# Patient Record
Sex: FEMALE | Race: WHITE | NOT HISPANIC OR LATINO | Employment: UNEMPLOYED | URBAN - NONMETROPOLITAN AREA
[De-identification: names, ages, dates, MRNs, and addresses within clinical notes are randomized per-mention and may not be internally consistent; named-entity substitution may affect disease eponyms.]

---

## 2017-03-02 ENCOUNTER — ALLSCRIPTS OFFICE VISIT (OUTPATIENT)
Dept: FAMILY MEDICINE CLINIC | Facility: CLINIC | Age: 47
End: 2017-03-02
Payer: COMMERCIAL

## 2017-03-02 DIAGNOSIS — I42.0 DILATED CARDIOMYOPATHY (HCC): ICD-10-CM

## 2017-03-02 DIAGNOSIS — E55.9 VITAMIN D DEFICIENCY: ICD-10-CM

## 2017-03-02 DIAGNOSIS — R00.0 TACHYCARDIA: ICD-10-CM

## 2017-03-02 DIAGNOSIS — I25.10 ATHEROSCLEROTIC HEART DISEASE OF NATIVE CORONARY ARTERY WITHOUT ANGINA PECTORIS: ICD-10-CM

## 2017-03-02 PROCEDURE — T1015 CLINIC SERVICE: HCPCS | Performed by: PHYSICIAN ASSISTANT

## 2017-05-15 ENCOUNTER — ALLSCRIPTS OFFICE VISIT (OUTPATIENT)
Dept: FAMILY MEDICINE CLINIC | Facility: CLINIC | Age: 47
End: 2017-05-15
Payer: COMMERCIAL

## 2017-05-15 ENCOUNTER — APPOINTMENT (OUTPATIENT)
Dept: LAB | Facility: HOSPITAL | Age: 47
End: 2017-05-15
Payer: COMMERCIAL

## 2017-05-15 DIAGNOSIS — E55.9 VITAMIN D DEFICIENCY: ICD-10-CM

## 2017-05-15 DIAGNOSIS — I25.10 ATHEROSCLEROTIC HEART DISEASE OF NATIVE CORONARY ARTERY WITHOUT ANGINA PECTORIS: ICD-10-CM

## 2017-05-15 DIAGNOSIS — I42.0 DILATED CARDIOMYOPATHY (HCC): ICD-10-CM

## 2017-05-15 DIAGNOSIS — R00.0 TACHYCARDIA: ICD-10-CM

## 2017-05-15 LAB
25(OH)D3 SERPL-MCNC: 7.3 NG/ML (ref 30–100)
ALBUMIN SERPL BCP-MCNC: 3.5 G/DL (ref 3.5–5)
ALP SERPL-CCNC: 156 U/L (ref 46–116)
ALT SERPL W P-5'-P-CCNC: 16 U/L (ref 12–78)
ANION GAP SERPL CALCULATED.3IONS-SCNC: 9 MMOL/L (ref 4–13)
AST SERPL W P-5'-P-CCNC: 17 U/L (ref 5–45)
BASOPHILS # BLD AUTO: 0.09 THOUSANDS/ΜL (ref 0–0.1)
BASOPHILS NFR BLD AUTO: 1 % (ref 0–1)
BILIRUB SERPL-MCNC: 0.25 MG/DL (ref 0.2–1)
BUN SERPL-MCNC: 8 MG/DL (ref 5–25)
CALCIUM SERPL-MCNC: 9 MG/DL (ref 8.3–10.1)
CHLORIDE SERPL-SCNC: 109 MMOL/L (ref 100–108)
CHOLEST SERPL-MCNC: 147 MG/DL (ref 50–200)
CO2 SERPL-SCNC: 25 MMOL/L (ref 21–32)
CREAT SERPL-MCNC: 0.75 MG/DL (ref 0.6–1.3)
EOSINOPHIL # BLD AUTO: 0.11 THOUSAND/ΜL (ref 0–0.61)
EOSINOPHIL NFR BLD AUTO: 1 % (ref 0–6)
ERYTHROCYTE [DISTWIDTH] IN BLOOD BY AUTOMATED COUNT: 15 % (ref 11.6–15.1)
GFR SERPL CREATININE-BSD FRML MDRD: >60 ML/MIN/1.73SQ M
GLUCOSE P FAST SERPL-MCNC: 92 MG/DL (ref 65–99)
HCT VFR BLD AUTO: 41.5 % (ref 34.8–46.1)
HDLC SERPL-MCNC: 37 MG/DL (ref 40–60)
HGB BLD-MCNC: 13.3 G/DL (ref 11.5–15.4)
LDLC SERPL CALC-MCNC: 85 MG/DL (ref 0–100)
LYMPHOCYTES # BLD AUTO: 3.51 THOUSANDS/ΜL (ref 0.6–4.47)
LYMPHOCYTES NFR BLD AUTO: 26 % (ref 14–44)
MCH RBC QN AUTO: 28.9 PG (ref 26.8–34.3)
MCHC RBC AUTO-ENTMCNC: 32 G/DL (ref 31.4–37.4)
MCV RBC AUTO: 90 FL (ref 82–98)
MONOCYTES # BLD AUTO: 0.51 THOUSAND/ΜL (ref 0.17–1.22)
MONOCYTES NFR BLD AUTO: 4 % (ref 4–12)
NEUTROPHILS # BLD AUTO: 9.35 THOUSANDS/ΜL (ref 1.85–7.62)
NEUTS SEG NFR BLD AUTO: 68 % (ref 43–75)
NRBC BLD AUTO-RTO: 0 /100 WBCS
PLATELET # BLD AUTO: 438 THOUSANDS/UL (ref 149–390)
PMV BLD AUTO: 10.4 FL (ref 8.9–12.7)
POTASSIUM SERPL-SCNC: 4.5 MMOL/L (ref 3.5–5.3)
PROT SERPL-MCNC: 7.1 G/DL (ref 6.4–8.2)
RBC # BLD AUTO: 4.61 MILLION/UL (ref 3.81–5.12)
SODIUM SERPL-SCNC: 143 MMOL/L (ref 136–145)
TRIGL SERPL-MCNC: 123 MG/DL
TSH SERPL DL<=0.05 MIU/L-ACNC: 0.5 UIU/ML (ref 0.36–3.74)
WBC # BLD AUTO: 13.61 THOUSAND/UL (ref 4.31–10.16)

## 2017-05-15 PROCEDURE — T1015 CLINIC SERVICE: HCPCS | Performed by: FAMILY MEDICINE

## 2017-05-15 PROCEDURE — 80053 COMPREHEN METABOLIC PANEL: CPT

## 2017-05-15 PROCEDURE — 82306 VITAMIN D 25 HYDROXY: CPT

## 2017-05-15 PROCEDURE — 80061 LIPID PANEL: CPT

## 2017-05-15 PROCEDURE — 84443 ASSAY THYROID STIM HORMONE: CPT

## 2017-05-15 PROCEDURE — 36415 COLL VENOUS BLD VENIPUNCTURE: CPT

## 2017-05-15 PROCEDURE — 85025 COMPLETE CBC W/AUTO DIFF WBC: CPT

## 2017-07-12 ENCOUNTER — ALLSCRIPTS OFFICE VISIT (OUTPATIENT)
Dept: OTHER | Facility: OTHER | Age: 47
End: 2017-07-12

## 2017-07-27 ENCOUNTER — GENERIC CONVERSION - ENCOUNTER (OUTPATIENT)
Dept: OTHER | Facility: OTHER | Age: 47
End: 2017-07-27

## 2017-07-27 ENCOUNTER — APPOINTMENT (EMERGENCY)
Dept: CT IMAGING | Facility: HOSPITAL | Age: 47
End: 2017-07-27
Payer: COMMERCIAL

## 2017-07-27 ENCOUNTER — HOSPITAL ENCOUNTER (OUTPATIENT)
Facility: HOSPITAL | Age: 47
Setting detail: OBSERVATION
Discharge: HOME | End: 2017-07-28
Attending: EMERGENCY MEDICINE | Admitting: INTERNAL MEDICINE
Payer: COMMERCIAL

## 2017-07-27 DIAGNOSIS — I25.810 CORONARY ARTERY DISEASE INVOLVING CORONARY BYPASS GRAFT: Chronic | ICD-10-CM

## 2017-07-27 DIAGNOSIS — G45.9 BRAIN TIA: Primary | ICD-10-CM

## 2017-07-27 LAB
ALBUMIN SERPL BCP-MCNC: 3.4 G/DL (ref 3.5–5)
ALP SERPL-CCNC: 140 U/L (ref 46–116)
ALT SERPL W P-5'-P-CCNC: 16 U/L (ref 12–78)
ANION GAP SERPL CALCULATED.3IONS-SCNC: 10 MMOL/L (ref 4–13)
AST SERPL W P-5'-P-CCNC: 18 U/L (ref 5–45)
BASOPHILS # BLD AUTO: 0.08 THOUSANDS/ΜL (ref 0–0.1)
BASOPHILS NFR BLD AUTO: 1 % (ref 0–1)
BILIRUB SERPL-MCNC: 0.2 MG/DL (ref 0.2–1)
BUN SERPL-MCNC: 9 MG/DL (ref 5–25)
CALCIUM SERPL-MCNC: 8.6 MG/DL (ref 8.3–10.1)
CHLORIDE SERPL-SCNC: 104 MMOL/L (ref 100–108)
CO2 SERPL-SCNC: 26 MMOL/L (ref 21–32)
CREAT SERPL-MCNC: 0.85 MG/DL (ref 0.6–1.3)
EOSINOPHIL # BLD AUTO: 0.12 THOUSAND/ΜL (ref 0–0.61)
EOSINOPHIL NFR BLD AUTO: 1 % (ref 0–6)
ERYTHROCYTE [DISTWIDTH] IN BLOOD BY AUTOMATED COUNT: 15 % (ref 11.6–15.1)
GFR SERPL CREATININE-BSD FRML MDRD: 82 ML/MIN/1.73SQ M
GLUCOSE SERPL-MCNC: 68 MG/DL (ref 65–140)
HCT VFR BLD AUTO: 41.2 % (ref 34.8–46.1)
HGB BLD-MCNC: 13.6 G/DL (ref 11.5–15.4)
INR PPP: 0.94 (ref 0.86–1.16)
LYMPHOCYTES # BLD AUTO: 3.66 THOUSANDS/ΜL (ref 0.6–4.47)
LYMPHOCYTES NFR BLD AUTO: 30 % (ref 14–44)
MCH RBC QN AUTO: 28.8 PG (ref 26.8–34.3)
MCHC RBC AUTO-ENTMCNC: 33 G/DL (ref 31.4–37.4)
MCV RBC AUTO: 87 FL (ref 82–98)
MONOCYTES # BLD AUTO: 0.77 THOUSAND/ΜL (ref 0.17–1.22)
MONOCYTES NFR BLD AUTO: 6 % (ref 4–12)
NEUTROPHILS # BLD AUTO: 7.47 THOUSANDS/ΜL (ref 1.85–7.62)
NEUTS SEG NFR BLD AUTO: 62 % (ref 43–75)
PLATELET # BLD AUTO: 427 THOUSANDS/UL (ref 149–390)
PMV BLD AUTO: 9.4 FL (ref 8.9–12.7)
POTASSIUM SERPL-SCNC: 3.1 MMOL/L (ref 3.5–5.3)
PROT SERPL-MCNC: 7.4 G/DL (ref 6.4–8.2)
PROTHROMBIN TIME: 12.5 SECONDS (ref 12.1–14.4)
RBC # BLD AUTO: 4.72 MILLION/UL (ref 3.81–5.12)
SODIUM SERPL-SCNC: 140 MMOL/L (ref 136–145)
SPECIMEN SOURCE: NORMAL
TROPONIN I BLD-MCNC: 0 NG/ML (ref 0–0.08)
TSH SERPL DL<=0.05 MIU/L-ACNC: 1.22 UIU/ML (ref 0.36–3.74)
WBC # BLD AUTO: 12.1 THOUSAND/UL (ref 4.31–10.16)

## 2017-07-27 PROCEDURE — 36415 COLL VENOUS BLD VENIPUNCTURE: CPT | Performed by: EMERGENCY MEDICINE

## 2017-07-27 PROCEDURE — 85025 COMPLETE CBC W/AUTO DIFF WBC: CPT | Performed by: EMERGENCY MEDICINE

## 2017-07-27 PROCEDURE — 84484 ASSAY OF TROPONIN QUANT: CPT

## 2017-07-27 PROCEDURE — 80053 COMPREHEN METABOLIC PANEL: CPT | Performed by: EMERGENCY MEDICINE

## 2017-07-27 PROCEDURE — 70450 CT HEAD/BRAIN W/O DYE: CPT

## 2017-07-27 PROCEDURE — 83880 ASSAY OF NATRIURETIC PEPTIDE: CPT | Performed by: INTERNAL MEDICINE

## 2017-07-27 PROCEDURE — 85610 PROTHROMBIN TIME: CPT | Performed by: EMERGENCY MEDICINE

## 2017-07-27 PROCEDURE — 93005 ELECTROCARDIOGRAM TRACING: CPT | Performed by: EMERGENCY MEDICINE

## 2017-07-27 PROCEDURE — 84443 ASSAY THYROID STIM HORMONE: CPT | Performed by: EMERGENCY MEDICINE

## 2017-07-28 ENCOUNTER — APPOINTMENT (OUTPATIENT)
Dept: OCCUPATIONAL THERAPY | Facility: HOSPITAL | Age: 47
End: 2017-07-28
Payer: COMMERCIAL

## 2017-07-28 ENCOUNTER — APPOINTMENT (OUTPATIENT)
Dept: ULTRASOUND IMAGING | Facility: HOSPITAL | Age: 47
End: 2017-07-28
Payer: COMMERCIAL

## 2017-07-28 ENCOUNTER — APPOINTMENT (OUTPATIENT)
Dept: RADIOLOGY | Facility: HOSPITAL | Age: 47
End: 2017-07-28
Payer: COMMERCIAL

## 2017-07-28 ENCOUNTER — APPOINTMENT (OUTPATIENT)
Dept: CT IMAGING | Facility: HOSPITAL | Age: 47
End: 2017-07-28
Payer: COMMERCIAL

## 2017-07-28 ENCOUNTER — GENERIC CONVERSION - ENCOUNTER (OUTPATIENT)
Dept: OTHER | Facility: OTHER | Age: 47
End: 2017-07-28

## 2017-07-28 ENCOUNTER — APPOINTMENT (OUTPATIENT)
Dept: MRI IMAGING | Facility: HOSPITAL | Age: 47
End: 2017-07-28
Payer: COMMERCIAL

## 2017-07-28 ENCOUNTER — APPOINTMENT (OUTPATIENT)
Dept: NON INVASIVE DIAGNOSTICS | Facility: HOSPITAL | Age: 47
End: 2017-07-28
Payer: COMMERCIAL

## 2017-07-28 ENCOUNTER — APPOINTMENT (OUTPATIENT)
Dept: PHYSICAL THERAPY | Facility: HOSPITAL | Age: 47
End: 2017-07-28
Payer: COMMERCIAL

## 2017-07-28 VITALS
SYSTOLIC BLOOD PRESSURE: 122 MMHG | OXYGEN SATURATION: 95 % | WEIGHT: 158.51 LBS | HEART RATE: 66 BPM | TEMPERATURE: 99 F | HEIGHT: 66 IN | BODY MASS INDEX: 25.47 KG/M2 | DIASTOLIC BLOOD PRESSURE: 61 MMHG | RESPIRATION RATE: 16 BRPM

## 2017-07-28 PROBLEM — G45.9 BRAIN TIA: Status: ACTIVE | Noted: 2017-07-28

## 2017-07-28 PROBLEM — R20.0 LEFT SIDED NUMBNESS: Status: ACTIVE | Noted: 2017-07-28

## 2017-07-28 PROBLEM — R06.00 DOE (DYSPNEA ON EXERTION): Status: ACTIVE | Noted: 2017-07-28

## 2017-07-28 PROBLEM — R06.09 DOE (DYSPNEA ON EXERTION): Status: ACTIVE | Noted: 2017-07-28

## 2017-07-28 PROBLEM — I65.22 LEFT CAROTID STENOSIS: Chronic | Status: ACTIVE | Noted: 2017-07-28

## 2017-07-28 PROBLEM — M79.606 LEG PAIN: Status: ACTIVE | Noted: 2017-07-28

## 2017-07-28 LAB
ALBUMIN SERPL BCP-MCNC: 3.1 G/DL (ref 3.5–5)
ALP SERPL-CCNC: 133 U/L (ref 46–116)
ALT SERPL W P-5'-P-CCNC: 11 U/L (ref 12–78)
ANION GAP SERPL CALCULATED.3IONS-SCNC: 9 MMOL/L (ref 4–13)
AST SERPL W P-5'-P-CCNC: 18 U/L (ref 5–45)
ATRIAL RATE: 87 BPM
BILIRUB SERPL-MCNC: 0.2 MG/DL (ref 0.2–1)
BUN SERPL-MCNC: 7 MG/DL (ref 5–25)
CALCIUM SERPL-MCNC: 8.6 MG/DL (ref 8.3–10.1)
CHLORIDE SERPL-SCNC: 107 MMOL/L (ref 100–108)
CHOLEST SERPL-MCNC: 187 MG/DL (ref 50–200)
CO2 SERPL-SCNC: 24 MMOL/L (ref 21–32)
CREAT SERPL-MCNC: 0.74 MG/DL (ref 0.6–1.3)
DEPRECATED D DIMER PPP: 614 NG/ML (FEU) (ref 0–424)
ERYTHROCYTE [DISTWIDTH] IN BLOOD BY AUTOMATED COUNT: 15 % (ref 11.6–15.1)
EST. AVERAGE GLUCOSE BLD GHB EST-MCNC: 126 MG/DL
GFR SERPL CREATININE-BSD FRML MDRD: 97 ML/MIN/1.73SQ M
GLUCOSE P FAST SERPL-MCNC: 91 MG/DL (ref 65–99)
GLUCOSE SERPL-MCNC: 91 MG/DL (ref 65–140)
HBA1C MFR BLD: 6 % (ref 4.2–6.3)
HCT VFR BLD AUTO: 40.1 % (ref 34.8–46.1)
HDLC SERPL-MCNC: 28 MG/DL (ref 40–60)
HGB BLD-MCNC: 13.1 G/DL (ref 11.5–15.4)
LDLC SERPL CALC-MCNC: 127 MG/DL (ref 0–100)
MAGNESIUM SERPL-MCNC: 1.9 MG/DL (ref 1.6–2.6)
MCH RBC QN AUTO: 28.4 PG (ref 26.8–34.3)
MCHC RBC AUTO-ENTMCNC: 32.7 G/DL (ref 31.4–37.4)
MCV RBC AUTO: 87 FL (ref 82–98)
NT-PROBNP SERPL-MCNC: 133 PG/ML
P AXIS: 75 DEGREES
PHOSPHATE SERPL-MCNC: 3.3 MG/DL (ref 2.7–4.5)
PLATELET # BLD AUTO: 420 THOUSANDS/UL (ref 149–390)
PMV BLD AUTO: 9.9 FL (ref 8.9–12.7)
POTASSIUM SERPL-SCNC: 3.3 MMOL/L (ref 3.5–5.3)
PR INTERVAL: 160 MS
PROT SERPL-MCNC: 7 G/DL (ref 6.4–8.2)
QRS AXIS: 101 DEGREES
QRSD INTERVAL: 144 MS
QT INTERVAL: 420 MS
QTC INTERVAL: 505 MS
RBC # BLD AUTO: 4.62 MILLION/UL (ref 3.81–5.12)
SODIUM SERPL-SCNC: 140 MMOL/L (ref 136–145)
T WAVE AXIS: 57 DEGREES
TRIGL SERPL-MCNC: 159 MG/DL
VENTRICULAR RATE: 87 BPM
WBC # BLD AUTO: 13.54 THOUSAND/UL (ref 4.31–10.16)

## 2017-07-28 PROCEDURE — 73630 X-RAY EXAM OF FOOT: CPT

## 2017-07-28 PROCEDURE — 70551 MRI BRAIN STEM W/O DYE: CPT

## 2017-07-28 PROCEDURE — 71275 CT ANGIOGRAPHY CHEST: CPT

## 2017-07-28 PROCEDURE — 93306 TTE W/DOPPLER COMPLETE: CPT

## 2017-07-28 PROCEDURE — G8996 SWALLOW CURRENT STATUS: HCPCS

## 2017-07-28 PROCEDURE — 80053 COMPREHEN METABOLIC PANEL: CPT | Performed by: INTERNAL MEDICINE

## 2017-07-28 PROCEDURE — G8998 SWALLOW D/C STATUS: HCPCS

## 2017-07-28 PROCEDURE — 71010 HB CHEST X-RAY 1 VIEW FRONTAL (PORTABLE): CPT

## 2017-07-28 PROCEDURE — 97166 OT EVAL MOD COMPLEX 45 MIN: CPT

## 2017-07-28 PROCEDURE — G8988 SELF CARE GOAL STATUS: HCPCS

## 2017-07-28 PROCEDURE — 93970 EXTREMITY STUDY: CPT

## 2017-07-28 PROCEDURE — 99285 EMERGENCY DEPT VISIT HI MDM: CPT

## 2017-07-28 PROCEDURE — G8997 SWALLOW GOAL STATUS: HCPCS

## 2017-07-28 PROCEDURE — 93880 EXTRACRANIAL BILAT STUDY: CPT

## 2017-07-28 PROCEDURE — 92610 EVALUATE SWALLOWING FUNCTION: CPT

## 2017-07-28 PROCEDURE — G8979 MOBILITY GOAL STATUS: HCPCS | Performed by: PHYSICAL THERAPIST

## 2017-07-28 PROCEDURE — 85027 COMPLETE CBC AUTOMATED: CPT | Performed by: INTERNAL MEDICINE

## 2017-07-28 PROCEDURE — 80061 LIPID PANEL: CPT | Performed by: INTERNAL MEDICINE

## 2017-07-28 PROCEDURE — 97530 THERAPEUTIC ACTIVITIES: CPT

## 2017-07-28 PROCEDURE — 83735 ASSAY OF MAGNESIUM: CPT | Performed by: INTERNAL MEDICINE

## 2017-07-28 PROCEDURE — 97163 PT EVAL HIGH COMPLEX 45 MIN: CPT | Performed by: PHYSICAL THERAPIST

## 2017-07-28 PROCEDURE — G8987 SELF CARE CURRENT STATUS: HCPCS

## 2017-07-28 PROCEDURE — 84100 ASSAY OF PHOSPHORUS: CPT | Performed by: INTERNAL MEDICINE

## 2017-07-28 PROCEDURE — 85379 FIBRIN DEGRADATION QUANT: CPT | Performed by: INTERNAL MEDICINE

## 2017-07-28 PROCEDURE — 83036 HEMOGLOBIN GLYCOSYLATED A1C: CPT | Performed by: INTERNAL MEDICINE

## 2017-07-28 PROCEDURE — G8978 MOBILITY CURRENT STATUS: HCPCS | Performed by: PHYSICAL THERAPIST

## 2017-07-28 PROCEDURE — G8989 SELF CARE D/C STATUS: HCPCS

## 2017-07-28 RX ORDER — GABAPENTIN 400 MG/1
400 CAPSULE ORAL 3 TIMES DAILY
Status: DISCONTINUED | OUTPATIENT
Start: 2017-07-28 | End: 2017-07-28 | Stop reason: HOSPADM

## 2017-07-28 RX ORDER — OXYCODONE HYDROCHLORIDE AND ACETAMINOPHEN 5; 325 MG/1; MG/1
1 TABLET ORAL EVERY 4 HOURS PRN
Status: DISCONTINUED | OUTPATIENT
Start: 2017-07-28 | End: 2017-07-28 | Stop reason: HOSPADM

## 2017-07-28 RX ORDER — ASPIRIN 81 MG/1
81 TABLET, CHEWABLE ORAL DAILY
Status: DISCONTINUED | OUTPATIENT
Start: 2017-07-28 | End: 2017-07-28 | Stop reason: HOSPADM

## 2017-07-28 RX ORDER — NICOTINE 21 MG/24HR
1 PATCH, TRANSDERMAL 24 HOURS TRANSDERMAL DAILY
Status: DISCONTINUED | OUTPATIENT
Start: 2017-07-28 | End: 2017-07-28 | Stop reason: HOSPADM

## 2017-07-28 RX ORDER — ATORVASTATIN CALCIUM 40 MG/1
40 TABLET, FILM COATED ORAL EVERY EVENING
Qty: 30 TABLET | Refills: 0 | Status: SHIPPED | OUTPATIENT
Start: 2017-07-28 | End: 2018-05-03 | Stop reason: SDUPTHER

## 2017-07-28 RX ORDER — OXYCODONE HYDROCHLORIDE AND ACETAMINOPHEN 5; 325 MG/1; MG/1
1 TABLET ORAL EVERY 4 HOURS PRN
Refills: 0
Start: 2017-07-28 | End: 2017-08-07

## 2017-07-28 RX ORDER — PANTOPRAZOLE SODIUM 40 MG/1
40 TABLET, DELAYED RELEASE ORAL
Status: DISCONTINUED | OUTPATIENT
Start: 2017-07-28 | End: 2017-07-28 | Stop reason: HOSPADM

## 2017-07-28 RX ORDER — ACETAMINOPHEN 325 MG/1
650 TABLET ORAL EVERY 4 HOURS PRN
Status: DISCONTINUED | OUTPATIENT
Start: 2017-07-28 | End: 2017-07-28 | Stop reason: HOSPADM

## 2017-07-28 RX ORDER — ASPIRIN 81 MG/1
81 TABLET, CHEWABLE ORAL DAILY
Qty: 30 TABLET | Refills: 0 | Status: SHIPPED | OUTPATIENT
Start: 2017-07-28 | End: 2018-04-06 | Stop reason: SDUPTHER

## 2017-07-28 RX ORDER — ATORVASTATIN CALCIUM 40 MG/1
40 TABLET, FILM COATED ORAL EVERY EVENING
Status: DISCONTINUED | OUTPATIENT
Start: 2017-07-28 | End: 2017-07-28 | Stop reason: HOSPADM

## 2017-07-28 RX ORDER — CITALOPRAM 20 MG/1
20 TABLET ORAL DAILY
Status: DISCONTINUED | OUTPATIENT
Start: 2017-07-28 | End: 2017-07-28 | Stop reason: HOSPADM

## 2017-07-28 RX ORDER — POTASSIUM CHLORIDE 20 MEQ/1
20 TABLET, EXTENDED RELEASE ORAL ONCE
Status: COMPLETED | OUTPATIENT
Start: 2017-07-28 | End: 2017-07-28

## 2017-07-28 RX ORDER — MAGNESIUM HYDROXIDE/ALUMINUM HYDROXICE/SIMETHICONE 120; 1200; 1200 MG/30ML; MG/30ML; MG/30ML
30 SUSPENSION ORAL EVERY 6 HOURS PRN
Status: DISCONTINUED | OUTPATIENT
Start: 2017-07-28 | End: 2017-07-28 | Stop reason: HOSPADM

## 2017-07-28 RX ADMIN — POTASSIUM CHLORIDE 20 MEQ: 1500 TABLET, EXTENDED RELEASE ORAL at 10:20

## 2017-07-28 RX ADMIN — ASPIRIN 81 MG 81 MG: 81 TABLET ORAL at 02:25

## 2017-07-28 RX ADMIN — GABAPENTIN 400 MG: 400 CAPSULE ORAL at 08:13

## 2017-07-28 RX ADMIN — ASPIRIN 81 MG 81 MG: 81 TABLET ORAL at 08:12

## 2017-07-28 RX ADMIN — CITALOPRAM HYDROBROMIDE 20 MG: 20 TABLET ORAL at 08:12

## 2017-07-28 RX ADMIN — IOHEXOL 85 ML: 350 INJECTION, SOLUTION INTRAVENOUS at 12:04

## 2017-07-28 RX ADMIN — ENOXAPARIN SODIUM 40 MG: 40 INJECTION SUBCUTANEOUS at 08:13

## 2017-07-28 RX ADMIN — OXYCODONE HYDROCHLORIDE AND ACETAMINOPHEN 1 TABLET: 5; 325 TABLET ORAL at 02:27

## 2017-07-28 RX ADMIN — METOPROLOL TARTRATE 12.5 MG: 25 TABLET ORAL at 02:25

## 2017-07-28 RX ADMIN — METOPROLOL TARTRATE 12.5 MG: 25 TABLET ORAL at 16:16

## 2017-07-28 RX ADMIN — PANTOPRAZOLE SODIUM 40 MG: 40 TABLET, DELAYED RELEASE ORAL at 06:11

## 2017-07-28 RX ADMIN — ALUMINUM HYDROXIDE, MAGNESIUM HYDROXIDE, AND SIMETHICONE 30 ML: 200; 200; 20 SUSPENSION ORAL at 10:20

## 2017-07-28 RX ADMIN — GABAPENTIN 400 MG: 400 CAPSULE ORAL at 16:16

## 2017-07-28 RX ADMIN — METOPROLOL TARTRATE 12.5 MG: 25 TABLET ORAL at 08:15

## 2017-09-10 ENCOUNTER — HOSPITAL ENCOUNTER (INPATIENT)
Facility: HOSPITAL | Age: 47
LOS: 2 days | Discharge: HOME | DRG: 190 | End: 2017-09-12
Attending: HOSPITALIST | Admitting: HOSPITALIST
Payer: COMMERCIAL

## 2017-09-10 DIAGNOSIS — Z95.1 S/P CABG X 1: Primary | ICD-10-CM

## 2017-09-10 DIAGNOSIS — I21.4 NSTEMI (NON-ST ELEVATED MYOCARDIAL INFARCTION) (HCC): ICD-10-CM

## 2017-09-10 PROBLEM — D72.829 LEUKOCYTOSIS: Status: ACTIVE | Noted: 2017-09-10

## 2017-09-10 PROBLEM — G45.9 TIA (TRANSIENT ISCHEMIC ATTACK): Status: ACTIVE | Noted: 2017-09-10

## 2017-09-10 LAB
ANION GAP SERPL CALCULATED.3IONS-SCNC: 8 MMOL/L (ref 4–13)
APTT PPP: 29 SECONDS (ref 23–35)
APTT PPP: 49 SECONDS (ref 23–35)
APTT PPP: 85 SECONDS (ref 23–35)
ATRIAL RATE: 71 BPM
ATRIAL RATE: 74 BPM
ATRIAL RATE: 76 BPM
BACTERIA UR QL AUTO: NORMAL /HPF
BILIRUB UR QL STRIP: NEGATIVE
BUN SERPL-MCNC: 3 MG/DL (ref 5–25)
CALCIUM SERPL-MCNC: 8.7 MG/DL (ref 8.3–10.1)
CHLORIDE SERPL-SCNC: 108 MMOL/L (ref 100–108)
CLARITY UR: NORMAL
CO2 SERPL-SCNC: 26 MMOL/L (ref 21–32)
COLOR UR: YELLOW
CREAT SERPL-MCNC: 0.71 MG/DL (ref 0.6–1.3)
ERYTHROCYTE [DISTWIDTH] IN BLOOD BY AUTOMATED COUNT: 15.1 % (ref 11.6–15.1)
GFR SERPL CREATININE-BSD FRML MDRD: 102 ML/MIN/1.73SQ M
GLUCOSE SERPL-MCNC: 100 MG/DL (ref 65–140)
GLUCOSE UR STRIP-MCNC: NEGATIVE MG/DL
HCG SERPL QL: ABNORMAL
HCT VFR BLD AUTO: 40.2 % (ref 34.8–46.1)
HGB BLD-MCNC: 13.1 G/DL (ref 11.5–15.4)
HGB UR QL STRIP.AUTO: NEGATIVE
INR PPP: 1.1 (ref 0.86–1.16)
KETONES UR STRIP-MCNC: NEGATIVE MG/DL
LEUKOCYTE ESTERASE UR QL STRIP: NEGATIVE
MCH RBC QN AUTO: 28.6 PG (ref 26.8–34.3)
MCHC RBC AUTO-ENTMCNC: 32.6 G/DL (ref 31.4–37.4)
MCV RBC AUTO: 88 FL (ref 82–98)
NITRITE UR QL STRIP: NEGATIVE
NON-SQ EPI CELLS URNS QL MICRO: NORMAL /HPF
P AXIS: 63 DEGREES
P AXIS: 70 DEGREES
P AXIS: 74 DEGREES
PH UR STRIP.AUTO: 7 [PH] (ref 4.5–8)
PLATELET # BLD AUTO: 365 THOUSANDS/UL (ref 149–390)
PMV BLD AUTO: 9.8 FL (ref 8.9–12.7)
POTASSIUM SERPL-SCNC: 3.7 MMOL/L (ref 3.5–5.3)
PR INTERVAL: 174 MS
PR INTERVAL: 178 MS
PR INTERVAL: 186 MS
PROT UR STRIP-MCNC: NEGATIVE MG/DL
PROTHROMBIN TIME: 14.2 SECONDS (ref 12.1–14.4)
QRS AXIS: 100 DEGREES
QRS AXIS: 106 DEGREES
QRS AXIS: 99 DEGREES
QRSD INTERVAL: 140 MS
QRSD INTERVAL: 146 MS
QRSD INTERVAL: 160 MS
QT INTERVAL: 428 MS
QT INTERVAL: 432 MS
QT INTERVAL: 436 MS
QTC INTERVAL: 469 MS
QTC INTERVAL: 475 MS
QTC INTERVAL: 490 MS
RBC # BLD AUTO: 4.58 MILLION/UL (ref 3.81–5.12)
RBC #/AREA URNS AUTO: NORMAL /HPF
SODIUM SERPL-SCNC: 142 MMOL/L (ref 136–145)
SP GR UR STRIP.AUTO: 1 (ref 1–1.03)
T WAVE AXIS: 76 DEGREES
T WAVE AXIS: 78 DEGREES
T WAVE AXIS: 85 DEGREES
TROPONIN I SERPL-MCNC: 0.97 NG/ML
TROPONIN I SERPL-MCNC: 1.03 NG/ML
TROPONIN I SERPL-MCNC: 1.36 NG/ML
TROPONIN I SERPL-MCNC: 1.39 NG/ML
TROPONIN I SERPL-MCNC: 1.59 NG/ML
TROPONIN I SERPL-MCNC: 1.67 NG/ML
UROBILINOGEN UR QL STRIP.AUTO: 0.2 E.U./DL
VENTRICULAR RATE: 71 BPM
VENTRICULAR RATE: 74 BPM
VENTRICULAR RATE: 76 BPM
WBC # BLD AUTO: 15.24 THOUSAND/UL (ref 4.31–10.16)
WBC #/AREA URNS AUTO: NORMAL /HPF

## 2017-09-10 PROCEDURE — 80048 BASIC METABOLIC PNL TOTAL CA: CPT | Performed by: HOSPITALIST

## 2017-09-10 PROCEDURE — 93005 ELECTROCARDIOGRAM TRACING: CPT

## 2017-09-10 PROCEDURE — B211YZZ FLUOROSCOPY OF MULTIPLE CORONARY ARTERIES USING OTHER CONTRAST: ICD-10-PCS | Performed by: HOSPITALIST

## 2017-09-10 PROCEDURE — 93005 ELECTROCARDIOGRAM TRACING: CPT | Performed by: HOSPITALIST

## 2017-09-10 PROCEDURE — 84484 ASSAY OF TROPONIN QUANT: CPT | Performed by: NURSE PRACTITIONER

## 2017-09-10 PROCEDURE — 84484 ASSAY OF TROPONIN QUANT: CPT | Performed by: HOSPITALIST

## 2017-09-10 PROCEDURE — 85610 PROTHROMBIN TIME: CPT | Performed by: HOSPITALIST

## 2017-09-10 PROCEDURE — 4A023N7 MEASUREMENT OF CARDIAC SAMPLING AND PRESSURE, LEFT HEART, PERCUTANEOUS APPROACH: ICD-10-PCS | Performed by: HOSPITALIST

## 2017-09-10 PROCEDURE — 85730 THROMBOPLASTIN TIME PARTIAL: CPT | Performed by: NURSE PRACTITIONER

## 2017-09-10 PROCEDURE — 84703 CHORIONIC GONADOTROPIN ASSAY: CPT | Performed by: STUDENT IN AN ORGANIZED HEALTH CARE EDUCATION/TRAINING PROGRAM

## 2017-09-10 PROCEDURE — 81001 URINALYSIS AUTO W/SCOPE: CPT | Performed by: HOSPITALIST

## 2017-09-10 PROCEDURE — 93005 ELECTROCARDIOGRAM TRACING: CPT | Performed by: NURSE PRACTITIONER

## 2017-09-10 PROCEDURE — B212YZZ FLUOROSCOPY OF SINGLE CORONARY ARTERY BYPASS GRAFT USING OTHER CONTRAST: ICD-10-PCS | Performed by: HOSPITALIST

## 2017-09-10 PROCEDURE — 85027 COMPLETE CBC AUTOMATED: CPT | Performed by: HOSPITALIST

## 2017-09-10 PROCEDURE — B215YZZ FLUOROSCOPY OF LEFT HEART USING OTHER CONTRAST: ICD-10-PCS | Performed by: HOSPITALIST

## 2017-09-10 PROCEDURE — 85730 THROMBOPLASTIN TIME PARTIAL: CPT | Performed by: HOSPITALIST

## 2017-09-10 RX ORDER — CLOPIDOGREL BISULFATE 75 MG/1
300 TABLET ORAL DAILY
Status: DISCONTINUED | OUTPATIENT
Start: 2017-09-10 | End: 2017-09-10

## 2017-09-10 RX ORDER — ASPIRIN 81 MG/1
81 TABLET, CHEWABLE ORAL DAILY
Status: DISCONTINUED | OUTPATIENT
Start: 2017-09-10 | End: 2017-09-12 | Stop reason: HOSPADM

## 2017-09-10 RX ORDER — SENNOSIDES 8.6 MG
1 TABLET ORAL DAILY
Status: DISCONTINUED | OUTPATIENT
Start: 2017-09-10 | End: 2017-09-12 | Stop reason: HOSPADM

## 2017-09-10 RX ORDER — HEPARIN SODIUM 1000 [USP'U]/ML
2000 INJECTION, SOLUTION INTRAVENOUS; SUBCUTANEOUS AS NEEDED
Status: DISCONTINUED | OUTPATIENT
Start: 2017-09-10 | End: 2017-09-11

## 2017-09-10 RX ORDER — MORPHINE SULFATE 2 MG/ML
2 INJECTION, SOLUTION INTRAMUSCULAR; INTRAVENOUS EVERY 4 HOURS PRN
Status: DISCONTINUED | OUTPATIENT
Start: 2017-09-10 | End: 2017-09-12 | Stop reason: HOSPADM

## 2017-09-10 RX ORDER — HEPARIN SODIUM 10000 [USP'U]/100ML
3-20 INJECTION, SOLUTION INTRAVENOUS
Status: DISCONTINUED | OUTPATIENT
Start: 2017-09-10 | End: 2017-09-11

## 2017-09-10 RX ORDER — ATORVASTATIN CALCIUM 40 MG/1
40 TABLET, FILM COATED ORAL EVERY EVENING
Status: DISCONTINUED | OUTPATIENT
Start: 2017-09-10 | End: 2017-09-12 | Stop reason: HOSPADM

## 2017-09-10 RX ORDER — CLOPIDOGREL BISULFATE 75 MG/1
75 TABLET ORAL DAILY
Status: DISCONTINUED | OUTPATIENT
Start: 2017-09-11 | End: 2017-09-11

## 2017-09-10 RX ORDER — SODIUM CHLORIDE 9 MG/ML
125 INJECTION, SOLUTION INTRAVENOUS CONTINUOUS
Status: DISCONTINUED | OUTPATIENT
Start: 2017-09-10 | End: 2017-09-11

## 2017-09-10 RX ORDER — PANTOPRAZOLE SODIUM 40 MG/1
40 TABLET, DELAYED RELEASE ORAL
Status: DISCONTINUED | OUTPATIENT
Start: 2017-09-10 | End: 2017-09-12 | Stop reason: HOSPADM

## 2017-09-10 RX ORDER — CALCIUM CARBONATE 200(500)MG
500 TABLET,CHEWABLE ORAL DAILY PRN
Status: DISCONTINUED | OUTPATIENT
Start: 2017-09-10 | End: 2017-09-12 | Stop reason: HOSPADM

## 2017-09-10 RX ORDER — OXYCODONE HYDROCHLORIDE 5 MG/1
5 TABLET ORAL EVERY 4 HOURS PRN
COMMUNITY
End: 2021-06-16

## 2017-09-10 RX ORDER — HEPARIN SODIUM 1000 [USP'U]/ML
4000 INJECTION, SOLUTION INTRAVENOUS; SUBCUTANEOUS AS NEEDED
Status: DISCONTINUED | OUTPATIENT
Start: 2017-09-10 | End: 2017-09-11

## 2017-09-10 RX ORDER — ONDANSETRON 2 MG/ML
4 INJECTION INTRAMUSCULAR; INTRAVENOUS EVERY 6 HOURS PRN
Status: DISCONTINUED | OUTPATIENT
Start: 2017-09-10 | End: 2017-09-12 | Stop reason: HOSPADM

## 2017-09-10 RX ORDER — GABAPENTIN 400 MG/1
400 CAPSULE ORAL 3 TIMES DAILY
Status: DISCONTINUED | OUTPATIENT
Start: 2017-09-10 | End: 2017-09-12 | Stop reason: HOSPADM

## 2017-09-10 RX ORDER — OXYCODONE HYDROCHLORIDE 5 MG/1
5 TABLET ORAL EVERY 6 HOURS PRN
Status: DISCONTINUED | OUTPATIENT
Start: 2017-09-10 | End: 2017-09-12 | Stop reason: HOSPADM

## 2017-09-10 RX ORDER — DOCUSATE SODIUM 100 MG/1
100 CAPSULE, LIQUID FILLED ORAL 2 TIMES DAILY
Status: DISCONTINUED | OUTPATIENT
Start: 2017-09-10 | End: 2017-09-12 | Stop reason: HOSPADM

## 2017-09-10 RX ORDER — ACETAMINOPHEN 325 MG/1
650 TABLET ORAL EVERY 6 HOURS PRN
Status: DISCONTINUED | OUTPATIENT
Start: 2017-09-10 | End: 2017-09-12 | Stop reason: HOSPADM

## 2017-09-10 RX ORDER — CLOPIDOGREL BISULFATE 75 MG/1
300 TABLET ORAL ONCE
Status: COMPLETED | OUTPATIENT
Start: 2017-09-10 | End: 2017-09-10

## 2017-09-10 RX ORDER — NITROGLYCERIN 0.4 MG/1
0.4 TABLET SUBLINGUAL
Status: DISCONTINUED | OUTPATIENT
Start: 2017-09-10 | End: 2017-09-12 | Stop reason: HOSPADM

## 2017-09-10 RX ORDER — CITALOPRAM 20 MG/1
20 TABLET ORAL DAILY
Status: DISCONTINUED | OUTPATIENT
Start: 2017-09-10 | End: 2017-09-12 | Stop reason: HOSPADM

## 2017-09-10 RX ORDER — NICOTINE 21 MG/24HR
1 PATCH, TRANSDERMAL 24 HOURS TRANSDERMAL DAILY
Status: DISCONTINUED | OUTPATIENT
Start: 2017-09-10 | End: 2017-09-12 | Stop reason: HOSPADM

## 2017-09-10 RX ORDER — PANTOPRAZOLE SODIUM 40 MG/1
40 TABLET, DELAYED RELEASE ORAL
Status: DISCONTINUED | OUTPATIENT
Start: 2017-09-10 | End: 2017-09-10

## 2017-09-10 RX ADMIN — HEPARIN SODIUM 2000 UNITS: 1000 INJECTION, SOLUTION INTRAVENOUS; SUBCUTANEOUS at 16:05

## 2017-09-10 RX ADMIN — CITALOPRAM HYDROBROMIDE 20 MG: 20 TABLET ORAL at 08:24

## 2017-09-10 RX ADMIN — ATORVASTATIN CALCIUM 40 MG: 40 TABLET, FILM COATED ORAL at 17:04

## 2017-09-10 RX ADMIN — GABAPENTIN 400 MG: 400 CAPSULE ORAL at 21:23

## 2017-09-10 RX ADMIN — METOPROLOL TARTRATE 12.5 MG: 25 TABLET ORAL at 08:24

## 2017-09-10 RX ADMIN — SODIUM CHLORIDE 125 ML/HR: 0.9 INJECTION, SOLUTION INTRAVENOUS at 16:00

## 2017-09-10 RX ADMIN — PANTOPRAZOLE SODIUM 40 MG: 40 TABLET, DELAYED RELEASE ORAL at 08:24

## 2017-09-10 RX ADMIN — MORPHINE SULFATE 2 MG: 2 INJECTION, SOLUTION INTRAMUSCULAR; INTRAVENOUS at 17:23

## 2017-09-10 RX ADMIN — CLOPIDOGREL BISULFATE 300 MG: 75 TABLET ORAL at 11:15

## 2017-09-10 RX ADMIN — SENNOSIDES 8.6 MG: 8.6 TABLET, FILM COATED ORAL at 08:24

## 2017-09-10 RX ADMIN — METOPROLOL TARTRATE 12.5 MG: 25 TABLET ORAL at 13:46

## 2017-09-10 RX ADMIN — OXYCODONE HYDROCHLORIDE 5 MG: 5 TABLET ORAL at 23:09

## 2017-09-10 RX ADMIN — GABAPENTIN 400 MG: 400 CAPSULE ORAL at 15:59

## 2017-09-10 RX ADMIN — GABAPENTIN 400 MG: 400 CAPSULE ORAL at 08:24

## 2017-09-10 RX ADMIN — PANTOPRAZOLE SODIUM 40 MG: 40 TABLET, DELAYED RELEASE ORAL at 15:59

## 2017-09-10 RX ADMIN — ASPIRIN 81 MG 81 MG: 81 TABLET ORAL at 08:24

## 2017-09-10 RX ADMIN — DOCUSATE SODIUM 100 MG: 100 CAPSULE, LIQUID FILLED ORAL at 08:24

## 2017-09-10 RX ADMIN — OXYCODONE HYDROCHLORIDE 5 MG: 5 TABLET ORAL at 09:02

## 2017-09-10 RX ADMIN — METOPROLOL TARTRATE 12.5 MG: 25 TABLET ORAL at 21:23

## 2017-09-10 RX ADMIN — HEPARIN SODIUM AND DEXTROSE 12 UNITS/KG/HR: 10000; 5 INJECTION INTRAVENOUS at 09:04

## 2017-09-10 RX ADMIN — CLOPIDOGREL BISULFATE 300 MG: 75 TABLET ORAL at 15:59

## 2017-09-10 RX ADMIN — SODIUM CHLORIDE 125 ML/HR: 0.9 INJECTION, SOLUTION INTRAVENOUS at 23:11

## 2017-09-11 ENCOUNTER — APPOINTMENT (INPATIENT)
Dept: NON INVASIVE DIAGNOSTICS | Facility: HOSPITAL | Age: 47
DRG: 190 | End: 2017-09-11
Payer: COMMERCIAL

## 2017-09-11 ENCOUNTER — GENERIC CONVERSION - ENCOUNTER (OUTPATIENT)
Dept: OTHER | Facility: OTHER | Age: 47
End: 2017-09-11

## 2017-09-11 LAB
ANION GAP SERPL CALCULATED.3IONS-SCNC: 7 MMOL/L (ref 4–13)
APTT PPP: 79 SECONDS (ref 23–35)
ATRIAL RATE: 87 BPM
BASOPHILS # BLD AUTO: 0.08 THOUSANDS/ΜL (ref 0–0.1)
BASOPHILS NFR BLD AUTO: 1 % (ref 0–1)
BUN SERPL-MCNC: 4 MG/DL (ref 5–25)
CALCIUM SERPL-MCNC: 8.1 MG/DL (ref 8.3–10.1)
CHLORIDE SERPL-SCNC: 114 MMOL/L (ref 100–108)
CO2 SERPL-SCNC: 24 MMOL/L (ref 21–32)
CREAT SERPL-MCNC: 0.74 MG/DL (ref 0.6–1.3)
EOSINOPHIL # BLD AUTO: 0.15 THOUSAND/ΜL (ref 0–0.61)
EOSINOPHIL NFR BLD AUTO: 2 % (ref 0–6)
ERYTHROCYTE [DISTWIDTH] IN BLOOD BY AUTOMATED COUNT: 15.1 % (ref 11.6–15.1)
GFR SERPL CREATININE-BSD FRML MDRD: 97 ML/MIN/1.73SQ M
GLUCOSE SERPL-MCNC: 92 MG/DL (ref 65–140)
HCT VFR BLD AUTO: 38.6 % (ref 34.8–46.1)
HGB BLD-MCNC: 12.5 G/DL (ref 11.5–15.4)
LYMPHOCYTES # BLD AUTO: 3.36 THOUSANDS/ΜL (ref 0.6–4.47)
LYMPHOCYTES NFR BLD AUTO: 40 % (ref 14–44)
MAGNESIUM SERPL-MCNC: 2.2 MG/DL (ref 1.6–2.6)
MCH RBC QN AUTO: 28.4 PG (ref 26.8–34.3)
MCHC RBC AUTO-ENTMCNC: 32.4 G/DL (ref 31.4–37.4)
MCV RBC AUTO: 88 FL (ref 82–98)
MONOCYTES # BLD AUTO: 0.75 THOUSAND/ΜL (ref 0.17–1.22)
MONOCYTES NFR BLD AUTO: 9 % (ref 4–12)
NEUTROPHILS # BLD AUTO: 4 THOUSANDS/ΜL (ref 1.85–7.62)
NEUTS SEG NFR BLD AUTO: 48 % (ref 43–75)
NRBC BLD AUTO-RTO: 0 /100 WBCS
P AXIS: 74 DEGREES
PLATELET # BLD AUTO: 320 THOUSANDS/UL (ref 149–390)
PMV BLD AUTO: 9.6 FL (ref 8.9–12.7)
POTASSIUM SERPL-SCNC: 4 MMOL/L (ref 3.5–5.3)
PR INTERVAL: 182 MS
QRS AXIS: 102 DEGREES
QRSD INTERVAL: 150 MS
QT INTERVAL: 424 MS
QTC INTERVAL: 510 MS
RBC # BLD AUTO: 4.4 MILLION/UL (ref 3.81–5.12)
SODIUM SERPL-SCNC: 145 MMOL/L (ref 136–145)
T WAVE AXIS: 63 DEGREES
TROPONIN I SERPL-MCNC: 0.93 NG/ML
VENTRICULAR RATE: 87 BPM
WBC # BLD AUTO: 8.35 THOUSAND/UL (ref 4.31–10.16)

## 2017-09-11 PROCEDURE — 93459 L HRT ART/GRFT ANGIO: CPT | Performed by: STUDENT IN AN ORGANIZED HEALTH CARE EDUCATION/TRAINING PROGRAM

## 2017-09-11 PROCEDURE — 99153 MOD SED SAME PHYS/QHP EA: CPT | Performed by: STUDENT IN AN ORGANIZED HEALTH CARE EDUCATION/TRAINING PROGRAM

## 2017-09-11 PROCEDURE — C1760 CLOSURE DEV, VASC: HCPCS | Performed by: STUDENT IN AN ORGANIZED HEALTH CARE EDUCATION/TRAINING PROGRAM

## 2017-09-11 PROCEDURE — C1894 INTRO/SHEATH, NON-LASER: HCPCS | Performed by: STUDENT IN AN ORGANIZED HEALTH CARE EDUCATION/TRAINING PROGRAM

## 2017-09-11 PROCEDURE — 85025 COMPLETE CBC W/AUTO DIFF WBC: CPT | Performed by: NURSE PRACTITIONER

## 2017-09-11 PROCEDURE — 84484 ASSAY OF TROPONIN QUANT: CPT | Performed by: INTERNAL MEDICINE

## 2017-09-11 PROCEDURE — 85730 THROMBOPLASTIN TIME PARTIAL: CPT | Performed by: INTERNAL MEDICINE

## 2017-09-11 PROCEDURE — 80048 BASIC METABOLIC PNL TOTAL CA: CPT | Performed by: NURSE PRACTITIONER

## 2017-09-11 PROCEDURE — 83735 ASSAY OF MAGNESIUM: CPT | Performed by: NURSE PRACTITIONER

## 2017-09-11 PROCEDURE — 93306 TTE W/DOPPLER COMPLETE: CPT

## 2017-09-11 PROCEDURE — C1769 GUIDE WIRE: HCPCS | Performed by: STUDENT IN AN ORGANIZED HEALTH CARE EDUCATION/TRAINING PROGRAM

## 2017-09-11 PROCEDURE — 99152 MOD SED SAME PHYS/QHP 5/>YRS: CPT | Performed by: STUDENT IN AN ORGANIZED HEALTH CARE EDUCATION/TRAINING PROGRAM

## 2017-09-11 RX ORDER — SODIUM CHLORIDE 9 MG/ML
125 INJECTION, SOLUTION INTRAVENOUS CONTINUOUS
Status: DISPENSED | OUTPATIENT
Start: 2017-09-11 | End: 2017-09-11

## 2017-09-11 RX ORDER — LIDOCAINE HYDROCHLORIDE 10 MG/ML
INJECTION, SOLUTION INFILTRATION; PERINEURAL CODE/TRAUMA/SEDATION MEDICATION
Status: COMPLETED | OUTPATIENT
Start: 2017-09-11 | End: 2017-09-11

## 2017-09-11 RX ORDER — MIDAZOLAM HYDROCHLORIDE 1 MG/ML
INJECTION INTRAMUSCULAR; INTRAVENOUS CODE/TRAUMA/SEDATION MEDICATION
Status: COMPLETED | OUTPATIENT
Start: 2017-09-11 | End: 2017-09-11

## 2017-09-11 RX ORDER — FENTANYL CITRATE 50 UG/ML
INJECTION, SOLUTION INTRAMUSCULAR; INTRAVENOUS CODE/TRAUMA/SEDATION MEDICATION
Status: COMPLETED | OUTPATIENT
Start: 2017-09-11 | End: 2017-09-11

## 2017-09-11 RX ADMIN — SODIUM CHLORIDE 125 ML/HR: 0.9 INJECTION, SOLUTION INTRAVENOUS at 14:58

## 2017-09-11 RX ADMIN — MIDAZOLAM 1 MG: 1 INJECTION INTRAMUSCULAR; INTRAVENOUS at 11:30

## 2017-09-11 RX ADMIN — MIDAZOLAM 1 MG: 1 INJECTION INTRAMUSCULAR; INTRAVENOUS at 11:27

## 2017-09-11 RX ADMIN — FENTANYL CITRATE 25 MCG: 50 INJECTION, SOLUTION INTRAMUSCULAR; INTRAVENOUS at 11:27

## 2017-09-11 RX ADMIN — ATORVASTATIN CALCIUM 40 MG: 40 TABLET, FILM COATED ORAL at 17:53

## 2017-09-11 RX ADMIN — ASPIRIN 81 MG 81 MG: 81 TABLET ORAL at 09:16

## 2017-09-11 RX ADMIN — LIDOCAINE HYDROCHLORIDE 10 ML: 10 INJECTION, SOLUTION INFILTRATION; PERINEURAL at 11:27

## 2017-09-11 RX ADMIN — DOCUSATE SODIUM 100 MG: 100 CAPSULE, LIQUID FILLED ORAL at 17:53

## 2017-09-11 RX ADMIN — DOCUSATE SODIUM 100 MG: 100 CAPSULE, LIQUID FILLED ORAL at 13:02

## 2017-09-11 RX ADMIN — SODIUM CHLORIDE 125 ML/HR: 0.9 INJECTION, SOLUTION INTRAVENOUS at 07:29

## 2017-09-11 RX ADMIN — METOPROLOL TARTRATE 12.5 MG: 25 TABLET ORAL at 05:29

## 2017-09-11 RX ADMIN — CLOPIDOGREL BISULFATE 75 MG: 75 TABLET ORAL at 09:16

## 2017-09-11 RX ADMIN — METOPROLOL TARTRATE 12.5 MG: 25 TABLET ORAL at 13:47

## 2017-09-11 RX ADMIN — METOPROLOL TARTRATE 12.5 MG: 25 TABLET ORAL at 22:59

## 2017-09-11 RX ADMIN — FENTANYL CITRATE 25 MCG: 50 INJECTION, SOLUTION INTRAMUSCULAR; INTRAVENOUS at 11:30

## 2017-09-11 RX ADMIN — FENTANYL CITRATE 50 MCG: 50 INJECTION, SOLUTION INTRAMUSCULAR; INTRAVENOUS at 11:21

## 2017-09-11 RX ADMIN — CITALOPRAM HYDROBROMIDE 20 MG: 20 TABLET ORAL at 09:16

## 2017-09-11 RX ADMIN — OXYCODONE HYDROCHLORIDE 5 MG: 5 TABLET ORAL at 13:07

## 2017-09-11 RX ADMIN — HEPARIN SODIUM AND DEXTROSE 14 UNITS/KG/HR: 10000; 5 INJECTION INTRAVENOUS at 07:28

## 2017-09-11 RX ADMIN — OXYCODONE HYDROCHLORIDE 5 MG: 5 TABLET ORAL at 19:56

## 2017-09-11 RX ADMIN — LIDOCAINE HYDROCHLORIDE 10 ML: 10 INJECTION, SOLUTION INFILTRATION; PERINEURAL at 11:33

## 2017-09-11 RX ADMIN — MIDAZOLAM 2 MG: 1 INJECTION INTRAMUSCULAR; INTRAVENOUS at 11:21

## 2017-09-11 RX ADMIN — PANTOPRAZOLE SODIUM 40 MG: 40 TABLET, DELAYED RELEASE ORAL at 15:42

## 2017-09-11 RX ADMIN — GABAPENTIN 400 MG: 400 CAPSULE ORAL at 22:59

## 2017-09-11 RX ADMIN — GABAPENTIN 400 MG: 400 CAPSULE ORAL at 15:42

## 2017-09-11 RX ADMIN — SENNOSIDES 8.6 MG: 8.6 TABLET, FILM COATED ORAL at 13:02

## 2017-09-11 RX ADMIN — GABAPENTIN 400 MG: 400 CAPSULE ORAL at 09:17

## 2017-09-12 VITALS
SYSTOLIC BLOOD PRESSURE: 130 MMHG | BODY MASS INDEX: 25.12 KG/M2 | TEMPERATURE: 98.1 F | WEIGHT: 156.31 LBS | HEART RATE: 80 BPM | OXYGEN SATURATION: 99 % | DIASTOLIC BLOOD PRESSURE: 72 MMHG | HEIGHT: 66 IN | RESPIRATION RATE: 18 BRPM

## 2017-09-12 LAB
ANION GAP SERPL CALCULATED.3IONS-SCNC: 7 MMOL/L (ref 4–13)
BUN SERPL-MCNC: 6 MG/DL (ref 5–25)
CALCIUM SERPL-MCNC: 8.9 MG/DL (ref 8.3–10.1)
CHLORIDE SERPL-SCNC: 111 MMOL/L (ref 100–108)
CO2 SERPL-SCNC: 24 MMOL/L (ref 21–32)
CREAT SERPL-MCNC: 0.69 MG/DL (ref 0.6–1.3)
ERYTHROCYTE [DISTWIDTH] IN BLOOD BY AUTOMATED COUNT: 14.8 % (ref 11.6–15.1)
GFR SERPL CREATININE-BSD FRML MDRD: 104 ML/MIN/1.73SQ M
GLUCOSE SERPL-MCNC: 83 MG/DL (ref 65–140)
HCT VFR BLD AUTO: 37.9 % (ref 34.8–46.1)
HGB BLD-MCNC: 12.5 G/DL (ref 11.5–15.4)
MCH RBC QN AUTO: 28.6 PG (ref 26.8–34.3)
MCHC RBC AUTO-ENTMCNC: 33 G/DL (ref 31.4–37.4)
MCV RBC AUTO: 87 FL (ref 82–98)
PLATELET # BLD AUTO: 335 THOUSANDS/UL (ref 149–390)
PMV BLD AUTO: 10 FL (ref 8.9–12.7)
POTASSIUM SERPL-SCNC: 3.9 MMOL/L (ref 3.5–5.3)
RBC # BLD AUTO: 4.37 MILLION/UL (ref 3.81–5.12)
SODIUM SERPL-SCNC: 142 MMOL/L (ref 136–145)
WBC # BLD AUTO: 12.91 THOUSAND/UL (ref 4.31–10.16)

## 2017-09-12 PROCEDURE — 85027 COMPLETE CBC AUTOMATED: CPT | Performed by: INTERNAL MEDICINE

## 2017-09-12 PROCEDURE — 80048 BASIC METABOLIC PNL TOTAL CA: CPT | Performed by: INTERNAL MEDICINE

## 2017-09-12 RX ADMIN — GABAPENTIN 400 MG: 400 CAPSULE ORAL at 09:50

## 2017-09-12 RX ADMIN — OXYCODONE HYDROCHLORIDE 5 MG: 5 TABLET ORAL at 02:50

## 2017-09-12 RX ADMIN — DOCUSATE SODIUM 100 MG: 100 CAPSULE, LIQUID FILLED ORAL at 09:50

## 2017-09-12 RX ADMIN — METOPROLOL TARTRATE 12.5 MG: 25 TABLET ORAL at 06:02

## 2017-09-12 RX ADMIN — CITALOPRAM HYDROBROMIDE 20 MG: 20 TABLET ORAL at 09:55

## 2017-09-12 RX ADMIN — SENNOSIDES 8.6 MG: 8.6 TABLET, FILM COATED ORAL at 09:50

## 2017-09-12 RX ADMIN — ASPIRIN 81 MG 81 MG: 81 TABLET ORAL at 09:50

## 2017-09-12 RX ADMIN — OXYCODONE HYDROCHLORIDE 5 MG: 5 TABLET ORAL at 09:50

## 2017-09-12 RX ADMIN — PANTOPRAZOLE SODIUM 40 MG: 40 TABLET, DELAYED RELEASE ORAL at 06:02

## 2017-09-13 ENCOUNTER — TRANSCRIBE ORDERS (OUTPATIENT)
Dept: ADMINISTRATIVE | Facility: HOSPITAL | Age: 47
End: 2017-09-13

## 2017-09-13 ENCOUNTER — ALLSCRIPTS OFFICE VISIT (OUTPATIENT)
Dept: OTHER | Facility: OTHER | Age: 47
End: 2017-09-13

## 2017-09-13 DIAGNOSIS — G54.0 BRACHIAL PLEXUS LESIONS: Primary | ICD-10-CM

## 2017-09-13 DIAGNOSIS — G54.0 BRACHIAL PLEXUS DISORDERS: ICD-10-CM

## 2017-10-05 ENCOUNTER — GENERIC CONVERSION - ENCOUNTER (OUTPATIENT)
Dept: OTHER | Facility: OTHER | Age: 47
End: 2017-10-05

## 2017-10-20 ENCOUNTER — APPOINTMENT (OUTPATIENT)
Dept: FAMILY MEDICINE CLINIC | Facility: CLINIC | Age: 47
End: 2017-10-20
Payer: COMMERCIAL

## 2017-10-20 ENCOUNTER — GENERIC CONVERSION - ENCOUNTER (OUTPATIENT)
Dept: OTHER | Facility: OTHER | Age: 47
End: 2017-10-20

## 2017-10-20 DIAGNOSIS — R22.41 LOCALIZED SWELLING, MASS AND LUMP, RIGHT LOWER LIMB: ICD-10-CM

## 2017-10-20 PROCEDURE — T1015 CLINIC SERVICE: HCPCS | Performed by: FAMILY MEDICINE

## 2017-11-20 ENCOUNTER — HOSPITAL ENCOUNTER (OUTPATIENT)
Facility: HOSPITAL | Age: 47
Setting detail: OBSERVATION
Discharge: HOME | End: 2017-11-22
Attending: EMERGENCY MEDICINE | Admitting: HOSPITALIST
Payer: COMMERCIAL

## 2017-11-20 ENCOUNTER — APPOINTMENT (EMERGENCY)
Dept: RADIOLOGY | Facility: HOSPITAL | Age: 47
End: 2017-11-20
Payer: COMMERCIAL

## 2017-11-20 ENCOUNTER — APPOINTMENT (EMERGENCY)
Dept: CT IMAGING | Facility: HOSPITAL | Age: 47
End: 2017-11-20
Payer: COMMERCIAL

## 2017-11-20 DIAGNOSIS — G45.9 TIA (TRANSIENT ISCHEMIC ATTACK): Primary | ICD-10-CM

## 2017-11-20 LAB
ABO GROUP BLD: NORMAL
ANION GAP BLD CALC-SCNC: 19 MMOL/L (ref 4–13)
ANION GAP SERPL CALCULATED.3IONS-SCNC: 10 MMOL/L (ref 4–13)
APTT PPP: 28 SECONDS (ref 23–35)
BLD GP AB SCN SERPL QL: NEGATIVE
BUN BLD-MCNC: <3 MG/DL (ref 5–25)
BUN SERPL-MCNC: 5 MG/DL (ref 5–25)
CA-I BLD-SCNC: 1.18 MMOL/L (ref 1.12–1.32)
CALCIUM SERPL-MCNC: 8.8 MG/DL (ref 8.3–10.1)
CHLORIDE BLD-SCNC: 106 MMOL/L (ref 100–108)
CHLORIDE SERPL-SCNC: 106 MMOL/L (ref 100–108)
CO2 SERPL-SCNC: 27 MMOL/L (ref 21–32)
CREAT BLD-MCNC: 0.7 MG/DL (ref 0.6–1.3)
CREAT SERPL-MCNC: 0.8 MG/DL (ref 0.6–1.3)
ERYTHROCYTE [DISTWIDTH] IN BLOOD BY AUTOMATED COUNT: 15.3 % (ref 11.6–15.1)
GFR SERPL CREATININE-BSD FRML MDRD: 104 ML/MIN/1.73SQ M
GFR SERPL CREATININE-BSD FRML MDRD: 88 ML/MIN/1.73SQ M
GLUCOSE SERPL-MCNC: 101 MG/DL (ref 65–140)
GLUCOSE SERPL-MCNC: 105 MG/DL (ref 65–140)
HCT VFR BLD AUTO: 39.8 % (ref 34.8–46.1)
HCT VFR BLD CALC: 42 % (ref 34.8–46.1)
HGB BLD-MCNC: 13 G/DL (ref 11.5–15.4)
HGB BLDA-MCNC: 14.3 G/DL (ref 11.5–15.4)
INR PPP: 0.94 (ref 0.86–1.16)
MCH RBC QN AUTO: 28.8 PG (ref 26.8–34.3)
MCHC RBC AUTO-ENTMCNC: 32.7 G/DL (ref 31.4–37.4)
MCV RBC AUTO: 88 FL (ref 82–98)
PCO2 BLD: 23 MMOL/L (ref 21–32)
PLATELET # BLD AUTO: 412 THOUSANDS/UL (ref 149–390)
PMV BLD AUTO: 9.3 FL (ref 8.9–12.7)
POTASSIUM BLD-SCNC: 3.3 MMOL/L (ref 3.5–5.3)
POTASSIUM SERPL-SCNC: 3.3 MMOL/L (ref 3.5–5.3)
PROTHROMBIN TIME: 12.5 SECONDS (ref 12.1–14.4)
RBC # BLD AUTO: 4.52 MILLION/UL (ref 3.81–5.12)
RH BLD: POSITIVE
SODIUM BLD-SCNC: 144 MMOL/L (ref 136–145)
SODIUM SERPL-SCNC: 143 MMOL/L (ref 136–145)
SPECIMEN EXPIRATION DATE: NORMAL
SPECIMEN SOURCE: ABNORMAL
WBC # BLD AUTO: 15.96 THOUSAND/UL (ref 4.31–10.16)

## 2017-11-20 PROCEDURE — 86850 RBC ANTIBODY SCREEN: CPT | Performed by: EMERGENCY MEDICINE

## 2017-11-20 PROCEDURE — 70496 CT ANGIOGRAPHY HEAD: CPT

## 2017-11-20 PROCEDURE — 70450 CT HEAD/BRAIN W/O DYE: CPT

## 2017-11-20 PROCEDURE — 71010 HB CHEST X-RAY 1 VIEW FRONTAL (PORTABLE): CPT

## 2017-11-20 PROCEDURE — 86900 BLOOD TYPING SEROLOGIC ABO: CPT | Performed by: EMERGENCY MEDICINE

## 2017-11-20 PROCEDURE — 85610 PROTHROMBIN TIME: CPT | Performed by: EMERGENCY MEDICINE

## 2017-11-20 PROCEDURE — 93005 ELECTROCARDIOGRAM TRACING: CPT | Performed by: EMERGENCY MEDICINE

## 2017-11-20 PROCEDURE — 85027 COMPLETE CBC AUTOMATED: CPT | Performed by: EMERGENCY MEDICINE

## 2017-11-20 PROCEDURE — 85730 THROMBOPLASTIN TIME PARTIAL: CPT | Performed by: EMERGENCY MEDICINE

## 2017-11-20 PROCEDURE — 80048 BASIC METABOLIC PNL TOTAL CA: CPT | Performed by: EMERGENCY MEDICINE

## 2017-11-20 PROCEDURE — 36415 COLL VENOUS BLD VENIPUNCTURE: CPT | Performed by: EMERGENCY MEDICINE

## 2017-11-20 PROCEDURE — 70498 CT ANGIOGRAPHY NECK: CPT

## 2017-11-20 PROCEDURE — 85014 HEMATOCRIT: CPT

## 2017-11-20 PROCEDURE — 80047 BASIC METABLC PNL IONIZED CA: CPT

## 2017-11-20 PROCEDURE — 86901 BLOOD TYPING SEROLOGIC RH(D): CPT | Performed by: EMERGENCY MEDICINE

## 2017-11-20 RX ORDER — OXYCODONE HYDROCHLORIDE AND ACETAMINOPHEN 5; 325 MG/1; MG/1
1 TABLET ORAL ONCE
Status: COMPLETED | OUTPATIENT
Start: 2017-11-20 | End: 2017-11-20

## 2017-11-20 RX ADMIN — IOHEXOL 100 ML: 350 INJECTION, SOLUTION INTRAVENOUS at 21:00

## 2017-11-20 RX ADMIN — OXYCODONE HYDROCHLORIDE AND ACETAMINOPHEN 1 TABLET: 5; 325 TABLET ORAL at 23:49

## 2017-11-21 ENCOUNTER — APPOINTMENT (OUTPATIENT)
Dept: ULTRASOUND IMAGING | Facility: HOSPITAL | Age: 47
End: 2017-11-21
Payer: COMMERCIAL

## 2017-11-21 ENCOUNTER — APPOINTMENT (OUTPATIENT)
Dept: MRI IMAGING | Facility: HOSPITAL | Age: 47
End: 2017-11-21
Payer: COMMERCIAL

## 2017-11-21 LAB
ALBUMIN SERPL BCP-MCNC: 2.9 G/DL (ref 3.5–5)
ALP SERPL-CCNC: 130 U/L (ref 46–116)
ALT SERPL W P-5'-P-CCNC: 12 U/L (ref 12–78)
ANION GAP SERPL CALCULATED.3IONS-SCNC: 9 MMOL/L (ref 4–13)
AST SERPL W P-5'-P-CCNC: 15 U/L (ref 5–45)
ATRIAL RATE: 89 BPM
BILIRUB SERPL-MCNC: 0.1 MG/DL (ref 0.2–1)
BILIRUB UR QL STRIP: NEGATIVE
BUN SERPL-MCNC: 6 MG/DL (ref 5–25)
CALCIUM SERPL-MCNC: 8.5 MG/DL (ref 8.3–10.1)
CHLORIDE SERPL-SCNC: 108 MMOL/L (ref 100–108)
CHOLEST SERPL-MCNC: 167 MG/DL (ref 50–200)
CLARITY UR: CLEAR
CO2 SERPL-SCNC: 23 MMOL/L (ref 21–32)
COLOR UR: YELLOW
CREAT SERPL-MCNC: 0.73 MG/DL (ref 0.6–1.3)
DEPRECATED D DIMER PPP: 780 NG/ML (FEU) (ref 0–424)
ERYTHROCYTE [DISTWIDTH] IN BLOOD BY AUTOMATED COUNT: 15.5 % (ref 11.6–15.1)
EST. AVERAGE GLUCOSE BLD GHB EST-MCNC: 111 MG/DL
FLUAV AG SPEC QL: NORMAL
FLUBV AG SPEC QL: NORMAL
GFR SERPL CREATININE-BSD FRML MDRD: 98 ML/MIN/1.73SQ M
GLUCOSE P FAST SERPL-MCNC: 88 MG/DL (ref 65–99)
GLUCOSE SERPL-MCNC: 88 MG/DL (ref 65–140)
GLUCOSE UR STRIP-MCNC: NEGATIVE MG/DL
HBA1C MFR BLD: 5.5 % (ref 4.2–6.3)
HCT VFR BLD AUTO: 39 % (ref 34.8–46.1)
HDLC SERPL-MCNC: 28 MG/DL (ref 40–60)
HGB BLD-MCNC: 12.4 G/DL (ref 11.5–15.4)
HGB UR QL STRIP.AUTO: NEGATIVE
KETONES UR STRIP-MCNC: NEGATIVE MG/DL
L PNEUMO1 AG UR QL IA.RAPID: NEGATIVE
LDLC SERPL CALC-MCNC: 119 MG/DL (ref 0–100)
LEUKOCYTE ESTERASE UR QL STRIP: NEGATIVE
MAGNESIUM SERPL-MCNC: 1.9 MG/DL (ref 1.6–2.6)
MAGNESIUM SERPL-MCNC: 2.1 MG/DL (ref 1.6–2.6)
MCH RBC QN AUTO: 28.2 PG (ref 26.8–34.3)
MCHC RBC AUTO-ENTMCNC: 31.8 G/DL (ref 31.4–37.4)
MCV RBC AUTO: 89 FL (ref 82–98)
NITRITE UR QL STRIP: NEGATIVE
P AXIS: 75 DEGREES
PH UR STRIP.AUTO: 5 [PH] (ref 4.5–8)
PHOSPHATE SERPL-MCNC: 3.2 MG/DL (ref 2.7–4.5)
PLATELET # BLD AUTO: 335 THOUSANDS/UL (ref 149–390)
PMV BLD AUTO: 9.7 FL (ref 8.9–12.7)
POTASSIUM SERPL-SCNC: 4.2 MMOL/L (ref 3.5–5.3)
PR INTERVAL: 164 MS
PROT SERPL-MCNC: 6.5 G/DL (ref 6.4–8.2)
PROT UR STRIP-MCNC: NEGATIVE MG/DL
QRS AXIS: 97 DEGREES
QRSD INTERVAL: 134 MS
QT INTERVAL: 402 MS
QTC INTERVAL: 489 MS
RBC # BLD AUTO: 4.39 MILLION/UL (ref 3.81–5.12)
RSV B RNA SPEC QL NAA+PROBE: NORMAL
S PNEUM AG UR QL: NEGATIVE
SODIUM SERPL-SCNC: 140 MMOL/L (ref 136–145)
SP GR UR STRIP.AUTO: 1.01 (ref 1–1.03)
T WAVE AXIS: 57 DEGREES
TRIGL SERPL-MCNC: 99 MG/DL
TROPONIN I SERPL-MCNC: <0.02 NG/ML
UROBILINOGEN UR QL STRIP.AUTO: 0.2 E.U./DL
VENTRICULAR RATE: 89 BPM
WBC # BLD AUTO: 10.72 THOUSAND/UL (ref 4.31–10.16)

## 2017-11-21 PROCEDURE — 97167 OT EVAL HIGH COMPLEX 60 MIN: CPT

## 2017-11-21 PROCEDURE — G8987 SELF CARE CURRENT STATUS: HCPCS

## 2017-11-21 PROCEDURE — G8998 SWALLOW D/C STATUS: HCPCS | Performed by: SPEECH-LANGUAGE PATHOLOGIST

## 2017-11-21 PROCEDURE — 84100 ASSAY OF PHOSPHORUS: CPT | Performed by: HOSPITALIST

## 2017-11-21 PROCEDURE — 97116 GAIT TRAINING THERAPY: CPT | Performed by: PHYSICAL THERAPIST

## 2017-11-21 PROCEDURE — G8997 SWALLOW GOAL STATUS: HCPCS | Performed by: SPEECH-LANGUAGE PATHOLOGIST

## 2017-11-21 PROCEDURE — G8979 MOBILITY GOAL STATUS: HCPCS | Performed by: PHYSICAL THERAPIST

## 2017-11-21 PROCEDURE — G8996 SWALLOW CURRENT STATUS: HCPCS | Performed by: SPEECH-LANGUAGE PATHOLOGIST

## 2017-11-21 PROCEDURE — 87449 NOS EACH ORGANISM AG IA: CPT | Performed by: HOSPITALIST

## 2017-11-21 PROCEDURE — 94664 DEMO&/EVAL PT USE INHALER: CPT

## 2017-11-21 PROCEDURE — G8978 MOBILITY CURRENT STATUS: HCPCS | Performed by: PHYSICAL THERAPIST

## 2017-11-21 PROCEDURE — 97163 PT EVAL HIGH COMPLEX 45 MIN: CPT | Performed by: PHYSICAL THERAPIST

## 2017-11-21 PROCEDURE — G8989 SELF CARE D/C STATUS: HCPCS

## 2017-11-21 PROCEDURE — 99285 EMERGENCY DEPT VISIT HI MDM: CPT

## 2017-11-21 PROCEDURE — 70551 MRI BRAIN STEM W/O DYE: CPT

## 2017-11-21 PROCEDURE — 84145 PROCALCITONIN (PCT): CPT | Performed by: HOSPITALIST

## 2017-11-21 PROCEDURE — 84484 ASSAY OF TROPONIN QUANT: CPT | Performed by: HOSPITALIST

## 2017-11-21 PROCEDURE — 85379 FIBRIN DEGRADATION QUANT: CPT | Performed by: HOSPITALIST

## 2017-11-21 PROCEDURE — 92610 EVALUATE SWALLOWING FUNCTION: CPT | Performed by: SPEECH-LANGUAGE PATHOLOGIST

## 2017-11-21 PROCEDURE — 87798 DETECT AGENT NOS DNA AMP: CPT | Performed by: HOSPITALIST

## 2017-11-21 PROCEDURE — 87081 CULTURE SCREEN ONLY: CPT | Performed by: HOSPITALIST

## 2017-11-21 PROCEDURE — 93970 EXTREMITY STUDY: CPT

## 2017-11-21 PROCEDURE — G8988 SELF CARE GOAL STATUS: HCPCS

## 2017-11-21 PROCEDURE — 80061 LIPID PANEL: CPT | Performed by: HOSPITALIST

## 2017-11-21 PROCEDURE — 81003 URINALYSIS AUTO W/O SCOPE: CPT | Performed by: EMERGENCY MEDICINE

## 2017-11-21 PROCEDURE — 85027 COMPLETE CBC AUTOMATED: CPT | Performed by: HOSPITALIST

## 2017-11-21 PROCEDURE — 97530 THERAPEUTIC ACTIVITIES: CPT

## 2017-11-21 PROCEDURE — 94760 N-INVAS EAR/PLS OXIMETRY 1: CPT

## 2017-11-21 PROCEDURE — 83036 HEMOGLOBIN GLYCOSYLATED A1C: CPT | Performed by: HOSPITALIST

## 2017-11-21 PROCEDURE — 83735 ASSAY OF MAGNESIUM: CPT | Performed by: HOSPITALIST

## 2017-11-21 PROCEDURE — 80053 COMPREHEN METABOLIC PANEL: CPT | Performed by: HOSPITALIST

## 2017-11-21 RX ORDER — LORAZEPAM 2 MG/ML
1 INJECTION INTRAMUSCULAR ONCE
Status: DISCONTINUED | OUTPATIENT
Start: 2017-11-21 | End: 2017-11-21

## 2017-11-21 RX ORDER — ACETAMINOPHEN 325 MG/1
650 TABLET ORAL EVERY 4 HOURS PRN
Status: DISCONTINUED | OUTPATIENT
Start: 2017-11-21 | End: 2017-11-22 | Stop reason: HOSPADM

## 2017-11-21 RX ORDER — CLOPIDOGREL BISULFATE 75 MG/1
75 TABLET ORAL DAILY
Status: DISCONTINUED | OUTPATIENT
Start: 2017-11-21 | End: 2017-11-22 | Stop reason: HOSPADM

## 2017-11-21 RX ORDER — POTASSIUM CHLORIDE 20 MEQ/1
20 TABLET, EXTENDED RELEASE ORAL ONCE
Status: COMPLETED | OUTPATIENT
Start: 2017-11-21 | End: 2017-11-21

## 2017-11-21 RX ORDER — OXYCODONE HYDROCHLORIDE 5 MG/1
5 TABLET ORAL EVERY 4 HOURS PRN
Status: DISCONTINUED | OUTPATIENT
Start: 2017-11-21 | End: 2017-11-22 | Stop reason: HOSPADM

## 2017-11-21 RX ORDER — GABAPENTIN 400 MG/1
400 CAPSULE ORAL 3 TIMES DAILY
Status: DISCONTINUED | OUTPATIENT
Start: 2017-11-21 | End: 2017-11-22 | Stop reason: HOSPADM

## 2017-11-21 RX ORDER — LEVALBUTEROL INHALATION SOLUTION 0.63 MG/3ML
0.63 SOLUTION RESPIRATORY (INHALATION) EVERY 6 HOURS PRN
Status: DISCONTINUED | OUTPATIENT
Start: 2017-11-21 | End: 2017-11-22 | Stop reason: HOSPADM

## 2017-11-21 RX ORDER — ASPIRIN 81 MG/1
81 TABLET, CHEWABLE ORAL DAILY
Status: DISCONTINUED | OUTPATIENT
Start: 2017-11-21 | End: 2017-11-22 | Stop reason: HOSPADM

## 2017-11-21 RX ORDER — CITALOPRAM 20 MG/1
20 TABLET ORAL DAILY
Status: DISCONTINUED | OUTPATIENT
Start: 2017-11-21 | End: 2017-11-22 | Stop reason: HOSPADM

## 2017-11-21 RX ORDER — ONDANSETRON 2 MG/ML
4 INJECTION INTRAMUSCULAR; INTRAVENOUS EVERY 6 HOURS PRN
Status: DISCONTINUED | OUTPATIENT
Start: 2017-11-21 | End: 2017-11-22 | Stop reason: HOSPADM

## 2017-11-21 RX ORDER — ATORVASTATIN CALCIUM 40 MG/1
40 TABLET, FILM COATED ORAL EVERY EVENING
Status: DISCONTINUED | OUTPATIENT
Start: 2017-11-21 | End: 2017-11-22 | Stop reason: HOSPADM

## 2017-11-21 RX ADMIN — GABAPENTIN 400 MG: 400 CAPSULE ORAL at 21:51

## 2017-11-21 RX ADMIN — CEFTRIAXONE SODIUM 1000 MG: 1 INJECTION, POWDER, FOR SOLUTION INTRAMUSCULAR; INTRAVENOUS at 04:07

## 2017-11-21 RX ADMIN — ATORVASTATIN CALCIUM 40 MG: 40 TABLET, FILM COATED ORAL at 18:12

## 2017-11-21 RX ADMIN — OXYCODONE HYDROCHLORIDE 5 MG: 5 TABLET ORAL at 18:17

## 2017-11-21 RX ADMIN — METOPROLOL TARTRATE 12.5 MG: 25 TABLET ORAL at 14:47

## 2017-11-21 RX ADMIN — GABAPENTIN 400 MG: 400 CAPSULE ORAL at 18:12

## 2017-11-21 RX ADMIN — CITALOPRAM HYDROBROMIDE 20 MG: 20 TABLET ORAL at 08:22

## 2017-11-21 RX ADMIN — ENOXAPARIN SODIUM 70 MG: 80 INJECTION SUBCUTANEOUS at 08:23

## 2017-11-21 RX ADMIN — ENOXAPARIN SODIUM 70 MG: 80 INJECTION SUBCUTANEOUS at 21:51

## 2017-11-21 RX ADMIN — CLOPIDOGREL BISULFATE 75 MG: 75 TABLET ORAL at 08:22

## 2017-11-21 RX ADMIN — AZITHROMYCIN MONOHYDRATE 500 MG: 500 INJECTION, POWDER, LYOPHILIZED, FOR SOLUTION INTRAVENOUS at 05:18

## 2017-11-21 RX ADMIN — METOPROLOL TARTRATE 12.5 MG: 25 TABLET ORAL at 21:52

## 2017-11-21 RX ADMIN — POTASSIUM CHLORIDE 20 MEQ: 20 TABLET, EXTENDED RELEASE ORAL at 02:57

## 2017-11-21 RX ADMIN — GABAPENTIN 400 MG: 400 CAPSULE ORAL at 08:22

## 2017-11-21 RX ADMIN — ASPIRIN 81 MG CHEWABLE TABLET 81 MG: 81 TABLET CHEWABLE at 08:22

## 2017-11-21 NOTE — SOCIAL WORK
Cm met with the patient to evaluate the patients prior function and living situation and any barriers to d/c and form a safe d/c plan  Cm also evaluated the patient for any services in the home or needs for services  Cm will follow the patient and address any needs and address all concerns  Cm met with the patient and she is at home and she is in a multi level home and there is a full flight of steps to the 2nd floor where the bed and bath is  The patient has 4 steps to enter the home  She is independent at home with all adls  Home with no needs   pcp Angélica Bustillos   The cm d/c assessment was reviewed with the patient

## 2017-11-21 NOTE — PHYSICAL THERAPY NOTE
PT Evaluation     11/21/17 0852   Note Type   Note type Eval/Treat   Pain Assessment   Pain Assessment 0-10   Pain Score 7   Pain Location Shoulder   Pain Orientation Left   Home Living   Type of [address] Multi-level;Bed/bath upstairs;Stairs to enter with rails  (4 YULIANA with HR, full flight with HR)   Bathroom Shower/Tub Tub/shower unit   Bathroom Toilet Standard   Home Equipment (no DME)   Prior Function   Level of [place] Independent with ADLs and functional mobility  ((I) ambulation no A  D )   Lives With Spouse   Receives Help From Family   ADL Assistance Independent   IADLs Independent   Comments (I) with driving   Restrictions/Precautions   Other Precautions Contact/isolation   General   Family/Caregiver Present No   Cognition   Arousal/Participation Alert   Orientation Level Oriented X4   Following Commands Follows all commands and directions without difficulty   RLE Assessment   RLE Assessment WFL  (hip 4+/5, knee 4-/5, ankle 4/5)   LLE Assessment   LLE Assessment WFL  (4+/5 throughout L LE)   Coordination   Sensation WFL   Light Touch   RLE Light Touch Grossly intact   LLE Light Touch Grossly intact   Bed Mobility   Supine to Sit 6  Modified independent   Additional items Bedrails   Sit to Supine (seated in chair at bedside, bell in reach)   Additional Comments Pt with no difficulty with bed mobility  Pt with normal eye tracking, ROM, smooth pursuit and saccades  Pt with normal heel to shin and thumb to fingertip test bilaterally  Pt with slightly decreased L hand disdiadochokinesia and increased postural sway with LOB during tandem stance eyes closed  moderate postural sway no LOB with narrom BRANDEN stance eyes closed  Transfers   Sit to Stand 7  Independent   Stand to Sit 7  Independent   Stand pivot 7  Independent   Additional Comments pt with wide BRANDEN and decreased step length during ambulation but no LOB  Pt able to correct 2 small episodes of gait deviation without assistance  Ambulation/Elevation   Gait pattern Wide BRANDEN; Short stride   Gait Assistance 5  Supervision   Assistive Device None   Distance 170ft no A D  (S) no LOB but ambulates with wide BRANDEN  Balance   Static Sitting Good   Dynamic Sitting Good   Static Standing Fair +   Dynamic Standing Fair   Ambulatory Fair   Endurance Deficit   Endurance Deficit Yes   Endurance Deficit Description decreased ambulation tolerance due to fatigue   Activity Tolerance   Activity Tolerance Patient tolerated treatment well;Patient limited by fatigue   Assessment   Prognosis Good   Problem List Decreased strength;Decreased endurance; Impaired balance;Decreased mobility;Pain   Assessment Pt is a 52year old female presenting with decreased R LE strength balance endurance and functional mobility with altered gait pattern but able to self correct minor gait deviations  Pt with no complaint of lightheadedness or dizziness performing all functional mobility at a (S) to (I) level  Pt demonstrates decreased dynamic balance  pt would benefit from continued activity in PT to ensure safe (I) stair negotiation and to improve ambulatory endurance with return to (I) LOF  Pt will be safe to return home on discharge   Goals   Patient Goals To feel better and return home   STG Expiration Date 11/28/17   Short Term Goal #1 (I) with all bed mobility and transfers no A D  Short Term Goal #2 Pt will ambulate 250ft no A D  (I) no LOB and will ascend/descend 11 steps with HR (I)   Plan   Treatment/Interventions Functional transfer training;LE strengthening/ROM; Elevations; Therapeutic exercise; Endurance training;Patient/family training;Bed mobility;Gait training   PT Frequency (3-5x/wk)   Recommendation   Recommendation Home independently   PT - OK to Discharge Yes  (when medically stable)   SCD's on when PT entered room  SCD's reapplied and turned on with pt seated in chair, alarm on and call bell in reach

## 2017-11-21 NOTE — PROGRESS NOTES
Progress Note - Neurology   Katherine Muniz 52 y o  female MRN: [id number]  Unit/Bed#: TRAUMA Encounter: [id number]    CVA Alert:  52year old woman with chronic weakness of the left hand, as well as numbness of the 3rd,4th,5th digits on the left and medial aspect of the forearm and upper arm as per Dr Trina Singh (vascular Sx) note dated 9/13/2017  She has been diagnosed with Thoracic outlet syndrome involving the left upper extremity  - Current complaints of worsening Left upper extremity weakness and numbness since 7 pm   - NIHSS 1-2 new symptoms  ( although unlikely to be vascular in etiology)  - CVA alert called 21:53  - neuro response 21:53    No tpa 2/2 low NIHSS/non-disabling symptoms , and unlikely to be vascular etiology  Of note patient reports to ED that "my carotid artery is blocked", and the same language in quotes is mentioned in the OP note of Dr Carolann Hammans leading me to believe that the patient s uses that specific term quite often  Her CUS in 7/28/2017 demonstrated <50 % stenosis on the R, and 50-69% on the L and was unchanged compared 09/12/2016  Not sure where     [address] and CTA were both unremarkable for any significant path

## 2017-11-21 NOTE — OCCUPATIONAL THERAPY NOTE
Occupational Therapy Evaluation     Patient Name: Adam POTTER Date: 11/21/2017  Problem List  Patient Active Problem List   Diagnosis    Smoker    NSTEMI (non-ST elevated myocardial infarction) ([address] )    CAD S/P CABG x 1 2016    Numbness of face    GERD (gastroesophageal reflux disease)    Coronary artery disease involving coronary bypass graft    Depression    Tobacco abuse    Left sided numbness    Brain TIA    CASTORENA (dyspnea on exertion)    Leg pain    Left carotid stenosis    Leukocytosis    TIA (transient ischemic attack)     Past Medical History  Past Medical History:   Diagnosis Date    Chronic pain     Coronary artery disease     Cubital tunnel syndrome     Depression     GERD (gastroesophageal reflux disease)     History of Clostridium difficile     Ovarian cyst     Psychiatric disorder     depression    Raynaud's disease     Takotsubo cardiomyopathy     Thoracic outlet syndrome     Tobacco abuse     Vitamin D deficiency      Past Surgical History  Past Surgical History:   Procedure Laterality Date    CARDIAC CATHETERIZATION      CABG    CORONARY ARTERY BYPASS GRAFT N/A 2/12/2016    Procedure: CABG X1; Left  EVH to mid-LAD; ZAHRA;  Surgeon: Justin Butcher DO;  [facility];  Service:    Jeanne ALVAREZ TENNIS ELBOW      DILATION AND CURETTAGE OF UTERUS      HYSTEROSCOPY      LEFT OOPHORECTOMY      SALPINGECTOMY Right              11/21/17 0826   Note Type   Note type Eval only   Restrictions/Precautions   Weight Bearing Precautions Per Order No   Other Precautions Contact/isolation;Droplet precautions; Chair Alarm; Bed Alarm;Multiple lines;Telemetry;O2;Fall Risk;Pain   Pain Assessment   Pain Assessment 0-10   Pain Score 7   Pain Location Shoulder   Pain Orientation Left   Home Living   Additional Comments see PT evaluation   Prior Function   Comments see PT evaluation for details   Psychosocial   Psychosocial (WDL) WDL   Bed Mobility   Supine to Sit 6 Modified independent   Additional items Bedrails   Sit to Supine (seated in chair at end of sessio)   Transfers   Sit to Stand 7  Independent   Stand to Sit 7  Independent   Additional Comments no device; no difficulties; on 1 5L O2-SpO2 ranged 97-98%    Functional Mobility   Functional Mobility 7  Independent   Additional Comments no difficulties, pt did not note dizziness or light headedness   Balance   Static Sitting Good   Dynamic Sitting Good   Static Standing Fair +   Dynamic Standing Fair +   Ambulatory Fair +   Activity Tolerance   Activity Tolerance Patient tolerated treatment well   RUE Assessment   RUE Assessment WFL   LUE Assessment   LUE Assessment WFL   Hand Function   Gross Motor Coordination Functional   Fine Motor Coordination Functional   Sensation   Light Touch No apparent deficits   Sharp/Dull No apparent deficits   Cognition   Overall Cognitive Status WFL   Arousal/Participation Alert   Attention Within functional limits   Orientation Level Oriented X4   Memory Within functional limits   Following Commands Follows all commands and directions without difficulty   Assessment   Assessment pt with no functional limitations requiring skilled OT intervention; pt is to be d/c'ed from this facility today   Recommendation   OT Discharge Recommendation Home independent   OT - OK to Discharge Yes   B[place]hel Index   Feeding 10   Bathing 5   Grooming Score 5   Dressing Score 10   Bladder Score 10   Bowels Score 10   Toilet Use Score 10   Transfers (Bed/Chair) Score 15   Mobility (Level Surface) Score 15   Stairs Score 0   Barthel Index Score 90       Pt is performing at PLOF; OT services will be discontinued at this time  Pt left seated in chair at end of session; all needs within reach; all lines intact; scds connected and turned on

## 2017-11-21 NOTE — SPEECH THERAPY NOTE
Speech/Language Pathology  Assessment    Patient Name: Niecy Espinoza  ITPKA'C Date: 11/21/2017     Problem List  Patient Active Problem List   Diagnosis    Smoker    NSTEMI (non-ST elevated myocardial infarction) [facility] [address] )    CAD S/P CABG x 1 2016    Numbness of face    GERD (gastroesophageal reflux disease)    Coronary artery disease involving coronary bypass graft    Depression    Tobacco abuse    Left sided numbness    Brain TIA    CASTORENA (dyspnea on exertion)    Leg pain    Left carotid stenosis    Leukocytosis    TIA (transient ischemic attack)     Past Medical History  Past Medical History:   Diagnosis Date    Chronic pain     Coronary artery disease     Cubital tunnel syndrome     Depression     GERD (gastroesophageal reflux disease)     History of Clostridium difficile     Ovarian cyst     Psychiatric disorder     depression    Raynaud's disease     Takotsubo cardiomyopathy     Thoracic outlet syndrome     Tobacco abuse     Vitamin D deficiency      Past Surgical History  Past Surgical History:   Procedure Laterality Date    CARDIAC CATHETERIZATION      CABG    CORONARY ARTERY BYPASS GRAFT N/A 2/12/2016    Procedure: CABG X1; Left  EVH to mid-LAD; ZAHRA;  Surgeon: Ericka Smalls DO;  [facility];  Service:    Hussain ALVAREZ TENNIS ELBOW      DILATION AND CURETTAGE OF UTERUS      HYSTEROSCOPY      LEFT OOPHORECTOMY      SALPINGECTOMY Right         11/21/17 1220   Swallow Information   Current Risks for Dysphagia & Aspiration New Neuro event   Current Diet Regular; Thin liquid   Baseline Diet Regular; Thin liquids   Baseline Assessment   Behavior/Cognition Alert; Cooperative; Interactive   Speech/Language Status WFL   Patient Positioning Upright in bed   Swallow Mechanism Exam   Labial Symmetry WFL   Labial Strength WFL   Labial ROM WFL   Labial Sensation WFL   Facial Symmetry WFL   Facial Strength WFL   Facial ROM WFL   Facial Sensation WFL   Lingual Symmetry WFL   Lingual Strength WFL   Lingual ROM WFL   Lingual Sensation WFL   Velum WFL   Gag WFL   Mandible WFL   Dentition Nearly edentulous; Poor dental/oral hygiene   Volitional Cough Strong   Consistencies Assessed and Performance   Materials Admnistered Regular/Solid;Soft/Level 3;Puree/Level 1; Thin liquid   Oral Stage WFL   Phargngeal Stage WFL   Swallow Mechanics WFL;Swallow initation; Appears prompt;Good Larygneal rise   Esophageal Concerns Hx GERDS   Summary   Swallow Summary Patient presents after possible TIA in bed and upright  Patient is intact cognitively and able to follow directions and verbalize  Oral stage is [facility] and no suspect or overt s/s of pharyngeal dysphagia are present at bedside  Patient is able to feed herself  Patient does have h/o GERD and is on medication for this; and she states she has no recent s/s  Patient is [facility] on current diet level  Recommendations   Risk for Aspiration None   Recommendations Consider oral diet   Diet Solid Recommendation Regular consistency   Diet Liquid Recommendation Thin liquid   Recommended Form of Meds As desired; As tolerated   General Precautions Feed only when alert;Remain upright for 45 mins after meals;Upright as possible for all oral intake   Results Reviewed with RN;PT/Family/Caregiver        11/21/17 1220   Swallow Information   Current Risks for Dysphagia & Aspiration New Neuro event   Current Diet Regular; Thin liquid   Baseline Diet Regular; Thin liquids   Baseline Assessment   Behavior/Cognition Alert; Cooperative; Interactive   Speech/Language Status WFL   Patient Positioning Upright in bed   Swallow Mechanism Exam   Labial Symmetry WFL   Labial Strength WFL   Labial ROM WFL   Labial Sensation WFL   Facial Symmetry WFL   Facial Strength WFL   Facial ROM WFL   Facial Sensation WFL   Lingual Symmetry WFL   Lingual Strength WFL   Lingual ROM WFL   Lingual Sensation WFL   Velum WFL   Gag WFL   Mandible WFL   Dentition Nearly edentulous; Poor dental/oral hygiene   Volitional Cough Strong   Consistencies Assessed and Performance   Materials Admnistered Regular/Solid;Soft/Level 3;Puree/Level 1; Thin liquid   Oral Stage WFL   Phargngeal Stage WFL   Swallow Mechanics WFL;Swallow initation; Appears prompt;Good Larygneal rise   Esophageal Concerns Hx GERDS   Summary   Swallow Summary Patient presents after possible TIA in bed and upright  Patient is intact cognitively and able to follow directions and verbalize  Oral stage is [facility] and no suspect or overt s/s of pharyngeal dysphagia are present at bedside  Patient is able to feed herself  Patient does have h/o GERD and is on medication for this; and she states she has no recent s/s  Patient is [facility] on current diet level  Recommendations   Risk for Aspiration None   Recommendations Consider oral diet   Diet Solid Recommendation Regular consistency   Diet Liquid Recommendation Thin liquid   Recommended Form of Meds As desired; As tolerated   General Precautions Feed only when alert;Remain upright for 45 mins after meals;Upright as possible for all oral intake   Results Reviewed with RN;PT/Family/Caregiver

## 2017-11-21 NOTE — RESPIRATORY THERAPY NOTE
RT Protocol Note  Katherine Muniz 52 y o  female MRN: [id number]  Unit/Bed#: 416-01 Encounter: [id number]    Assessment    Principal Problem:    TIA (transient ischemic attack)      Home Pulmonary Medications:  None    Past Medical History:   Diagnosis Date    Chronic pain     Coronary artery disease     Cubital tunnel syndrome     Depression     GERD (gastroesophageal reflux disease)     History of Clostridium difficile     Ovarian cyst     Psychiatric disorder     depression    Raynaud's disease     Takotsubo cardiomyopathy     Thoracic outlet syndrome     Tobacco abuse     Vitamin D deficiency      Social History     Social History    Marital status: /Civil Union     Spouse name: N/A    Number of children: 3    Years of education: N/A     Occupational History    unemployed      Social History Main Topics    Smoking status: Current Every Day Smoker     Packs/day: 0 25     Years: 30 00    Smokeless tobacco: Never Used    Alcohol use No    Drug use: No    Sexual activity: Yes     Other Topics Concern    None     Social History Narrative    None       Subjective         Objective    Physical Exam:   Assessment Type: Assess only  General Appearance: Drowsy  Respiratory Pattern: Normal  Chest Assessment: Chest expansion symmetrical  Bilateral Breath Sounds: Diminished  Cough: None  O2 Device: NC    Vitals:  Blood pressure 92/68, pulse 64, temperature 98 3 °F (36 8 °C), temperature source Temporal, resp  rate 16, height 5' 6" (1 676 m), weight 70 kg (154 lb 5 2 oz), last menstrual period 10/14/2015, SpO2 97 %, not currently breastfeeding  Imaging and other studies: I have personally reviewed pertinent reports        O2 Device: NC     Plan    Respiratory Plan: No distress/Pulmonary history

## 2017-11-21 NOTE — PLAN OF CARE
Problem: PAIN - ADULT  Goal: Verbalizes/displays adequate comfort level or baseline comfort level  Interventions:  - Encourage patient to monitor pain and request assistance  - Assess pain using appropriate pain scale  - Administer analgesics based on type and severity of pain and evaluate response  - Implement non-pharmacological measures as appropriate and evaluate response  - Consider cultural and social influences on pain and pain management  - Notify physician/advanced practitioner if interventions unsuccessful or patient reports new pain   Outcome: Progressing      Problem: INFECTION - ADULT  Goal: Absence or prevention of progression during hospitalization  INTERVENTIONS:  - Assess and monitor for signs and symptoms of infection  - Monitor lab/diagnostic results  - Monitor all insertion sites, i e  indwelling lines, tubes, and drains  - Monitor endotracheal (as able) and nasal secretions for changes in amount and color  - [place] appropriate cooling/warming therapies per order  - Administer medications as ordered  - Instruct and encourage patient and family to use good hand hygiene technique  - Identify and instruct in appropriate isolation precautions for identified infection/condition   Outcome: Progressing      Problem: SAFETY ADULT  Goal: Patient will remain free of falls  INTERVENTIONS:  - Assess patient frequently for physical needs  -  Identify cognitive and physical deficits and behaviors that affect risk of falls    -  [place] fall precautions as indicated by assessment   - Educate patient/family on patient safety including physical limitations  - Instruct patient to call for assistance with activity based on assessment  - Modify environment to reduce risk of injury  - Consider OT/PT consult to assist with strengthening/mobility    Outcome: Progressing    Goal: Maintain or return to baseline ADL function  INTERVENTIONS:  -  Assess patient's ability to carry out ADLs; assess patient's baseline for ADL function and identify physical deficits which impact ability to perform ADLs (bathing, care of mouth/teeth, toileting, grooming, dressing, etc )  - Assess/evaluate cause of self-care deficits   - Assess range of motion  - Assess patient's mobility; develop plan if impaired  - Assess patient's need for assistive devices and provide as appropriate  - Encourage maximum independence but intervene and supervise when necessary  ¯ Involve family in performance of ADLs  ¯ Assess for home care needs following discharge   ¯ Request OT consult to assist with ADL evaluation and planning for discharge  ¯ Provide patient education as appropriate   Outcome: Progressing    Goal: Maintain or return mobility status to optimal level  INTERVENTIONS:  - Assess patient's baseline mobility status (ambulation, transfers, stairs, etc )    - Identify cognitive and physical deficits and behaviors that affect mobility  - Identify mobility aids required to assist with transfers and/or ambulation (gait belt, sit-to-stand, lift, walker, cane, etc )  - [place] fall precautions as indicated by assessment  - Record patient progress and toleration of activity level on Mobility SBAR; progress patient to next Phase/Stage  - Instruct patient to call for assistance with activity based on assessment  - Request Rehabilitation consult to assist with strengthening/weightbearing, etc    Outcome: Progressing      Problem: DISCHARGE PLANNING  Goal: Discharge to home or other facility with appropriate resources  INTERVENTIONS:  - Identify barriers to discharge w/patient and caregiver  - Arrange for needed discharge resources and transportation as appropriate  - Identify discharge learning needs (meds, wound care, etc )  - Arrange for interpretive services to assist at discharge as needed  - Refer to Case Management Department for coordinating discharge planning if the patient needs post-hospital services based on physician/advanced practitioner order or complex needs related to functional status, cognitive ability, or social support system   Outcome: Progressing      Problem: Knowledge Deficit  Goal: Patient/family/caregiver demonstrates understanding of disease process, treatment plan, medications, and discharge instructions  Complete learning assessment and assess knowledge base  Interventions:  - Provide teaching at level of understanding  - Provide teaching via preferred learning methods   Outcome: Progressing      Problem: Neurological Deficit  Goal: Neurological status is stable or improving  Interventions:  - Monitor and assess patient's level of consciousness, motor function, sensory function, and level of assistance needed for ADLs  - Monitor and report changes from baseline  Collaborate with interdisciplinary team to initiate plan and implement interventions as ordered  - Provide and maintain a safe environment  - Utilize seizure precautions  - Utilize fall precautions  - Utilize aspiration precautions  - Utilize bleeding precautions  Outcome: Progressing      Problem: Potential for Falls  Goal: Patient will remain free of falls  INTERVENTIONS:  - Assess patient frequently for physical needs  -  Identify cognitive and physical deficits and behaviors that affect risk of falls    -  [place] fall precautions as indicated by assessment   - Educate patient/family on patient safety including physical limitations  - Instruct patient to call for assistance with activity based on assessment  - Modify environment to reduce risk of injury  - Consider OT/PT consult to assist with strengthening/mobility    Outcome: Progressing

## 2017-11-21 NOTE — ED PROVIDER NOTES
History  Chief Complaint   Patient presents with    STROKE Alert     Around 200 tonight patient got dizzy, had left facial numbness, and her left arm started to hurt her  Patient: Ken Ball  45 y o /female  YOB: 1970  MRN: [id number]  PCP: Kimberly Melton PA-C  Date of evaluation: 11/21/17    (N B Maye Fothergill may have been used in the preparation of this document )    Chief complaint:  Stroke-like symptoms then  At 19:00, she noticed numbness on the left side of her face, associated with new paresthesia of the left upper arm and new weakness in flexion and extension of the left arm  Her week left hand  was unchanged  She admitted to some lightheadedness  She had no difficulties with speech or language  She had no lack of coordination  She has not been ill recently  She has had no injury  She states that she has a blocked carotid artery on the left side  (See below for further information on this point)            Prior to Admission Medications   Prescriptions Last Dose Informant Patient Reported? Taking?   aspirin 81 mg chewable tablet 11/21/2017 at Unknown time  No Yes   Sig: Chew 1 tablet daily   atorvastatin (LIPITOR) 40 mg tablet 11/21/2017 at Unknown time  No Yes   Sig: Take 1 tablet by mouth every evening   citalopram (CeleXA) 20 mg tablet 11/21/2017 at Unknown time  Yes Yes   Sig: Take 20 mg by mouth daily  gabapentin (NEURONTIN) 400 mg capsule 11/21/2017 at Unknown time  Yes Yes   Sig: Take 400 mg by mouth 3 (three) times a day  metoprolol tartrate (LOPRESSOR) 25 mg tablet 11/21/2017 at Unknown time  No Yes   Sig: Take 0 5 tablets (12 5 mg total) by mouth every 8 (eight) hours  oxyCODONE (ROXICODONE) 5 mg immediate release tablet 11/21/2017 at Unknown time  Yes Yes   Sig: Take 5 mg by mouth every 4 (four) hours as needed for moderate pain   pantoprazole (PROTONIX) 40 mg tablet 11/21/2017 at Unknown time  Yes Yes   Sig: Take 40 mg by mouth daily  Facility-Administered Medications: None       Past Medical History:   Diagnosis Date    Chronic pain     Coronary artery disease     Cubital tunnel syndrome     Depression     GERD (gastroesophageal reflux disease)     History of Clostridium difficile     Ovarian cyst     Psychiatric disorder     depression    Raynaud's disease     Takotsubo cardiomyopathy     Thoracic outlet syndrome     Tobacco abuse     Vitamin D deficiency        Past Surgical History:   Procedure Laterality Date    CARDIAC CATHETERIZATION      CABG    CORONARY ARTERY BYPASS GRAFT N/A 2/12/2016    Procedure: CABG X1; Left  EVH to mid-LAD; ZAHRA;  Surgeon: Erik Sparks DO;  Location: [facility];  Service:    Vee Silence DEBRIDEMENT TENNIS ELBOW      DILATION AND CURETTAGE OF UTERUS      HYSTEROSCOPY      LEFT OOPHORECTOMY      SALPINGECTOMY Right        Family History   Problem Relation Age of Onset    Heart disease Mother     Heart attack Mother     Heart attack Brother 36     s/p stent placement    Diabetes Brother     Heart disease Brother     Cancer Father     Diabetes Sister     Heart disease Sister     Diabetes Sister      I have reviewed and agree with the history as documented  Social History   Substance Use Topics    Smoking status: Current Every Day Smoker     Packs/day: 0 25     Years: 30 00    Smokeless tobacco: Never Used    Alcohol use No        Review of Systems   Constitutional: Negative for chills and fever  HENT: Negative for hearing loss, trouble swallowing and voice change  Eyes: Negative for pain, redness and visual disturbance  Respiratory: Negative for cough and shortness of breath  Cardiovascular: Negative for chest pain and palpitations  Gastrointestinal: Negative for abdominal pain, constipation, diarrhea, nausea and vomiting  Genitourinary: Negative for dysuria, hematuria, vaginal bleeding and vaginal discharge     Musculoskeletal: Negative for back pain, gait problem and neck pain  Skin: Negative for color change and rash  Neurological: Positive for dizziness, weakness, light-headedness and numbness  Negative for speech difficulty  Psychiatric/Behavioral: Negative for confusion and decreased concentration  The patient is not nervous/anxious  All other systems reviewed and are negative  Physical Exam  ED Triage Vitals   Temperature Pulse Respirations Blood Pressure SpO2   11/20/17 2110 11/20/17 2054 11/20/17 2054 11/20/17 2056 11/20/17 2054   99 2 °F (37 3 °C) 91 21 123/62 96 %      Temp Source Heart Rate Source Patient Position - Orthostatic VS BP Location FiO2 (%)   11/20/17 2110 11/20/17 2110 11/20/17 2110 11/20/17 2300 --   Temporal Monitor Sitting Right arm       Pain Score       11/20/17 2300       2           Orthostatic Vital Signs  Vitals:    11/20/17 2300 11/20/17 2330 11/21/17 0016 11/21/17 0200   BP: 106/66 107/62 108/79 103/60   Pulse: 77 75 74 68   Patient Position - Orthostatic VS: Lying Lying Sitting Lying       Physical Exam   Constitutional: She is oriented to person, place, and time  She appears well-developed and well-nourished  HENT:   Mouth/Throat: Oropharynx is clear and moist and mucous membranes are normal    Voice normal   Eyes: EOM are normal  Pupils are equal, round, and reactive to light  Cardiovascular: Normal rate and regular rhythm  Pulmonary/Chest: Effort normal    Abdominal: Soft  Bowel sounds are normal    Neurological: She is alert and oriented to person, place, and time  No cranial nerve deficit or sensory deficit  GCS eye subscore is 4  GCS verbal subscore is 5  GCS motor subscore is 6  Weak LUE   Skin: Skin is warm and dry  Psychiatric: She has a normal mood and affect  Her speech is normal and behavior is normal    Nursing note and vitals reviewed        ED Medications  Medications   atorvastatin (LIPITOR) tablet 40 mg (not administered)   citalopram (CeleXA) tablet 20 mg (not administered)   gabapentin (NEURONTIN) capsule 400 mg (not administered)   metoprolol tartrate (LOPRESSOR) partial tablet 12 5 mg (not administered)   oxyCODONE (ROXICODONE) IR tablet 5 mg (not administered)   aspirin chewable tablet 81 mg (not administered)   acetaminophen (TYLENOL) tablet 650 mg (not administered)   ondansetron (ZOFRAN) injection 4 mg (not administered)   clopidogrel (PLAVIX) tablet 75 mg (not administered)   enoxaparin (LOVENOX) subcutaneous injection 40 mg (not administered)   cefTRIAXone (ROCEPHIN) 1,000 mg in dextrose 5 % 50 mL IVPB (not administered)     And   azithromycin (ZITHROMAX) 500 mg in sodium chloride 0 9 % 250 mL IVPB (not administered)   iohexol (OMNIPAQUE) 350 MG/ML injection (SINGLE-DOSE) 100 mL (100 mL Intravenous Given 11/20/17 2100)   oxyCODONE-acetaminophen (PERCOCET) 5-325 mg per tablet 1 tablet (1 tablet Oral Given 11/20/17 2349)   potassium chloride (K-DUR,KLOR-CON) CR tablet 20 mEq (20 mEq Oral Given 11/21/17 0257)       Diagnostic Studies  Results Reviewed     Procedure Component Value Units Date/Time    Urinalysis with reflex to microscopic [[id number]] Collected:  11/21/17 0048    Lab Status:  Final result Specimen:  Urine from Urine, Clean Catch Updated:  11/21/17 0100     Color, UA Yellow     Clarity, UA Clear     Specific Gravity, UA 1 010     pH, UA 5 0     Leukocytes, UA Negative     Nitrite, UA Negative     Protein, UA Negative mg/dl      Glucose, UA Negative mg/dl      Ketones, UA Negative mg/dl      Urobilinogen, UA 0 2 E U /dl      Bilirubin, UA Negative     Blood, UA Negative    POCT Chem 8+ [[id number]]  (Abnormal) Collected:  11/20/17 2117    Lab Status:  Final result Updated:  11/20/17 2123     SODIUM, I-STAT 144 mmol/l      Potassium, i-STAT 3 3 (L) mmol/L      Chloride, istat 106 mmol/L      CO2, i-STAT 23 mmol/L      Anion Gap, Istat 19 (H) mmol/L      Calcium, Ionized i-STAT 1 18 mmol/L      BUN, I-STAT <3 (L) mg/dl      Creatinine, i-STAT 0 7 mg/dl      eGFR 104 ml/min/1 73sq m      Glucose, i-STAT 101 mg/dl      Hct, i-STAT 42 %      Hgb, i-STAT 14 3 g/dl      Specimen Type VENOUS    Basic metabolic panel [[id number]]  (Abnormal) Collected:  11/20/17 2059    Lab Status:  Final result Specimen:  Blood from Arm, Right Updated:  11/20/17 2117     Sodium 143 mmol/L      Potassium 3 3 (L) mmol/L      Chloride 106 mmol/L      CO2 27 mmol/L      Anion Gap 10 mmol/L      BUN 5 mg/dL      Creatinine 0 80 mg/dL      Glucose 105 mg/dL      Calcium 8 8 mg/dL      eGFR 88 ml/min/1 73sq m     Narrative:         National Kidney Disease Education Program recommendations are as follows:  GFR calculation is accurate only with a steady state creatinine  Chronic Kidney disease less than 60 ml/min/1 73 sq  meters  Kidney failure less than 15 ml/min/1 73 sq  meters  APTT [[id number]]  (Normal) Collected:  11/20/17 2059    Lab Status:  Final result Specimen:  Blood from Arm, Right Updated:  11/20/17 2114     PTT 28 seconds     Narrative: Therapeutic Heparin Range = 60-90 seconds    Protime-INR [[id number]]  (Normal) Collected:  11/20/17 2059    Lab Status:  Final result Specimen:  Blood from Arm, Right Updated:  11/20/17 2114     Protime 12 5 seconds      INR 0 94    CBC [[id number]]  (Abnormal) Collected:  11/20/17 2059    Lab Status:  Final result Specimen:  Blood from Arm, Right Updated:  11/20/17 2106     WBC 15 96 (H) Thousand/uL      RBC 4 52 Million/uL      Hemoglobin 13 0 g/dL      Hematocrit 39 8 %      MCV 88 fL      MCH 28 8 pg      MCHC 32 7 g/dL      RDW 15 3 (H) %      Platelets 315 (H) Thousands/uL      MPV 9 3 fL                  CTA head and neck w wo contrast   Final Result by Victor Hugo Steen MD (11/20 2131)      No acute intracranial abnormality  No focal stenosis or saccular aneurysm within the [place]  No hemodynamically significant stenosis within either common or internal carotid artery  Less than 50% stenosis by NASCET criteria        Moderate emphysematous changes noted at both lung apices  Workstation performed: [id number]         CT stroke alert brain   Final Result by Kira Keen MD (11/20 2113)      No acute intracranial abnormality  Findings were directly discussed with Isiah Hayes on 11/20/2017 9:13 PM          Workstation performed: [id number]         X-ray chest 1 view portable    (Results Pending)   MRI Inpatient Order    (Results Pending)              Procedures  CriticalCare Time  Performed by: Yahaira Ocampo by: Hannah Wise     Critical care provider statement:     Critical care time (minutes):  35    Critical care time was exclusive of:  Separately billable procedures and treating other patients and teaching time    Critical care was necessary to treat or prevent imminent or life-threatening deterioration of the following conditions:  CNS failure or compromise    Critical care was time spent personally by me on the following activities:  Ordering and performing treatments and interventions, ordering and review of radiographic studies, ordering and review of laboratory studies, review of old charts, examination of patient, discussions with consultants, obtaining history from patient or surrogate and development of treatment plan with patient or surrogate           Phone Contacts  ED Phone Contact    ED Course  ED Course      Phone consult with Dr Christofer Tan, neurology  See his note  His review of records revealed more about the pt's carotid artery history  Compare to the note copied below  -------------------------  FROM [facility] OFFICE VISIT ---------------------------------     Assessment  1  Thoracic outlet syndrome (353 0) (G54 0)     Plan  Thoracic outlet syndrome    · (1) BASIC METABOLIC PROFILE; Status:Active; Requested for:52Cqv1185;    Perform:[facility]; Due:96Mgo9308; Ordered; For:Thoracic outlet syndrome; Ordered By:Silvia Herman;   · CTA CHEST WO W CONTRAST; Status:Need Information - Financial Authorization; Requested for:43Gsq1057;    Perform:[facility]; Order Comments:Evaluate for Thoracic outlet syndrome; Due:45Bas8605; Ordered; For:Thoracic outlet syndrome; Ordered By:Debbie Herman;     Discussion/Summary  Discussion Summary:   Patient presents with numbness left upper extremity digits 34 and 5 and medial aspect of upper and lower arm  Her carotid study is essentially normal with no evidence of neurological changes or stroke on her MRI  She has numbness which is exacerbated with frequent and repetitive motions a left upper extremity likely consistent with thoracic outlet syndrome neurogenic  She is just recently discharged from the hospital for MI evaluation, I'll be ordering a CTA of her chest in 3 months and see her back in the office for further evaluation and recommendations  Counseling Documentation With Imm: The patient, patient's family was counseled regarding diagnostic results,-instructions for management,-risk factor reductions,-prognosis,-risks and benefits of treatment options,-importance of compliance with treatment  total time of encounter was 25 [id number] minutes was spent counseling  Chief Complaint  Chief Complaint Free Text Note Form: My carotid artery is blocked  cv duplex 07/28 had TIA 3 wks ago  still has numbness and weakness on Left side  Denies vision changes or slurred speech  History of Present Illness  HPI: Left arm numbness exacerbated with repetitive motion      Review of Systems  Complete Female - Vasc:   Constitutional: fever,-chills-and-feeling tired, but-No fever or chills, feels well, no tiredness, no recent weight gain or weight loss,-loss in appetite,-no recent weight gain-and-no recent weight loss  Eyes: no sudden vision loss,-no blurred vision-and-no double vision, but-no eye pain,-no eyesight problems,-no dryness of the eyes,-eyes not red,-no purulent discharge from the eyes,-no itching of the eyes-and-wears glasses     ENT: sore throat, but-no earache,-no nosebleeds,-no hearing loss,-no nasal discharge-and-no hoarseness  Cardiovascular: no painful veins-and-no bleeding veins, but-regular heart rate,-no chest pain,-no intermittent leg claudication,-no palpitations-and-leg pain with walking  Respiratory: shortness of breath,-wheezing,-cough,-shortness of breath during exertion,-orthopnea-and-complaints of PND  Gastrointestinal: No nausea, No vomiting, no diarrhea, no blood in stool  Genitourinary: no dysuria, no Hematuria,no urinary incontinence  Musculoskeletal: no lumbar pain,-joint swelling-and-limb swelling, but-no limb pain,-no myalgias-and-no joint stiffness  Integumentary: no rash, no lesions, no wounds, no ulcer  Neurological: headache,-numbness,-no dementia,-dizziness-and-difficulty walking, but-no confusion,-no limb weakness,-no convulsions-and-no fainting  Psychiatric: depression-and-no mood disorder, but-no anxiety  Hematologic/Lymphatic: no bleeding disorder, no easy bruising  ROS Reviewed:   ROS reviewed  Active Problems  1  Abdominal bloating (787 3) (R14 0)   2  Abdominal pain, epigastric (789 06) (R10 13)   3  Acid reflux (530 81) (K21 9)   4  Acute systolic CHF (congestive heart failure) (428 21,428 0) (I50 21)   5  Atherosclerotic heart disease of native coronary artery without angina pectoris (414 01)   (I25 10)   6  Backache (724 5) (M54 9)   7  Bacterial UTI (599 0,041 9) (N39 0,A49 9)   8  Bilateral leg edema (782 3) (R60 0)   9  Brain TIA (435 9) (G45 9)   10  CABG   11  Cardiomyopathy, dilated (425 4) (I42 0)   12  Cerumen impaction (380 4) (H61 20)   13  Chest pain, precordial (786 51) (R07 2)   14  Coronary artery disease (414 00) (I25 10)   15  Cough (786 2) (R05)   16  Cubital tunnel syndrome (354 2) (G56 20)   17  Depression (311) (F32 9)   18  Depression screen (V79 0) (Z13 89)   19  Diarrhea, unspecified type (787 91) (R19 7)   20  CASTORENA (dyspnea on exertion) (786 09) (R06 09)   21   Encounter for consultation (V65 9) (Z71 9)   22  GERD (gastroesophageal reflux disease) (530 81) (K21 9)   23  Hip pain, unspecified laterality   24  Ischemic cardiomyopathy (414 8) (I25 5)   25  Leg pain, left (729 5) (M79 605)   26  Lower back pain (724 2) (M54 5)   27  Need for immunization against influenza (V04 81) (Z23)   28  Non-ST elevation MI (NSTEMI) (410 70) (I21 4)   29  Other specified transient cerebral ischemias (435 8) (G45 8)   30  Persistent disorder of initiating or maintaining sleep (307 42) (G47 00)   31  Postop check (V67 00) (Z09)   32  Postoperative visit (V58 49) (Z48 89)   33  Raynaud's syndrome without gangrene (443 0) (I73 00)   34  Rib pain on left side (786 50) (R07 81)   35  Snoring (786 09) (R06 83)   36  ST elevation myocardial infarction (STEMI) of anterior wall (410 10) (I21 09)   37  Tachycardia (785 0) (R00 0)   38  Urinary urgency (788 63) (R39 15)   39  Vitamin D deficiency (268 9) (E55 9)   40  Weight gain, abnormal (783 1) (R63 5)     Past Medical History  1  History of C  difficile diarrhea (008 45) (A04 7)   2  History of Denial Of Any Significant Medical History   3  History of  3   4  History of menorrhagia (V13 29) (Z87 42)   5  History of upper respiratory infection (V12 09) (Z87 09)   6  History of viral gastroenteritis (V12 09) (Z86 19)  Active Problems And Past Medical History Reviewed: The active problems and past medical history were reviewed and updated today  Surgical History  1  History of CABG   2  History of Dilation And Curettage   3  History of Hysteroscopy   4  History of Salpingectomy Unilateral Right Side   5  History of Salpingo-oophorectomy Left Side  Surgical History Reviewed: The surgical history was reviewed and updated today  Family History  Mother    1  Family history of Heart disease (429 9) (I51 9)  Father    2  Family history of lung cancer (V16 1) (Z80 1)  Brother    3  Family history of Diabetes mellitus (250 00) (E11 9)   4   Family history of malignant neoplasm of prostate (V16 42) (Z80 42)   5  Family history of Heart disease (429 9) (I51 9)   6  Family history of Hypertension (401 9) (I10)  Family History    7  Denied: Family history of Breast Cancer   8  Denied: Family history of Colon Cancer   9  Denied: Family history of Osteoporosis   10  Denied: Family history of Ovarian Cancer   11  Denied: Family history of Uterine Cancer  Family History Reviewed: The family history was reviewed and updated today  Social History   · Current Every Day Smoker (305 1)   · No alcohol use  Social History Reviewed: The social history was reviewed and updated today  Current Meds   1  Aspir-Low 81 MG Oral Tablet Delayed Release; take one tablet daily; Therapy: 77WGD7667 to (Last Rx:67Vcg9524)  Requested for: 88Qfp0824 Ordered   2  Atorvastatin Calcium 40 MG Oral Tablet; take one tablet daily with dinner; Therapy: 09APC7789 to (Last Rx:84Ysi7528)  Requested for: 16QAC1330 Ordered   3  CeleXA 20 MG Oral Tablet; TAKE 1 TABLET DAILY; Therapy: (Recorded:35Fsm4088) to Recorded   4  Clopidogrel Bisulfate 75 MG Oral Tablet; TAKE (1) TABLET BY MOUTH DAILY; Therapy: 16ZLB5498 to (Last Rx:13Fhw6833)  Requested for: 76KEC0928 Ordered   5  Doxepin HCl - 10 MG Oral Capsule; TAKE 1 CAPSULE AT BEDTIME; Therapy: (Recorded:66Ecn9071) to Recorded   6  Furosemide 20 MG Oral Tablet; TAKE 1 TABLET DAILY AS NEEDED; Therapy: 42GMF4070 to (([phone] [phone])  Requested for: 04NDH6159; Last   Rx:61Pdp2485 Ordered   7  Gabapentin 400 MG Oral Capsule; TAKE 1 CAPSULE Every 8 hours; Therapy: (Recorded:56Paw9670) to Recorded   8  Metoprolol Tartrate 25 MG Oral Tablet; TAKE ONE-HALF TABLET EVERY 8 HOURS; Therapy: 49EEP3764 to (Last Rx:69Nxo4808)  Requested for: 80AVT1997 Ordered   9  Oxycodone-Acetaminophen 5-325 MG Oral Tablet; TAKE 1 TAB 2X DAILY   (3 X daily per   patient); Therapy: 89VPQ8486 to (Last Rx:95Zhz3787) Ordered   10   Pantoprazole Sodium 40 MG Oral Tablet Delayed Release; TAKE (1) TABLET BY MOUTH    DAILY; Therapy: 73JDN5615 to (Last Rx:23Fct9006)  Requested for: 98Otz1187 Ordered   11  Venlafaxine HCl ER 37 5 MG Oral Capsule Extended Release 24 Hour; TAKE ONE    CAPSULE BY MOUTH ONCE DAILY; Therapy: 46QVV6026 to (Evaluate:30Jun2017)  Requested for: 57NPG1321; Last    Rx:02Mar2017 Ordered   12  Vitamin D (Ergocalciferol) 95800 UNIT Oral Capsule; 1 cap 2x weekly for 6 weeks; Therapy: 61THQ3928 to (Evaluate:09Jun2017)  Requested for: 42ALM7513; Last    Rx:31Ods7986 Ordered  Medication List Reviewed: The medication list was reviewed and updated today  Allergies  1  No Known Drug Allergies     Vitals  Vital Signs     Recorded: 13Sep2017 09:46AM Recorded: 13Sep2017 09:44AM   Heart Rate 91 86   Pulse Quality Regular Regular   Systolic 457, LUE, Sitting 128, RUE, Sitting   Diastolic 63, LUE, Sitting 67, RUE, Sitting   Height   5 ft 6 in   Weight   151 lb 12 8 oz   BMI Calculated   24 5   BSA Calculated   1 78      Physical Exam     Carotid: right 2+,-no bruit heard on the right,-left 2+-and-no bruit on the left  Brachial: right 2+-and-left 2+  Radial: right 2+-and-left 2+  Femoral: right 2+,-no bruit heard on the right,-left 2+-and-no bruit on the left  Popliteal: right 2+-and-left 2+  Posterior tibialis: right 2+-and-left 2+  Dorsalis pedis: right 2+-and-left 2+  Distal Pulse Exam: Left radial pulse not disturbed with the surrender sign Normal Capillary Refill             NIH Stroke Scale    Flowsheet Row Most Recent Value   Level of Consciousness (1a )  0 Filed at: 11/21/2017 0019   LOC Questions (1b )  0 Filed at: 11/21/2017 0019   LOC Commands (1c )  0 Filed at: 11/21/2017 0019   Best Gaze (2 )  0 Filed at: 11/21/2017 0019   Visual (3 )  0 Filed at: 11/21/2017 0019   Facial Palsy (4 )  0 Filed at: 11/21/2017 0019   Motor Arm, Left (5a )  0 Filed at: 11/21/2017 0019   Motor Arm, Right (5b )  0 Filed at: 11/21/2017 0019   Motor Leg, Left (6a )  0 Filed at: 11/21/2017 0019   Motor Leg, Right (6b )  0 Filed at: 11/21/2017 0019   Limb Ataxia (7 )  0 Filed at: 11/21/2017 0019   Sensory (8 )  1 Filed at: 11/21/2017 0019   Best Language (9 )  0 Filed at: 11/21/2017 0019   Dysarthria (10 )  0 Filed at: 11/21/2017 0019   Extinction and Inattention (11 ) (Formerly Neglect)  0 Filed at: 11/21/2017 0019   Total  1 Filed at: 11/21/2017 0019                        MDM  Number of Diagnoses or Management Options  TIA (transient ischemic attack):      Amount and/or Complexity of Data Reviewed  Tests in the radiology section of CPT®: ordered and reviewed  Tests in the medicine section of CPT®: ordered and reviewed  Discussion of test results with the performing providers: yes  Decide to obtain previous medical records or to obtain history from someone other than the patient: yes  Obtain history from someone other than the patient: yes  Discuss the patient with other providers: yes    Risk of Complications, Morbidity, and/or Mortality  Presenting problems: high    Patient Progress  Patient progress: stable    The patient presented with a condition in which there was a high probability of imminent or life-threatening deterioration, and critical care services (excluding separately billable procedures) totalled 30-74 minutes          Disposition  Final diagnoses:   TIA (transient ischemic attack)     Time reflects when diagnosis was documented in both MDM as applicable and the Disposition within this note     Time User Action Codes Description Comment    11/20/2017  8:52 PM Lilliana BARBER Add [G45 9] TIA (transient ischemic attack)       ED Disposition     None      Follow-up Information    None       Current Discharge Medication List      CONTINUE these medications which have NOT CHANGED    Details   aspirin 81 mg chewable tablet Chew 1 tablet daily  Qty: 30 tablet, Refills: 0      atorvastatin (LIPITOR) 40 mg tablet Take 1 tablet by mouth every evening  Qty: 30 tablet, Refills: 0      citalopram (CeleXA) 20 mg tablet Take 20 mg by mouth daily  gabapentin (NEURONTIN) 400 mg capsule Take 400 mg by mouth 3 (three) times a day  metoprolol tartrate (LOPRESSOR) 25 mg tablet Take 0 5 tablets (12 5 mg total) by mouth every 8 (eight) hours  Qty: 45 tablet, Refills: 2      oxyCODONE (ROXICODONE) 5 mg immediate release tablet Take 5 mg by mouth every 4 (four) hours as needed for moderate pain      pantoprazole (PROTONIX) 40 mg tablet Take 40 mg by mouth daily  No discharge procedures on file      ED Provider  Electronically Signed by           Michael Caballero MD  11/21/17 0145

## 2017-11-21 NOTE — PROGRESS NOTES
Adrian Palma is a 53 yo female admitted for multiple complaints which include feeling achy, productive cough and chills  She is also complaining of left arm pain and numbness that she describes as "pins and needles" feeling  She is being workup for possible TIA  CT brain is negative  MRI brain is pending  Chest x-ray is negative for acute findings  Will discontinue IV Rocephin and continue IV Azithromycin for acute tracheobronchitis  The patient also had complaints of left leg pain, venous duplex is negative for DVT  D-dimer is elevated, CTA chest pending to r/o pulmonary emboli however it is not able to be done until tomorrow 11/22/17 which will be 24 hours after CTA head and neck  Prophylactic Lovenox given dosed at 1 mg/kg

## 2017-11-21 NOTE — CASE MANAGEMENT
Initial Clinical Review    Admission: Date/Time/Statement: 11/20/2017  2233    Orders Placed This Encounter   Procedures    Place in Observation (expected length of stay for this patient is less than two midnights)     Standing Status:   Standing     Number of Occurrences:   1     Order Specific Question:   Admitting Physician     Answer:   Xavi Mitchell [[id number]]     Order Specific Question:   Level of Care     Answer:   Med Surg [[id number]]         ED: Date/Time/Mode of Arrival:   ED Arrival Information     Expected Arrival Acuity Means of Arrival Escorted By Service Admission Type    - 11/20/2017 20:44 Immediate Walk-In Family Member General Medicine Emergency    Arrival Complaint    Possible Stroke          Chief Complaint:   Chief Complaint   Patient presents with    STROKE Alert     Around 1900 tonight patient got dizzy, had left facial numbness, and her left arm started to hurt her  History of Illness: 52year old female who presents to the ED today with generally not feeling well  She says that her left neck hurts and radiates down her left arm  Everything is achy  She has had chills and a productive cough for 5 days now  She has been coughing up mucous  She complains of chest tightness, like it is sore from coughing  She denies shortness of breath  She was in contact with someone who had pneumonia  She had loose stools yesterday but none today  She was just on antibiotics, a little green pill but not sure of the name, prescribed by her pain doctor for an infection in her right calf  The patient says that they didn't do anything  She had previously seen a PA at her PCPs office who had ordered an ultrasound to r/o a soft tissue mass  She has been running low grade fevers      Per the ED report, the patient developed dizziness and left facial numbness and then her left arm started to hurt   They requested an observational stay for a TIA work up   Eliecer Walker of Systems   Constitutional: Positive for chills and fever    HENT: Positive for congestion and tinnitus  Respiratory: Positive for cough, shortness of breath and wheezing  Feels like she has a throat full of phlegm making it hard to swallow, but hasn't had any choking   Gastrointestinal:        Heartburn, decreased appetite   Endocrine: Positive for polydipsia  Hot flashes   Musculoskeletal: Positive for arthralgias and myalgias  Leg cramps, pain in calf when walking on right calf with it going up her leg more --started a month ago  She is supposed to get u/s tomorrow per PA at PCPs office to r/o mass; then pain doctor told her it was infection and put her on abx and it didn't change at all  Skin:        dry   Neurological: Positive for dizziness, weakness, numbness and headaches  She has weakness with numbness and tingling in left arm like she always gets only it is worse with increased pain in her neck    Hematological: Bruises/bleeds easily  Psychiatric/Behavioral: Positive for dysphoric mood and sleep disturbance  Stress     ED Vital Signs:   ED Triage Vitals   Temperature Pulse Respirations Blood Pressure SpO2   11/20/17 2110 11/20/17 2054 11/20/17 2054 11/20/17 2056 11/20/17 2054   99 2 °F (37 3 °C) 91 21 123/62 96 %      Temp Source Heart Rate Source Patient Position - Orthostatic VS BP Location FiO2 (%)   11/20/17 2110 11/20/17 2110 11/20/17 2110 11/20/17 2300 --   Temporal Monitor Sitting Right arm       Pain Score       11/20/17 2300       2        Wt Readings from Last 1 Encounters:   11/21/17 70 kg (154 lb 5 2 oz)       Vital Signs (abnormal): 99 2    Abnormal Labs/Diagnostic Test Results:k+ 3 3  Wbc 15 96--platlets 412  cta of head--No acute intracranial abnormality  No focal stenosis or saccular aneurysm within the [place]  No hemodynamically significant stenosis within either common or internal carotid artery   Less than 50% stenosis by NASCET criteria      Moderate emphysematous changes noted at both [facility]  ED Treatment:   Medication Administration from 11/20/2017 2044 to 11/21/2017 0008       Date/Time Order Dose Route Action Action by Comments     11/20/2017 2100 iohexol (OMNIPAQUE) 350 MG/ML injection (SINGLE-DOSE) 100 mL 100 mL Intravenous Given Bear Bassett      11/20/2017 7409 oxyCODONE-acetaminophen (PERCOCET) 5-325 mg per tablet 1 tablet 1 tablet Oral Given Samuel Smith RN           Past Medical/Surgical History: Active Ambulatory Problems     Diagnosis Date Noted    Smoker 02/08/2016    NSTEMI (non-ST elevated myocardial infarction) ([facility] [address] ) 02/13/2016    CAD S/P CABG x 1 2016 02/13/2016    Numbness of face 09/11/2016    GERD (gastroesophageal reflux disease) 09/11/2016    Coronary artery disease involving coronary bypass graft 09/11/2016    Depression 09/11/2016    Tobacco abuse 09/11/2016    Left sided numbness 07/28/2017    Brain TIA 07/28/2017    CASTORENA (dyspnea on exertion) 07/28/2017    Leg pain 07/28/2017    Left carotid stenosis 07/28/2017    Leukocytosis 09/10/2017    TIA (transient ischemic attack) 09/10/2017     Resolved Ambulatory Problems     Diagnosis Date Noted    Elevated troponin 02/08/2016    Chest pain 02/08/2016     Past Medical History:   Diagnosis Date    Chronic pain     Coronary artery disease     Cubital tunnel syndrome     Depression     GERD (gastroesophageal reflux disease)     History of Clostridium difficile     Ovarian cyst     Psychiatric disorder     Raynaud's disease     Takotsubo cardiomyopathy     Thoracic outlet syndrome     Tobacco abuse     Vitamin D deficiency        Admitting Diagnosis: TIA (transient ischemic attack) [G45 9]  Stroke-like symptom [R29 90]    Age/Sex: 52 y o  female    Assessment/Plan: Assessment:  1  URI versus pneumonia  2  Possible DVT right lower extremity  3  Exacerbation of thoracic outlet syndrome due to scalene muscle spasm resultant from coughing  4  Possible TIA  5  Hypokalemia  6  Leukocytosis     Plan:  1  Observe in hospital  2  F/u CXR report, cover for CAP in meantime  Check procalcitonin  3  Check D-dimer  4   Stroke work up        Admission Orders:  Scheduled Meds:   aspirin 81 mg Oral Daily   atorvastatin 40 mg Oral QPM   cefTRIAXone 1,000 mg Intravenous Q24H   And      azithromycin 500 mg Intravenous Q24H   citalopram 20 mg Oral Daily   clopidogrel 75 mg Oral Daily   enoxaparin 1 mg/kg Subcutaneous Q12H NAIF   gabapentin 400 mg Oral TID   metoprolol tartrate 12 5 mg Oral Q8H [address]     Continuous Infusions:    PRN Meds:   acetaminophen    levalbuterol    ondansetron    oxyCODONE     Neurology consult    venodynes  neuros q1 hr x4 then q2 x2 then q4   pulse ox on ra  Dysphagia eval  cta of chest  Mri of brain  Sputum culture  Trop q3  Cardiac diet  Pt/ot  Speech eval

## 2017-11-21 NOTE — PLAN OF CARE
Problem: PAIN - ADULT  Goal: Verbalizes/displays adequate comfort level or baseline comfort level  Interventions:  - Encourage patient to monitor pain and request assistance  - Assess pain using appropriate pain scale  - Administer analgesics based on type and severity of pain and evaluate response  - Implement non-pharmacological measures as appropriate and evaluate response  - Consider cultural and social influences on pain and pain management  - Notify physician/advanced practitioner if interventions unsuccessful or patient reports new pain  Outcome: Progressing      Problem: INFECTION - ADULT  Goal: Absence or prevention of progression during hospitalization  INTERVENTIONS:  - Assess and monitor for signs and symptoms of infection  - Monitor lab/diagnostic results  - Monitor all insertion sites, i e  indwelling lines, tubes, and drains  - Monitor endotracheal (as able) and nasal secretions for changes in amount and color  - [place] appropriate cooling/warming therapies per order  - Administer medications as ordered  - Instruct and encourage patient and family to use good hand hygiene technique  - Identify and instruct in appropriate isolation precautions for identified infection/condition  Outcome: Progressing      Problem: SAFETY ADULT  Goal: Patient will remain free of falls  INTERVENTIONS:  - Assess patient frequently for physical needs  -  Identify cognitive and physical deficits and behaviors that affect risk of falls    -  [place] fall precautions as indicated by assessment   - Educate patient/family on patient safety including physical limitations  - Instruct patient to call for assistance with activity based on assessment  - Modify environment to reduce risk of injury  - Consider OT/PT consult to assist with strengthening/mobility  Outcome: Progressing    Goal: Maintain or return to baseline ADL function  INTERVENTIONS:  -  Assess patient's ability to carry out ADLs; assess patient's baseline for ADL function and identify physical deficits which impact ability to perform ADLs (bathing, care of mouth/teeth, toileting, grooming, dressing, etc )  - Assess/evaluate cause of self-care deficits   - Assess range of motion  - Assess patient's mobility; develop plan if impaired  - Assess patient's need for assistive devices and provide as appropriate  - Encourage maximum independence but intervene and supervise when necessary  ¯ Involve family in performance of ADLs  ¯ Assess for home care needs following discharge   ¯ Request OT consult to assist with ADL evaluation and planning for discharge  ¯ Provide patient education as appropriate  Outcome: Progressing    Goal: Maintain or return mobility status to optimal level  INTERVENTIONS:  - Assess patient's baseline mobility status (ambulation, transfers, stairs, etc )    - Identify cognitive and physical deficits and behaviors that affect mobility  - Identify mobility aids required to assist with transfers and/or ambulation (gait belt, sit-to-stand, lift, walker, cane, etc )  - [place] fall precautions as indicated by assessment  - Record patient progress and toleration of activity level on Mobility SBAR; progress patient to next Phase/Stage  - Instruct patient to call for assistance with activity based on assessment  - Request Rehabilitation consult to assist with strengthening/weightbearing, etc   Outcome: Progressing      Problem: DISCHARGE PLANNING  Goal: Discharge to home or other facility with appropriate resources  INTERVENTIONS:  - Identify barriers to discharge w/patient and caregiver  - Arrange for needed discharge resources and transportation as appropriate  - Identify discharge learning needs (meds, wound care, etc )  - Arrange for interpretive services to assist at discharge as needed  - Refer to Case Management Department for coordinating discharge planning if the patient needs post-hospital services based on physician/advanced practitioner order or complex needs related to functional status, cognitive ability, or social support system  Outcome: Progressing      Problem: Knowledge Deficit  Goal: Patient/family/caregiver demonstrates understanding of disease process, treatment plan, medications, and discharge instructions  Complete learning assessment and assess knowledge base  Interventions:  - Provide teaching at level of understanding  - Provide teaching via preferred learning methods  Outcome: Progressing      Problem: Neurological Deficit  Goal: Neurological status is stable or improving  Interventions:  - Monitor and assess patient's level of consciousness, motor function, sensory function, and level of assistance needed for ADLs  - Monitor and report changes from baseline  Collaborate with interdisciplinary team to initiate plan and implement interventions as ordered  - Provide and maintain a safe environment  - Utilize seizure precautions  - Utilize fall precautions  - Utilize aspiration precautions  - Utilize bleeding precautions    Outcome: Progressing

## 2017-11-21 NOTE — H&P
H&P Exam - Gaudencio Osorio 52 y o  female MRN: [id number]    Unit/Bed#: 416-01 Encounter: [id number]    Assessment:  1  URI versus pneumonia  2  Possible DVT right lower extremity  3  Exacerbation of thoracic outlet syndrome due to scalene muscle spasm resultant from coughing  4  Possible TIA  5  Hypokalemia  6  Leukocytosis    Plan:  1  Observe in hospital  2  F/u CXR report, cover for CAP in meantime  Check procalcitonin  3  Check D-dimer  4  Stroke work up     History of Present Illness   Ms Preethi Kaminski is a very pleasant 52year old female who presents to the ED today with generally not feeling well  She says that her left neck hurts and radiates down her left arm  Everything is achy  She has had chills and a productive cough for 5 days now  She has been coughing up mucous  She complains of chest tightness, like it is sore from coughing  She denies shortness of breath  She was in contact with someone who had pneumonia  She had loose stools yesterday but none today  She was just on antibiotics, a little green pill but not sure of the name, prescribed by her pain doctor for an infection in her right calf  The patient says that they didn't do anything  She had previously seen a PA at her PCPs office who had ordered an ultrasound to r/o a soft tissue mass  She has been running low grade fevers  Per the ED report, the patient developed dizziness and left facial numbness and then her left arm started to hurt  They requested an observational stay for a TIA work up  Review of Systems   Constitutional: Positive for chills and fever  HENT: Positive for congestion and tinnitus  Respiratory: Positive for cough, shortness of breath and wheezing  Feels like she has a throat full of phlegm making it hard to swallow, but hasn't had any choking   Gastrointestinal:        Heartburn, decreased appetite   Endocrine: Positive for polydipsia  Hot flashes   Musculoskeletal: Positive for arthralgias and myalgias  Leg cramps, pain in calf when walking on right calf with it going up her leg more --started a month ago  She is supposed to get u/s tomorrow per PA at PCPs office to r/o mass; then pain doctor told her it was infection and put her on abx and it didn't change at all  Skin:        dry   Neurological: Positive for dizziness, weakness, numbness and headaches  She has weakness with numbness and tingling in left arm like she always gets only it is worse with increased pain in her neck    Hematological: Bruises/bleeds easily  Psychiatric/Behavioral: Positive for dysphoric mood and sleep disturbance  Stress   All other systems reviewed and are negative        Historical Information   Past Medical History:   Diagnosis Date    Chronic pain     Coronary artery disease     Cubital tunnel syndrome     Depression     GERD (gastroesophageal reflux disease)     History of Clostridium difficile     Ovarian cyst     Psychiatric disorder     depression    Raynaud's disease     Takotsubo cardiomyopathy     Thoracic outlet syndrome     Tobacco abuse     Vitamin D deficiency      Past Surgical History:   Procedure Laterality Date    CARDIAC CATHETERIZATION      CABG    CORONARY ARTERY BYPASS GRAFT N/A 2/12/2016    Procedure: CABG X1; Left  EVH to mid-LAD; ZAHRA;  Surgeon: Kimmy Jacobo DO;  Location: [facility];  Service:    Edith ALVAREZ TENNIS ELBOW      DILATION AND CURETTAGE OF UTERUS      HYSTEROSCOPY      LEFT OOPHORECTOMY      SALPINGECTOMY Right      Social History   History   Alcohol Use No     History   Drug Use No     History   Smoking Status    Current Every Day Smoker    Packs/day: 0 25    Years: 30 00   Smokeless Tobacco    Never Used     Family History:   Family History   Problem Relation Age of Onset    Heart disease Mother     Heart attack Mother     Heart attack Brother 36     s/p stent placement    Diabetes Brother     Heart disease Brother     Cancer Father     Diabetes Sister     Heart disease Sister     Diabetes Sister        Meds/Allergies   PTA meds:   Prior to Admission Medications   Prescriptions Last Dose Informant Patient Reported? Taking?   aspirin 81 mg chewable tablet 11/21/2017 at Unknown time  No Yes   Sig: Chew 1 tablet daily   atorvastatin (LIPITOR) 40 mg tablet 11/21/2017 at Unknown time  No Yes   Sig: Take 1 tablet by mouth every evening   citalopram (CeleXA) 20 mg tablet 11/21/2017 at Unknown time  Yes Yes   Sig: Take 20 mg by mouth daily  gabapentin (NEURONTIN) 400 mg capsule 11/21/2017 at Unknown time  Yes Yes   Sig: Take 400 mg by mouth 3 (three) times a day  metoprolol tartrate (LOPRESSOR) 25 mg tablet 11/21/2017 at Unknown time  No Yes   Sig: Take 0 5 tablets (12 5 mg total) by mouth every 8 (eight) hours  oxyCODONE (ROXICODONE) 5 mg immediate release tablet 11/21/2017 at Unknown time  Yes Yes   Sig: Take 5 mg by mouth every 4 (four) hours as needed for moderate pain   pantoprazole (PROTONIX) 40 mg tablet 11/21/2017 at Unknown time  Yes Yes   Sig: Take 40 mg by mouth daily        Facility-Administered Medications: None     Allergies   Allergen Reactions    Toradol [Ketorolac Tromethamine] GI Intolerance       Objective   First Vitals:   Blood Pressure: 123/62 (11/20/17 2056)  Pulse: 91 (11/20/17 2054)  Temperature: 99 2 °F (37 3 °C) (11/20/17 2110)  Temp Source: Temporal (11/20/17 2110)  Respirations: 21 (11/20/17 2054)  Height: 5' 6" (167 6 cm) (11/21/17 0008)  Weight - Scale: 70 9 kg (156 lb 3 2 oz) (11/20/17 2110)  SpO2: 96 % (11/20/17 2054)    Current Vitals:   Blood Pressure: 103/60 (11/21/17 0200)  Pulse: 68 (11/21/17 0200)  Temperature: 97 8 °F (36 6 °C) (11/21/17 0200)  Temp Source: Temporal (11/21/17 0200)  Respirations: 18 (11/21/17 0200)  Height: 5' 6" (167 6 cm) (11/21/17 0008)  Weight - Scale: 70 kg (154 lb 5 2 oz) (11/21/17 0008)  SpO2: 94 % (11/21/17 0200)    No intake or output data in the 24 hours ending 11/21/17 0225    Invasive Devices     Peripheral Intravenous Line            Peripheral IV 11/20/17 Right Antecubital less than 1 day                Physical Exam   Constitutional: She is oriented to person, place, and time  She appears well-developed and well-nourished  No distress  Looks tired   HENT:   Head: Normocephalic and atraumatic  Nose: Nose normal    Mouth/Throat: Oropharynx is clear and moist  No oropharyngeal exudate  Eyes: Conjunctivae and EOM are normal  Pupils are equal, round, and reactive to light  Right eye exhibits no discharge  Left eye exhibits no discharge  No scleral icterus  Neck: Normal range of motion  Neck supple  No JVD present  No tracheal deviation present  No thyromegaly present  Tenderness with palpation of left middle scalene muscle   Cardiovascular: Normal rate, regular rhythm, normal heart sounds and intact distal pulses  Exam reveals no gallop and no friction rub  No murmur heard  Pulmonary/Chest: Effort normal and breath sounds normal  No stridor  No respiratory distress  She has no wheezes  She has no rales  She exhibits no tenderness  Abdominal: Soft  Bowel sounds are normal  She exhibits no distension and no mass  There is no tenderness  There is no rebound and no guarding  Musculoskeletal: Normal range of motion  She exhibits edema, tenderness and deformity  Her right calf up to her knee has a posterior section that is slightly ecchymotic with thickened skin and very tender to touch, no increased calor, no rubor  Lymphadenopathy:     She has no cervical adenopathy  Neurological: She is alert and oriented to person, place, and time  She has normal reflexes  She displays normal reflexes  No cranial nerve deficit  She exhibits normal muscle tone  Coordination normal    Skin: Skin is warm and dry  She is not diaphoretic  Psychiatric: She has a normal mood and affect   Her behavior is normal  Judgment and thought content normal        Lab Results: Results for Man Ortega (MRN [id number]) as of 11/21/2017 02:10   Ref   Range 11/20/2017 20:59 11/20/2017 21:17 11/21/2017 00:48   Glucose, i-STAT Latest Ref Range: 65 - 140 mg/dl  101    SODIUM, I-STAT Latest Ref Range: 136 - 145 mmol/l  144    POTASSIUM,I-STAT Latest Ref Range: 3 5 - 5 3 mmol/L  3 3 (L)    CHLORIDE, ISTAT Latest Ref Range: 100 - 108 mmol/L  106    CO2, i-STAT Latest Ref Range: 21 - 32 mmol/L  23    Anion Gap, Istat Latest Ref Range: 4 - 13 mmol/L  19 (H)    CALCIUM, IONIZED ISTAT Latest Ref Range: 1 12 - 1 32 mmol/L  1 18    BUN, I-STAT Latest Ref Range: 5 - 25 mg/dl  <3 (L)    CREATININE, I-STAT Latest Ref Range: 0 6 - 1 3 mg/dl  0 7    eGFR Latest Units: ml/min/1 73sq m 88 104    Hemoglobin, Istat Latest Ref Range: 11 5 - 15 4 g/dl  14 3    HCT, I-STAT Latest Ref Range: 34 8 - 46 1 %  42    Specimen Type Unknown  VENOUS    Sodium Latest Ref Range: 136 - 145 mmol/L 143     Potassium Latest Ref Range: 3 5 - 5 3 mmol/L 3 3 (L)     Chloride Latest Ref Range: 100 - 108 mmol/L 106     CO2 Latest Ref Range: 21 - 32 mmol/L 27     Anion Gap Latest Ref Range: 4 - 13 mmol/L 10     BUN Latest Ref Range: 5 - 25 mg/dL 5     Creatinine Latest Ref Range: 0 60 - 1 30 mg/dL 0 80     Glucose Latest Ref Range: 65 - 140 mg/dL 105     Calcium Latest Ref Range: 8 3 - 10 1 mg/dL 8 8     WBC Latest Ref Range: 4 31 - 10 16 Thousand/uL 15 96 (H)     RBC Latest Ref Range: 3 81 - 5 12 Million/uL 4 52     Hemoglobin Latest Ref Range: 11 5 - 15 4 g/dL 13 0     Hematocrit Latest Ref Range: 34 8 - 46 1 % 39 8     MCV Latest Ref Range: 82 - 98 fL 88     MCH Latest Ref Range: 26 8 - 34 3 pg 28 8     MCHC Latest Ref Range: 31 4 - 37 4 g/dL 32 7     RDW Latest Ref Range: 11 6 - 15 1 % 15 3 (H)     Platelets Latest Ref Range: 149 - 390 Thousands/uL 412 (H)     MPV Latest Ref Range: 8 9 - 12 7 fL 9 3     Protime Latest Ref Range: 12 1 - 14 4 seconds 12 5     INR Latest Ref Range: 0 86 - 1 16  0 94     PTT Latest Ref Range: 23 - 35 seconds 28 ABO Grouping Unknown A     Rh Factor Unknown Positive     Antibody Screen Unknown Negative     Specimen Expiration Date Unknown [id number]     Color, UA Unknown   Yellow   Clarity, UA Unknown   Clear   Specific [place], UA Latest Ref Range: 1 003 - 1 030    1 010   Glucose, UA Latest Ref Range: Negative mg/dl   Negative   Ketones, UA Latest Ref Range: Negative mg/dl   Negative   Blood, UA Latest Ref Range: Negative, Trace-Intact    Negative   Nitrite, UA Latest Ref Range: Negative    Negative   Leukocytes, UA Latest Ref Range: Negative    Negative   pH, UA Latest Ref Range: 4 5 - 8 0    5 0   Protein, UA Latest Ref Range: Negative mg/dl   Negative   Bilirubin, UA Latest Ref Range: Negative    Negative   Urobilinogen, UA Latest Ref Range: 0 2, 1 0 E U /dl E U /dl   0 2     Imaging:  CTA head and neck: IMPRESSION:   No acute intracranial abnormality    No focal stenosis or saccular aneurysm within the [place]    No hemodynamically significant stenosis within either common or internal carotid artery  Less than 50% stenosis by NASCET criteria    Moderate emphysematous changes noted at both lung apices  Head CT: No acute intracranial abnormality  CXR per my review appears to have increased opacity in the right peribronchial area with air bronchograms noted  EKG, Pathology, and Other Studies: EKG shows NSR with rate 89, old RBBB, no acute ST-T changes    Code Status: Prior FULL  Advance Directive and Living Will:      Power of :    POLST:      Counseling / Coordination of Care: Total floor / unit time spent today 69 minutes

## 2017-11-22 ENCOUNTER — APPOINTMENT (OUTPATIENT)
Dept: CT IMAGING | Facility: HOSPITAL | Age: 47
End: 2017-11-22
Payer: COMMERCIAL

## 2017-11-22 VITALS
WEIGHT: 154.32 LBS | HEART RATE: 75 BPM | RESPIRATION RATE: 18 BRPM | OXYGEN SATURATION: 96 % | HEIGHT: 66 IN | DIASTOLIC BLOOD PRESSURE: 88 MMHG | BODY MASS INDEX: 24.8 KG/M2 | SYSTOLIC BLOOD PRESSURE: 129 MMHG | TEMPERATURE: 99.6 F

## 2017-11-22 PROBLEM — D72.829 LEUKOCYTOSIS: Status: RESOLVED | Noted: 2017-09-10 | Resolved: 2017-11-22

## 2017-11-22 PROBLEM — R06.00 DOE (DYSPNEA ON EXERTION): Status: RESOLVED | Noted: 2017-07-28 | Resolved: 2017-11-22

## 2017-11-22 PROBLEM — R20.0 LEFT SIDED NUMBNESS: Status: RESOLVED | Noted: 2017-07-28 | Resolved: 2017-11-22

## 2017-11-22 PROBLEM — G45.9 BRAIN TIA: Status: RESOLVED | Noted: 2017-07-28 | Resolved: 2017-11-22

## 2017-11-22 PROBLEM — G45.9 TIA (TRANSIENT ISCHEMIC ATTACK): Status: RESOLVED | Noted: 2017-09-10 | Resolved: 2017-11-22

## 2017-11-22 PROBLEM — R06.09 DOE (DYSPNEA ON EXERTION): Status: RESOLVED | Noted: 2017-07-28 | Resolved: 2017-11-22

## 2017-11-22 LAB
ANION GAP SERPL CALCULATED.3IONS-SCNC: 10 MMOL/L (ref 4–13)
BASOPHILS # BLD AUTO: 0.05 THOUSANDS/ΜL (ref 0–0.1)
BASOPHILS NFR BLD AUTO: 0 % (ref 0–1)
BUN SERPL-MCNC: 7 MG/DL (ref 5–25)
CALCIUM SERPL-MCNC: 9 MG/DL (ref 8.3–10.1)
CHLORIDE SERPL-SCNC: 107 MMOL/L (ref 100–108)
CO2 SERPL-SCNC: 25 MMOL/L (ref 21–32)
CREAT SERPL-MCNC: 0.75 MG/DL (ref 0.6–1.3)
EOSINOPHIL # BLD AUTO: 0.13 THOUSAND/ΜL (ref 0–0.61)
EOSINOPHIL NFR BLD AUTO: 1 % (ref 0–6)
ERYTHROCYTE [DISTWIDTH] IN BLOOD BY AUTOMATED COUNT: 15.4 % (ref 11.6–15.1)
GFR SERPL CREATININE-BSD FRML MDRD: 95 ML/MIN/1.73SQ M
GLUCOSE SERPL-MCNC: 85 MG/DL (ref 65–140)
HCT VFR BLD AUTO: 42 % (ref 34.8–46.1)
HGB BLD-MCNC: 13.6 G/DL (ref 11.5–15.4)
LYMPHOCYTES # BLD AUTO: 2.87 THOUSANDS/ΜL (ref 0.6–4.47)
LYMPHOCYTES NFR BLD AUTO: 26 % (ref 14–44)
MCH RBC QN AUTO: 28.4 PG (ref 26.8–34.3)
MCHC RBC AUTO-ENTMCNC: 32.4 G/DL (ref 31.4–37.4)
MCV RBC AUTO: 88 FL (ref 82–98)
MONOCYTES # BLD AUTO: 0.78 THOUSAND/ΜL (ref 0.17–1.22)
MONOCYTES NFR BLD AUTO: 7 % (ref 4–12)
MRSA NOSE QL CULT: NORMAL
NEUTROPHILS # BLD AUTO: 7.33 THOUSANDS/ΜL (ref 1.85–7.62)
NEUTS SEG NFR BLD AUTO: 66 % (ref 43–75)
PLATELET # BLD AUTO: 427 THOUSANDS/UL (ref 149–390)
PMV BLD AUTO: 9.7 FL (ref 8.9–12.7)
POTASSIUM SERPL-SCNC: 3.8 MMOL/L (ref 3.5–5.3)
RBC # BLD AUTO: 4.79 MILLION/UL (ref 3.81–5.12)
SODIUM SERPL-SCNC: 142 MMOL/L (ref 136–145)
WBC # BLD AUTO: 11.16 THOUSAND/UL (ref 4.31–10.16)

## 2017-11-22 PROCEDURE — 97110 THERAPEUTIC EXERCISES: CPT

## 2017-11-22 PROCEDURE — 80048 BASIC METABOLIC PNL TOTAL CA: CPT | Performed by: PHYSICIAN ASSISTANT

## 2017-11-22 PROCEDURE — 97116 GAIT TRAINING THERAPY: CPT

## 2017-11-22 PROCEDURE — 71275 CT ANGIOGRAPHY CHEST: CPT

## 2017-11-22 PROCEDURE — 85025 COMPLETE CBC W/AUTO DIFF WBC: CPT | Performed by: PHYSICIAN ASSISTANT

## 2017-11-22 RX ORDER — CLOPIDOGREL BISULFATE 75 MG/1
75 TABLET ORAL DAILY
Qty: 30 TABLET | Refills: 0 | Status: ON HOLD
Start: 2017-11-23 | End: 2018-05-10

## 2017-11-22 RX ORDER — AZITHROMYCIN 250 MG/1
TABLET, FILM COATED ORAL
Qty: 3 TABLET | Refills: 0 | Status: SHIPPED | OUTPATIENT
Start: 2017-11-22 | End: 2018-01-30 | Stop reason: ALTCHOICE

## 2017-11-22 RX ADMIN — IOHEXOL 85 ML: 350 INJECTION, SOLUTION INTRAVENOUS at 06:34

## 2017-11-22 RX ADMIN — ENOXAPARIN SODIUM 70 MG: 80 INJECTION SUBCUTANEOUS at 09:21

## 2017-11-22 RX ADMIN — GABAPENTIN 400 MG: 400 CAPSULE ORAL at 17:57

## 2017-11-22 RX ADMIN — METOPROLOL TARTRATE 12.5 MG: 25 TABLET ORAL at 05:34

## 2017-11-22 RX ADMIN — ASPIRIN 81 MG CHEWABLE TABLET 81 MG: 81 TABLET CHEWABLE at 09:21

## 2017-11-22 RX ADMIN — METOPROLOL TARTRATE 12.5 MG: 25 TABLET ORAL at 14:12

## 2017-11-22 RX ADMIN — GABAPENTIN 400 MG: 400 CAPSULE ORAL at 09:20

## 2017-11-22 RX ADMIN — CLOPIDOGREL BISULFATE 75 MG: 75 TABLET ORAL at 09:21

## 2017-11-22 RX ADMIN — AZITHROMYCIN MONOHYDRATE 500 MG: 500 INJECTION, POWDER, LYOPHILIZED, FOR SOLUTION INTRAVENOUS at 05:02

## 2017-11-22 RX ADMIN — CITALOPRAM HYDROBROMIDE 20 MG: 20 TABLET ORAL at 09:21

## 2017-11-22 RX ADMIN — ATORVASTATIN CALCIUM 40 MG: 40 TABLET, FILM COATED ORAL at 17:57

## 2017-11-22 NOTE — DISCHARGE SUMMARY
Discharge Summary - Ellie Borrero 52 y o  female MRN: [id number]  Unit/Bed#: 416-01 Encounter: [id number]    Admission Date: 11/20/2017   Discharge date: 11/22/17    Admitting Diagnosis: TIA (transient ischemic attack) [G45 9]  Stroke-like symptom [R29 90]     Discharge diagnosis:   Patient Active Problem List   Diagnosis    Smoker    NSTEMI (non-ST elevated myocardial infarction) ([facility] [address] )    CAD S/P CABG x 1 2016    GERD (gastroesophageal reflux disease)    Coronary artery disease involving coronary bypass graft    Depression    Tobacco abuse    Leg pain    Left carotid stenosis       HPI: This patient is a 52year old female who presented to the hospital with multiple complaints including feeling achy, productive cough, chills, left arm pain and numbness  Initially CTA of the head and neck was performed and unremarkable  Chest xray also was normal      Hospital Course: The patient was placed on observation status and was treated with IV azithromycin and rocephin to cover for possible pneumonia, which was then reduced to just azithromycin to treat tracheobronchitis  She received supplemental oxygen and nebulizer treatments as needed  Azithromycin will be continued for a 3 day course on discharge  A CTA chest was negative for pulmonary embolism, but did show signs of chronic lung disease, which should be followed by pulmonology on an outpatient basis  To workup her complaints of left arm numbness an MRI of the head was obtained, which was negative  Her numbness was attributed to thoracic outlet syndrome, unlikely to be a vascular etiology  Overall, after an observation stay this patient was stable for discharge home  Her symptoms had resolved at the time of discharge         Condition at Discharge: good     Examination on discharge:   Vitals:    11/22/17 0714 11/22/17 1118 11/22/17 1400 11/22/17 1603   BP: 103/63 119/57 105/60 129/88   Pulse: 68 60 64 75   Resp: 18 18 18 18   Temp: 98 °F (36 7 °C) 99 2 °F (37 3 °C) 98 2 °F (36 8 °C) 99 6 °F (37 6 °C)   TempSrc: Temporal Temporal Temporal Temporal   SpO2: 96% 95% 96% 96%   Weight:       Height:       Gen: NAD  HEENT: MMM  Lungs: CTA throughout  Abd: soft, nontender  Ext: no edema of the BLE  Discharge instructions/Information to patient and family:   See after visit summary for information provided to patient and family  Provisions for Follow-Up Care:  See after visit summary for information related to follow-up care and any pertinent home health orders  Disposition: Home    Planned Readmission: No    Discharge Statement   I spent 35 minutes discharging the patient  This time was spent on the day of discharge  I examined the patient, reviewed the medical record, and prepared the appropriate paperwork for discharge as well as provided patient education  Discharge Medications:  See after visit summary for reconciled discharge medications provided to patient and family

## 2017-11-22 NOTE — PLAN OF CARE
Problem: Neurological Deficit  Goal: Neurological status is stable or improving  Interventions:  - Monitor and assess patient's level of consciousness, motor function, sensory function, and level of assistance needed for ADLs  - Monitor and report changes from baseline  Collaborate with interdisciplinary team to initiate plan and implement interventions as ordered  - Provide and maintain a safe environment  - Utilize seizure precautions  - Utilize fall precautions  - Utilize aspiration precautions  - Utilize bleeding precautions  Outcome: Progressing      Problem: Activity Intolerance/Impaired Mobility  Goal: Mobility/activity is maintained at optimum level for patient  Interventions:  - Assess and monitor patient  barriers to mobility and need for assistive/adaptive devices  - Assess patient's emotional response to limitations  - Collaborate with interdisciplinary team and initiate plans and interventions as ordered  - Encourage independent activity per ability   - Maintain proper body alignment  - Perform active/passive rom as tolerated/ordered  - Plan activities to conserve energy   - Turn patient   Outcome: Progressing      Problem: Potential for Aspiration  Goal: Non-ventilated patient's risk of aspiration is minimized  Assess and monitor vital signs, respiratory status, and labs (WBC)  Monitor for signs of aspiration (tachypnea, cough, rales, wheezing, cyanosis, fever)  - Assess and monitor patient's ability to swallow  - Place patient up in chair to eat if possible  - HOB up at 90 degrees to eat if unable to get patient up into chair   - Supervise patient during oral intake  - Instruct patient to take small bites  - Instruct patient to take small single sips when taking liquids    - Follow patient-specific strategies generated by speech pathologist    Outcome: Progressing      Problem: Nutrition  Goal: Nutrition/Hydration status is improving  Monitor and assess patient's nutrition/hydration status for malnutrition (ex- brittle hair, bruises, dry skin, pale skin and conjunctiva, muscle wasting, smooth red tongue, and disorientation)  Collaborate with interdisciplinary team and initiate plan and interventions as ordered  Monitor patient's weight and dietary intake as ordered or per policy  Utilize nutrition screening tool and intervene per policy  Determine patient's food preferences and provide high-protein, high-caloric foods as appropriate  - Assist patient with eating   - Allow adequate time for meals   - Encourage patient to take dietary supplement as ordered  - Collaborate with clinical nutritionist   - Include patient/family/caregiver in decisions related to nutrition     Outcome: Progressing

## 2017-11-22 NOTE — PLAN OF CARE
Problem: PAIN - ADULT  Goal: Verbalizes/displays adequate comfort level or baseline comfort level  Interventions:  - Encourage patient to monitor pain and request assistance  - Assess pain using appropriate pain scale  - Administer analgesics based on type and severity of pain and evaluate response  - Implement non-pharmacological measures as appropriate and evaluate response  - Consider cultural and social influences on pain and pain management  - Notify physician/advanced practitioner if interventions unsuccessful or patient reports new pain   Outcome: Progressing      Problem: INFECTION - ADULT  Goal: Absence or prevention of progression during hospitalization  INTERVENTIONS:  - Assess and monitor for signs and symptoms of infection  - Monitor lab/diagnostic results  - Monitor all insertion sites, i e  indwelling lines, tubes, and drains  - Monitor endotracheal (as able) and nasal secretions for changes in amount and color  - [place] appropriate cooling/warming therapies per order  - Administer medications as ordered  - Instruct and encourage patient and family to use good hand hygiene technique  - Identify and instruct in appropriate isolation precautions for identified infection/condition   Outcome: Progressing      Problem: SAFETY ADULT  Goal: Patient will remain free of falls  INTERVENTIONS:  - Assess patient frequently for physical needs  -  Identify cognitive and physical deficits and behaviors that affect risk of falls    -  [place] fall precautions as indicated by assessment   - Educate patient/family on patient safety including physical limitations  - Instruct patient to call for assistance with activity based on assessment  - Modify environment to reduce risk of injury  - Consider OT/PT consult to assist with strengthening/mobility    Outcome: Progressing    Goal: Maintain or return to baseline ADL function  INTERVENTIONS:  -  Assess patient's ability to carry out ADLs; assess patient's baseline for ADL function and identify physical deficits which impact ability to perform ADLs (bathing, care of mouth/teeth, toileting, grooming, dressing, etc )  - Assess/evaluate cause of self-care deficits   - Assess range of motion  - Assess patient's mobility; develop plan if impaired  - Assess patient's need for assistive devices and provide as appropriate  - Encourage maximum independence but intervene and supervise when necessary  ¯ Involve family in performance of ADLs  ¯ Assess for home care needs following discharge   ¯ Request OT consult to assist with ADL evaluation and planning for discharge  ¯ Provide patient education as appropriate   Outcome: Progressing    Goal: Maintain or return mobility status to optimal level  INTERVENTIONS:  - Assess patient's baseline mobility status (ambulation, transfers, stairs, etc )    - Identify cognitive and physical deficits and behaviors that affect mobility  - Identify mobility aids required to assist with transfers and/or ambulation (gait belt, sit-to-stand, lift, walker, cane, etc )  - [place] fall precautions as indicated by assessment  - Record patient progress and toleration of activity level on Mobility SBAR; progress patient to next Phase/Stage  - Instruct patient to call for assistance with activity based on assessment  - Request Rehabilitation consult to assist with strengthening/weightbearing, etc    Outcome: Progressing      Problem: DISCHARGE PLANNING  Goal: Discharge to home or other facility with appropriate resources  INTERVENTIONS:  - Identify barriers to discharge w/patient and caregiver  - Arrange for needed discharge resources and transportation as appropriate  - Identify discharge learning needs (meds, wound care, etc )  - Arrange for interpretive services to assist at discharge as needed  - Refer to Case Management Department for coordinating discharge planning if the patient needs post-hospital services based on physician/advanced practitioner order or complex needs related to functional status, cognitive ability, or social support system   Outcome: Progressing      Problem: Knowledge Deficit  Goal: Patient/family/caregiver demonstrates understanding of disease process, treatment plan, medications, and discharge instructions  Complete learning assessment and assess knowledge base  Interventions:  - Provide teaching at level of understanding  - Provide teaching via preferred learning methods   Outcome: Progressing      Problem: Neurological Deficit  Goal: Neurological status is stable or improving  Interventions:  - Monitor and assess patient's level of consciousness, motor function, sensory function, and level of assistance needed for ADLs  - Monitor and report changes from baseline  Collaborate with interdisciplinary team to initiate plan and implement interventions as ordered  - Provide and maintain a safe environment  - Utilize seizure precautions  - Utilize fall precautions  - Utilize aspiration precautions  - Utilize bleeding precautions  Outcome: Progressing      Problem: Potential for Falls  Goal: Patient will remain free of falls  INTERVENTIONS:  - Assess patient frequently for physical needs  -  Identify cognitive and physical deficits and behaviors that affect risk of falls    -  [place] fall precautions as indicated by assessment   - Educate patient/family on patient safety including physical limitations  - Instruct patient to call for assistance with activity based on assessment  - Modify environment to reduce risk of injury  - Consider OT/PT consult to assist with strengthening/mobility    Outcome: Progressing

## 2017-11-22 NOTE — NURSING NOTE
Pt walked off unit accompanied by significant other and friend  Discharge instructions given to pt  Verbal understanding of same  Pt unwilling to quit smoking an refused cessation materials  Pt in no acute distress

## 2017-11-22 NOTE — PHYSICAL THERAPY NOTE
PT treatment Note      11/22/17 0901   Pain Assessment   Pain Score No Pain   Restrictions/Precautions   Other Precautions (Contact/isolation; Chair Alarm; Bed Alarm;)   Subjective   Subjective States she has some tingling LUE  Bed Mobility   Supine to Sit 6  Modified independent   Additional items Bedrails   Transfers   Sit to Stand 7  Independent   Stand to Sit 7  Independent   Stand pivot 7  Independent   Ambulation/Elevation   Gait pattern Wide BRANDEN; Short stride   Gait Assistance 5  Supervision   Assistive Device None   Distance 180'   Balance   Static Sitting Good   Dynamic Sitting Good   Static Standing Fair +   Dynamic Standing Fair   Ambulatory Fair   Endurance Deficit   Endurance Deficit Yes   Activity Tolerance   Activity Tolerance Patient tolerated treatment well   Exercises   Hip Flexion Sitting;20 reps   Hip Abduction Sitting;20 reps   Hip Adduction Sitting;20 reps   Knee AROM Long Arc Quad Sitting;20 reps   Ankle Pumps Sitting;20 reps   Assessment   Prognosis Good   Problem List Decreased strength;Decreased endurance; Impaired balance;Decreased mobility   Assessment Performing functional mobility at (I/S) level of function  Slow altered gait with no episodes of LOB  Pt demonstrates decreased dynamic balance  pt would benefit from continued activity in PT to ensure safe (I) stair negotiation and to improve ambulatory endurance with return to (I) LOF  Pt will be safe to return home on discharge   Plan   Treatment/Interventions Functional transfer training;LE strengthening/ROM; Therapeutic exercise; Endurance training;Bed mobility;Gait training   Progress Progressing toward goals   Recommendation   Recommendation Home independently   Pt  OOB with call bell within reach, scd's connected and turned on and alarm on at end of PT session

## 2017-11-24 LAB — PROCALCITONIN: 0.03 NG/ML (ref 0–0.08)

## 2017-12-12 NOTE — PROGRESS NOTES
Addendum to discharge summary from 11/22/17: Please note the patient did NOT suffer a NSTEMI while here in the hospital  This diagnosis was entered in error and has since been removed from her discharge diagnosis list

## 2017-12-13 DIAGNOSIS — G54.0 BRACHIAL PLEXUS DISORDERS: ICD-10-CM

## 2018-01-02 ENCOUNTER — GENERIC CONVERSION - ENCOUNTER (OUTPATIENT)
Dept: OTHER | Facility: OTHER | Age: 48
End: 2018-01-02

## 2018-01-02 ENCOUNTER — APPOINTMENT (OUTPATIENT)
Dept: FAMILY MEDICINE CLINIC | Facility: CLINIC | Age: 48
End: 2018-01-02
Payer: COMMERCIAL

## 2018-01-02 PROCEDURE — T1015 CLINIC SERVICE: HCPCS | Performed by: FAMILY MEDICINE

## 2018-01-03 ENCOUNTER — HOSPITAL ENCOUNTER (EMERGENCY)
Facility: HOSPITAL | Age: 48
Discharge: HOME | End: 2018-01-03
Attending: EMERGENCY MEDICINE | Admitting: EMERGENCY MEDICINE
Payer: COMMERCIAL

## 2018-01-03 ENCOUNTER — APPOINTMENT (EMERGENCY)
Dept: CT IMAGING | Facility: HOSPITAL | Age: 48
End: 2018-01-03
Payer: COMMERCIAL

## 2018-01-03 ENCOUNTER — APPOINTMENT (EMERGENCY)
Dept: RADIOLOGY | Facility: HOSPITAL | Age: 48
End: 2018-01-03
Payer: COMMERCIAL

## 2018-01-03 VITALS
HEART RATE: 71 BPM | RESPIRATION RATE: 16 BRPM | SYSTOLIC BLOOD PRESSURE: 109 MMHG | WEIGHT: 153.66 LBS | TEMPERATURE: 98.2 F | HEIGHT: 66 IN | DIASTOLIC BLOOD PRESSURE: 67 MMHG | BODY MASS INDEX: 24.7 KG/M2 | OXYGEN SATURATION: 97 %

## 2018-01-03 DIAGNOSIS — G54.0 THORACIC OUTLET SYNDROME: Primary | ICD-10-CM

## 2018-01-03 LAB
ALBUMIN SERPL BCP-MCNC: 3.2 G/DL (ref 3.5–5)
ALP SERPL-CCNC: 131 U/L (ref 46–116)
ALT SERPL W P-5'-P-CCNC: 21 U/L (ref 12–78)
AMPHETAMINES SERPL QL SCN: NEGATIVE
ANION GAP SERPL CALCULATED.3IONS-SCNC: 9 MMOL/L (ref 4–13)
AST SERPL W P-5'-P-CCNC: 16 U/L (ref 5–45)
ATRIAL RATE: 74 BPM
BARBITURATES UR QL: NEGATIVE
BASOPHILS # BLD AUTO: 0.07 THOUSANDS/ΜL (ref 0–0.1)
BASOPHILS NFR BLD AUTO: 1 % (ref 0–1)
BENZODIAZ UR QL: NEGATIVE
BILIRUB SERPL-MCNC: 0.2 MG/DL (ref 0.2–1)
BUN SERPL-MCNC: 7 MG/DL (ref 5–25)
CALCIUM SERPL-MCNC: 8.4 MG/DL (ref 8.3–10.1)
CHLORIDE SERPL-SCNC: 105 MMOL/L (ref 100–108)
CO2 SERPL-SCNC: 28 MMOL/L (ref 21–32)
COCAINE UR QL: NEGATIVE
CREAT SERPL-MCNC: 0.73 MG/DL (ref 0.6–1.3)
EOSINOPHIL # BLD AUTO: 0.14 THOUSAND/ΜL (ref 0–0.61)
EOSINOPHIL NFR BLD AUTO: 1 % (ref 0–6)
ERYTHROCYTE [DISTWIDTH] IN BLOOD BY AUTOMATED COUNT: 15.3 % (ref 11.6–15.1)
GFR SERPL CREATININE-BSD FRML MDRD: 98 ML/MIN/1.73SQ M
GLUCOSE SERPL-MCNC: 91 MG/DL (ref 65–140)
HCT VFR BLD AUTO: 39.4 % (ref 34.8–46.1)
HGB BLD-MCNC: 12.9 G/DL (ref 11.5–15.4)
LYMPHOCYTES # BLD AUTO: 3.31 THOUSANDS/ΜL (ref 0.6–4.47)
LYMPHOCYTES NFR BLD AUTO: 29 % (ref 14–44)
MAGNESIUM SERPL-MCNC: 1.9 MG/DL (ref 1.6–2.6)
MCH RBC QN AUTO: 29 PG (ref 26.8–34.3)
MCHC RBC AUTO-ENTMCNC: 32.7 G/DL (ref 31.4–37.4)
MCV RBC AUTO: 89 FL (ref 82–98)
METHADONE UR QL: NEGATIVE
MONOCYTES # BLD AUTO: 0.69 THOUSAND/ΜL (ref 0.17–1.22)
MONOCYTES NFR BLD AUTO: 6 % (ref 4–12)
NEUTROPHILS # BLD AUTO: 7.41 THOUSANDS/ΜL (ref 1.85–7.62)
NEUTS SEG NFR BLD AUTO: 63 % (ref 43–75)
OPIATES UR QL SCN: NEGATIVE
P AXIS: 71 DEGREES
PCP UR QL: NEGATIVE
PLATELET # BLD AUTO: 347 THOUSANDS/UL (ref 149–390)
PMV BLD AUTO: 9.6 FL (ref 8.9–12.7)
POTASSIUM SERPL-SCNC: 3.9 MMOL/L (ref 3.5–5.3)
PR INTERVAL: 176 MS
PROT SERPL-MCNC: 6.7 G/DL (ref 6.4–8.2)
QRS AXIS: 99 DEGREES
QRSD INTERVAL: 142 MS
QT INTERVAL: 416 MS
QTC INTERVAL: 461 MS
RBC # BLD AUTO: 4.45 MILLION/UL (ref 3.81–5.12)
SODIUM SERPL-SCNC: 142 MMOL/L (ref 136–145)
T WAVE AXIS: 64 DEGREES
THC UR QL: POSITIVE
VENTRICULAR RATE: 74 BPM
WBC # BLD AUTO: 11.62 THOUSAND/UL (ref 4.31–10.16)

## 2018-01-03 PROCEDURE — 85025 COMPLETE CBC W/AUTO DIFF WBC: CPT | Performed by: EMERGENCY MEDICINE

## 2018-01-03 PROCEDURE — 71046 X-RAY EXAM CHEST 2 VIEWS: CPT

## 2018-01-03 PROCEDURE — 83735 ASSAY OF MAGNESIUM: CPT | Performed by: EMERGENCY MEDICINE

## 2018-01-03 PROCEDURE — 93005 ELECTROCARDIOGRAM TRACING: CPT | Performed by: EMERGENCY MEDICINE

## 2018-01-03 PROCEDURE — 80307 DRUG TEST PRSMV CHEM ANLYZR: CPT | Performed by: EMERGENCY MEDICINE

## 2018-01-03 PROCEDURE — 70450 CT HEAD/BRAIN W/O DYE: CPT

## 2018-01-03 PROCEDURE — 36415 COLL VENOUS BLD VENIPUNCTURE: CPT | Performed by: EMERGENCY MEDICINE

## 2018-01-03 PROCEDURE — 99284 EMERGENCY DEPT VISIT MOD MDM: CPT

## 2018-01-03 PROCEDURE — 80053 COMPREHEN METABOLIC PANEL: CPT | Performed by: EMERGENCY MEDICINE

## 2018-01-03 NOTE — DISCHARGE INSTRUCTIONS
Thoracic Outlet Syndrome   WHAT YOU NEED TO KNOW:   Thoracic outlet syndrome (TOS) occurs when nerves or blood vessels are pinched in the thoracic outlet  The thoracic outlet is the area between your collarbone and your first rib  Nerves and blood vessels run through the thoracic outlet as they go from your chest out to your hands  DISCHARGE INSTRUCTIONS:   Physical therapy:  A physical therapist teaches you exercises to strengthen the muscles in your neck, shoulders, and back  This can help increase the amount of room in the thoracic outlet  These exercises can also help improve your posture and decrease pain  Self-care:   · Practice correct posture  · Do not sleep with your arms above your chest     · Avoid activities that involve repeated movements  · Do exercises as directed to strengthen and stretch your shoulder and neck muscles  Medicines: You may need any of the following:  · Nonsteroidal anti-inflammatory (NSAID) medicine  may decrease swelling or pain  This medicine can be bought with or without a doctor's order  NSAIDs can cause stomach bleeding or kidney problems in certain people  If you take blood thinner medicine, always ask your healthcare provider if NSAIDs are safe for you  Always read the medicine label and follow the directions on it before using this medicine  · Muscle relaxers  or other medicines to decrease nerve pain may be needed  Ask your healthcare provider for more information about these medicines  · Prescription pain medicine  may be given to decrease pain  Do not wait until the pain is severe before you take this medicine  · Take your medicine as directed  Contact your healthcare provider if you think your medicine is not helping or if you have side effects  Tell him of her if you are allergic to any medicine  Keep a list of the medicines, vitamins, and herbs you take  Include the amounts, and when and why you take them   Bring the list or the pill bottles to follow-up visits  Carry your medicine list with you in case of an emergency  Follow up with your healthcare provider in 3 to 4 weeks: Your healthcare provider may refer you to an orthopedic, neurologic, or other specialist  Write down your questions so you remember to ask them during your visits  Contact your healthcare provider if:   · You have new pain, numbness, or tingling in your neck, shoulder, arm, or hand  · You have a weak  that is new for you  · One hand looks smaller than the other  · You have questions or concerns about your condition or care  Return to the emergency department if:   · You have severe pain  · Your arm or hand aches or feels heavy  · Your arm or hand feels cold, or looks pale or blue  © 2017 [facility] Information is for End User's use only and may not be sold, redistributed or otherwise used for commercial purposes  All illustrations and images included in CareNotes® are the copyrighted property of [employer] M [employer]  or [employer]  The above information is an  only  It is not intended as medical advice for individual conditions or treatments  Talk to your doctor, nurse or pharmacist before following any medical regimen to see if it is safe and effective for you

## 2018-01-03 NOTE — ED PROVIDER NOTES
History  Chief Complaint   Patient presents with    Numbness     Pt reports left sided facial and arm numbness that started on 1/1/18 and was seen by PCP on 1/2/18 Pt was instructed if continues to be seen in ER  Patient: Chidi Sneed  57 y o /female  YOB: 1970  MRN: [id number]  PCP: Jazlyn Palomino PA-C  Date of evaluation: 01/03/18    (N B  Link Bradlykofi may have been used in the preparation of this document )    This patient presents to the emergency department by private vehicle reporting left-sided facial and arm numbness starting 1 day prior to her visit  She saw her family doctor on January 2nd for this problem which has been continuous since 1 day prior to her visit  She has had no symptom free periods  She states that she has a 100 percent stenosis in her left carotid artery  She states that she has been told that the symptoms are due to that problem  (See notes below )    This patient is well-known to this emergency department for repeated visits for this same complaint  During a previous visit, I have had an extended discussion with her vascular surgeon about this problem  He has diagnosed her with a thoracic outlet syndrome  He stated that she had no significant carotid artery stenosis  (Below I have appended several exercise from her medical records regarding this problem )        History provided by:  Patient and medical records      Prior to Admission Medications   Prescriptions Last Dose Informant Patient Reported? Taking?   aspirin 81 mg chewable tablet 1/2/2018 at Unknown time  No Yes   Sig: Chew 1 tablet daily   atorvastatin (LIPITOR) 40 mg tablet 1/2/2018 at Unknown time  No Yes   Sig: Take 1 tablet by mouth every evening   azithromycin (ZITHROMAX) 250 mg tablet   No Yes   Sig: Take 1 tablet daily for 3 more days   citalopram (CeleXA) 20 mg tablet   Yes Yes   Sig: Take 20 mg by mouth daily     clopidogrel (PLAVIX) 75 mg tablet 1/2/2018 at Unknown time  No Yes   Sig: Take 1 tablet by mouth daily   gabapentin (NEURONTIN) 400 mg capsule 1/2/2018 at Unknown time  Yes Yes   Sig: Take 400 mg by mouth 3 (three) times a day  metoprolol tartrate (LOPRESSOR) 25 mg tablet 1/2/2018 at Unknown time  No Yes   Sig: Take 0 5 tablets (12 5 mg total) by mouth every 8 (eight) hours  oxyCODONE (ROXICODONE) 5 mg immediate release tablet 1/2/2018 at Unknown time  Yes Yes   Sig: Take 5 mg by mouth every 4 (four) hours as needed for moderate pain   pantoprazole (PROTONIX) 40 mg tablet 1/2/2018 at Unknown time  Yes Yes   Sig: Take 40 mg by mouth daily  Facility-Administered Medications: None       Past Medical History:   Diagnosis Date    Chronic pain     Coronary artery disease     Cubital tunnel syndrome     Depression     GERD (gastroesophageal reflux disease)     History of Clostridium difficile     Ovarian cyst     Psychiatric disorder     depression    Raynaud's disease     Takotsubo cardiomyopathy     Thoracic outlet syndrome     Tobacco abuse     Vitamin D deficiency        Past Surgical History:   Procedure Laterality Date    CARDIAC CATHETERIZATION      CABG    CORONARY ARTERY BYPASS GRAFT N/A 2/12/2016    Procedure: CABG X1; Left  EVH to mid-LAD; ZAHRA;  Surgeon: Ian Salinas DO;  Location: [facility];  Service:    Ardeth Needs DEBRIDEMENT TENNIS ELBOW      DILATION AND CURETTAGE OF UTERUS      HYSTEROSCOPY      LEFT OOPHORECTOMY      SALPINGECTOMY Right        Family History   Problem Relation Age of Onset    Heart disease Mother     Heart attack Mother     Heart attack Brother 36     s/p stent placement    Diabetes Brother     Heart disease Brother     Cancer Father     Diabetes Sister     Heart disease Sister     Diabetes Sister      I have reviewed and agree with the history as documented      Social History   Substance Use Topics    Smoking status: Current Every Day Smoker     Packs/day: 0 25     Years: 30 00    Smokeless tobacco: Never Used    Alcohol use No        Review of Systems    Physical Exam  ED Triage Vitals [01/03/18 0008]   Temperature Pulse Respirations Blood Pressure SpO2   98 2 °F (36 8 °C) 93 19 125/64 98 %      Temp Source Heart Rate Source Patient Position - Orthostatic VS BP Location FiO2 (%)   Temporal Monitor Lying Right arm --      Pain Score       7           Orthostatic Vital Signs  Vitals:    01/03/18 0008 01/03/18 0015 01/03/18 0300   BP: 125/64 125/64 109/67   Pulse: 93 80 71   Patient Position - Orthostatic VS: Lying         Physical Exam   Constitutional: She is oriented to person, place, and time  She appears well-developed and well-nourished  HENT:   Mouth/Throat: Oropharynx is clear and moist and mucous membranes are normal    Voice normal   Eyes: EOM are normal  Pupils are equal, round, and reactive to light  Cardiovascular: Normal rate and regular rhythm  Pulmonary/Chest: Effort normal    Abdominal: Soft  Bowel sounds are normal    Neurological: She is alert and oriented to person, place, and time  No cranial nerve deficit  GCS eye subscore is 4  GCS verbal subscore is 5  GCS motor subscore is 6  Left handgrasp weaker than right - similar to previous exams by me on previous visits   Skin: Skin is warm and dry  Psychiatric: She has a normal mood and affect  Her speech is normal and behavior is normal    Nursing note and vitals reviewed  ED Medications  Medications - No data to display    Diagnostic Studies  Results Reviewed     Procedure Component Value Units Date/Time    Rapid drug screen, urine [[id number]]  (Abnormal) Collected:  01/03/18 0228    Lab Status:  Final result Specimen:  Urine from Urine, Clean Catch Updated:  01/03/18 0246     Amph/Meth UR Negative     Barbiturate Ur Negative     Benzodiazepine Urine Negative     Cocaine Urine Negative     Methadone Urine Negative     Opiate Urine Negative     PCP Ur Negative     THC Urine Positive (A)    Narrative:         Presumptive report   If requested, specimen will be sent to reference lab for confirmation  FOR MEDICAL PURPOSES ONLY  IF CONFIRMATION NEEDED PLEASE CONTACT THE LAB WITHIN 5 DAYS  Drug Screen Cutoff Levels:  AMPHETAMINE/METHAMPHETAMINES  1000 ng/mL  BARBITURATES     200 ng/mL  BENZODIAZEPINES     200 ng/mL  COCAINE      300 ng/mL  METHADONE      300 ng/mL  OPIATES      300 ng/mL  PHENCYCLIDINE     25 ng/mL  THC       50 ng/mL    Comprehensive metabolic panel [[id number]]  (Abnormal) Collected:  01/03/18 0140    Lab Status:  Final result Specimen:  Blood from Arm, Right Updated:  01/03/18 0207     Sodium 142 mmol/L      Potassium 3 9 mmol/L      Chloride 105 mmol/L      CO2 28 mmol/L      Anion Gap 9 mmol/L      BUN 7 mg/dL      Creatinine 0 73 mg/dL      Glucose 91 mg/dL      Calcium 8 4 mg/dL      AST 16 U/L      ALT 21 U/L      Alkaline Phosphatase 131 (H) U/L      Total Protein 6 7 g/dL      Albumin 3 2 (L) g/dL      Total Bilirubin 0 20 mg/dL      eGFR 98 ml/min/1 73sq m     Narrative:         National Kidney Disease Education Program recommendations are as follows:  GFR calculation is accurate only with a steady state creatinine  Chronic Kidney disease less than 60 ml/min/1 73 sq  meters  Kidney failure less than 15 ml/min/1 73 sq  meters      Magnesium [[id number]]  (Normal) Collected:  01/03/18 0140    Lab Status:  Final result Specimen:  Blood from Arm, Right Updated:  01/03/18 0207     Magnesium 1 9 mg/dL     CBC and differential [[id number]]  (Abnormal) Collected:  01/03/18 0140    Lab Status:  Final result Specimen:  Blood from Arm, Right Updated:  01/03/18 0153     WBC 11 62 (H) Thousand/uL      RBC 4 45 Million/uL      Hemoglobin 12 9 g/dL      Hematocrit 39 4 %      MCV 89 fL      MCH 29 0 pg      MCHC 32 7 g/dL      RDW 15 3 (H) %      MPV 9 6 fL      Platelets 565 Thousands/uL      Neutrophils Relative 63 %      Lymphocytes Relative 29 %      Monocytes Relative 6 %      Eosinophils Relative 1 %      Basophils Relative 1 % Neutrophils Absolute 7 41 Thousands/µL      Lymphocytes Absolute 3 31 Thousands/µL      Monocytes Absolute 0 69 Thousand/µL      Eosinophils Absolute 0 14 Thousand/µL      Basophils Absolute 0 07 Thousands/µL              [facility] report for this date:    [address]  Preliminary Radiology Report  Call: [phone]  assistance Online chat: [email or web address]  Patient Name: Erling Burkitt MRN: [id number]   (Age): 1970 52 Gender: F  Date of Exam: 2018 Accession: [id number]  Referring Physician: Ysosi Iverson # of Images: 155  Ordered As: CT HEAD WO   (QA) DISCREPANCY? If there is a discrepancy between the preliminary and final interpretation, please notify [facility] via [email or web address])  If you do not have access to our QA portal, call our QA team at [phone]  This report is intended only for the use of the referring physician, and only in accordance with law, If you received this in error, call [phone]  Page 1 of 1  EXAM:  CT Head Without Intravenous Contrast  CLINICAL HISTORY:  52years old, female; Signs and symptoms; Numbness / parasthesia; Left; Patient HX: Pt C/O  numbness in lt arm and lt face x 1 day  TECHNIQUE:  Axial computed tomography images of the head/brain without intravenous contrast  All CT scans at  this facility use one or more dose reduction techniques, viz : automated exposure control; ma/kV  adjustment per patient size (including targeted exams where dose is matched to indication; i e  head);  or iterative reconstruction technique  Coronal reformatted images were created and reviewed  COMPARISON:  CT STROKE ALERT BRAIN 2017 20:59  FINDINGS:  Brain: Unremarkable  No hemorrhage  No significant white matter disease  No edema  Ventricles: Unremarkable  No ventriculomegaly  Bones/joints: Unremarkable  No acute fracture  Soft tissues: Unremarkable  Sinuses: Unremarkable as visualized  No acute sinusitis  Mastoid air cells: Unremarkable as visualized  No mastoid effusion  IMPRESSION:  No acute intracranial pathology  Thank you for allowing us to participate in the care of your patient  Dictated and Authenticated by: Robert Zhang MD  01/03/2018 1:31 AM Eastern Time (Caleb Taylor 4319)    XR chest 2 views   ED Interpretation by Kory Wyman MD (01/03 0225)   NAD      Final Result by ANIL Small MD (78/74 0130)      No acute pulmonary disease, status post cardiac surgery  ____ ____ ____ ____ ____ ____ ____ ____ ____ ____ ____ ____ ____       As per comments in the PACS workstation, findings are concordant with preliminary interpretation provided by the emergency room physician                Workstation performed: [id number]         CT head wo contrast   ED Interpretation by Kory Wyman MD (01/03 0225)   [facility] read      Final Result by Brijesh Maria MD (01/03 7241)      No acute intracranial process or significant interval change since November 20, 2017  Findings are consistent with the preliminary report from [facility] which was provided shortly after completion of the exam                       Workstation performed: [id number]                    Procedures  Procedures       Phone Contacts  ED Phone Contact    ED Course  ED Course as of Jan 05 0344 Wed Jan 03, 2018   0258 Pt given counseling about adverse effects of smoking  Pt does not feel ready to quit  Pt advised that when she is ready to quit, her PCP can help her  Review of records   Reveals the following  There is a cerebrovascular duplex scan complete bilateral completed on 28 July 2017  On the right side there is less than 50 percent stenosis noted in the internal carotid artery  Plaque is homogeneous since moved  Vertebral artery flow is antegrade there is no significant subclavian artery disease    On the left side there is 50 to 69 percent stenosis noted in the internal carotid artery  Plaque is homogeneous and smooth  Vertebral artery flow is antegrade  There is no significant subclavian artery disease  Compared to previous study on 12 September 2016 there is no significant change  Patient: Jabari Penn  33 y o /female  YOB: 1970  MRN: [id number]  PCP: Angela Colin PA-C  Date of evaluation: 01/05/18    (N B  [address] may have been used in the preparation of this document )        -------------------------    FROM [facility] OFFICE VISIT  for the same symptoms ---------------------------------      Assessment  1  Thoracic outlet syndrome (353 0) (G54 0)     Plan  Thoracic outlet syndrome    · (1) BASIC METABOLIC PROFILE; Status:Active; Requested for:[id number];    Perform:[facility]; Due:85Osd5186; Ordered; For:Thoracic outlet syndrome; Ordered By:Hoda Herman;   · CTA CHEST WO W CONTRAST; Status:Need Information - Financial Authorization; Requested for:[id number];    Perform:[facility]; Order Comments:Evaluate for Thoracic outlet syndrome; Due:17Vhj2228; Ordered; For:Thoracic outlet syndrome; Ordered By:Hoda Herman;     Discussion/Summary  Discussion Summary:   Patient presents with numbness left upper extremity digits 34 and 5 and medial aspect of upper and lower arm  Her carotid study is essentially normal with no evidence of neurological changes or stroke on her MRI  She has numbness which is exacerbated with frequent and repetitive motions a left upper extremity likely consistent with thoracic outlet syndrome neurogenic  She is just recently discharged from the hospital for MI evaluation, I'll be ordering a CTA of her chest in 3 months and see her back in the office for further evaluation and recommendations     Counseling Documentation With Imm: The patient, patient's family was counseled regarding diagnostic results,-instructions for management,-risk factor reductions,-prognosis,-risks and benefits of treatment options,-importance of compliance with treatment  total time of encounter was 25 [id number] minutes was spent counseling       Chief Complaint  Chief Complaint Free Text Note Form: My carotid artery is blocked  cv duplex 07/28 had TIA 3 wks ago  still has numbness and weakness on Left side  Denies vision changes or slurred speech       History of Present Illness  HPI: Left arm numbness exacerbated with repetitive motion      Review of Systems  Complete Female - Vasc:   Constitutional: fever,-chills-and-feeling tired, but-No fever or chills, feels well, no tiredness, no recent weight gain or weight loss,-loss in appetite,-no recent weight gain-and-no recent weight loss  Eyes: no sudden vision loss,-no blurred vision-and-no double vision, but-no eye pain,-no eyesight problems,-no dryness of the eyes,-eyes not red,-no purulent discharge from the eyes,-no itching of the eyes-and-wears glasses  ENT: sore throat, but-no earache,-no nosebleeds,-no hearing loss,-no nasal discharge-and-no hoarseness  Cardiovascular: no painful veins-and-no bleeding veins, but-regular heart rate,-no chest pain,-no intermittent leg claudication,-no palpitations-and-leg pain with walking  Respiratory: shortness of breath,-wheezing,-cough,-shortness of breath during exertion,-orthopnea-and-complaints of PND  Gastrointestinal: No nausea, No vomiting, no diarrhea, no blood in stool  Genitourinary: no dysuria, no Hematuria,no urinary incontinence  Musculoskeletal: no lumbar pain,-joint swelling-and-limb swelling, but-no limb pain,-no myalgias-and-no joint stiffness  Integumentary: no rash, no lesions, no wounds, no ulcer  Neurological: headache,-numbness,-no dementia,-dizziness-and-difficulty walking, but-no confusion,-no limb weakness,-no convulsions-and-no fainting  Psychiatric: depression-and-no mood disorder, but-no anxiety  Hematologic/Lymphatic: no bleeding disorder, no easy bruising     ROS Reviewed:   ANA reviewed       Active Problems  1  Abdominal bloating (787 3) (R14 0)   2  Abdominal pain, epigastric (789 06) (R10 13)   3  Acid reflux (530 81) (K21 9)   7  ZYXRB systolic CHF (congestive heart failure) (428 21,428 0) (I50 21)   5  Atherosclerotic heart disease of native coronary artery without angina pectoris (414 01)   (I25 10)   6  Backache (724 5) (M54 9)   7  Bacterial UTI (599 0,041 9) (N39 0,A49  9)   8  Bilateral leg edema (782 3) (R60 0)   9  Brain TIA (435 9) (G45 9)   10  CABG   11  Cardiomyopathy, dilated (425 4) (I42 0)   12  Cerumen impaction (380 4) (H61 20)   13  Chest pain, precordial (786 51) (R07 2)   14  Coronary artery disease (414 00) (I25 10)   15  Cough (786 2) (R05)   16  Cubital tunnel syndrome (354 2) (G56 20)   17  Depression (311) (F32 9)   18  Depression screen (V79 0) (Z13 89)   19  Diarrhea, unspecified type (787 91) (R19 7)   20  CASTORENA (dyspnea on exertion) (786 09) (R06 09)   21  Encounter for consultation (V65 9) (Z71 9)   22  GERD (gastroesophageal reflux disease) (530 81) (K21 9)   23  Hip pain, unspecified laterality   24  Ischemic cardiomyopathy (414 8) (I25 5)   25  Leg pain, left (729 5) (M79 605)   26  Lower back pain (724 2) (M54 5)   27  Need for immunization against influenza (V04 81) (Z23)   28  Non-ST elevation MI (NSTEMI) (410 70) (I21 4)   29  Other specified transient cerebral ischemias (435 8) (G45 8)   30  Persistent disorder of initiating or maintaining sleep (307 42) (G47 00)   31  Postop check (V67 00) (Z09)   32  Postoperative visit (V58 49) (Z48 89)   33  Raynaud's syndrome without gangrene (443 0) (I73 00)   34  Rib pain on left side (786 50) (R07 81)   35  Snoring (786 09) (R06 83)   36  ST elevation myocardial infarction (STEMI) of anterior wall (410 10) (I21 09)   37  Tachycardia (785 0) (R00 0)   38  Urinary urgency (788 63) (R39 15)   39  Vitamin D deficiency (268 9) (E55 9)   40  Weight gain, abnormal (783 1) (R63 5)     Past Medical History  1  History of C  difficile diarrhea (008 45) (A04 7)   2  History of Denial Of Any Significant Medical History   3  History of  3   4  History of menorrhagia (V13 29) (Z87 42)   5  History of upper respiratory infection (V12 09) (Z87 09)   6  History of viral gastroenteritis (V12 09) (Z86 19)  Active Problems And Past Medical History Reviewed: The active problems and past medical history were reviewed and updated today       Surgical History  1  History of CABG   2  History of Dilation And Curettage   3  History of Hysteroscopy   4  History of Salpingectomy Unilateral Right Side   5  History of Salpingo-oophorectomy Left Side  Surgical History Reviewed: The surgical history was reviewed and updated today      +++++++++  END of VASCULAR office note  ++++++         Study Result from ED visit 2017 with the same symptoms:    CTA NECK AND BRAIN WITH CONTRAST     INDICATION: Stroke alert  History of thoracic outlet syndrome      COMPARISON:   None      TECHNIQUE:       Post contrast imaging was performed after administration of iodinated contrast through the neck and brain  Post contrast axial 0 625 mm images timed to opacify the arterial system        3D rendering was performed on an independent workstation  MIP reconstructions performed  Coronal reconstructions were performed of the noncontrast portion of the brain        Radiation dose length product (DLP) for this visit:  391 15 mGy-cm   This examination, like all CT scans performed in the [facility], was performed utilizing techniques to minimize radiation dose exposure, including the use of iterative   reconstruction and automated exposure control         IV Contrast:  100 mL of iohexol (OMNIPAQUE)         IMAGE QUALITY:   Diagnostic     FINDINGS:     CERVICAL VASCULATURE     AORTIC ARCH AND GREAT VESSELS:  Normal aortic arch and great vessel origins    There is mild atherosclerotic plaquing of the proximal left subclavian artery causing no hemodynamically significant stenosis      RIGHT VERTEBRAL ARTERY CERVICAL SEGMENT:  Normal origin  The vessel is normal in caliber throughout the neck      LEFT VERTEBRAL ARTERY CERVICAL SEGMENT:  Normal origin  The vessel is normal in caliber throughout the neck      RIGHT EXTRACRANIAL CAROTID SEGMENT:  Normal caliber common carotid artery  Normal bifurcation and cervical internal carotid artery  No stenosis or dissection           LEFT EXTRACRANIAL CAROTID SEGMENT:  Normal caliber common carotid artery  Normal bifurcation and cervical internal carotid artery  No stenosis or dissection           NASCET criteria was used to determine the degree of internal carotid artery diameter stenosis        INTRACRANIAL VASCULATURE      INTERNAL CAROTID ARTERIES:  Normal enhancement of the intracranial portions of the internal carotid arteries  Normal ophthalmic artery origins  Normal ICA terminus       ANTERIOR CIRCULATION:  Symmetric A1 segments and anterior cerebral arteries with normal enhancement  Normal anterior communicating artery      MIDDLE CEREBRAL ARTERY CIRCULATION:  M1 segment and middle cerebral artery branches demonstrate normal enhancement bilaterally      DISTAL VERTEBRAL ARTERIES:  Normal distal vertebral arteries  Posterior inferior cerebellar artery origins are normal  Normal vertebral basilar junction      BASILAR ARTERY:  Basilar artery is normal in caliber  Normal superior cerebellar arteries      POSTERIOR CEREBRAL ARTERIES: Both posterior cerebral arteries arises from the basilar tip  Both arteries demonstrate normal flow-related enhancement     Normal posterior communicating arteries      DURAL VENOUS SINUSES:  Normal         NON VASCULAR ANATOMY     BONY STRUCTURES:  No acute osseous abnormality      SOFT TISSUES OF THE NECK:  Normal              THORACIC INLET:  Moderate emphysematous lung changes are noted at both lung apices         IMPRESSION:     No acute intracranial abnormality      No focal stenosis or saccular aneurysm within the [place]      No hemodynamically significant stenosis within either common or internal carotid artery  Less than 50% stenosis by NASCET criteria      Moderate emphysematous changes noted at both lung apices      +++++ END of CTA from November ED visit for same symptoms                  [facility]  CritCare Time    Disposition  Final diagnoses:   Thoracic outlet syndrome - already diagnosed by pt's vascular surgeon     Time reflects when diagnosis was documented in both MDM as applicable and the Disposition within this note     Time User Action Codes Description Comment    1/3/2018  3:22 AM Rocio BARBER Add [G54 0] Thoracic outlet syndrome     1/3/2018  3:22 AM Kiya Garcia Modify [G54 0] Thoracic outlet syndrome already diagnosed by pt's vascular surgeon      ED Disposition     ED Disposition Condition Comment    Discharge  65 [address] Frazier discharge to home/self care  Condition at discharge: Good        Follow-up Information     Follow up With Specialties Details Why Contact Info    Eual Files, PABRONWYN Family Medicine Call in 1 day Say you are following up from an ER visit  [facility]      Tono Morley MD Vascular Surgery, Radiology Call Tell about this ER visit  [facility]  [phone]          Discharge Medication List as of 1/3/2018  3:23 AM      CONTINUE these medications which have NOT CHANGED    Details   aspirin 81 mg chewable tablet Chew 1 tablet daily, Starting Fri 7/28/2017, Normal      atorvastatin (LIPITOR) 40 mg tablet Take 1 tablet by mouth every evening, Starting Fri 7/28/2017, Normal      azithromycin (ZITHROMAX) 250 mg tablet Take 1 tablet daily for 3 more days, Normal      citalopram (CeleXA) 20 mg tablet Take 20 mg by mouth daily  , Until Discontinued, Historical Med      clopidogrel (PLAVIX) 75 mg tablet Take 1 tablet by mouth daily, Starting Thu 11/23/2017, No Print gabapentin (NEURONTIN) 400 mg capsule Take 400 mg by mouth 3 (three) times a day , Until Discontinued, Historical Med      metoprolol tartrate (LOPRESSOR) 25 mg tablet Take 0 5 tablets (12 5 mg total) by mouth every 8 (eight) hours  , Starting 2/15/2016, Until Discontinued, Normal      oxyCODONE (ROXICODONE) 5 mg immediate release tablet Take 5 mg by mouth every 4 (four) hours as needed for moderate pain, Historical Med      pantoprazole (PROTONIX) 40 mg tablet Take 40 mg by mouth daily  , Until Discontinued, Historical Med           No discharge procedures on file      ED Provider  Electronically Signed by           Valery Barney MD  01/05/18 4873

## 2018-01-10 NOTE — PROGRESS NOTES
History of Present Illness  Care Coordination Encounter Information:   Type of Encounter: Telephonic    Spoke to Patient  Care Coordination  Nurse Eric Packer:   The reason for call is to discuss outreach for follow up/needed services  Patient is doing well after her CABG surgery  She states she is having new right rib pain but it is resolved after she takes her Percocet  She denies any redness, swelling, fever, chills, or sweats  She is aware that she has an appointment with Dr Lynn Aviles on 3/16/2016 but was not aware of the time, which i confirmed was at 345pm  She states her incisions are healing and the home health nurses are still coming to the house; last week they were with her twice  Patient taking all medications, and is taking her lowered dose of Aspirin now, which is 81 mg  I educated on the signs and symptoms of infection, which patient is aware of and explained that she should call her doctor if her rib pain gets worse or does not subside with pain medications  She verbally understands  No questions or concerns at this time; patient thanked me for calling  Active Problems    1  Abdominal pain, epigastric (789 06) (R10 13)   2  Acute systolic CHF (congestive heart failure) (428 21,428 0) (I50 21)   3  Backache (724 5) (M54 9)   4  Bacterial UTI (599 0,041 9) (N39 0,A49 9)   5  CABG   6  CAD in native artery (414 01) (I25 10)   7  Cardiomyopathy, dilated (425 4) (I42 0)   8  Cerumen impaction (380 4) (H61 20)   9  Cough (786 2) (R05)   10  Cubital tunnel syndrome (354 2) (G56 20)   11  Depression (311) (F32 9)   12  CASTORENA (dyspnea on exertion) (786 09) (R06 09)   13  Encounter for consultation (V65 9) (Z71 9)   14  GERD (gastroesophageal reflux disease) (530 81) (K21 9)   15  Hip pain, unspecified laterality   16  Ischemic cardiomyopathy (414 8) (I25 5)   17  Lower back pain (724 2) (M54 5)   18  Need for immunization against influenza (V04 81) (Z23)   19   Non-ST elevation MI (NSTEMI) (410 70) (I21 4)   20  Postoperative visit (V58 49) (Z48 89)   21  Raynaud's syndrome without gangrene (443 0) (I73 00)   22  Rib pain on left side (786 50) (R07 81)   23  ST elevation myocardial infarction (STEMI) of anterior wall (410 10) (I21 09)   24  Tachycardia (785 0) (R00 0)   25  Urinary urgency (788 63) (R39 15)   26  Vitamin D deficiency (268 9) (E55 9)    Past Medical History    1  History of C  difficile diarrhea (008 45) (A04 7)   2  History of Denial Of Any Significant Medical History   3  History of  3   4  History of menorrhagia (V13 29) (Z87 42)   5  History of upper respiratory infection (V12 09) (Z87 09)   6  History of viral gastroenteritis (V12 09) (Z86 19)    Surgical History    1  History of CABG   2  History of Dilation And Curettage   3  History of Hysteroscopy    Family History    1  Family history of Heart disease (429 9) (I51 9)    2  Family history of lung cancer (V16 1) (Z80 1)    3  Family history of Diabetes mellitus (250 00) (E11 9)   4  Family history of malignant neoplasm of prostate (V16 42) (Z80 42)   5  Family history of Heart disease (429 9) (I51 9)   6  Family history of Hypertension (401 9) (I10)    7  Denied: Family history of Breast Cancer   8  Denied: Family history of Colon Cancer   9  Denied: Family history of Osteoporosis   10  Denied: Family history of Ovarian Cancer   11  Denied: Family history of Uterine Cancer    Social History    · Current Every Day Smoker (305 1)   · No alcohol use    Current Meds    1  Pantoprazole Sodium 40 MG Oral Tablet Delayed Release; TAKE (1) TABLET BY MOUTH   DAILY; Therapy: 77TXQ7148 to (Last Rx:29Ptg8722)  Requested for: 96XRG4264 Ordered    2  Aspirin Low Dose 81 MG Oral Tablet; take 1 tablet every other day; Last Rx:2016   Ordered    3  Citalopram Hydrobromide 20 MG Oral Tablet; TAKE 1 TABLET DAILY; Therapy: 44MTH5561 to (Last Rx:51Rul9272)  Requested for: 36AUR2651 Ordered    4   Clopidogrel Bisulfate 75 MG Oral Tablet; TAKE (1) TABLET BY MOUTH DAILY; Therapy: 28WPJ4186 to (Last Rx:52Wwv3634)  Requested for: 41KMW6074 Ordered    5  Oxycodone-Acetaminophen 5-325 MG Oral Tablet; TAKE 1 TAB 2X DAILY   (3 X daily per   patient); Therapy: 60CLQ7111 to (Last Rx:75Xef1785) Ordered    6  Atorvastatin Calcium 40 MG Oral Tablet; TAKE (1) TABLET BY MOUTH DAILY; Therapy: 87SUI3352 to (Last Rx:45Khg9595)  Requested for: 98Sjq7936 Ordered    7  Docusate Sodium 100 MG Oral Tablet; Take 1 tablet twice daily; Therapy: (Recorded:80Bwh0431) to Recorded   8  Gabapentin 400 MG Oral Capsule; TAKE 1 CAPSULE Every 8 hours; Therapy: (Recorded:18Jun2015) to Recorded   9  Meloxicam 7 5 MG Oral Tablet; TAKE 1 TABLET DAILY; Therapy: (Recorded:13Fwx9934) to Recorded   10  Metoprolol Tartrate 25 MG Oral Tablet; half tab bid; Therapy: (Recorded:03Mar2016) to Recorded   11  Polyethylene Glycol 3350 POWD;    Therapy: (Recorded:84Pyr5334) to Recorded    Allergies    1  No Known Drug Allergies    End of Encounter Meds    1  Pantoprazole Sodium 40 MG Oral Tablet Delayed Release; TAKE (1) TABLET BY MOUTH   DAILY; Therapy: 87NZB4729 to (Last Rx:17Xry3767)  Requested for: 08HWC2518 Ordered    2  Aspirin Low Dose 81 MG Oral Tablet; take 1 tablet every other day; Last Rx:06Mar2016   Ordered    3  Citalopram Hydrobromide 20 MG Oral Tablet; TAKE 1 TABLET DAILY; Therapy: 76GPF1525 to (Last Rx:84Wac7805)  Requested for: 19YZN5537 Ordered    4  Clopidogrel Bisulfate 75 MG Oral Tablet; TAKE (1) TABLET BY MOUTH DAILY; Therapy: 36EDR9857 to (Last Rx:94Okb4887)  Requested for: 25JTR3733 Ordered    5  Oxycodone-Acetaminophen 5-325 MG Oral Tablet; TAKE 1 TAB 2X DAILY   (3 X daily per   patient); Therapy: 99WNM0244 to (Last Rx:86Cer9344) Ordered    6  Atorvastatin Calcium 40 MG Oral Tablet; TAKE (1) TABLET BY MOUTH DAILY; Therapy: 93MMT3482 to (Last Rx:27Tnr5076)  Requested for: 94Cpc5307 Ordered    7  Docusate Sodium 100 MG Oral Tablet;  Take 1 tablet twice daily; Therapy: (Recorded:61Anl0686) to Recorded   8  Gabapentin 400 MG Oral Capsule; TAKE 1 CAPSULE Every 8 hours; Therapy: (Recorded:18Jun2015) to Recorded   9  Meloxicam 7 5 MG Oral Tablet; TAKE 1 TABLET DAILY; Therapy: (Recorded:69Blh2332) to Recorded   10  Metoprolol Tartrate 25 MG Oral Tablet; half tab bid; Therapy: (Recorded:03Mar2016) to Recorded   11  Polyethylene Glycol 3350 POWD;    Therapy: (Recorded:54Qnp3354) to Recorded    Future Appointments    Date/Time Provider Specialty Site   03/16/2016 03:45 PM Jaxson Saenz DO Cardiac Surgery [address] [facility] OFFICE     Patient Care Team    Care Team Member Role Specialty Office Number   Sondra Saunders MD Specialist Orthopedic Surgery [phone]   Fortunato Mullen MD Specialist Obstetrics/Gynecology [phone]   [facility] HYATT    Cardiology [phone]   [phone]     Signatures   Electronically signed by : Sherlyn Bennett RN; Mar  7 2016  2:05PM EST                       (Author)

## 2018-01-13 VITALS
HEIGHT: 66 IN | HEART RATE: 91 BPM | DIASTOLIC BLOOD PRESSURE: 70 MMHG | WEIGHT: 149 LBS | TEMPERATURE: 97.6 F | RESPIRATION RATE: 17 BRPM | OXYGEN SATURATION: 97 % | BODY MASS INDEX: 23.95 KG/M2 | SYSTOLIC BLOOD PRESSURE: 108 MMHG

## 2018-01-13 VITALS
WEIGHT: 153 LBS | HEIGHT: 66 IN | OXYGEN SATURATION: 98 % | DIASTOLIC BLOOD PRESSURE: 68 MMHG | BODY MASS INDEX: 24.59 KG/M2 | TEMPERATURE: 96.8 F | SYSTOLIC BLOOD PRESSURE: 100 MMHG | HEART RATE: 100 BPM

## 2018-01-14 VITALS
HEIGHT: 66 IN | HEART RATE: 91 BPM | SYSTOLIC BLOOD PRESSURE: 114 MMHG | BODY MASS INDEX: 24.4 KG/M2 | DIASTOLIC BLOOD PRESSURE: 63 MMHG | WEIGHT: 151.8 LBS

## 2018-01-15 NOTE — MISCELLANEOUS
History of Present Illness  Cardiac Surgery Phone Follow-up:    This phone call was in regards to post-operative care  Procedure: CABG Ã1  2/12/16   Hospital Discharge Date: 2/15/16  Surgeon: Ericka Marlow DO  Office Visit Pending: Yes   3/16/16 at 3:15 PM    The patient has an appointment with their PCP on: 2/23/16 at 12:15 PM    The patient has an appointment with their cardiologist on: 3/3/16 at 10 AM    Date of Call: 02/19/2016, Follow-up call after discharge from hospital    Symptom review with the patient is as follows: shortness of breath, no chest pain, leg swelling, pain is controlled, incision stable, lightheaded/dizziness, activity: Walking around and currently out of the house, patient's reported pre-op weight was 125 lbs, patient's reported weight at discharge was 130 lbs, patient's reported current weight is No scale and home lbs and medication review  Comments: Spoke to patient today  She reports lightheadedness and dizziness with walking however she is currently out of the house during this phone call tolerating her activity  She is not weighing herself, does not have a scale in the house  She does report some edema in her lower extremities at the end of the day  Incision healing well without issues  She was set with [facility] they have not been out to see her or contact her at this point  Plan: 1  [facility] home healthcare called to inquire about patient's visits  They will investigate and get back to me and the patient  2  Obtain scale, daily weight, call for waking at 2 pounds or more in one day  3  Frequent use of incentive spirometer  4  Continue frequent activity, increase as tolerated  5  Call CT surgery office with questions  Active Problems    1  Abdominal pain, epigastric (789 06) (R10 13)   2  Acute systolic CHF (congestive heart failure) (428 21,428 0) (I50 21)   3  Backache (724 5) (M54 9)   4  Bacterial UTI (599 0,041 9) (N39 0,A49 9)   5  Cardiomyopathy, dilated (425 4) (I42 0)   6  Cerumen impaction (380 4) (H61 20)   7  Cough (786 2) (R05)   8  Cubital tunnel syndrome (354 2) (G56 20)   9  Depression (311) (F32 9)   10  CASTORENA (dyspnea on exertion) (786 09) (R06 09)   11  Encounter for consultation (V65 9) (Z71 9)   12  GERD (gastroesophageal reflux disease) (530 81) (K21 9)   13  Hip pain, unspecified laterality   14  Ischemic cardiomyopathy (414 8) (I25 5)   15  Lower back pain (724 2) (M54 5)   16  Need for immunization against influenza (V04 81) (Z23)   17  Non-ST elevation MI (NSTEMI) (410 70) (I21 4)   18  Postoperative visit (V58 49) (Z48 89)   19  Raynaud's syndrome without gangrene (443 0) (I73 00)   20  Rib pain on left side (786 50) (R07 81)   21  ST elevation myocardial infarction (STEMI) of anterior wall (410 10) (I21 09)   22  Tachycardia (785 0) (R00 0)   23  Urinary urgency (788 63) (R39 15)   24  Vitamin D deficiency (268 9) (E55 9)    Past Medical History    1  History of C  difficile diarrhea (008 45) (A04 7)   2  History of Denial Of Any Significant Medical History   3  History of  3   4  History of menorrhagia (V13 29) (Z87 42)   5  History of upper respiratory infection (V12 09) (Z87 09)   6  History of viral gastroenteritis (V12 09) (Z86 19)    Surgical History    1  History of Dilation And Curettage   2  History of Hysteroscopy    Family History    1  Denied: Family history of Breast Cancer   2  Denied: Family history of Colon Cancer   3  Family history of Diabetes mellitus (250 00) (E11 9) : Brother   4  Family history of lung cancer (V16 1) (Z80 1) : Father   5  Family history of malignant neoplasm of prostate (V16 42) (X71 87) : Brother   6  Family history of Heart disease (429 9) (I51 9) : Brother, Mother   9  Family history of Hypertension (401 9) (I10) : Brother   6  Denied: Family history of Osteoporosis   9  Denied: Family history of Ovarian Cancer   10   Denied: Family history of Uterine Cancer    Social History    · Current Every Day Smoker (305 1)   · No alcohol use    Current Meds    1  Pantoprazole Sodium 40 MG Oral Tablet Delayed Release; TAKE (1) TABLET BY MOUTH   DAILY; Therapy: 95AZB6659 to (Last Rx:56Yoc4031)  Requested for: 98TXV8278 Ordered    2  Citalopram Hydrobromide 20 MG Oral Tablet; TAKE 1 TABLET DAILY; Therapy: 12YHA6355 to (Last Rx:73Gad2147)  Requested for: 42LNM3073 Ordered    3  Clopidogrel Bisulfate 75 MG Oral Tablet; TAKE (1) TABLET BY MOUTH DAILY; Therapy: 19XZV6283 to (Last Rx:79Bnd0853)  Requested for: 34KCD2065 Ordered    4  Oxycodone-Acetaminophen 5-325 MG Oral Tablet; TAKE 1 TAB 2X DAILY   (3 X daily per   patient); Therapy: 86KOE5323 to (Last Rx:61Nmx9331) Ordered    5  Atorvastatin Calcium 40 MG Oral Tablet; TAKE (1) TABLET BY MOUTH DAILY; Therapy: 65RIH4080 to (Last Rx:04Lub6129)  Requested for: 62Wmh8688 Ordered    6  Aspirin 325 MG Oral Tablet; Therapy: (Recorded:33Nyx1195) to Recorded   7  Docusate Sodium 100 MG Oral Tablet; Take 1 tablet twice daily; Therapy: (Recorded:74Vgs1254) to Recorded   8  Gabapentin 400 MG Oral Capsule; TAKE 1 CAPSULE Every 8 hours; Therapy: (Recorded:87Ukt3329) to Recorded   9  Meloxicam 7 5 MG Oral Tablet; TAKE 1 TABLET DAILY; Therapy: (Recorded:32Juu5144) to Recorded   10  Metoprolol Tartrate 25 MG Oral Tablet; take 0 5 tablet daily; Therapy: (Recorded:28Yah1149) to Recorded   11  Polyethylene Glycol 3350 POWD;    Therapy: (Recorded:32Brq4041) to Recorded   12  Potassium Chloride 10 MEQ TBCR; Take 1 tablet twice daily; Therapy: (Recorded:42Fof6104) to Recorded   13  Torsemide 10 MG Oral Tablet; Take 1 tablet twice daily for 10 days;     Therapy: (Recorded:61Atp7341) to Recorded    Signatures   Electronically signed by : ROXANA Bergeron; Feb 19 2016  1:33PM EST                       (Author)

## 2018-01-18 NOTE — MISCELLANEOUS
Provider Comments  Provider Comments: You missed your appointment on 7/12/2017 with Dr Marco Lugo  Please contact our office at [phone] to reschedule      Thank You,  [facility]      Signatures   Electronically signed by : Mg Guthrie DO; Jul 12 2017 11:54AM EST                       (Author)

## 2018-01-22 VITALS — OXYGEN SATURATION: 98 %

## 2018-01-22 VITALS
HEIGHT: 66 IN | HEART RATE: 88 BPM | WEIGHT: 151 LBS | BODY MASS INDEX: 24.27 KG/M2 | OXYGEN SATURATION: 97 % | TEMPERATURE: 98.8 F | SYSTOLIC BLOOD PRESSURE: 116 MMHG | DIASTOLIC BLOOD PRESSURE: 68 MMHG

## 2018-01-24 VITALS
SYSTOLIC BLOOD PRESSURE: 90 MMHG | HEIGHT: 66 IN | OXYGEN SATURATION: 98 % | DIASTOLIC BLOOD PRESSURE: 70 MMHG | TEMPERATURE: 97.2 F | RESPIRATION RATE: 16 BRPM | WEIGHT: 151 LBS | BODY MASS INDEX: 24.27 KG/M2 | HEART RATE: 89 BPM

## 2018-01-30 ENCOUNTER — APPOINTMENT (EMERGENCY)
Dept: RADIOLOGY | Facility: HOSPITAL | Age: 48
End: 2018-01-30
Payer: COMMERCIAL

## 2018-01-30 ENCOUNTER — HOSPITAL ENCOUNTER (EMERGENCY)
Facility: HOSPITAL | Age: 48
Discharge: HOME | End: 2018-01-30
Attending: EMERGENCY MEDICINE | Admitting: EMERGENCY MEDICINE
Payer: COMMERCIAL

## 2018-01-30 ENCOUNTER — APPOINTMENT (EMERGENCY)
Dept: CT IMAGING | Facility: HOSPITAL | Age: 48
End: 2018-01-30
Payer: COMMERCIAL

## 2018-01-30 ENCOUNTER — OFFICE VISIT (OUTPATIENT)
Dept: FAMILY MEDICINE CLINIC | Facility: CLINIC | Age: 48
End: 2018-01-30
Payer: COMMERCIAL

## 2018-01-30 VITALS
DIASTOLIC BLOOD PRESSURE: 72 MMHG | WEIGHT: 148 LBS | RESPIRATION RATE: 18 BRPM | OXYGEN SATURATION: 96 % | HEIGHT: 66 IN | HEART RATE: 103 BPM | TEMPERATURE: 97.3 F | BODY MASS INDEX: 23.78 KG/M2 | SYSTOLIC BLOOD PRESSURE: 130 MMHG

## 2018-01-30 VITALS
HEART RATE: 80 BPM | HEIGHT: 66 IN | DIASTOLIC BLOOD PRESSURE: 70 MMHG | BODY MASS INDEX: 23.77 KG/M2 | OXYGEN SATURATION: 100 % | WEIGHT: 147.93 LBS | SYSTOLIC BLOOD PRESSURE: 146 MMHG | RESPIRATION RATE: 17 BRPM | TEMPERATURE: 97.6 F

## 2018-01-30 DIAGNOSIS — F32.A DEPRESSION, UNSPECIFIED DEPRESSION TYPE: ICD-10-CM

## 2018-01-30 DIAGNOSIS — Z72.0 TOBACCO ABUSE: Chronic | ICD-10-CM

## 2018-01-30 DIAGNOSIS — R07.89 CHEST TIGHTNESS OR PRESSURE: Primary | ICD-10-CM

## 2018-01-30 DIAGNOSIS — R06.02 SOB (SHORTNESS OF BREATH): ICD-10-CM

## 2018-01-30 DIAGNOSIS — R07.9 CHEST PAIN AT REST: Primary | ICD-10-CM

## 2018-01-30 DIAGNOSIS — J44.9 CHRONIC OBSTRUCTIVE PULMONARY DISEASE (HCC): ICD-10-CM

## 2018-01-30 DIAGNOSIS — G54.0 THORACIC OUTLET SYNDROME: ICD-10-CM

## 2018-01-30 LAB
ALBUMIN SERPL BCP-MCNC: 3.8 G/DL (ref 3.5–5)
ALP SERPL-CCNC: 156 U/L (ref 46–116)
ALT SERPL W P-5'-P-CCNC: 16 U/L (ref 12–78)
ANION GAP SERPL CALCULATED.3IONS-SCNC: 15 MMOL/L (ref 4–13)
APTT PPP: 28 SECONDS (ref 23–35)
AST SERPL W P-5'-P-CCNC: 18 U/L (ref 5–45)
BASOPHILS # BLD AUTO: 0.06 THOUSANDS/ΜL (ref 0–0.1)
BASOPHILS NFR BLD AUTO: 1 % (ref 0–1)
BILIRUB SERPL-MCNC: 0.3 MG/DL (ref 0.2–1)
BUN SERPL-MCNC: 1 MG/DL (ref 5–25)
CALCIUM SERPL-MCNC: 9 MG/DL (ref 8.3–10.1)
CHLORIDE SERPL-SCNC: 107 MMOL/L (ref 100–108)
CO2 SERPL-SCNC: 22 MMOL/L (ref 21–32)
CREAT SERPL-MCNC: 0.68 MG/DL (ref 0.6–1.3)
DEPRECATED D DIMER PPP: 731 NG/ML (FEU) (ref 0–424)
EOSINOPHIL # BLD AUTO: 0.08 THOUSAND/ΜL (ref 0–0.61)
EOSINOPHIL NFR BLD AUTO: 1 % (ref 0–6)
ERYTHROCYTE [DISTWIDTH] IN BLOOD BY AUTOMATED COUNT: 15.5 % (ref 11.6–15.1)
GAS + CO PNL BLDA: 5.9 % (ref 0–1.5)
GFR SERPL CREATININE-BSD FRML MDRD: 104 ML/MIN/1.73SQ M
GLUCOSE SERPL-MCNC: 83 MG/DL (ref 65–140)
HCT VFR BLD AUTO: 43.7 % (ref 34.8–46.1)
HGB BLD-MCNC: 14.7 G/DL (ref 11.5–15.4)
INR PPP: 1 (ref 0.86–1.16)
LYMPHOCYTES # BLD AUTO: 2.78 THOUSANDS/ΜL (ref 0.6–4.47)
LYMPHOCYTES NFR BLD AUTO: 26 % (ref 14–44)
MCH RBC QN AUTO: 29.2 PG (ref 26.8–34.3)
MCHC RBC AUTO-ENTMCNC: 33.6 G/DL (ref 31.4–37.4)
MCV RBC AUTO: 87 FL (ref 82–98)
MONOCYTES # BLD AUTO: 0.58 THOUSAND/ΜL (ref 0.17–1.22)
MONOCYTES NFR BLD AUTO: 5 % (ref 4–12)
NEUTROPHILS # BLD AUTO: 7.2 THOUSANDS/ΜL (ref 1.85–7.62)
NEUTS SEG NFR BLD AUTO: 67 % (ref 43–75)
PLATELET # BLD AUTO: 415 THOUSANDS/UL (ref 149–390)
PMV BLD AUTO: 10 FL (ref 8.9–12.7)
POTASSIUM SERPL-SCNC: 3.3 MMOL/L (ref 3.5–5.3)
PROT SERPL-MCNC: 7.9 G/DL (ref 6.4–8.2)
PROTHROMBIN TIME: 13.1 SECONDS (ref 12.1–14.4)
RBC # BLD AUTO: 5.04 MILLION/UL (ref 3.81–5.12)
SODIUM SERPL-SCNC: 144 MMOL/L (ref 136–145)
TROPONIN I SERPL-MCNC: <0.02 NG/ML
WBC # BLD AUTO: 10.7 THOUSAND/UL (ref 4.31–10.16)

## 2018-01-30 PROCEDURE — 87040 BLOOD CULTURE FOR BACTERIA: CPT | Performed by: EMERGENCY MEDICINE

## 2018-01-30 PROCEDURE — 85025 COMPLETE CBC W/AUTO DIFF WBC: CPT | Performed by: EMERGENCY MEDICINE

## 2018-01-30 PROCEDURE — 93005 ELECTROCARDIOGRAM TRACING: CPT

## 2018-01-30 PROCEDURE — 85379 FIBRIN DEGRADATION QUANT: CPT | Performed by: EMERGENCY MEDICINE

## 2018-01-30 PROCEDURE — 80053 COMPREHEN METABOLIC PANEL: CPT | Performed by: EMERGENCY MEDICINE

## 2018-01-30 PROCEDURE — 71275 CT ANGIOGRAPHY CHEST: CPT

## 2018-01-30 PROCEDURE — 96360 HYDRATION IV INFUSION INIT: CPT

## 2018-01-30 PROCEDURE — 36415 COLL VENOUS BLD VENIPUNCTURE: CPT | Performed by: EMERGENCY MEDICINE

## 2018-01-30 PROCEDURE — 96361 HYDRATE IV INFUSION ADD-ON: CPT

## 2018-01-30 PROCEDURE — 84484 ASSAY OF TROPONIN QUANT: CPT | Performed by: EMERGENCY MEDICINE

## 2018-01-30 PROCEDURE — 99285 EMERGENCY DEPT VISIT HI MDM: CPT

## 2018-01-30 PROCEDURE — T1015 CLINIC SERVICE: HCPCS | Performed by: FAMILY MEDICINE

## 2018-01-30 PROCEDURE — 82375 ASSAY CARBOXYHB QUANT: CPT | Performed by: EMERGENCY MEDICINE

## 2018-01-30 PROCEDURE — 71046 X-RAY EXAM CHEST 2 VIEWS: CPT

## 2018-01-30 PROCEDURE — 85610 PROTHROMBIN TIME: CPT | Performed by: EMERGENCY MEDICINE

## 2018-01-30 PROCEDURE — 85730 THROMBOPLASTIN TIME PARTIAL: CPT | Performed by: EMERGENCY MEDICINE

## 2018-01-30 RX ORDER — VENLAFAXINE HYDROCHLORIDE 37.5 MG/1
1 CAPSULE, EXTENDED RELEASE ORAL DAILY
COMMUNITY
Start: 2017-03-02 | End: 2018-01-30 | Stop reason: SINTOL

## 2018-01-30 RX ORDER — ASPIRIN 325 MG
325 TABLET, DELAYED RELEASE (ENTERIC COATED) ORAL ONCE
Status: DISCONTINUED | OUTPATIENT
Start: 2018-01-30 | End: 2018-01-30

## 2018-01-30 RX ORDER — PREDNISONE 20 MG/1
TABLET ORAL
Qty: 20 TABLET | Refills: 0 | Status: SHIPPED | OUTPATIENT
Start: 2018-01-30 | End: 2018-02-12 | Stop reason: ALTCHOICE

## 2018-01-30 RX ORDER — OXYCODONE HYDROCHLORIDE AND ACETAMINOPHEN 5; 325 MG/1; MG/1
TABLET ORAL
COMMUNITY
Start: 2014-12-09 | End: 2018-01-30 | Stop reason: SDUPTHER

## 2018-01-30 RX ORDER — MELOXICAM 7.5 MG/1
7.5 TABLET ORAL DAILY
COMMUNITY
End: 2018-01-30 | Stop reason: ALTCHOICE

## 2018-01-30 RX ORDER — NITROGLYCERIN 0.4 MG/1
0.4 TABLET SUBLINGUAL ONCE
Status: COMPLETED | OUTPATIENT
Start: 2018-01-30 | End: 2018-01-30

## 2018-01-30 RX ORDER — NITROGLYCERIN 0.4 MG/1
0.4 TABLET SUBLINGUAL
COMMUNITY

## 2018-01-30 RX ORDER — ESCITALOPRAM OXALATE 5 MG/1
5 TABLET ORAL DAILY
Qty: 30 TABLET | Refills: 3 | Status: SHIPPED | OUTPATIENT
Start: 2018-01-30 | End: 2018-05-03 | Stop reason: SDUPTHER

## 2018-01-30 RX ORDER — POTASSIUM CHLORIDE 20 MEQ/1
40 TABLET, EXTENDED RELEASE ORAL ONCE
Status: COMPLETED | OUTPATIENT
Start: 2018-01-30 | End: 2018-01-30

## 2018-01-30 RX ORDER — SODIUM CHLORIDE 9 MG/ML
125 INJECTION, SOLUTION INTRAVENOUS CONTINUOUS
Status: DISCONTINUED | OUTPATIENT
Start: 2018-01-30 | End: 2018-01-30

## 2018-01-30 RX ORDER — GUAIFENESIN 600 MG
1200 TABLET, EXTENDED RELEASE 12 HR ORAL EVERY 12 HOURS SCHEDULED
Qty: 40 TABLET | Refills: 0 | Status: SHIPPED | OUTPATIENT
Start: 2018-01-30 | End: 2018-02-12 | Stop reason: ALTCHOICE

## 2018-01-30 RX ADMIN — NITROGLYCERIN 0.4 MG: 0.4 TABLET SUBLINGUAL at 14:26

## 2018-01-30 RX ADMIN — SODIUM CHLORIDE 1000 ML: 0.9 INJECTION, SOLUTION INTRAVENOUS at 14:19

## 2018-01-30 RX ADMIN — POTASSIUM CHLORIDE 40 MEQ: 1500 TABLET, EXTENDED RELEASE ORAL at 15:39

## 2018-01-30 RX ADMIN — IOHEXOL 85 ML: 350 INJECTION, SOLUTION INTRAVENOUS at 15:31

## 2018-01-30 NOTE — ED PROVIDER NOTES
History  Chief Complaint   Patient presents with    Chest Pain     Chest pressure since this morning, PCP states she has EKG changes     Pt sent from PCP office for further evaluation  Pt had been at Other ER 1/25 and dx with pneumonia -was on Zithromax  Catherene Bridegroom to PCP today for SOB with activity and 2 days watery diarrhea  Pt relates PCP noted change on EKG from that other visit  Pt gives hx of 2 days feeling "hot" -did not take temp  No chills  Has had chest pressure anjd occ left arm numbness, which she relates is not different then her Thoracic outlet synd, for which she is under eval at present  Has white productive cough  Diarrhea -no vomiting  Pt presents NAD  HAs smell of fuel oil  Pt denies any free standing heaters,etc    PMH  Thoracic outlet sx; CAD; GERD; COPD;Reynauds; Cardiomyopathy; chronic pain  History provided by:  Patient  Chest Pain   Chest pain location: vague tightness  Pain quality: tightness    Pain quality: not sharp and not shooting    Pain radiates to the back: no    Timing:  Intermittent  Context: breathing    Context: not eating, not lifting, no movement, no stress and no trauma    Worsened by:  Deep breathing  Associated symptoms: cough, numbness (occ left arm) and shortness of breath    Associated symptoms: no abdominal pain, no altered mental status, no anorexia, no back pain, no claudication, no diaphoresis, no dizziness, no dysphagia, no fever, no headache, no heartburn, no lower extremity edema, no nausea, no near-syncope, no orthopnea, no palpitations, no syncope and not vomiting    Risk factors: coronary artery disease, hypertension and smoking    Risk factors: no immobilization, not male and no prior DVT/PE        Prior to Admission Medications   Prescriptions Last Dose Informant Patient Reported?  Taking?   aspirin 81 mg chewable tablet   No No   Sig: Chew 1 tablet daily   atorvastatin (LIPITOR) 40 mg tablet   No No   Sig: Take 1 tablet by mouth every evening clopidogrel (PLAVIX) 75 mg tablet   No No   Sig: Take 1 tablet by mouth daily   escitalopram (LEXAPRO) 5 mg tablet   No No   Sig: Take 1 tablet (5 mg total) by mouth daily   gabapentin (NEURONTIN) 400 mg capsule   Yes No   Sig: Take 400 mg by mouth 3 (three) times a day  metoprolol tartrate (LOPRESSOR) 25 mg tablet   No No   Sig: Take 0 5 tablets (12 5 mg total) by mouth every 8 (eight) hours  nitroglycerin (NITROSTAT) 0 4 mg SL tablet   Yes No   Sig: Place 0 4 mg under the tongue every 5 (five) minutes as needed for chest pain   oxyCODONE (ROXICODONE) 5 mg immediate release tablet   Yes No   Sig: Take 5 mg by mouth every 4 (four) hours as needed for moderate pain   pantoprazole (PROTONIX) 40 mg tablet   Yes No   Sig: Take 40 mg by mouth daily        Facility-Administered Medications: None       Past Medical History:   Diagnosis Date    Chronic pain     Coronary artery disease     Cubital tunnel syndrome     Depression     GERD (gastroesophageal reflux disease)     History of Clostridium difficile     Ovarian cyst     Psychiatric disorder     depression    Raynaud's disease     Takotsubo cardiomyopathy     Thoracic outlet syndrome     Tobacco abuse     Vitamin D deficiency        Past Surgical History:   Procedure Laterality Date    CARDIAC CATHETERIZATION      CABG    CORONARY ARTERY BYPASS GRAFT N/A 2/12/2016    Procedure: CABG X1; Left  EVH to mid-LAD; ZAHRA;  Surgeon: Ted Aleman DO;  Location: [facility];  Service:    Hola Polio DEBRIDEMENT TENNIS ELBOW      DILATION AND CURETTAGE OF UTERUS      HYSTEROSCOPY      LEFT OOPHORECTOMY      SALPINGECTOMY Right        Family History   Problem Relation Age of Onset    Heart disease Mother     Heart attack Mother     Heart attack Brother 36     s/p stent placement    Diabetes Brother     Heart disease Brother     Cancer Father     Diabetes Sister     Heart disease Sister     Diabetes Sister      I have reviewed and agree with the history as documented  Social History   Substance Use Topics    Smoking status: Current Every Day Smoker     Packs/day: 0 25     Years: 30 00    Smokeless tobacco: Never Used    Alcohol use No        Review of Systems   Constitutional: Positive for activity change  Negative for chills, diaphoresis and fever  HENT: Negative  Negative for drooling, facial swelling, mouth sores, sore throat, trouble swallowing and voice change  Eyes: Negative  Negative for pain and visual disturbance  Respiratory: Positive for cough, chest tightness and shortness of breath  Negative for choking, wheezing and stridor  Cardiovascular: Positive for chest pain  Negative for palpitations, orthopnea, claudication, leg swelling, syncope and near-syncope  Gastrointestinal: Positive for diarrhea  Negative for abdominal pain, anorexia, blood in stool, heartburn, nausea and vomiting  Genitourinary: Negative  Negative for dysuria, flank pain, hematuria and pelvic pain  Musculoskeletal: Negative for back pain, joint swelling, myalgias, neck pain and neck stiffness  Skin: Negative  Negative for pallor, rash and wound  Neurological: Positive for numbness (occ left arm)  Negative for dizziness, tremors, syncope, facial asymmetry, speech difficulty, light-headedness and headaches  Psychiatric/Behavioral: Negative  Negative for confusion, decreased concentration and hallucinations  The patient is not nervous/anxious          Physical Exam  ED Triage Vitals [01/30/18 1355]   Temperature Pulse Respirations Blood Pressure SpO2   97 6 °F (36 4 °C) 81 18 141/85 97 %      Temp Source Heart Rate Source Patient Position - Orthostatic VS BP Location FiO2 (%)   Temporal Monitor Lying Left arm --      Pain Score       6           Orthostatic Vital Signs  Vitals:    01/30/18 1430 01/30/18 1500 01/30/18 1545 01/30/18 1600   BP: 130/89 134/82 126/79 146/70   Pulse: 83 76 81 80   Patient Position - Orthostatic VS:  Lying Sitting Sitting Physical Exam   Constitutional: She is oriented to person, place, and time  She appears well-developed and well-nourished  She is active and cooperative  Non-toxic appearance  She does not appear ill  No distress  Mildly dyspnic with speaking  Has smell of fuel oil   HENT:   Head: Normocephalic and atraumatic  Right Ear: Tympanic membrane, external ear and ear canal normal    Left Ear: Tympanic membrane, external ear and ear canal normal    Mouth/Throat: Mucous membranes are normal  Mucous membranes are not dry and not cyanotic  Abnormal dentition (poor dentition)  No oropharyngeal exudate, posterior oropharyngeal edema or posterior oropharyngeal erythema  Eyes: Conjunctivae, EOM and lids are normal  Pupils are equal, round, and reactive to light  Neck: Trachea normal, normal range of motion and phonation normal  Neck supple  No JVD present  Cardiovascular: Normal rate and regular rhythm  No extrasystoles are present  No perf edema or calf tenderness   Pulmonary/Chest: No stridor  Tachypneic: with activity  No respiratory distress  She has decreased breath sounds (all fields)  She has no rhonchi (couse cough)  She has no rales  Abdominal: Soft  Bowel sounds are normal  There is no tenderness  There is no rigidity, no guarding and no CVA tenderness  Neurological: She is alert and oriented to person, place, and time  She has normal strength and normal reflexes  No cranial nerve deficit or sensory deficit  She displays a negative Romberg sign  Present and equal upper ext neuro,vascular , sensation,motor exam   Skin: Skin is warm and dry  No petechiae and no rash noted  She is not diaphoretic  No cyanosis  Nails show clubbing  Psychiatric: She has a normal mood and affect  Her speech is normal and behavior is normal  Judgment and thought content normal  Cognition and memory are normal    Vitals reviewed        ED Medications  Medications   sodium chloride 0 9 % bolus 1,000 mL (0 mL Intravenous Stopped 1/30/18 1630)   nitroglycerin (NITROSTAT) SL tablet 0 4 mg (0 4 mg Sublingual Given 1/30/18 1426)   potassium chloride (K-DUR,KLOR-CON) CR tablet 40 mEq (40 mEq Oral Given 1/30/18 1539)   iohexol (OMNIPAQUE) 350 MG/ML injection (SINGLE-DOSE) 85 mL (85 mL Intravenous Given 1/30/18 1531)       Diagnostic Studies  Results Reviewed     Procedure Component Value Units Date/Time    Blood culture #2 [[id number]] Collected:  01/30/18 1519    Lab Status:  Preliminary result Specimen:  Blood from Arm, Right Updated:  02/01/18 2301     Blood Culture No Growth at 48 hrs  Carboxyhemoglobin [[id number]]  (Abnormal) Collected:  01/30/18 1520    Lab Status:  Final result Specimen:  Blood from Arm, Right Updated:  01/30/18 1530     Carbon Monoxide, Blood 5 9 (H) %     Narrative:         Normal Carboxyhemoglobin range for nonsmokers is <1 5%   Normal Carboxyhemoglobin range for smokers is 1 5% to 5 1%     Troponin I [[id number]]  (Normal) Collected:  01/30/18 1420    Lab Status:  Final result Specimen:  Blood from Line, Venous Updated:  01/30/18 1452     Troponin I <0 02 ng/mL     Narrative:         [employer] Chemistry analyzer 99% cutoff is > 0 04 ng/mL in network labs    o cTnI 99% cutoff is useful only when applied to patients in the clinical setting of myocardial ischemia  o cTnI 99% cutoff should be interpreted in the context of clinical history, ECG findings and possibly cardiac imaging to establish correct diagnosis  o cTnI 99% cutoff may be suggestive but clearly not indicative of a coronary event without the clinical setting of myocardial ischemia      Comprehensive metabolic panel [[id number]]  (Abnormal) Collected:  01/30/18 1420    Lab Status:  Final result Specimen:  Blood from Line, Venous Updated:  01/30/18 1449     Sodium 144 mmol/L      Potassium 3 3 (L) mmol/L      Chloride 107 mmol/L      CO2 22 mmol/L      Anion Gap 15 (H) mmol/L      BUN 1 (L) mg/dL      Creatinine 0 68 mg/dL      Glucose 83 mg/dL Calcium 9 0 mg/dL      AST 18 U/L      ALT 16 U/L      Alkaline Phosphatase 156 (H) U/L      Total Protein 7 9 g/dL      Albumin 3 8 g/dL      Total Bilirubin 0 30 mg/dL      eGFR 104 ml/min/1 73sq m     Narrative:         National Kidney Disease Education Program recommendations are as follows:  GFR calculation is accurate only with a steady state creatinine  Chronic Kidney disease less than 60 ml/min/1 73 sq  meters  Kidney failure less than 15 ml/min/1 73 sq  meters  D-Dimer [[id number]]  (Abnormal) Collected:  01/30/18 1420    Lab Status:  Final result Specimen:  Blood from Line, Venous Updated:  01/30/18 1437     D-Dimer, Quant 731 (H) ng/ml (FEU)     Protime-INR [[id number]]  (Normal) Collected:  01/30/18 1420    Lab Status:  Final result Specimen:  Blood from Line, Venous Updated:  01/30/18 1434     Protime 13 1 seconds      INR 1 00    APTT [[id number]]  (Normal) Collected:  01/30/18 1420    Lab Status:  Final result Specimen:  Blood from Line, Venous Updated:  01/30/18 1434     PTT 28 seconds     Narrative:          Therapeutic Heparin Range = 60-90 seconds    CBC and differential [[id number]]  (Abnormal) Collected:  01/30/18 1420    Lab Status:  Final result Specimen:  Blood from Line, Venous Updated:  01/30/18 1428     WBC 10 70 (H) Thousand/uL      RBC 5 04 Million/uL      Hemoglobin 14 7 g/dL      Hematocrit 43 7 %      MCV 87 fL      MCH 29 2 pg      MCHC 33 6 g/dL      RDW 15 5 (H) %      MPV 10 0 fL      Platelets 948 (H) Thousands/uL      Neutrophils Relative 67 %      Lymphocytes Relative 26 %      Monocytes Relative 5 %      Eosinophils Relative 1 %      Basophils Relative 1 %      Neutrophils Absolute 7 20 Thousands/µL      Lymphocytes Absolute 2 78 Thousands/µL      Monocytes Absolute 0 58 Thousand/µL      Eosinophils Absolute 0 08 Thousand/µL      Basophils Absolute 0 06 Thousands/µL                  CTA ED chest PE study   Final Result by Neftali Ferreira MD (01/30 4386)      No evidence of pulmonary embolism      Severe panlobular emphysema      No congestion      A focal area of groundglass density seen in the right apex, not definitely seen on the previous study would suggest a follow-up CT at 6 months as per Fleischner Society guidelines of 2017  If this abnormality persists and is stable on the follow-up    study at 6 months then follow-up CT every 2 years is suggested      The study was marked in [facility] for immediate notification  Workstation performed: [id number]         XR chest 2 views   Final Result by ANIL Myers MD (01/30 1513)      No acute pulmonary disease, status post cardiac surgery                Workstation performed: [id number]                    Procedures  ECG 12 Lead Documentation  Date/Time: 1/30/2018 1:40 PM  Performed by: Margareth Nielsen  Authorized by: Margareth Nielsen     ECG reviewed by me, the ED Provider: yes    Patient location:  ED  Previous ECG:     Previous ECG:  Compared to current    Similarity:  No change  Interpretation:     Interpretation: non-specific    Rate:     ECG rate assessment: normal    Rhythm:     Rhythm: sinus rhythm    Comments:      RBBB           Phone Contacts  ED Phone Contact    ED Course  ED Course as of Feb 02 1313   Tue Jan 30, 2018   1449 Potassium: (!) 3 3   1449 D-DIMER QUANTITATIVE: (!) 731   1449 WBC: (!) 10 70   1451 eGFR: 104   1543 Carbon Monoxide, Blood: (!) 5 9   1559 Troponin I: <0 02   1609 Discussed work up and results  Discussed CO level-pt is smoker and  as well- will have CO checked in house  Pt has cardiologist-pt to set up follow up appt    Will review/adjust resp meds          HEART Risk Score    Flowsheet Row Most Recent Value   History  0 Filed at: 01/30/2018 1519   ECG  1 Filed at: 01/30/2018 1519   Age  1 Filed at: 01/30/2018 1519   Risk Factors  2 Filed at: 01/30/2018 1519   Troponin  0 Filed at: 01/30/2018 1519   Heart Score Risk Calculator   History  0 Filed at: 01/30/2018 1519   ECG  1 Filed at: 01/30/2018 1519   Age  1 Filed at: 01/30/2018 1519   Risk Factors  2 Filed at: 01/30/2018 1519   Troponin  0 Filed at: 01/30/2018 1519   HEART Score  4 Filed at: 01/30/2018 1519   HEART Score  4 Filed at: 01/30/2018 1519                            MDM  The patient presented with a condition in which there was a high probability of imminent or life-threatening deterioration, and critical care services (excluding separately billable procedures) totalled 30-74 minutes  Disposition  Final diagnoses:   Tobacco abuse   Chest tightness or pressure   Chronic obstructive pulmonary disease ([address] )   Thoracic outlet syndrome     Time reflects when diagnosis was documented in both MDM as applicable and the Disposition within this note     Time User Action Codes Description Comment    1/30/2018  4:18 PM [place] Add [Z72 0] Tobacco abuse     2/2/2018  1:03 PM [place] Modify [Z72 0] Tobacco abuse     2/2/2018  1:04 PM [place] Add [R07 89] Chest tightness or pressure     2/2/2018  1:04 PM [place] Add [J44 9] Chronic obstructive pulmonary disease ([address] )     2/2/2018  1:05 PM [place] Add [G54 0] Thoracic outlet syndrome       ED Disposition     ED Disposition Condition Comment    Discharge  Maximilian Buenrostro discharge to home/self care      Condition at discharge: Stable        Follow-up Information     Follow up With Specialties Details Why Contact Info    Arsenio Spencer PA-C Family Medicine   [place] [id number] [phone]          Discharge Medication List as of 1/30/2018  4:19 PM      START taking these medications    Details   guaiFENesin (MUCINEX) 600 mg 12 hr tablet Take 2 tablets (1,200 mg total) by mouth every 12 (twelve) hours, Starting Tue 1/30/2018, Normal      predniSONE 20 mg tablet Take 3 tablets daily for 3 days then 2 tablets daily for 3 days then 1 tablet daily for 3 days, Normal         CONTINUE these medications which have NOT CHANGED    Details   aspirin 81 mg chewable tablet Chew 1 tablet daily, Starting Fri 7/28/2017, Normal      atorvastatin (LIPITOR) 40 mg tablet Take 1 tablet by mouth every evening, Starting Fri 7/28/2017, Normal      clopidogrel (PLAVIX) 75 mg tablet Take 1 tablet by mouth daily, Starting Thu 11/23/2017, No Print      escitalopram (LEXAPRO) 5 mg tablet Take 1 tablet (5 mg total) by mouth daily, Starting Tue 1/30/2018, Normal      gabapentin (NEURONTIN) 400 mg capsule Take 400 mg by mouth 3 (three) times a day , Until Discontinued, Historical Med      metoprolol tartrate (LOPRESSOR) 25 mg tablet Take 0 5 tablets (12 5 mg total) by mouth every 8 (eight) hours  , Starting 2/15/2016, Until Discontinued, Normal      nitroglycerin (NITROSTAT) 0 4 mg SL tablet Place 0 4 mg under the tongue every 5 (five) minutes as needed for chest pain, Historical Med      oxyCODONE (ROXICODONE) 5 mg immediate release tablet Take 5 mg by mouth every 4 (four) hours as needed for moderate pain, Historical Med      pantoprazole (PROTONIX) 40 mg tablet Take 40 mg by mouth daily  , Until Discontinued, Historical Med           No discharge procedures on file      ED Provider  Electronically Signed by           Jason Parkinson DO  02/02/18 1102

## 2018-01-30 NOTE — DISCHARGE INSTRUCTIONS
Use your inhaler 2 puffs every 4 hrs when awake  Take prescriptions as directed  Make appt with your cardiologist for check up   Have the carbon monoxide level checked in your house today            Chest Pain   WHAT YOU NEED TO KNOW:   What causes chest pain? Chest pain can be caused by a range of conditions, from not serious to life-threatening  Chest pain can be a symptom of a digestive problem, such as acid reflux or a stomach ulcer  An anxiety attack or a strong emotion, such as anger, can also cause chest pain  Infection, inflammation, or a fracture in the bones or cartilage in your chest can cause pain or discomfort  Sometimes chest pain or pressure is caused by poor blood flow to your heart (angina)  Chest pain may also be caused by life-threatening conditions such as a heart attack or blood clot in your lungs  What other symptoms might I have with chest pain? · A burning feeling behind your breastbone    · A racing or slow heartbeat     · Fever or sweating     · Nausea or vomiting     · Shortness of breath     · Discomfort or pressure that spreads from your chest to your back, jaw, or arm     · Feeling weak, tired, or faint  How is the cause of chest pain diagnosed? Your healthcare provider will examine you  Describe your chest pain in as much detail as possible  Tell him or her where your pain is and when it began  Tell the provider if you notice anything that makes the pain worse or better  Tell him or her if it is constant or comes and goes  Your healthcare provider will ask about any medicines you use and medical conditions you have  He or she will also examine you  You may also need any of the following tests:  · An EKG  is a test that records your heart's electrical activity  · Blood tests  check for heart damage and signs of a heart attack  · An echocardiogram  uses sound waves to see if blood is flowing normally through your heart       · An ultrasound, x-ray, CT, or MRI scan  may show the cause of your chest pain  You may be given contrast liquid to help your heart show up better in the pictures  Tell the healthcare provider if you have ever had an allergic reaction to contrast liquid  Do not enter the MRI room with anything metal  Metal can cause serious injury  Tell the healthcare provider if you have any metal in or on your body  · An endoscopy  may be done to check for ulcers or problem with your esophagus  How is chest pain treated? Medicines may be given to treat the cause of your chest pain  Examples include pain medicine, anxiety medicine, or medicines to increase blood flow to your heart  Do not  take certain medicines without asking your healthcare provider first  These include NSAIDs, herbal or vitamin supplements, or hormones (estrogen or progestin)  What are some healthy living tips? The following are general healthy guidelines  If your chest pain is caused by a heart problem, your healthcare provider will give you specific guidelines to follow  · Do not smoke  Nicotine and other chemicals in cigarettes and cigars can cause lung and heart damage  Ask your healthcare provider for information if you currently smoke and need help to quit  E-cigarettes or smokeless tobacco still contain nicotine  Talk to your healthcare provider before you use these products  · Eat a variety of healthy, low-fat foods  Healthy foods include fruits, vegetables, whole-grain breads, low-fat dairy products, beans, lean meats, and fish  Ask for more information about a heart healthy diet  · Ask about activity  Your healthcare provider will tell you which activities to limit or avoid  Ask when you can drive, return to work, and have sex  Ask about the best exercise plan for you  · Maintain a healthy weight  Ask your healthcare provider how much you should weigh  Ask him or her to help you create a weight loss plan if you are overweight    Call 911 if:   · You have any of the following signs of a heart attack:      ¨ Squeezing, pressure, or pain in your chest that lasts longer than 5 minutes or returns    ¨ Discomfort or pain in your back, neck, jaw, stomach, or arm     ¨ Trouble breathing    ¨ Nausea or vomiting    ¨ Lightheadedness or a sudden cold sweat, especially with chest pain or trouble breathing    When should I seek immediate care? · You have chest discomfort that gets worse, even with medicine  · You cough or vomit blood  · Your bowel movements are black or bloody  · You cannot stop vomiting, or it hurts to swallow  When should I contact my healthcare provider? · You have questions or concerns about your condition or care  CARE AGREEMENT:   You have the right to help plan your care  Learn about your health condition and how it may be treated  Discuss treatment options with your caregivers to decide what care you want to receive  You always have the right to refuse treatment  The above information is an  only  It is not intended as medical advice for individual conditions or treatments  Talk to your doctor, nurse or pharmacist before following any medical regimen to see if it is safe and effective for you  © 2017 [facility] Information is for End User's use only and may not be sold, redistributed or otherwise used for commercial purposes  All illustrations and images included in CareNotes® are the copyrighted property of [employer]  or [employer]  COPD (Chronic Obstructive Pulmonary Disease)   WHAT YOU NEED TO KNOW:   Chronic obstructive pulmonary disease (COPD) is a lung disease that makes it hard for you to breathe  It is usually a result of lung damage caused by years of irritation and inflammation in your lungs  DISCHARGE INSTRUCTIONS:   Call 911 if:   · You feel lightheaded, short of breath, and have chest pain  Seek care immediately if:   · You are confused, dizzy, or feel faint       · Your arm or leg feels warm, tender, and painful  It may look swollen and red  · You cough up blood  Contact your healthcare provider if:   · You have more shortness of breath than usual      · You need more medicine than usual to control your symptoms  · You are coughing or wheezing more than usual      · You are coughing up more mucus, or it is a different color or has a different odor  · You gain more than 3 pounds in a week  · You have a fever, a runny or stuffy nose, and a sore throat, or other cold or flu symptoms  · Your skin, lips, or nails start to turn blue  · You have swelling in your legs or ankles  · You are very tired or weak for more than a day  · You notice changes in your mood, or changes in your ability to think or concentrate  · You have questions or concerns about your condition or care  Medicines:   · Medicines  may be used to open your airways, decrease swelling and inflammation in your lungs, or treat an infection  You may need 2 or more medicines  A short-acting medicine relieves symptoms quickly  Long-acting medicines will control or prevent symptoms  Ask for more information about the medicines you are given and how to use them safely  · Take your medicine as directed  Contact your healthcare provider if you think your medicine is not helping or if you have side effects  Tell him or her if you are allergic to any medicine  Keep a list of the medicines, vitamins, and herbs you take  Include the amounts, and when and why you take them  Bring the list or the pill bottles to follow-up visits  Carry your medicine list with you in case of an emergency  Help make breathing easier:   · Use pursed-lip breathing any time you feel short of breath  Take a deep breath in through your nose  Slowly breathe out through your mouth with your lips pursed for twice as long as you inhaled   You can also practice this breathing pattern while you bend, lift, climb stairs, or exercise  It slows down your breathing and helps move more air in and out of your lungs  · Do not smoke, and avoid others who smoke  Nicotine and other substances can cause lung irritation or damage and make it harder for you to breathe  Do not use e-cigarettes or smokeless tobacco  They still contain nicotine  Ask your healthcare provider for information if you currently smoke and need help to quit  For support and more information:  [employer]  Phone: 2- 884 - 757-2096  Web Address: [employer]      · Be aware of and avoid anything that makes your symptoms worse  Stay out of high altitudes and places with high humidity  Stay inside, or cover your mouth and nose with a scarf when you are outside during cold weather  Stay inside on days when air pollution or pollen counts are high  Do not use aerosol sprays such as deodorant, bug spray, and hair spray  Manage COPD and help prevent exacerbations:  COPD is a serious condition that gets worse over time  A COPD exacerbation means your symptoms suddenly get worse  It is important to prevent exacerbations  An exacerbation can cause more lung damage  COPD cannot be cured, but you can take action to feel better and prevent COPD exacerbations:  · Protect yourself from germs  Germs can get into your lungs and cause an infection  An infection in your lungs can create more mucus and make it harder to breathe  An infection can also create swelling in your airways and prevent air from getting in  You can decrease your risk for infection by doing the following:     [facility] your hands often with soap and water  Carry germ-killing gel with you  You can use the gel to clean your hands when soap and water are not available  ¨ Do not touch your eyes, nose, or mouth unless you have washed your hands first      ¨ Always cover your mouth when you cough  Cough into a tissue or your shirtsleeve so you do not spread germs from your hands       ¨ Try to avoid people who have a cold or the flu  If you are sick, stay away from others as much as possible  · Drink more liquids  This will help to keep your air passages moist and help you cough up mucus  Ask how much liquid to drink each day and which liquids are best for you  · Exercise daily  Exercise for at least 20 minutes each day to help increase your energy and decrease shortness of breath  Walking or riding a bike are good ways to exercise  Talk to your healthcare provider about the best exercise plan for you  · Ask about vaccines  Your healthcare provider may recommend that you get regular flu and pneumonia vaccines  Pneumonia can become life-threatening for a person who has COPD  Ask about other vaccines you may need  Ask your healthcare provider about the flu and pneumonia vaccines  All adults should get the flu (influenza) vaccine every year as soon as it becomes available  The pneumonia vaccine is given to adults aged 72 or older to prevent pneumococcal disease, such as pneumonia  Adults aged 23 to 59 years who are at high risk for pneumococcal disease also should get the pneumococcal vaccine  It may need to be repeated 1 or 5 years later  Pulmonary rehabilitation:  Your healthcare provider may recommend a program to help you manage your symptoms and improve your quality of life  It may include nutritional counseling and exercise to strengthen your lungs  Make decisions about your choices for future treatment:  Ask for information about advanced medical directives and living hutton  These documents help you decide and write down your choices for treatment and end-of-life care  It is best to complete them when you feel well and can think clearly about your wishes  The information can then be kept for future use if you are in the hospital or become very ill  Follow up with your healthcare provider as directed: You may need more tests   Your healthcare provider may refer you to a pulmonary (lung) specialist  Write down your questions so you remember to ask them during your visits  © 2017 [address] [employer] Information is for End User's use only and may not be sold, redistributed or otherwise used for commercial purposes  All illustrations and images included in CareNotes® are the copyrighted property of [employer]  or [employer]  The above information is an  only  It is not intended as medical advice for individual conditions or treatments  Talk to your doctor, nurse or pharmacist before following any medical regimen to see if it is safe and effective for you  Carbon Monoxide Poisoning   WHAT YOU NEED TO KNOW:   What is carbon monoxide poisoning? Carbon monoxide (CO) poisoning is a life-threatening condition caused by exposure to high levels of CO  CO is a poisonous gas that you cannot see, taste, or smell  Exposure happens when you breathe in CO  CO can build up in your body and replace oxygen in your blood  Your brain, organs, and tissues can be damaged from a lack of oxygen  CO poisoning can be mild or severe  Severe poisoning can cause permanent injury  Where is CO found? · Smoke from a fire    · Faulty devices or equipment, such as a furnace, water heater, gas stove, or wood-burning stove or fireplace    · Gas-powered tools, vehicles, or machines used in poorly ventilated areas, such as a barbecue grill or chain saw    · Nonvented devices such as propane heaters, stoves, grills, or lanterns used inside a house, trailer, or tent     [employer] from cars or other vehicles  What increases my risk for CO poisoning? · Older age    · Heart disease, blood vessel disease, sickle cell anemia, and lung problems     · Pregnancy    · Smoking cigarettes    · Work that uses equipment or chemicals that produce CO  What are the signs and symptoms of CO poisoning? Signs and symptoms may develop right after CO exposure, or several weeks later   You may have any of the following:  · Blurred vision, dizziness, or a headache    · Nausea, vomiting, or loss of appetite    · Faster breathing than normal, or trouble breathing    · Weakness, muscle pain, or dark urine    · Chest pain, or a fast, strong, or irregular heartbeat    · Confusion, fainting, or seizures    · Tremors or shaking, or trouble moving, bending arms or legs, or walking    · Difficulty speaking, chewing, or controlling facial muscles  How is CO poisoning diagnosed? Your healthcare provider will examine you and ask about your symptoms  Tell him if anyone in your home has similar signs and symptoms  Pets may also show similar signs  Tell him if you use home heating devices that burn gas, oil, wood, or other fuel  You may need blood tests to check the level of CO in your blood  Blood tests may also show problems caused by CO poisoning  Your breath may be tested for the amount of CO it contains  Your heart rhythm and brain function may also be monitored  How is CO poisoning treated? · Extra oxygen  may be given if your blood oxygen level is lower than it should be  You may get oxygen through a mask placed over your nose and mouth or through small tubes placed in your nostrils  · Hyperbaric oxygen therapy  is used to get more oxygen into your body  The oxygen is given under pressure to help it get into your tissues and blood  You will be in a room called a hyperbaric chamber during the treatment  What should I do if I think I or someone else was exposed to CO? CO poisoning can seem like the flu  Anyone who may have been exposed to CO needs to be checked by a healthcare provider  The following are steps to take if you believe you or someone else is near a source of CO:  · Move into fresh air  If safely possible, shut off the source of the CO  Wait for a professional to help you if you cannot do this safely  · Call 911  Explain when the exposure happened and how long you think it lasted       · Start CPR if needed and you are trained on how to do this  CPR may be needed if the person is not breathing  What can I do to prevent CO poisoning? · Install a CO detector in every sleeping area in your home  Place it 5 feet above the floor and away from fireplaces or gas-burning equipment  Change the batteries twice each year  · Check your chimney, furnace, or wood stoves  Check for problems every year before you use them  Have your fireplace flue cleaned on a regular basis  · Be careful with gas appliances  Do not use barbecues or heaters that burn fuel inside your home or other closed spaces  Do not use your gas kitchen oven to heat your home  Make sure appliances are properly hooded or vented  · Do not let motor vehicles run in closed areas  This includes letting your car run in a garage  If the car is outside, check that the exhaust pipe is not blocked  · Do not smoke  Cigarette smoke contains small amounts of CO  This increases your risk of CO poisoning if you are exposed to a source of CO  Ask your healthcare provider for information if you need help quitting  Call 911 if:   · You have chest pain or an irregular or fast heartbeat  · You or someone close to you has a seizure or is unconscious  When should I seek immediate care? · You have trouble breathing or are breathing faster than usual     · You feel like you are going to faint  · You feel weak, have trouble moving, or have severe muscle pain  · Your urine becomes dark or red  When should I contact my healthcare provider? · You feel dizzy  · You have a headache or start to vomit  · Your eyesight becomes blurred  · You have questions or concerns about your condition or care  CARE AGREEMENT:   You have the right to help plan your care  Learn about your health condition and how it may be treated  Discuss treatment options with your caregivers to decide what care you want to receive   You always have the right to refuse treatment  The above information is an  only  It is not intended as medical advice for individual conditions or treatments  Talk to your doctor, nurse or pharmacist before following any medical regimen to see if it is safe and effective for you  © 2017 [address] [employer] Information is for End User's use only and may not be sold, redistributed or otherwise used for commercial purposes  All illustrations and images included in CareNotes® are the copyrighted property of [employer]  or [employer]

## 2018-01-30 NOTE — PROGRESS NOTES
History and Physical  Fernando Moreau 50 y o  female MRN: [id number]      Assessment:   SOB  Chest Pain      Plan: To ER    Chief Complaint   Patient presents with    Follow-up        HPI:  Fernando Moreau is a 50 y o  female who presents after hospitalization for community acquired pneumonia  Today she reports she is SOB and feels like somebody is sitting on her chest  Also has some loose stool  She is finished with the antibiotic that was given at discharge      Historical Information   Past Medical History:   Diagnosis Date    Chronic pain     Coronary artery disease     Cubital tunnel syndrome     Depression     GERD (gastroesophageal reflux disease)     History of Clostridium difficile     Ovarian cyst     Psychiatric disorder     depression    Raynaud's disease     Takotsubo cardiomyopathy     Thoracic outlet syndrome     Tobacco abuse     Vitamin D deficiency      Past Surgical History:   Procedure Laterality Date    CARDIAC CATHETERIZATION      CABG    CORONARY ARTERY BYPASS GRAFT N/A 2/12/2016    Procedure: CABG X1; Left  EVH to mid-LAD; ZAHRA;  Surgeon: Yee Adair DO;  Location: [facility];  Service:    [facility] DEBRIDEMENT TENNIS ELBOW      DILATION AND CURETTAGE OF UTERUS      HYSTEROSCOPY      LEFT OOPHORECTOMY      SALPINGECTOMY Right      Social History   History   Alcohol Use No     History   Drug Use No     History   Smoking Status    Current Every Day Smoker    Packs/day: 0 25    Years: 30 00   Smokeless Tobacco    Never Used     Family History   Problem Relation Age of Onset    Heart disease Mother     Heart attack Mother     Heart attack Brother 36     s/p stent placement    Diabetes Brother     Heart disease Brother     Cancer Father     Diabetes Sister     Heart disease Sister     Diabetes Sister        Meds/Allergies   Allergies   Allergen Reactions    Toradol [Ketorolac Tromethamine] GI Intolerance       Meds:    Current Outpatient Prescriptions:     aspirin 81 mg chewable tablet, Chew 1 tablet daily, Disp: 30 tablet, Rfl: 0    atorvastatin (LIPITOR) 40 mg tablet, Take 1 tablet by mouth every evening, Disp: 30 tablet, Rfl: 0    clopidogrel (PLAVIX) 75 mg tablet, Take 1 tablet by mouth daily, Disp: 30 tablet, Rfl: 0    gabapentin (NEURONTIN) 400 mg capsule, Take 400 mg by mouth 3 (three) times a day , Disp: , Rfl:     meloxicam (MOBIC) 7 5 mg tablet, Take 7 5 mg by mouth daily, Disp: , Rfl:     metoprolol tartrate (LOPRESSOR) 25 mg tablet, Take 0 5 tablets (12 5 mg total) by mouth every 8 (eight) hours  , Disp: 45 tablet, Rfl: 2    nitroglycerin (NITROSTAT) 0 4 mg SL tablet, Place 0 4 mg under the tongue every 5 (five) minutes as needed for chest pain, Disp: , Rfl:     oxyCODONE (ROXICODONE) 5 mg immediate release tablet, Take 5 mg by mouth every 4 (four) hours as needed for moderate pain, Disp: , Rfl:     oxyCODONE-acetaminophen (PERCOCET) 5-325 mg per tablet, Take by mouth, Disp: , Rfl:     pantoprazole (PROTONIX) 40 mg tablet, Take 40 mg by mouth daily  , Disp: , Rfl:     venlafaxine (EFFEXOR-XR) 37 5 mg 24 hr capsule, Take 1 capsule by mouth daily, Disp: , Rfl:       REVIEW OF SYSTEMS  Review of Systems   Constitutional: Positive for diaphoresis  HENT: Negative  Eyes: Negative  Respiratory: Positive for shortness of breath  Cardiovascular: Positive for chest pain  Gastrointestinal: Positive for diarrhea  Endocrine: Negative  Genitourinary: Negative  Musculoskeletal: Negative  Skin: Negative  Allergic/Immunologic: Negative  Neurological: Negative  Hematological: Negative  Psychiatric/Behavioral: Negative          Current Vitals:   Blood Pressure: 130/72 (01/30/18 1250)  Pulse: 103 (01/30/18 1250)  Temperature: (!) 97 3 °F (36 3 °C) (01/30/18 1250)  Respirations: 18 (01/30/18 1250)  Height: 5' 6" (167 6 cm) (01/30/18 1250)  Weight - Scale: 67 1 kg (148 lb) (01/30/18 1250)  SpO2: 96 % (01/30/18 1250)      PHYSICAL EXAMS:  Physical Exam   Constitutional: She is oriented to person, place, and time  She appears well-developed and well-nourished  HENT:   Head: Normocephalic and atraumatic  Eyes: EOM are normal  Pupils are equal, round, and reactive to light  Neck: Normal range of motion  Neck supple  Cardiovascular: Normal rate and regular rhythm  Pulmonary/Chest: Effort normal    Neurological: She is alert and oriented to person, place, and time  Psychiatric: She has a normal mood and affect  Lab Results:          Juan Carlos Larios PA-C  1/30/2018, 1:01 PM

## 2018-01-31 LAB
ATRIAL RATE: 78 BPM
P AXIS: 73 DEGREES
PR INTERVAL: 172 MS
QRS AXIS: 97 DEGREES
QRSD INTERVAL: 136 MS
QT INTERVAL: 430 MS
QTC INTERVAL: 490 MS
T WAVE AXIS: 54 DEGREES
VENTRICULAR RATE: 78 BPM

## 2018-01-31 PROCEDURE — 93010 ELECTROCARDIOGRAM REPORT: CPT | Performed by: INTERNAL MEDICINE

## 2018-02-04 LAB — BACTERIA BLD CULT: NORMAL

## 2018-02-06 LAB — ACCELERATIONS > 10BPM: 0

## 2018-02-06 NOTE — PROGRESS NOTES
I have reviewed the notes, assessments, and/or procedures performed by AP, I concur with her/his documentation of Gabriela Romero

## 2018-02-12 ENCOUNTER — HOSPITAL ENCOUNTER (EMERGENCY)
Facility: HOSPITAL | Age: 48
Discharge: HOME | End: 2018-02-13
Attending: EMERGENCY MEDICINE | Admitting: EMERGENCY MEDICINE
Payer: COMMERCIAL

## 2018-02-12 ENCOUNTER — APPOINTMENT (EMERGENCY)
Dept: RADIOLOGY | Facility: HOSPITAL | Age: 48
End: 2018-02-12
Payer: COMMERCIAL

## 2018-02-12 DIAGNOSIS — E87.6 HYPOKALEMIA: ICD-10-CM

## 2018-02-12 DIAGNOSIS — R09.1 PLEURISY: Primary | ICD-10-CM

## 2018-02-12 LAB
ALBUMIN SERPL BCP-MCNC: 3.3 G/DL (ref 3.5–5)
ALP SERPL-CCNC: 132 U/L (ref 46–116)
ALT SERPL W P-5'-P-CCNC: 11 U/L (ref 12–78)
ANION GAP SERPL CALCULATED.3IONS-SCNC: 10 MMOL/L (ref 4–13)
APTT PPP: 33 SECONDS (ref 23–35)
AST SERPL W P-5'-P-CCNC: 17 U/L (ref 5–45)
BASOPHILS # BLD AUTO: 0.07 THOUSANDS/ΜL (ref 0–0.1)
BASOPHILS NFR BLD AUTO: 1 % (ref 0–1)
BILIRUB SERPL-MCNC: 0.2 MG/DL (ref 0.2–1)
BUN SERPL-MCNC: 3 MG/DL (ref 5–25)
CALCIUM SERPL-MCNC: 8.4 MG/DL (ref 8.3–10.1)
CHLORIDE SERPL-SCNC: 105 MMOL/L (ref 100–108)
CO2 SERPL-SCNC: 25 MMOL/L (ref 21–32)
CREAT SERPL-MCNC: 0.65 MG/DL (ref 0.6–1.3)
DEPRECATED D DIMER PPP: 818 NG/ML (FEU) (ref 0–424)
EOSINOPHIL # BLD AUTO: 0.03 THOUSAND/ΜL (ref 0–0.61)
EOSINOPHIL NFR BLD AUTO: 0 % (ref 0–6)
ERYTHROCYTE [DISTWIDTH] IN BLOOD BY AUTOMATED COUNT: 15.2 % (ref 11.6–15.1)
GFR SERPL CREATININE-BSD FRML MDRD: 105 ML/MIN/1.73SQ M
GLUCOSE SERPL-MCNC: 97 MG/DL (ref 65–140)
HCT VFR BLD AUTO: 37.9 % (ref 34.8–46.1)
HGB BLD-MCNC: 12.8 G/DL (ref 11.5–15.4)
INR PPP: 1.05 (ref 0.86–1.16)
LYMPHOCYTES # BLD AUTO: 2.87 THOUSANDS/ΜL (ref 0.6–4.47)
LYMPHOCYTES NFR BLD AUTO: 19 % (ref 14–44)
MAGNESIUM SERPL-MCNC: 1.9 MG/DL (ref 1.6–2.6)
MCH RBC QN AUTO: 29.8 PG (ref 26.8–34.3)
MCHC RBC AUTO-ENTMCNC: 33.8 G/DL (ref 31.4–37.4)
MCV RBC AUTO: 88 FL (ref 82–98)
MONOCYTES # BLD AUTO: 0.93 THOUSAND/ΜL (ref 0.17–1.22)
MONOCYTES NFR BLD AUTO: 6 % (ref 4–12)
NEUTROPHILS # BLD AUTO: 11.16 THOUSANDS/ΜL (ref 1.85–7.62)
NEUTS SEG NFR BLD AUTO: 74 % (ref 43–75)
PLATELET # BLD AUTO: 347 THOUSANDS/UL (ref 149–390)
PMV BLD AUTO: 9.8 FL (ref 8.9–12.7)
POTASSIUM SERPL-SCNC: 3.2 MMOL/L (ref 3.5–5.3)
PROT SERPL-MCNC: 7 G/DL (ref 6.4–8.2)
PROTHROMBIN TIME: 13.6 SECONDS (ref 12.1–14.4)
RBC # BLD AUTO: 4.3 MILLION/UL (ref 3.81–5.12)
SODIUM SERPL-SCNC: 140 MMOL/L (ref 136–145)
TROPONIN I SERPL-MCNC: <0.02 NG/ML
WBC # BLD AUTO: 15.06 THOUSAND/UL (ref 4.31–10.16)

## 2018-02-12 PROCEDURE — 84484 ASSAY OF TROPONIN QUANT: CPT | Performed by: EMERGENCY MEDICINE

## 2018-02-12 PROCEDURE — 85025 COMPLETE CBC W/AUTO DIFF WBC: CPT | Performed by: EMERGENCY MEDICINE

## 2018-02-12 PROCEDURE — 36415 COLL VENOUS BLD VENIPUNCTURE: CPT | Performed by: EMERGENCY MEDICINE

## 2018-02-12 PROCEDURE — 85610 PROTHROMBIN TIME: CPT | Performed by: EMERGENCY MEDICINE

## 2018-02-12 PROCEDURE — 85379 FIBRIN DEGRADATION QUANT: CPT | Performed by: EMERGENCY MEDICINE

## 2018-02-12 PROCEDURE — 80053 COMPREHEN METABOLIC PANEL: CPT | Performed by: EMERGENCY MEDICINE

## 2018-02-12 PROCEDURE — 83735 ASSAY OF MAGNESIUM: CPT | Performed by: EMERGENCY MEDICINE

## 2018-02-12 PROCEDURE — 85730 THROMBOPLASTIN TIME PARTIAL: CPT | Performed by: EMERGENCY MEDICINE

## 2018-02-12 PROCEDURE — 71046 X-RAY EXAM CHEST 2 VIEWS: CPT

## 2018-02-12 PROCEDURE — 93005 ELECTROCARDIOGRAM TRACING: CPT

## 2018-02-12 RX ORDER — ALBUTEROL SULFATE 90 UG/1
2 AEROSOL, METERED RESPIRATORY (INHALATION) EVERY 6 HOURS PRN
COMMUNITY
End: 2018-05-25

## 2018-02-12 RX ORDER — POTASSIUM CHLORIDE 20 MEQ/1
20 TABLET, EXTENDED RELEASE ORAL ONCE
Status: COMPLETED | OUTPATIENT
Start: 2018-02-12 | End: 2018-02-13

## 2018-02-12 RX ORDER — ASPIRIN 81 MG/1
324 TABLET, CHEWABLE ORAL ONCE
Status: COMPLETED | OUTPATIENT
Start: 2018-02-12 | End: 2018-02-12

## 2018-02-12 RX ADMIN — ASPIRIN 81 MG CHEWABLE TABLET 324 MG: 81 TABLET CHEWABLE at 22:01

## 2018-02-13 ENCOUNTER — APPOINTMENT (EMERGENCY)
Dept: CT IMAGING | Facility: HOSPITAL | Age: 48
End: 2018-02-13
Payer: COMMERCIAL

## 2018-02-13 VITALS
HEART RATE: 76 BPM | WEIGHT: 148.8 LBS | TEMPERATURE: 98.2 F | HEIGHT: 66 IN | DIASTOLIC BLOOD PRESSURE: 60 MMHG | SYSTOLIC BLOOD PRESSURE: 99 MMHG | OXYGEN SATURATION: 96 % | BODY MASS INDEX: 23.91 KG/M2 | RESPIRATION RATE: 18 BRPM

## 2018-02-13 LAB — TROPONIN I SERPL-MCNC: <0.02 NG/ML

## 2018-02-13 PROCEDURE — 36415 COLL VENOUS BLD VENIPUNCTURE: CPT | Performed by: EMERGENCY MEDICINE

## 2018-02-13 PROCEDURE — 71275 CT ANGIOGRAPHY CHEST: CPT

## 2018-02-13 PROCEDURE — 84484 ASSAY OF TROPONIN QUANT: CPT | Performed by: EMERGENCY MEDICINE

## 2018-02-13 PROCEDURE — 99285 EMERGENCY DEPT VISIT HI MDM: CPT

## 2018-02-13 PROCEDURE — 93005 ELECTROCARDIOGRAM TRACING: CPT | Performed by: EMERGENCY MEDICINE

## 2018-02-13 RX ORDER — POTASSIUM CHLORIDE 20 MEQ/1
20 TABLET, EXTENDED RELEASE ORAL 2 TIMES DAILY
Qty: 20 TABLET | Refills: 0 | Status: SHIPPED | OUTPATIENT
Start: 2018-02-13 | End: 2020-03-19 | Stop reason: ALTCHOICE

## 2018-02-13 RX ADMIN — POTASSIUM CHLORIDE 20 MEQ: 20 TABLET, EXTENDED RELEASE ORAL at 00:01

## 2018-02-13 RX ADMIN — IOHEXOL 85 ML: 350 INJECTION, SOLUTION INTRAVENOUS at 00:29

## 2018-02-13 NOTE — ED NOTES
Patient transported to [facility] 052, [address] [facility]  02/13/18 [id number]

## 2018-02-13 NOTE — ED PROVIDER NOTES
History  Chief Complaint   Patient presents with    Chest Pain     Patient was seen on saturday at [facility] and was diagnosed with an infection and flid on the lungs  patient states that she is having spasms  79-year-old female with history of coronary artery disease status post CABG COPD GERD Raynaud's cardiomyopathy recent pneumonia presents with complaints of substernal chest pain which she describes as a sharp spasm sensation which radiates to the back  She has had trouble breathing she states every time she coughs or takes a deep breath it hurts more  She tried a nebulized treatment it did not change the symptoms these spasms last a couple of seconds if she breathes they seemed to resolve them or if she moves slightly they will get better  She denies any trauma or falls  She has been occasionally lightheaded  She has had no lower extremity edema  No fever but has been chilled  She has had an occasional cough  She does complain of increasing dyspnea on exertion worse than baseline  No prior history of DVT or PE  She was recently diagnosed with pneumonia in January and completed a course of Zithromax was re-evaluated here on 1/30/2017 for complaint of chest pain which did include a CTA to evaluate for the possibility of PE that was negative  She states she was in [facility] 10 2 days ago underwent chest x-ray and lab work as well as EKG told she had fluid on her lungs but no changes to medications were recommended  She denies any abdominal pain she has no new numbness she has chronic left upper extremity numbness secondary to thoracic outlet syndrome  She denies any sweats  She has had no other myalgias no congestion or upper respiratory complaints  No heartburn            Prior to Admission Medications   Prescriptions Last Dose Informant Patient Reported? Taking?    albuterol (PROVENTIL HFA,VENTOLIN HFA) 90 mcg/act inhaler   Yes Yes   Sig: Inhale 2 puffs every 6 (six) hours as needed for wheezing   aspirin 81 mg chewable tablet   No Yes   Sig: Chew 1 tablet daily   atorvastatin (LIPITOR) 40 mg tablet   No Yes   Sig: Take 1 tablet by mouth every evening   clopidogrel (PLAVIX) 75 mg tablet   No Yes   Sig: Take 1 tablet by mouth daily   escitalopram (LEXAPRO) 5 mg tablet   No Yes   Sig: Take 1 tablet (5 mg total) by mouth daily   gabapentin (NEURONTIN) 400 mg capsule   Yes Yes   Sig: Take 400 mg by mouth 3 (three) times a day  metoprolol tartrate (LOPRESSOR) 25 mg tablet   No Yes   Sig: Take 0 5 tablets (12 5 mg total) by mouth every 8 (eight) hours     nitroglycerin (NITROSTAT) 0 4 mg SL tablet   Yes Yes   Sig: Place 0 4 mg under the tongue every 5 (five) minutes as needed for chest pain   oxyCODONE (ROXICODONE) 5 mg immediate release tablet   Yes Yes   Sig: Take 5 mg by mouth every 4 (four) hours as needed for moderate pain      Facility-Administered Medications: None       Past Medical History:   Diagnosis Date    Chronic pain     Coronary artery disease     Cubital tunnel syndrome     Depression     GERD (gastroesophageal reflux disease)     History of Clostridium difficile     Ovarian cyst     Psychiatric disorder     depression    Raynaud's disease     Takotsubo cardiomyopathy     Thoracic outlet syndrome     Tobacco abuse     Vitamin D deficiency        Past Surgical History:   Procedure Laterality Date    CARDIAC CATHETERIZATION      CABG    CORONARY ARTERY BYPASS GRAFT N/A 2/12/2016    Procedure: CABG X1; Left  EVH to mid-LAD; ZAHRA;  Surgeon: Deneen Rogers DO;  Location: [facility];  Service:    Kathy ALVAREZ TENNIS ELBOW      DILATION AND CURETTAGE OF UTERUS      HYSTERECTOMY      HYSTEROSCOPY      LEFT OOPHORECTOMY      SALPINGECTOMY Right        Family History   Problem Relation Age of Onset    Heart disease Mother     Heart attack Mother     Heart attack Brother 36     s/p stent placement    Diabetes Brother     Heart disease Brother     Cancer Father     Diabetes Sister     Heart disease Sister     Diabetes Sister      I have reviewed and agree with the history as documented  Social History   Substance Use Topics    Smoking status: Current Every Day Smoker     Packs/day: 0 50     Years: 30 00    Smokeless tobacco: Never Used    Alcohol use No        Review of Systems   Constitutional: Positive for activity change and chills  Negative for appetite change, diaphoresis and fever  HENT: Negative for congestion, ear pain, rhinorrhea, sneezing and sore throat  Eyes: Negative for discharge and visual disturbance  Respiratory: Positive for cough and shortness of breath  Negative for wheezing  Cardiovascular: Positive for chest pain  Negative for leg swelling  Gastrointestinal: Negative for abdominal pain, blood in stool, diarrhea, nausea and vomiting  Endocrine: Negative for polyuria  Genitourinary: Negative for difficulty urinating  Musculoskeletal: Negative for back pain and myalgias  Skin: Negative for rash  Neurological: Positive for light-headedness  Negative for dizziness, speech difficulty, weakness, numbness and headaches  Hematological: Negative for adenopathy  Psychiatric/Behavioral: Negative for confusion  All other systems reviewed and are negative        Physical Exam  ED Triage Vitals   Temperature Pulse Respirations Blood Pressure SpO2   02/12/18 2332 02/12/18 2127 02/12/18 2127 02/12/18 2129 02/12/18 2127   98 2 °F (36 8 °C) 96 18 107/62 96 %      Temp Source Heart Rate Source Patient Position - Orthostatic VS BP Location FiO2 (%)   02/12/18 2332 02/12/18 2127 02/12/18 2230 02/12/18 2129 --   Temporal Monitor Lying Left arm       Pain Score       02/12/18 2127       7           Orthostatic Vital Signs  Vitals:    02/13/18 0000 02/13/18 0030 02/13/18 0045 02/13/18 0100   BP: 105/57 101/60  99/60   Pulse: 74 75 80 76   Patient Position - Orthostatic VS: Lying   Lying       Physical Exam   Constitutional: She is oriented to person, place, and time  She appears well-developed  No distress  HENT:   Head: Normocephalic and atraumatic  Right Ear: External ear normal    Left Ear: External ear normal    Nose: Nose normal    Mouth/Throat: Oropharynx is clear and moist  No oropharyngeal exudate  Eyes: Conjunctivae and EOM are normal  Pupils are equal, round, and reactive to light  Right eye exhibits no discharge  Left eye exhibits no discharge  Neck: Normal range of motion  Neck supple  No midline or paraspinous tenderness   Cardiovascular: Normal rate, regular rhythm, normal heart sounds and intact distal pulses  Pulmonary/Chest: Effort normal and breath sounds normal  No respiratory distress  She has no wheezes  She exhibits tenderness (peristeranally lower sternal border)  Abdominal: Soft  Bowel sounds are normal  She exhibits no distension  There is no tenderness  There is no rebound and no guarding  Back no midline or CVA tenderness   Musculoskeletal: Normal range of motion  She exhibits no edema, tenderness or deformity  Lymphadenopathy:     She has no cervical adenopathy  Neurological: She is alert and oriented to person, place, and time  No cranial nerve deficit or sensory deficit  She exhibits normal muscle tone  Coordination normal    Gait steady   Skin: Skin is warm and dry  Capillary refill takes less than 2 seconds  She is not diaphoretic  Psychiatric: She has a normal mood and affect  Vitals reviewed        ED Medications  Medications   aspirin chewable tablet 324 mg (324 mg Oral Given 2/12/18 2201)   potassium chloride (K-DUR,KLOR-CON) CR tablet 20 mEq (20 mEq Oral Given 2/13/18 0001)   iohexol (OMNIPAQUE) 350 MG/ML injection (SINGLE-DOSE) 85 mL (85 mL Intravenous Given 2/13/18 0029)       Diagnostic Studies  Results Reviewed     Procedure Component Value Units Date/Time    Troponin I [[id number]]  (Normal) Collected:  02/13/18 0044    Lab Status:  Final result Specimen:  Blood from Arm, Right Updated: 02/13/18 0107     Troponin I <0 02 ng/mL     Narrative:         [employer] Chemistry analyzer 99% cutoff is > 0 04 ng/mL in network labs    o cTnI 99% cutoff is useful only when applied to patients in the clinical setting of myocardial ischemia  o cTnI 99% cutoff should be interpreted in the context of clinical history, ECG findings and possibly cardiac imaging to establish correct diagnosis  o cTnI 99% cutoff may be suggestive but clearly not indicative of a coronary event without the clinical setting of myocardial ischemia  D-dimer, quantitative [[id number]]  (Abnormal) Collected:  02/12/18 2202    Lab Status:  Final result Specimen:  Blood from Arm, Right Updated:  02/12/18 2349     D-Dimer, Quant 818 (H) ng/ml (FEU)     Magnesium [[id number]]  (Normal) Collected:  02/12/18 2202    Lab Status:  Final result Specimen:  Blood from Arm, Right Updated:  02/12/18 2343     Magnesium 1 9 mg/dL     Troponin I [[id number]]  (Normal) Collected:  02/12/18 2202    Lab Status:  Final result Specimen:  Blood from Arm, Right Updated:  02/12/18 2234     Troponin I <0 02 ng/mL     Narrative:         Siemens Chemistry analyzer 99% cutoff is > 0 04 ng/mL in network labs    o cTnI 99% cutoff is useful only when applied to patients in the clinical setting of myocardial ischemia  o cTnI 99% cutoff should be interpreted in the context of clinical history, ECG findings and possibly cardiac imaging to establish correct diagnosis  o cTnI 99% cutoff may be suggestive but clearly not indicative of a coronary event without the clinical setting of myocardial ischemia      Comprehensive metabolic panel [[id number]]  (Abnormal) Collected:  02/12/18 2202    Lab Status:  Final result Specimen:  Blood from Arm, Right Updated:  02/12/18 2229     Sodium 140 mmol/L      Potassium 3 2 (L) mmol/L      Chloride 105 mmol/L      CO2 25 mmol/L      Anion Gap 10 mmol/L      BUN 3 (L) mg/dL      Creatinine 0 65 mg/dL      Glucose 97 mg/dL      Calcium 8 4 mg/dL AST 17 U/L      ALT 11 (L) U/L      Alkaline Phosphatase 132 (H) U/L      Total Protein 7 0 g/dL      Albumin 3 3 (L) g/dL      Total Bilirubin 0 20 mg/dL      eGFR 105 ml/min/1 73sq m     Narrative:         National Kidney Disease Education Program recommendations are as follows:  GFR calculation is accurate only with a steady state creatinine  Chronic Kidney disease less than 60 ml/min/1 73 sq  meters  Kidney failure less than 15 ml/min/1 73 sq  meters  Protime-INR [[id number]]  (Normal) Collected:  02/12/18 2202    Lab Status:  Final result Specimen:  Blood from Arm, Right Updated:  02/12/18 2217     Protime 13 6 seconds      INR 1 05    APTT [[id number]]  (Normal) Collected:  02/12/18 2202    Lab Status:  Final result Specimen:  Blood from Arm, Right Updated:  02/12/18 2217     PTT 33 seconds     Narrative:          Therapeutic Heparin Range = 60-90 seconds    CBC and differential [[id number]]  (Abnormal) Collected:  02/12/18 2202    Lab Status:  Final result Specimen:  Blood from Arm, Right Updated:  02/12/18 2208     WBC 15 06 (H) Thousand/uL      RBC 4 30 Million/uL      Hemoglobin 12 8 g/dL      Hematocrit 37 9 %      MCV 88 fL      MCH 29 8 pg      MCHC 33 8 g/dL      RDW 15 2 (H) %      MPV 9 8 fL      Platelets 132 Thousands/uL      Neutrophils Relative 74 %      Lymphocytes Relative 19 %      Monocytes Relative 6 %      Eosinophils Relative 0 %      Basophils Relative 1 %      Neutrophils Absolute 11 16 (H) Thousands/µL      Lymphocytes Absolute 2 87 Thousands/µL      Monocytes Absolute 0 93 Thousand/µL      Eosinophils Absolute 0 03 Thousand/µL      Basophils Absolute 0 07 Thousands/µL                  CTA ED chest PE study   ED Interpretation by Omar Boyle MD (02/13 0054)   [facility] Dr Daniel Cosby:  Central lobe da silva emphysema nonspecific peribronchial cuffing no evidence for PE no thoracic aneurysm peribronchial cuffing is present which may represent nonspecific which is nonspecific and may reflect acute or chronic bronchial inflammation a rate flexed an element of reactive airways disease no mass status post CABG no significant pleural pericardial effusion effusion no evidence of RV dysfunction small nonspecific mediastinal lymph nodes are present soft tissues unremarkable pleural spaces unremarkable no significant effusion no pneumothorax      X-ray chest 2 views   ED Interpretation by Vasu Rothman MD (02/12 2316)   Read by me; Radiologist to provide formal interpretation   Blunting of CFA seen on lateral vs  1/30/18                 Procedures  ECG 12 Lead Documentation  Date/Time: 2/12/2018 9:32 PM  Performed by: Keila Orr by: Michael Ngo     Indications / Diagnosis:  Cp  ECG reviewed by me, the ED Provider: yes    Patient location:  ED  Previous ECG:     Previous ECG:  Compared to current    Comparison ECG info:  1/20/18    Similarity:  No change  Rate:     ECG rate:  87    ECG rate assessment: normal    Rhythm:     Rhythm: sinus rhythm    QRS:     QRS axis:  Normal  Comments:      RBBB no acute ischemic change no signif change vs  prev    ECG 12 Lead Documentation  Date/Time: 2/13/2018 12:55 AM  Performed by: Keila Orr by: Michael Ngo     Indications / Diagnosis:  Cp recheck  ECG reviewed by me, the ED Provider: yes    Patient location:  ED  Previous ECG:     Previous ECG:  Compared to current    Comparison ECG info:  2/12/17 2126; 1/30/18 13:57    Similarity:  No change  Rate:     ECG rate:  73    ECG rate assessment: normal    Rhythm:     Rhythm: sinus rhythm    QRS:     QRS axis:  Normal  Comments:      RBBB no acute ischemic change no signif change from prev           Phone Contacts  ED Phone Contact    ED Course  ED Course          HEART Risk Score    Flowsheet Row Most Recent Value   History  1 Filed at: 02/13/2018 0003   ECG  1 Filed at: 02/13/2018 0003   Age  1 Filed at: 02/13/2018 0003   Risk Factors  2 Filed at: 02/13/2018 0003 Troponin  0 Filed at: 02/13/2018 0003   Heart Score Risk Calculator   History  1 Filed at: 02/13/2018 0003   ECG  1 Filed at: 02/13/2018 0003   Age  1 Filed at: 02/13/2018 0003   Risk Factors  2 Filed at: 02/13/2018 0003   Troponin  0 Filed at: 02/13/2018 0003   HEART Score  5 Filed at: 02/13/2018 0003   HEART Score  5 Filed at: 02/13/2018 0003                            [facility]  Number of Diagnoses or Management Options  Hypokalemia:   Pleurisy:   Diagnosis management comments: Mdm: pleurisy, costochondritis, pneumonia, PE, ACS; presentation not c/w COPD exacerbation or CHF; prior notes from 1/30/18 reviewed; secondary to symptoms patient describing concern for PE, will proceed with d-dimer if postive will purse repeat CTA; will purse delta trop, EKG    Prior records reviewed most recent cardiac cath 9/11/17: VENTRICLES:   --  Global left ventricular function was normal  EF calculated by contrast ventriculography was 60 %  Small anterobasal diverticulum noted  This was present pre bypass      CORONARY VESSELS:   --  The coronary circulation is right dominant  --  Left main: Normal   --  Mid LAD: There was a 99 % stenosis  --  Circumflex: Normal   --  Ostial RCA: There was a 20 % stenosis  --  Graft to the mid LAD: The graft was a saphenous vein graft from the ascending aorta  Graft angiography showed minor luminal irregularities  Reviewed NSAIDs patient has GI intolerance of ketorlac and ibuprofen, aleve do not work according to patient  Will proceed with asa for tx of pleurisy  Reviewed to taper back to 81mg as soon as pleurisy resovled as on plavix and to use enteric coated   Patient and  verbalize understanding of instruction       CritCare Time    Disposition  Final diagnoses:   Pleurisy   Hypokalemia     Time reflects when diagnosis was documented in both MDM as applicable and the Disposition within this note     Time User Action Codes Description Comment    2/13/2018  1:02 AM Yaa Lo Add [R09 1] Pleurisy     2/13/2018  1:02 AM Guadalupe Andujar Add [E87 6] Hypokalemia       ED Disposition     ED Disposition Condition Comment    Discharge  65 [address] Frazier discharge to home/self care  Condition at discharge: Stable        Follow-up Information     Follow up With Specialties Details Why Contact Info    Demetrius Son PA-C Family Medicine Call in 1 day recheck later this week [address] [facility] [address] ([place] [address])      [address] [facility] Call in 1 day recheck of chest pain symptoms [facility] [id number] [phone]        Patient's Medications   Discharge Prescriptions    POTASSIUM CHLORIDE (K-DUR,KLOR-CON) 20 MEQ TABLET    Take 1 tablet (20 mEq total) by mouth 2 (two) times a day       Start Date: 2/13/2018 End Date: --       Order Dose: 20 mEq       Quantity: 20 tablet    Refills: 0     No discharge procedures on file      ED Provider  Electronically Signed by           Sri Romero MD  02/13/18 7270

## 2018-02-13 NOTE — DISCHARGE INSTRUCTIONS
Pleurisy   WHAT YOU NEED TO KNOW:   Pleurisy happens when the pleura becomes irritated or swollen  The pleura are 2 thin layers of tissue that surround your lungs and line the inside of your chest cavity  There is a small amount of fluid between the pleura that helps the layers move easily when you breathe  When the pleura is irritated or swollen, the layers rub together as you breathe  DISCHARGE INSTRUCTIONS:   Return to the emergency department if:   · You have a fever  · You have shortness of breath  · Your lips or fingernails turn dusky or blue  · You have sudden, intense chest pain that feels different from your symptoms  · You are confused or feel like you are going to faint  Contact your healthcare provider if:   · Your pain gets worse, even after treatment  · You begin to cough up yellow, green, gray, or bloody mucus  · Your wound has redness, warmth, or drainage  · You have questions or concerns about your condition or care  Medicines: You may  receive any of the following:  · Cough medicine  helps decrease your urge to cough  A cough suppressant may help if a dry cough is causing you pain  · Antibiotics  are used if your pleurisy is caused by a bacteria  · Steroids  may be given to decrease inflammation  · NSAIDs , such as ibuprofen, help decrease swelling, pain, and fever  This medicine is available with or without a doctor's order  NSAIDs can cause stomach bleeding or kidney problems in certain people  If you take blood thinner medicine, always ask if NSAIDs are safe for you  Always read the medicine label and follow directions  Do not give these medicines to children under 10months of age without direction from your child's healthcare provider  · Prescription pain medicine  may be given to decrease severe pain if other pain medicines do not work  Do not wait until the pain is severe before you ask for more medicine  · Take your medicine as directed  Contact your healthcare provider if you think your medicine is not helping or if you have side effects  Tell him or her if you are allergic to any medicine  Keep a list of the medicines, vitamins, and herbs you take  Include the amounts, and when and why you take them  Bring the list or the pill bottles to follow-up visits  Carry your medicine list with you in case of an emergency  Self-care:   · Splint your pain when coughing  Hold a pillow or folded blanket tightly over your chest when you cough or take a deep breath  · Find a comfortable position  that allows you to decrease pain and breathe easier  You may find it comfortable to lie on your the side that has pleurisy  Change your position frequently to prevent complications, such as worsening pneumonia or lung collapse  · Do not smoke  Nicotine and other chemicals in cigarettes and cigars can cause lung damage  Ask your healthcare provider for information if you currently smoke and need help to quit  E-cigarettes or smokeless tobacco still contain nicotine  Talk to your healthcare provider before you use these products  Prevention:   · Get early treatment  for conditions that cause pleurisy  · Get vaccinated  Ask your healthcare provider if you should get a flu and pneumonia vaccine  These vaccines may prevent infections that cause pleurisy  Follow up with your healthcare provider as directed:  Write down your questions so you remember to ask them during your visits  © 2017 [employer]  Information is for End User's use only and may not be sold, redistributed or otherwise used for commercial purposes  All illustrations and images included in CareNotes® are the copyrighted property of [employer]  or [employer]  The above information is an  only  It is not intended as medical advice for individual conditions or treatments   Talk to your doctor, nurse or pharmacist before following any medical regimen to see if it is safe and effective for you  Hypokalemia   WHAT YOU NEED TO KNOW:   Hypokalemia is a low level of potassium in your blood  Potassium helps control how your muscles, heart, and digestive system work  Hypokalemia occurs when your body loses too much potassium or does not absorb enough from food  DISCHARGE INSTRUCTIONS:   Return to the emergency department if:   · You cannot move your arm or leg  · You have a fast or irregular heartbeat  · You are too tired or weak to stand up  Contact your healthcare provider if:   · You are vomiting, or you have diarrhea  · You have numbness or tingling in your arms or legs  · Your symptoms do not go away or they get worse  · You have questions or concerns about your condition or care  Medicines:   · Potassium  will be given to bring your potassium levels back to normal     · Take your medicine as directed  Contact your healthcare provider if you think your medicine is not helping or if you have side effects  Tell him of her if you are allergic to any medicine  Keep a list of the medicines, vitamins, and herbs you take  Include the amounts, and when and why you take them  Bring the list or the pill bottles to follow-up visits  Carry your medicine list with you in case of an emergency  Eat foods that are high in potassium:  Foods that are high in potassium include bananas, oranges, tomatoes, potatoes, and avocado  Khan beans, turkey, salmon, lean beef, yogurt, and milk are also high in potassium  Ask your healthcare provider or dietitian for more information about foods that are high in potassium  Follow up with your healthcare provider as directed:  Write down your questions so you remember to ask them during your visits  © 2017 [employer] Information is for End User's use only and may not be sold, redistributed or otherwise used for commercial purposes   All illustrations and images included in CareNotes® are the copyrighted property of [employer]  or [employer]  The above information is an  only  It is not intended as medical advice for individual conditions or treatments  Talk to your doctor, nurse or pharmacist before following any medical regimen to see if it is safe and effective for you  Potassium Content of Foods List   WHAT YOU NEED TO KNOW:   Potassium is a mineral that is found in most foods  Potassium helps to balance fluids and minerals in your body  It also helps your body maintain a normal blood pressure  Potassium helps your muscles contract and your nerves function normally  You may need to increase or decrease potassium if you have certain health conditions  DISCHARGE INSTRUCTIONS:   Why you may need to change the amount of potassium you eat:   · You may need more potassium  if you have hypokalemia (low potassium levels) or high blood pressure  You may also need more potassium if you are taking diuretics  Diuretics and certain medicines cause your body to lose potassium  · You may need less potassium  in your diet if you have hyperkalemia (high potassium levels) or kidney disease  Potassium content of fruit:  The amount of potassium in milligrams (mg) contained in each fruit or serving of fruit is listed beside the item    · High-potassium foods (more than 200 mg per serving):      ¨ 1 medium banana (425)    ¨ ½ of a papaya (390)    ¨ ½ cup of prune juice (370)    ¨ ¼ cup of raisins (270)    ¨ 1 medium pancho (325) or kiwi (240)    ¨ 1 small orange (240) or ½ cup of orange juice (235)    ¨ ½ cup of cubed cantaloupe (215) or diced honeydew melon (200)    ¨ 1 medium pear (200)    · Medium-potassium foods (50 to 200 mg per serving):      ¨ 1 medium peach (185)    ¨ 1 small apple or ½ cup of apple juice (150)    ¨ ½ cup of peaches canned in juice (120)    ¨ ½ cup of canned pineapple (100)    ¨ ½ cup of fresh, sliced strawberries (527)    ¨ ½ cup of watermelon (85)    · Low-potassium foods (less than 50 mg per serving):      ¨ ½ cup of cranberries (45) or cranberry juice cocktail (20)    ¨ ½ cup of nectar of papaya, pancho, or pear (35)  Potassium content of vegetables:   · High-potassium foods (more than 200 mg per serving):      ¨ 1 medium baked potato, with skin (925)    ¨ 1 baked medium sweet potato, with skin (450)    ¨ ½ cup of tomato or vegetable juice (275), or 1 medium raw tomato (290)    ¨ ½ cup of mushrooms (280)    ¨ ½ cup of fresh brussels sprouts (250)    ¨ ½ cup of cooked zucchini (220) or winter squash (250)    ¨ ¼ of a medium avocado (245)    ¨ ½ cup of broccoli (230)    · Medium-potassium foods (50 to 200 mg per serving):      ¨ ½ cup of corn (195)    ¨ ½ cup of fresh or cooked carrots (180)    ¨ ½ cup of fresh cauliflower (150)    ¨ ½ cup of asparagus (155)    ¨ ½ cup of canned peas (90)     ¨ 1 cup of lettuce, all types (100)    ¨ ½ cup of fresh green beans (90)    ¨ ½ cup of frozen green beans (85)    ¨ ½ cup of cucumber (80)  Potassium content of protein foods:   · High-potassium foods (more than 200 mg per serving):      ¨ ½ cup of cooked jorge beans (400) or lentils (365)    ¨ 1 cup of soy milk (300)    ¨ 3 ounces of baked or broiled salmon (319)    ¨ 3 ounces of roasted turkey, dark meat (250)    ¨ ¼ cup of sunflower seeds (241)    ¨ 3 ounces of cooked lean beef (224)    ¨ 2 tablespoons of smooth peanut butter (210)    · Medium-potassium foods (50 to 200 mg per serving):      ¨ 1 ounce of salted peanuts, almonds, or cashews (200)    ¨ 1 large egg (60 mg)  Potassium content of dairy foods:   · High-potassium foods (more than 200 mg per serving):      ¨ 6 ounces of yogurt (260 to 435)    ¨ 1 cup of nonfat, low-fat, or whole milk (350 to 380)    · Medium-potassium foods (50 to 200 mg per serving):      ¨ ½ cup of ricotta cheese (154)    ¨ ½ cup of vanilla ice cream (131)    ¨ ½ cup of low-fat (2%) cottage cheese (110)    · Low-potassium foods (less than 50 mg per serving):      ¨ 1 ounce of cheese (20 to 30)    Potassium content of grains:   · 1 slice of white bread (30)    · ½ cup of white or brown rice (50)    · ½ cup of spaghetti or macaroni (30)    · 1 flour or corn tortilla (50)    · 1 four-inch waffle (50)  Potassium content of other foods:   · 1 tablespoon of molasses (295)    · 1½ ounces of chocolate (165)    · Some salt substitutes may contain a high amount of potassium  Check the food label to find the amount of potassium it contains  © 2017 [employer] Information is for End User's use only and may not be sold, redistributed or otherwise used for commercial purposes  All illustrations and images included in CareNotes® are the copyrighted property of [employer]  or [employer]  The above information is an  only  It is not intended as medical advice for individual conditions or treatments  Talk to your doctor, nurse or pharmacist before following any medical regimen to see if it is safe and effective for you      Increase aspirin 81mg to aspirin 325mg daily (can be enteric coated 4-81mg) at most twice daily because you are already on plavix

## 2018-02-14 LAB
ATRIAL RATE: 73 BPM
ATRIAL RATE: 87 BPM
P AXIS: 69 DEGREES
P AXIS: 75 DEGREES
PR INTERVAL: 156 MS
PR INTERVAL: 168 MS
QRS AXIS: 100 DEGREES
QRS AXIS: 97 DEGREES
QRSD INTERVAL: 140 MS
QRSD INTERVAL: 142 MS
QT INTERVAL: 402 MS
QT INTERVAL: 432 MS
QTC INTERVAL: 475 MS
QTC INTERVAL: 483 MS
T WAVE AXIS: 51 DEGREES
T WAVE AXIS: 52 DEGREES
VENTRICULAR RATE: 73 BPM
VENTRICULAR RATE: 87 BPM

## 2018-02-14 PROCEDURE — 93010 ELECTROCARDIOGRAM REPORT: CPT | Performed by: INTERNAL MEDICINE

## 2018-03-26 ENCOUNTER — OFFICE VISIT (OUTPATIENT)
Dept: FAMILY MEDICINE CLINIC | Facility: CLINIC | Age: 48
End: 2018-03-26
Payer: COMMERCIAL

## 2018-03-26 VITALS
TEMPERATURE: 96.9 F | WEIGHT: 147 LBS | HEART RATE: 67 BPM | RESPIRATION RATE: 18 BRPM | OXYGEN SATURATION: 98 % | SYSTOLIC BLOOD PRESSURE: 124 MMHG | HEIGHT: 66 IN | BODY MASS INDEX: 23.63 KG/M2 | DIASTOLIC BLOOD PRESSURE: 66 MMHG

## 2018-03-26 DIAGNOSIS — J43.2 CENTRILOBULAR EMPHYSEMA (HCC): ICD-10-CM

## 2018-03-26 DIAGNOSIS — Z12.31 SCREENING MAMMOGRAM, ENCOUNTER FOR: Primary | ICD-10-CM

## 2018-03-26 DIAGNOSIS — G47.9 SLEEP DISTURBANCE: ICD-10-CM

## 2018-03-26 PROCEDURE — T1015 CLINIC SERVICE: HCPCS | Performed by: FAMILY MEDICINE

## 2018-03-26 RX ORDER — ALBUTEROL SULFATE 90 UG/1
2 AEROSOL, METERED RESPIRATORY (INHALATION) EVERY 6 HOURS PRN
COMMUNITY
End: 2018-08-19 | Stop reason: CLARIF

## 2018-03-26 RX ORDER — DULOXETIN HYDROCHLORIDE 20 MG/1
20 CAPSULE, DELAYED RELEASE ORAL DAILY
COMMUNITY
End: 2018-05-04 | Stop reason: ALTCHOICE

## 2018-03-26 NOTE — PROGRESS NOTES
History and Physical  Gary Floyd 50 y o  female MRN: [id number]      Assessment:   Sleep disturbance  CAD  COPD    Plan:  Sleep study  Continue current meds  She will be scheduling appt for Cardiology for follow up  RTC 4 months    Chief Complaint   Patient presents with    Follow-up        HPI:  Gary Floyd is a 50 y o  female who presents for rourine follow up  She would like oxygen at night since she is unable to breath and needs to open the window  Her  reports she stops breathing the during night and snores loudly  She continues to smoke      Historical Information   Past Medical History:   Diagnosis Date    Chronic pain     Coronary artery disease     Cubital tunnel syndrome     Depression     GERD (gastroesophageal reflux disease)     History of Clostridium difficile     Ovarian cyst     Psychiatric disorder     depression    Raynaud's disease     Takotsubo cardiomyopathy     Thoracic outlet syndrome     Tobacco abuse     Vitamin D deficiency      Past Surgical History:   Procedure Laterality Date    CARDIAC CATHETERIZATION      CABG    CORONARY ARTERY BYPASS GRAFT N/A 2/12/2016    Procedure: CABG X1; Left  EVH to mid-LAD; ZAHRA;  Surgeon: Stephen Davalos DO;  Location: [facility];  Service:    Himanshu Ellinwood DEBRIDEMENT TENNIS ELBOW      DILATION AND CURETTAGE OF UTERUS      HYSTERECTOMY      HYSTEROSCOPY      LEFT OOPHORECTOMY      SALPINGECTOMY Right      Social History   History   Alcohol Use No     History   Drug Use No     History   Smoking Status    Current Every Day Smoker    Packs/day: 0 50    Years: 30 00   Smokeless Tobacco    Never Used     Family History   Problem Relation Age of Onset    Heart disease Mother     Heart attack Mother     Heart attack Brother 36     s/p stent placement    Diabetes Brother     Heart disease Brother     Cancer Father     Diabetes Sister     Heart disease Sister     Diabetes Sister        Meds/Allergies   Allergies   Allergen Reactions    Toradol [Ketorolac Tromethamine] GI Intolerance       Meds:    Current Outpatient Prescriptions:     albuterol (PROVENTIL HFA,VENTOLIN HFA) 90 mcg/act inhaler, Inhale 2 puffs every 6 (six) hours as needed for wheezing, Disp: , Rfl:     DULoxetine (CYMBALTA) 20 mg capsule, Take 20 mg by mouth daily, Disp: , Rfl:     albuterol (PROVENTIL HFA,VENTOLIN HFA) 90 mcg/act inhaler, Inhale 2 puffs every 6 (six) hours as needed for wheezing, Disp: , Rfl:     aspirin 81 mg chewable tablet, Chew 1 tablet daily, Disp: 30 tablet, Rfl: 0    atorvastatin (LIPITOR) 40 mg tablet, Take 1 tablet by mouth every evening, Disp: 30 tablet, Rfl: 0    clopidogrel (PLAVIX) 75 mg tablet, Take 1 tablet by mouth daily, Disp: 30 tablet, Rfl: 0    escitalopram (LEXAPRO) 5 mg tablet, Take 1 tablet (5 mg total) by mouth daily, Disp: 30 tablet, Rfl: 3    gabapentin (NEURONTIN) 400 mg capsule, Take 400 mg by mouth 3 (three) times a day , Disp: , Rfl:     metoprolol tartrate (LOPRESSOR) 25 mg tablet, Take 0 5 tablets (12 5 mg total) by mouth every 8 (eight) hours  , Disp: 45 tablet, Rfl: 2    nitroglycerin (NITROSTAT) 0 4 mg SL tablet, Place 0 4 mg under the tongue every 5 (five) minutes as needed for chest pain, Disp: , Rfl:     oxyCODONE (ROXICODONE) 5 mg immediate release tablet, Take 5 mg by mouth every 4 (four) hours as needed for moderate pain, Disp: , Rfl:     potassium chloride (K-DUR,KLOR-CON) 20 mEq tablet, Take 1 tablet (20 mEq total) by mouth 2 (two) times a day, Disp: 20 tablet, Rfl: 0      REVIEW OF SYSTEMS  Review of Systems   Constitutional: Negative  HENT: Negative  Eyes: Negative  Respiratory:        As per HPI  Cardiovascular: Negative  Gastrointestinal: Negative  Endocrine: Negative  Genitourinary: Negative  Musculoskeletal: Negative  Skin: Negative  Allergic/Immunologic: Negative  Neurological: Negative  Hematological: Negative  Psychiatric/Behavioral: Negative  Current Vitals:   Blood Pressure: 124/66 (03/26/18 1039)  Pulse: 67 (03/26/18 1039)  Temperature: (!) 96 9 °F (36 1 °C) (03/26/18 1039)  Respirations: 18 (03/26/18 1039)  Height: 5' 6" (167 6 cm) (03/26/18 1039)  Weight - Scale: 66 7 kg (147 lb) (03/26/18 1039)  SpO2: 98 % (03/26/18 1039)      PHYSICAL EXAMS:  Physical Exam   Constitutional: She is oriented to person, place, and time  She appears well-developed and well-nourished  HENT:   Head: Normocephalic and atraumatic  Right Ear: External ear normal    Left Ear: External ear normal    Mouth/Throat: Oropharynx is clear and moist    Eyes: EOM are normal  Pupils are equal, round, and reactive to light  Neck: Normal range of motion  Neck supple  No thyromegaly present  Cardiovascular: Normal rate, regular rhythm and normal heart sounds  Pulmonary/Chest: Effort normal and breath sounds normal  She has no wheezes  She has no rales  Musculoskeletal: Normal range of motion  Neurological: She is alert and oriented to person, place, and time  No cranial nerve deficit  Skin: Skin is warm and dry  Psychiatric: She has a normal mood and affect  Lab Results:          Arjun Urias PA-C  3/26/2018, 10:59 AM

## 2018-04-02 ENCOUNTER — TRANSCRIBE ORDERS (OUTPATIENT)
Dept: SLEEP CENTER | Facility: CLINIC | Age: 48
End: 2018-04-02

## 2018-04-05 ENCOUNTER — OFFICE VISIT (OUTPATIENT)
Dept: SLEEP CENTER | Facility: CLINIC | Age: 48
End: 2018-04-05

## 2018-04-05 VITALS
DIASTOLIC BLOOD PRESSURE: 66 MMHG | OXYGEN SATURATION: 98 % | RESPIRATION RATE: 18 BRPM | SYSTOLIC BLOOD PRESSURE: 124 MMHG | WEIGHT: 147 LBS | BODY MASS INDEX: 23.63 KG/M2 | HEIGHT: 66 IN | HEART RATE: 84 BPM

## 2018-04-05 DIAGNOSIS — K21.9 GERD (GASTROESOPHAGEAL REFLUX DISEASE): Chronic | ICD-10-CM

## 2018-04-05 DIAGNOSIS — G47.30 SLEEP-RELATED BREATHING DISORDER: Primary | ICD-10-CM

## 2018-04-05 DIAGNOSIS — G47.00 INSOMNIA: ICD-10-CM

## 2018-04-05 DIAGNOSIS — I25.701: Chronic | ICD-10-CM

## 2018-04-05 DIAGNOSIS — I50.21 ACUTE SYSTOLIC CHF (CONGESTIVE HEART FAILURE) (HCC): ICD-10-CM

## 2018-04-05 DIAGNOSIS — J44.9 CHRONIC OBSTRUCTIVE PULMONARY DISEASE (HCC): ICD-10-CM

## 2018-04-05 DIAGNOSIS — Z72.0 TOBACCO ABUSE: Chronic | ICD-10-CM

## 2018-04-05 DIAGNOSIS — G89.4 CHRONIC PAIN DISORDER: ICD-10-CM

## 2018-04-05 DIAGNOSIS — G47.10 HYPERSOMNIA: ICD-10-CM

## 2018-04-05 DIAGNOSIS — G25.81 RESTLESS LEG SYNDROME: ICD-10-CM

## 2018-04-05 DIAGNOSIS — F51.3 SLEEP WALKING: ICD-10-CM

## 2018-04-05 NOTE — PROGRESS NOTES
Consultation - [facility]  50 y o  female  TMJ:[id number]  [facility]:[id number]    Physician Requesting Consult: Cristina Russ PA-C    Reason for Consult : [At your kind request] I saw this patient for initial sleep evaluation today  The patient is here to evaluate for suspected Obstructive Sleep Apnea  Medications, Past, Family and Social Histories were reviewed  She  has a past medical history of Chronic pain; Coronary artery disease; Cubital tunnel syndrome; Depression; GERD (gastroesophageal reflux disease); History of Clostridium difficile; Ovarian cyst; Psychiatric disorder; Raynaud's disease; Takotsubo cardiomyopathy; Thoracic outlet syndrome; Tobacco abuse; and Vitamin D deficiency  She has a current medication list which includes the following prescription(s): albuterol, albuterol, aspirin, atorvastatin, clopidogrel, duloxetine, escitalopram, gabapentin, metoprolol tartrate, nitroglycerin, oxycodone, and potassium chloride  HPI:  She presents with complaint of breathing difficulties especially at night  She awakens with choking or gasping and have to run to the window to get deep breaths several times a night  She also reports snoring that started around 2 years ago  This is not disturbing her or her bed partner  She has symptoms suggestive of restless legs syndrome  She has had a few episodes of sleep walking, the last around 2 months ago  Sleep Routine:  She is spending 9 hours in bed  She typically takes an hour to fall asleep (even with prescription hypnotic close)  Sleep is interrupted 3-4 times a night because of her breathing difficulties  She awakens feeling unrested  She has excessive daytime drowsiness and is napping for 2-4 hours during the daytime  She rated herself at Total score: 14 /24 on the [place] Sleepiness Scale  Habits:  [ reports that she has been smoking  She has a 15 00 pack-year smoking history   She has never used smokeless tobacco ], [ reports that she does not drink alcohol ], [ reports that she does not use drugs ], [she is using excessive amount of caffeine:  2 L of soda a day  She does not exercise]   Family History:  [Negative for sleep disturbance ]    ROS: reviewed & as attached  [Significant for dyspnea on effort, wheezing, swelling of her legs, palpitations that occur mainly at night  She has chronic pain that disturb sleep  She has ongoing symptoms of depression and rated herself at 21 on the PHQ-9 scale]     EXAM: Blood pressure 124/66, pulse 84, resp  rate 18, height 5' 6" (1 676 m), weight 66 7 kg (147 lb), last menstrual period 10/14/2015, SpO2 98 %, not currently breastfeeding  Body mass index is 23 73 kg/m²  General:This is a [well] groomed female, well appearing [at] their stated age, in no distress  Speech is clear and coherent  Psych: Alert, orientated & cooperative  Affect is constricted and she appears somewhat depressed  Judgement & insight [are good]  Extremities:Skin is warm and dry  Color and hydration are good  There is [no] pedal edema  Head and neck: Craniofacial anatomy [is normal]  TMJ & Sinuses are normal  Neck Circumference: 29 cm, There [are no abnormal masses or] Lymhadenopathy  Thyroid is [normal]  Trachea is [central]  Nasal airway:  Nares are narrow  Septum is [central ] Mucous membranes appeared [normal]  Turbinates are [normal ]  There is [no] rhinorrhea  Oral airway: [is crowded ] Base of tongue is at Modified Mallampati class [IV]  Hard palate [appears normal]  Soft palate [is redundant]  There is [no] tonsillar hypertrophy  She is edentulous     CVS: Heart sounds are [regular]  There [are no] murmur[s] O JVD  Resp: Effort is [normal]  Air entry is [good] bilaterally  GERD she has prolonged expiration  There are [no] wheezes or rales  Abdomen: obese, soft & non-tender  CNS: is intact  Gait is normal  Rombergs is negative  MSK: Muscle bulk, tone and power are [normal]  IMPRESSION:     1  Sleep-related breathing disorder  Diagnostic Sleep Study    CPAP Study   2  Insomnia     3  Restless leg syndrome     4  Sleep walking     5  Hypersomnia     6  Chronic pain disorder     7  Acute systolic CHF (congestive heart failure) ([facility] )     8  Chronic obstructive pulmonary disease ([facility] )     9  GERD (gastroesophageal reflux disease)     10  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 ([place] [address] )     11  Tobacco abuse      12  Multiple comorbidities a outlined above s    PLAN:     1  With respect to above conditions, I counseled on pathophysiology, diagnosis, treatment options, risks and benefits; interrelationship and effects on symptoms and comorbidities; risks of no treatment; costs and insurance aspects  2  I advised on weight reduction, avoiding sleeping supine, using alcohol or sedating medications close to bed time and on safe driving practices  3  Cognitive behavioral therapy was initiated with advise on Sleep Hygiene and behavioral techniques to manage Insomnia  Specifically limiting her time in bed to 8 hours, keeping a regular routine, avoiding daytime naps and caffeine use after 2:00 p m   I also advised starting an exercise program, on relaxation techniques and avoiding use of hypnotics that may explain her sleep walking  4  Nocturnal polysomnography is indicated and a diagnostic study will be scheduled  5  Patient is willing to try Positive airway pressure therapy and will be scheduled for a titration study if indicated  6  Follow-up will be scheduled after the studies to review results, further details of treatment options and to initiate/adjust therapy  Thank you for allowing me to participate in the care of this patient  I will keep you apprised of developments            Sincerely,    Baldev Toledo MD  Board Certified Specialist

## 2018-04-05 NOTE — PROGRESS NOTES
Review of Systems      Genitourinary frequent urination   Cardiology palpitations/fluttering feeling in your chest   Gastrointestinal abdominal pain disturbing sleep   Neurology headaches, numbness/tingling of an extremity and loss/change of vision   Constitutional none   Integumentary none   Psychiatry anxiety and depression   Musculoskeletal joint pain   Pulmonary shortness of breath and wheezing   ENT none   Endocrine excessive thirst   Hematological none

## 2018-04-06 DIAGNOSIS — I25.119 CORONARY ARTERY DISEASE WITH ANGINA PECTORIS, UNSPECIFIED VESSEL OR LESION TYPE, UNSPECIFIED WHETHER NATIVE OR TRANSPLANTED HEART (HCC): ICD-10-CM

## 2018-04-06 DIAGNOSIS — M54.9 BACK PAIN, UNSPECIFIED BACK LOCATION, UNSPECIFIED BACK PAIN LATERALITY, UNSPECIFIED CHRONICITY: ICD-10-CM

## 2018-04-06 DIAGNOSIS — I10 ESSENTIAL HYPERTENSION, BENIGN: Primary | ICD-10-CM

## 2018-04-06 RX ORDER — GABAPENTIN 100 MG/1
100 CAPSULE ORAL 3 TIMES DAILY
Qty: 90 CAPSULE | Refills: 0 | Status: SHIPPED | OUTPATIENT
Start: 2018-04-06 | End: 2020-03-19 | Stop reason: ALTCHOICE

## 2018-04-06 RX ORDER — ASPIRIN 81 MG/1
81 TABLET, CHEWABLE ORAL DAILY
Qty: 30 TABLET | Refills: 3 | Status: ON HOLD | OUTPATIENT
Start: 2018-04-06 | End: 2018-05-10

## 2018-04-11 ENCOUNTER — HOSPITAL ENCOUNTER (OUTPATIENT)
Dept: SLEEP CENTER | Facility: HOSPITAL | Age: 48
Discharge: HOME | End: 2018-04-11
Attending: INTERNAL MEDICINE
Payer: COMMERCIAL

## 2018-04-11 DIAGNOSIS — G47.30 SLEEP-RELATED BREATHING DISORDER: ICD-10-CM

## 2018-04-11 PROCEDURE — 95810 POLYSOM 6/> YRS 4/> PARAM: CPT

## 2018-04-12 NOTE — PROGRESS NOTES
Sleep Study Documentation    Pre-Sleep Study       Sleep testing procedure explained to patient:YES    Patient napped prior to study:NO    Caffeine:Dayshift worker after 12PM   Caffeine use:NO    Alcohol:Dayshift workers after 5PM: Alcohol use:NO    Typical day for patient:YES       Study Documentation  Diagnostic   Snore:Severe  Supplemental O2: no    O2 flow rate (L/min) range N/A  O2 flow rate (L/min) final N/A  Minimum SaO2 87%  Baseline SaO2 96%      EKG abnormalities: no     EEG abnormalities: no    Study Terminated:no    Patient classification: disabled       Post-Sleep Study    Medication used at bedtime or during sleep study:NO    Patient reports time it took to fall asleep:30 to 60 minutes    Patient reports waking up during study:Denied    Patient reports sleeping 4 to 6 hours without dreaming  Patient reports sleep during study:typical    Patient rated sleepiness: Somewhat sleepy or tired    PAP treatment:no

## 2018-04-13 ENCOUNTER — TELEPHONE (OUTPATIENT)
Dept: SLEEP CENTER | Facility: HOSPITAL | Age: 48
End: 2018-04-13

## 2018-04-18 ENCOUNTER — HOSPITAL ENCOUNTER (EMERGENCY)
Facility: HOSPITAL | Age: 48
Discharge: HOME | End: 2018-04-18
Attending: EMERGENCY MEDICINE | Admitting: EMERGENCY MEDICINE
Payer: COMMERCIAL

## 2018-04-18 VITALS
RESPIRATION RATE: 18 BRPM | OXYGEN SATURATION: 99 % | SYSTOLIC BLOOD PRESSURE: 130 MMHG | TEMPERATURE: 97.9 F | WEIGHT: 149 LBS | BODY MASS INDEX: 24.05 KG/M2 | HEART RATE: 75 BPM | DIASTOLIC BLOOD PRESSURE: 60 MMHG

## 2018-04-18 DIAGNOSIS — B02.9 SHINGLES: Primary | ICD-10-CM

## 2018-04-18 PROCEDURE — 99282 EMERGENCY DEPT VISIT SF MDM: CPT

## 2018-04-18 RX ORDER — VALACYCLOVIR HYDROCHLORIDE 1 G/1
1000 TABLET, FILM COATED ORAL 2 TIMES DAILY
Qty: 14 TABLET | Refills: 0 | Status: SHIPPED | OUTPATIENT
Start: 2018-04-18 | End: 2018-05-05 | Stop reason: ALTCHOICE

## 2018-04-18 RX ORDER — PREDNISONE 20 MG/1
40 TABLET ORAL DAILY
Qty: 8 TABLET | Refills: 0 | Status: SHIPPED | OUTPATIENT
Start: 2018-04-18 | End: 2018-05-05 | Stop reason: ALTCHOICE

## 2018-04-18 RX ORDER — PREDNISONE 20 MG/1
40 TABLET ORAL ONCE
Status: COMPLETED | OUTPATIENT
Start: 2018-04-18 | End: 2018-04-18

## 2018-04-18 RX ORDER — ACYCLOVIR 200 MG/1
400 CAPSULE ORAL ONCE
Status: COMPLETED | OUTPATIENT
Start: 2018-04-18 | End: 2018-04-18

## 2018-04-18 RX ADMIN — ACYCLOVIR 400 MG: 200 CAPSULE ORAL at 22:47

## 2018-04-18 RX ADMIN — PREDNISONE 40 MG: 20 TABLET ORAL at 22:47

## 2018-04-19 NOTE — DISCHARGE INSTRUCTIONS
Herpes Zoster   WHAT YOU SHOULD KNOW:   Herpes zoster (HZ) is also called shingles  It is an infection caused by the same virus that causes chickenpox (varicella-zoster virus)  After you get chickenpox, the virus stays in your body for several years without causing any symptoms  HZ occurs when the virus becomes active again  Once active, the virus will travel along a nerve to your skin and cause a rash  CARE AGREEMENT:   You have the right to help plan your care  Learn about your health condition and how it may be treated  Discuss treatment options with your caregivers to decide what care you want to receive  You always have the right to refuse treatment  RISKS:   If left untreated, HZ may cause eye problems, such as a drooping eyelid or blindness  It may lead to a brain infection or stroke  HZ can also cause nerve damage and lead to twitching, dizziness, or loss of taste and hearing  The blisters may leave scars or changes in skin color  HZ may cause pain even after the rash is gone  It may also lead to trouble moving parts of your body  WHILE YOU ARE HERE:   Informed consent  is a legal document that explains the tests, treatments, or procedures that you may need  Informed consent means you understand what will be done and can make decisions about what you want  You give your permission when you sign the consent form  You can have someone sign this form for you if you are not able to sign it  You have the right to understand your medical care in words you know  Before you sign the consent form, understand the risks and benefits of what will be done  Make sure all your questions are answered  Medicines:   · Antiviral medicine: These help decrease symptoms and healing time  They may also decrease your risk of developing nerve pain  You will need to start taking them within 3 days of the start of symptoms to prevent nerve pain  · Pain medicine:   You may need NSAIDs, acetaminophen, or opioid medicine depending on how much pain you are in  Do not wait until the pain is severe before you ask for more pain medicine  · Topical anesthetics: These are used to numb the skin and decrease pain  They can be a cream, gel, spray, or patch  · Anticonvulsants: These decrease nerve pain and may help you sleep at night  · Antidepressants: These may also be used to decrease nerve pain  · Epidural medicine: This is put into your spine to block pain  This medicine treats severe pain that does not get better with other pain medicines  Epidural medicine includes numbing medicine and steroids  Tests:  Your caregiver may send skin scrapings or fluid from your blisters for tests to see if you have HZ  © 2014 [address] [employer] is for End User's use only and may not be sold, redistributed or otherwise used for commercial purposes  All illustrations and images included in CareNotes® are the copyrighted property of [employer]  or [employer]  The above information is an  only  It is not intended as medical advice for individual conditions or treatments  Talk to your doctor, nurse or pharmacist before following any medical regimen to see if it is safe and effective for you  Shingles   WHAT YOU NEED TO KNOW:   Shingles is a painful infection caused by the same virus that causes chickenpox (varicella-zoster virus)  After you get chickenpox, the virus stays in your body for several years without causing any symptoms  Shingles occurs when the virus becomes active again  Once active, the virus will travel along a nerve to your skin and cause a rash  DISCHARGE INSTRUCTIONS:   Return to the emergency department if:   · You have painful, red, warm skin around the blisters, or the blisters drain pus  · Your neck is stiff or you have trouble moving it  · You have trouble moving your arms, legs, or face  · You have a seizure      · You have weakness in an arm or leg  · You become confused, or have difficulty speaking  · You have dizziness, a severe headache, hearing or vision loss  Contact your healthcare provider if:   · You feel weak or have a headache  · You have a cough, chills, or a fever  · You have abdominal pain, nausea, or vomiting  · Your rash becomes more itchy or painful  · Your rash spreads to other parts of your body  · Your pain worsens and does not go away even after taking medicine  · You have questions or concerns about your condition or care  Medicines:   · Antiviral medicine  helps decrease symptoms and healing time  They may also decrease your risk of developing nerve pain  You will need to start taking them within 3 days of the start of symptoms to prevent nerve pain  · Pain medicine  may be prescribed or suggested by your healthcare provider  You may need NSAIDs, acetaminophen, or opioid medicine depending on how much pain you are in  Do not wait until the pain is severe before you take more pain medicine  · Topical anesthetics  are used to numb the skin and decrease pain  They can be a cream, gel, spray, or patch  · Anticonvulsants  decrease nerve pain and may help you sleep at night  · Antidepressants  may be used to decrease nerve pain  · Take your medicine as directed  Contact your healthcare provider if you think your medicine is not helping or if you have side effects  Tell him of her if you are allergic to any medicine  Keep a list of the medicines, vitamins, and herbs you take  Include the amounts, and when and why you take them  Bring the list or the pill bottles to follow-up visits  Carry your medicine list with you in case of an emergency  Follow up with your healthcare provider as directed:  Write down your questions so you remember to ask them during your visits  Self-care:  Keep your rash clean and dry  Cover your rash with a bandage or clothing   Do not use bandages that stick to your skin  The sticky part may irritate your skin and make your rash last longer  Prevent the spread of shingles: The virus can be passed to a person who has never had chickenpox  This person may get chickenpox, but not shingles  You may pass the virus to others as long as you have a rash  The virus is spread by direct contact with the fluid from the blisters  Usually, you cannot spread the virus once the blisters dry up  Prevent shingles or another shingles outbreak:  A vaccine may be given to help prevent shingles  Ask for more information about this vaccine  © 2017 [address] [employer] Information is for End User's use only and may not be sold, redistributed or otherwise used for commercial purposes  All illustrations and images included in CareNotes® are the copyrighted property of [employer]  or [employer]  The above information is an  only  It is not intended as medical advice for individual conditions or treatments  Talk to your doctor, nurse or pharmacist before following any medical regimen to see if it is safe and effective for you  Shingles   WHAT YOU NEED TO KNOW:   Shingles is a painful infection caused by the same virus that causes chickenpox (varicella-zoster virus)  After you get chickenpox, the virus stays in your body for several years without causing any symptoms  Shingles occurs when the virus becomes active again  Once active, the virus will travel along a nerve to your skin and cause a rash  DISCHARGE INSTRUCTIONS:   Seek care immediately if:   · You have painful, red, warm skin around the blisters, or the blisters drain pus  · Your neck is stiff or you have trouble moving it  · You have trouble moving your arms, legs, or face  · You have a seizure  · You have weakness in an arm or leg  · You become confused, or have difficulty speaking  · You have dizziness, a severe headache, or hearing or vision loss    Contact your healthcare provider if:   · You feel weak or have a headache  · You have a cough, chills, or a fever  · You have abdominal pain or nausea, or you are vomiting  · Your rash becomes more itchy or painful  · Your rash spreads to other parts of your body  · Your pain worsens and does not go away even after you take medicine  · You have questions or concerns about your condition or care  Medicines:   · Antiviral medicine  helps decrease symptoms and healing time  It may also decrease your risk for nerve pain  You will need to start taking this medicine within 3 days of the start of symptoms to prevent nerve pain  · Pain medicine  may be prescribed or suggested by your healthcare provider  You may need NSAIDs, acetaminophen, or opioid medicine depending on how much pain you are in  Do not wait until the pain is severe before you take more pain medicine  · Topical anesthetics  are used to numb the skin and decrease pain  They can be a cream, gel, spray, or patch  · Anticonvulsants  decrease nerve pain and may help you sleep at night  · Antidepressants  may be used to decrease nerve pain  · Take your medicine as directed  Contact your healthcare provider if you think your medicine is not helping or if you have side effects  Tell him or her if you are allergic to any medicine  Keep a list of the medicines, vitamins, and herbs you take  Include the amounts, and when and why you take them  Bring the list or the pill bottles to follow-up visits  Carry your medicine list with you in case of an emergency  Follow up with your healthcare provider as directed:  Write down your questions so you remember to ask them during your visits  Self-care:  Keep your rash clean and dry  Cover your rash with a bandage or clothing  Do not use bandages that stick to your skin  The sticky part may irritate your skin and make your rash last longer  Prevent the spread of the shingles:   The virus can be passed to a person who has never had chickenpox  This person may get chickenpox, but not shingles  You may pass the virus to others as long as you have a rash  The virus is spread by direct contact with the fluid from the blisters  Usually, you cannot spread the virus once the blisters dry up  Prevent shingles or another shingles outbreak:  A vaccine may be given to help prevent shingles  Ask for more information about this vaccine  For more information:   · Centers for Disease Control and Prevention  [address] , [address]  Phone: 7- 790 - [phone]  Phone: 5- 530 - [phone]  Web Address: [employer]  © 2017 [address] [employer] Information is for End User's use only and may not be sold, redistributed or otherwise used for commercial purposes  All illustrations and images included in CareNotes® are the copyrighted property of [employer]  or [employer]  The above information is an  only  It is not intended as medical advice for individual conditions or treatments  Talk to your doctor, nurse or pharmacist before following any medical regimen to see if it is safe and effective for you

## 2018-04-19 NOTE — ED PROVIDER NOTES
History  Chief Complaint   Patient presents with    Rash     Patient has a rash in her right axillary with burning wrapping around to her front and her back  Patient states she "thinks she has shingles"  69-year-old female presents with vesicular rash to the right latissimus Dorsi  She states this has been present for last 2 days  She denies fever tachycardia or any systemic infectious processes  History provided by:  Patient  Rash   Location: Right posterior latisamus   Quality: blistering and burning    Severity:  Moderate  Onset quality:  Gradual  Duration:  2 days  Timing:  Intermittent  Progression:  Waxing and waning  Chronicity:  New  Context: not animal contact, not chemical exposure and not diapers    Relieved by:  Nothing  Worsened by:  Nothing  Associated symptoms: no abdominal pain, no diarrhea, no fatigue, no fever, no headaches and no joint pain        Prior to Admission Medications   Prescriptions Last Dose Informant Patient Reported? Taking?    DULoxetine (CYMBALTA) 20 mg capsule   Yes No   Sig: Take 20 mg by mouth daily   albuterol (PROVENTIL HFA,VENTOLIN HFA) 90 mcg/act inhaler   Yes No   Sig: Inhale 2 puffs every 6 (six) hours as needed for wheezing   albuterol (PROVENTIL HFA,VENTOLIN HFA) 90 mcg/act inhaler   Yes No   Sig: Inhale 2 puffs every 6 (six) hours as needed for wheezing   aspirin 81 mg chewable tablet   No No   Sig: Chew 1 tablet (81 mg total) daily   atorvastatin (LIPITOR) 40 mg tablet   No No   Sig: Take 1 tablet by mouth every evening   clopidogrel (PLAVIX) 75 mg tablet   No No   Sig: Take 1 tablet by mouth daily   escitalopram (LEXAPRO) 5 mg tablet   No No   Sig: Take 1 tablet (5 mg total) by mouth daily   gabapentin (NEURONTIN) 100 mg capsule   No No   Sig: Take 1 capsule (100 mg total) by mouth 3 (three) times a day   metoprolol tartrate (LOPRESSOR) 25 mg tablet   No No   Sig: Take 0 5 tablets (12 5 mg total) by mouth every 8 (eight) hours   nitroglycerin (NITROSTAT) 0 4 mg SL tablet   Yes No   Sig: Place 0 4 mg under the tongue every 5 (five) minutes as needed for chest pain   oxyCODONE (ROXICODONE) 5 mg immediate release tablet   Yes No   Sig: Take 5 mg by mouth every 4 (four) hours as needed for moderate pain   potassium chloride (K-DUR,KLOR-CON) 20 mEq tablet   No No   Sig: Take 1 tablet (20 mEq total) by mouth 2 (two) times a day      Facility-Administered Medications: None       Past Medical History:   Diagnosis Date    Chronic pain     Coronary artery disease     Cubital tunnel syndrome     Depression     GERD (gastroesophageal reflux disease)     History of Clostridium difficile     Ovarian cyst     Psychiatric disorder     depression    Raynaud's disease     Takotsubo cardiomyopathy     Thoracic outlet syndrome     Tobacco abuse     Vitamin D deficiency        Past Surgical History:   Procedure Laterality Date    CARDIAC CATHETERIZATION      CABG    CORONARY ARTERY BYPASS GRAFT N/A 2/12/2016    Procedure: CABG X1; Left  EVH to mid-LAD; ZAHRA;  Surgeon: Fely Salas DO;  Location: [facility];  Service:    [place] DEBRIDEMENT TENNIS ELBOW      DILATION AND CURETTAGE OF UTERUS      HYSTERECTOMY      HYSTEROSCOPY      LEFT OOPHORECTOMY      SALPINGECTOMY Right        Family History   Problem Relation Age of Onset    Heart disease Mother     Heart attack Mother     Heart attack Brother 36     s/p stent placement    Diabetes Brother     Heart disease Brother     Cancer Father     Diabetes Sister     Heart disease Sister     Diabetes Sister      I have reviewed and agree with the history as documented  Social History   Substance Use Topics    Smoking status: Current Every Day Smoker     Packs/day: 0 50     Years: 30 00    Smokeless tobacco: Never Used    Alcohol use No        Review of Systems   Constitutional: Negative for fatigue and fever  HENT: Negative for congestion, dental problem, drooling, ear discharge and ear pain  Eyes: Negative for pain, discharge and itching  Respiratory: Negative for apnea, cough, choking and chest tightness  Cardiovascular: Negative for chest pain and leg swelling  Gastrointestinal: Negative for abdominal pain and diarrhea  Endocrine: Negative for cold intolerance, heat intolerance and polydipsia  Genitourinary: Negative for difficulty urinating, dyspareunia, dysuria and enuresis  Musculoskeletal: Negative for arthralgias, back pain and gait problem  Skin: Positive for rash  Allergic/Immunologic: Negative for environmental allergies and food allergies  Neurological: Negative for dizziness, facial asymmetry, light-headedness and headaches  Hematological: Negative for adenopathy  Psychiatric/Behavioral: Negative for agitation, behavioral problems and confusion  Physical Exam  ED Triage Vitals [04/18/18 2110]   Temperature Pulse Respirations Blood Pressure SpO2   97 9 °F (36 6 °C) 74 18 135/66 98 %      Temp Source Heart Rate Source Patient Position - Orthostatic VS BP Location FiO2 (%)   Temporal Monitor Sitting Right arm --      Pain Score       7           Orthostatic Vital Signs  Vitals:    04/18/18 2110   BP: 135/66   Pulse: 74   Patient Position - Orthostatic VS: Sitting       Physical Exam   Constitutional: She appears well-developed and well-nourished  HENT:   Head: Normocephalic  Right Ear: External ear normal    Left Ear: External ear normal    Eyes: Pupils are equal, round, and reactive to light  Right eye exhibits no discharge  Left eye exhibits no discharge  Neck: Normal range of motion  No tracheal deviation present  No thyromegaly present  Cardiovascular: Normal rate, regular rhythm and normal heart sounds  Pulmonary/Chest: Effort normal  No respiratory distress  She has no wheezes  She has no rales  Abdominal: Soft  She exhibits no distension  There is no tenderness  There is no guarding  Musculoskeletal: Normal range of motion   She exhibits no edema or deformity  Neurological: She is alert  She displays normal reflexes  No cranial nerve deficit  Coordination normal    Skin: Capillary refill takes less than 2 seconds  The saccular eruption to the right posterior latisimus  region  Psychiatric: She has a normal mood and affect  Her behavior is normal    Vitals reviewed  ED Medications  Medications   predniSONE tablet 40 mg (not administered)   acyclovir (ZOVIRAX) capsule 400 mg (not administered)       Diagnostic Studies  Results Reviewed     None                 No orders to display              Procedures  Procedures       Phone Contacts  ED Phone Contact    ED Course  ED Course                                MDM  CritCare Time    Disposition  Final diagnoses:   Shingles     Time reflects when diagnosis was documented in both MDM as applicable and the Disposition within this note     Time User Action Codes Description Comment    4/18/2018 10:33 PM Milagros Felty Add [B02 9] Shingles       ED Disposition     ED Disposition Condition Comment    Discharge  [address] [place] discharge to home/self care  Condition at discharge: Good        Follow-up Information    None       Patient's Medications   Discharge Prescriptions    No medications on file     No discharge procedures on file      ED Provider  Electronically Signed by           Emerson Doll DO  04/18/18 5213

## 2018-05-03 DIAGNOSIS — I10 ESSENTIAL HYPERTENSION, BENIGN: ICD-10-CM

## 2018-05-03 DIAGNOSIS — F32.A DEPRESSION, UNSPECIFIED DEPRESSION TYPE: ICD-10-CM

## 2018-05-03 DIAGNOSIS — E78.5 DYSLIPIDEMIA: Primary | ICD-10-CM

## 2018-05-04 RX ORDER — ESCITALOPRAM OXALATE 5 MG/1
TABLET ORAL
Qty: 30 TABLET | Refills: 4 | Status: SHIPPED | OUTPATIENT
Start: 2018-05-04 | End: 2018-10-05 | Stop reason: SDUPTHER

## 2018-05-04 RX ORDER — ATORVASTATIN CALCIUM 40 MG/1
TABLET, FILM COATED ORAL
Qty: 30 TABLET | Refills: 4 | Status: SHIPPED | OUTPATIENT
Start: 2018-05-04 | End: 2019-04-26 | Stop reason: DRUGHIGH

## 2018-05-05 ENCOUNTER — HOSPITAL ENCOUNTER (EMERGENCY)
Facility: HOSPITAL | Age: 48
End: 2018-05-06
Attending: EMERGENCY MEDICINE | Admitting: EMERGENCY MEDICINE
Payer: COMMERCIAL

## 2018-05-05 DIAGNOSIS — E87.2 LACTIC ACIDOSIS: ICD-10-CM

## 2018-05-05 DIAGNOSIS — R19.7 NAUSEA VOMITING AND DIARRHEA: Primary | ICD-10-CM

## 2018-05-05 DIAGNOSIS — R50.9 FEVER: ICD-10-CM

## 2018-05-05 DIAGNOSIS — R00.0 TACHYCARDIA: ICD-10-CM

## 2018-05-05 DIAGNOSIS — R11.2 NAUSEA VOMITING AND DIARRHEA: Primary | ICD-10-CM

## 2018-05-05 LAB
ALBUMIN SERPL BCP-MCNC: 3.4 G/DL (ref 3.5–5)
ALP SERPL-CCNC: 126 U/L (ref 46–116)
ALT SERPL W P-5'-P-CCNC: 22 U/L (ref 12–78)
ANION GAP SERPL CALCULATED.3IONS-SCNC: 12 MMOL/L (ref 4–13)
AST SERPL W P-5'-P-CCNC: 24 U/L (ref 5–45)
BASOPHILS # BLD AUTO: 0.05 THOUSANDS/ΜL (ref 0–0.1)
BASOPHILS NFR BLD AUTO: 0 % (ref 0–1)
BILIRUB SERPL-MCNC: 0.2 MG/DL (ref 0.2–1)
BUN SERPL-MCNC: 8 MG/DL (ref 5–25)
CALCIUM SERPL-MCNC: 8.9 MG/DL (ref 8.3–10.1)
CHLORIDE SERPL-SCNC: 101 MMOL/L (ref 100–108)
CO2 SERPL-SCNC: 23 MMOL/L (ref 21–32)
CREAT SERPL-MCNC: 1 MG/DL (ref 0.6–1.3)
EOSINOPHIL # BLD AUTO: 0 THOUSAND/ΜL (ref 0–0.61)
EOSINOPHIL NFR BLD AUTO: 0 % (ref 0–6)
ERYTHROCYTE [DISTWIDTH] IN BLOOD BY AUTOMATED COUNT: 15.8 % (ref 11.6–15.1)
GFR SERPL CREATININE-BSD FRML MDRD: 67 ML/MIN/1.73SQ M
GLUCOSE SERPL-MCNC: 94 MG/DL (ref 65–140)
HCT VFR BLD AUTO: 41.1 % (ref 34.8–46.1)
HGB BLD-MCNC: 13.7 G/DL (ref 11.5–15.4)
LACTATE SERPL-SCNC: 4.6 MMOL/L (ref 0.5–2)
LIPASE SERPL-CCNC: 156 U/L (ref 73–393)
LYMPHOCYTES # BLD AUTO: 1.87 THOUSANDS/ΜL (ref 0.6–4.47)
LYMPHOCYTES NFR BLD AUTO: 15 % (ref 14–44)
MCH RBC QN AUTO: 29.3 PG (ref 26.8–34.3)
MCHC RBC AUTO-ENTMCNC: 33.3 G/DL (ref 31.4–37.4)
MCV RBC AUTO: 88 FL (ref 82–98)
MONOCYTES # BLD AUTO: 1.32 THOUSAND/ΜL (ref 0.17–1.22)
MONOCYTES NFR BLD AUTO: 11 % (ref 4–12)
NEUTROPHILS # BLD AUTO: 9.24 THOUSANDS/ΜL (ref 1.85–7.62)
NEUTS SEG NFR BLD AUTO: 74 % (ref 43–75)
PLATELET # BLD AUTO: 315 THOUSANDS/UL (ref 149–390)
PMV BLD AUTO: 10.1 FL (ref 8.9–12.7)
POTASSIUM SERPL-SCNC: 2.7 MMOL/L (ref 3.5–5.3)
PROT SERPL-MCNC: 7.4 G/DL (ref 6.4–8.2)
RBC # BLD AUTO: 4.67 MILLION/UL (ref 3.81–5.12)
SODIUM SERPL-SCNC: 136 MMOL/L (ref 136–145)
TROPONIN I SERPL-MCNC: <0.02 NG/ML
WBC # BLD AUTO: 12.48 THOUSAND/UL (ref 4.31–10.16)

## 2018-05-05 PROCEDURE — 85610 PROTHROMBIN TIME: CPT | Performed by: EMERGENCY MEDICINE

## 2018-05-05 PROCEDURE — 85025 COMPLETE CBC W/AUTO DIFF WBC: CPT | Performed by: EMERGENCY MEDICINE

## 2018-05-05 PROCEDURE — 36415 COLL VENOUS BLD VENIPUNCTURE: CPT | Performed by: EMERGENCY MEDICINE

## 2018-05-05 PROCEDURE — 84484 ASSAY OF TROPONIN QUANT: CPT | Performed by: EMERGENCY MEDICINE

## 2018-05-05 PROCEDURE — 80053 COMPREHEN METABOLIC PANEL: CPT | Performed by: EMERGENCY MEDICINE

## 2018-05-05 PROCEDURE — 96375 TX/PRO/DX INJ NEW DRUG ADDON: CPT

## 2018-05-05 PROCEDURE — 87040 BLOOD CULTURE FOR BACTERIA: CPT | Performed by: EMERGENCY MEDICINE

## 2018-05-05 PROCEDURE — 96361 HYDRATE IV INFUSION ADD-ON: CPT

## 2018-05-05 PROCEDURE — 85730 THROMBOPLASTIN TIME PARTIAL: CPT | Performed by: EMERGENCY MEDICINE

## 2018-05-05 PROCEDURE — 83605 ASSAY OF LACTIC ACID: CPT | Performed by: EMERGENCY MEDICINE

## 2018-05-05 PROCEDURE — 83690 ASSAY OF LIPASE: CPT | Performed by: EMERGENCY MEDICINE

## 2018-05-05 PROCEDURE — 93005 ELECTROCARDIOGRAM TRACING: CPT | Performed by: EMERGENCY MEDICINE

## 2018-05-05 RX ORDER — ONDANSETRON 2 MG/ML
4 INJECTION INTRAMUSCULAR; INTRAVENOUS ONCE
Status: COMPLETED | OUTPATIENT
Start: 2018-05-05 | End: 2018-05-06

## 2018-05-05 RX ORDER — ONDANSETRON 2 MG/ML
4 INJECTION INTRAMUSCULAR; INTRAVENOUS ONCE
Status: COMPLETED | OUTPATIENT
Start: 2018-05-05 | End: 2018-05-05

## 2018-05-05 RX ADMIN — SODIUM CHLORIDE 500 ML: 0.9 INJECTION, SOLUTION INTRAVENOUS at 22:54

## 2018-05-05 RX ADMIN — ONDANSETRON 4 MG: 2 INJECTION INTRAMUSCULAR; INTRAVENOUS at 22:35

## 2018-05-06 ENCOUNTER — APPOINTMENT (EMERGENCY)
Dept: CT IMAGING | Facility: HOSPITAL | Age: 48
End: 2018-05-06
Payer: COMMERCIAL

## 2018-05-06 ENCOUNTER — HOSPITAL ENCOUNTER (INPATIENT)
Facility: HOSPITAL | Age: 48
LOS: 4 days | Discharge: HOME | DRG: 351 | End: 2018-05-10
Attending: SURGERY | Admitting: SURGERY
Payer: COMMERCIAL

## 2018-05-06 VITALS
DIASTOLIC BLOOD PRESSURE: 75 MMHG | TEMPERATURE: 99.9 F | RESPIRATION RATE: 18 BRPM | OXYGEN SATURATION: 97 % | WEIGHT: 144.84 LBS | SYSTOLIC BLOOD PRESSURE: 134 MMHG | BODY MASS INDEX: 23.38 KG/M2 | HEART RATE: 92 BPM

## 2018-05-06 DIAGNOSIS — K52.9 ENTEROCOLITIS: Primary | ICD-10-CM

## 2018-05-06 DIAGNOSIS — I25.119 CORONARY ARTERY DISEASE WITH ANGINA PECTORIS, UNSPECIFIED VESSEL OR LESION TYPE, UNSPECIFIED WHETHER NATIVE OR TRANSPLANTED HEART (HCC): ICD-10-CM

## 2018-05-06 LAB
APTT PPP: 30 SECONDS (ref 23–35)
ATRIAL RATE: 89 BPM
BACTERIA UR QL AUTO: ABNORMAL /HPF
BILIRUB UR QL STRIP: NEGATIVE
CLARITY UR: NORMAL
COLOR UR: YELLOW
GLUCOSE UR STRIP-MCNC: NEGATIVE MG/DL
HGB UR QL STRIP.AUTO: NORMAL
INR PPP: 1.05 (ref 0.86–1.16)
KETONES UR STRIP-MCNC: NEGATIVE MG/DL
LACTATE SERPL-SCNC: 0.9 MMOL/L (ref 0.5–2)
LEUKOCYTE ESTERASE UR QL STRIP: NEGATIVE
NITRITE UR QL STRIP: NEGATIVE
NON-SQ EPI CELLS URNS QL MICRO: ABNORMAL /HPF
P AXIS: 52 DEGREES
PH UR STRIP.AUTO: 6 [PH] (ref 4.5–8)
PR INTERVAL: 148 MS
PROT UR STRIP-MCNC: NEGATIVE MG/DL
PROTHROMBIN TIME: 13.6 SECONDS (ref 12.1–14.4)
QRS AXIS: 102 DEGREES
QRSD INTERVAL: 142 MS
QT INTERVAL: 462 MS
QTC INTERVAL: 562 MS
RBC #/AREA URNS AUTO: ABNORMAL /HPF
SP GR UR STRIP.AUTO: <=1.005 (ref 1–1.03)
T WAVE AXIS: 50 DEGREES
UROBILINOGEN UR QL STRIP.AUTO: 0.2 E.U./DL
VENTRICULAR RATE: 89 BPM
WBC #/AREA URNS AUTO: ABNORMAL /HPF

## 2018-05-06 PROCEDURE — 87493 C DIFF AMPLIFIED PROBE: CPT | Performed by: SURGERY

## 2018-05-06 PROCEDURE — 96376 TX/PRO/DX INJ SAME DRUG ADON: CPT

## 2018-05-06 PROCEDURE — 94760 N-INVAS EAR/PLS OXIMETRY 1: CPT

## 2018-05-06 PROCEDURE — 99285 EMERGENCY DEPT VISIT HI MDM: CPT

## 2018-05-06 PROCEDURE — 96361 HYDRATE IV INFUSION ADD-ON: CPT

## 2018-05-06 PROCEDURE — 83605 ASSAY OF LACTIC ACID: CPT | Performed by: EMERGENCY MEDICINE

## 2018-05-06 PROCEDURE — 93010 ELECTROCARDIOGRAM REPORT: CPT | Performed by: INTERNAL MEDICINE

## 2018-05-06 PROCEDURE — 71275 CT ANGIOGRAPHY CHEST: CPT

## 2018-05-06 PROCEDURE — 74177 CT ABD & PELVIS W/CONTRAST: CPT

## 2018-05-06 PROCEDURE — 87505 NFCT AGENT DETECTION GI: CPT | Performed by: SURGERY

## 2018-05-06 PROCEDURE — 96365 THER/PROPH/DIAG IV INF INIT: CPT

## 2018-05-06 PROCEDURE — 94664 DEMO&/EVAL PT USE INHALER: CPT

## 2018-05-06 PROCEDURE — 94668 MNPJ CHEST WALL SBSQ: CPT

## 2018-05-06 PROCEDURE — 36415 COLL VENOUS BLD VENIPUNCTURE: CPT | Performed by: EMERGENCY MEDICINE

## 2018-05-06 PROCEDURE — 81001 URINALYSIS AUTO W/SCOPE: CPT | Performed by: EMERGENCY MEDICINE

## 2018-05-06 PROCEDURE — 99221 1ST HOSP IP/OBS SF/LOW 40: CPT | Performed by: SURGERY

## 2018-05-06 RX ORDER — CLOPIDOGREL BISULFATE 75 MG/1
75 TABLET ORAL DAILY
Status: DISCONTINUED | OUTPATIENT
Start: 2018-05-07 | End: 2018-05-10

## 2018-05-06 RX ORDER — GABAPENTIN 100 MG/1
100 CAPSULE ORAL 3 TIMES DAILY
Status: DISCONTINUED | OUTPATIENT
Start: 2018-05-06 | End: 2018-05-10 | Stop reason: HOSPADM

## 2018-05-06 RX ORDER — ATORVASTATIN CALCIUM 40 MG/1
40 TABLET, FILM COATED ORAL
Status: DISCONTINUED | OUTPATIENT
Start: 2018-05-06 | End: 2018-05-10 | Stop reason: HOSPADM

## 2018-05-06 RX ORDER — ALBUTEROL SULFATE 90 UG/1
2 AEROSOL, METERED RESPIRATORY (INHALATION) EVERY 6 HOURS PRN
Status: DISCONTINUED | OUTPATIENT
Start: 2018-05-06 | End: 2018-05-10 | Stop reason: HOSPADM

## 2018-05-06 RX ORDER — SODIUM CHLORIDE 9 MG/ML
75 INJECTION, SOLUTION INTRAVENOUS CONTINUOUS
Status: DISCONTINUED | OUTPATIENT
Start: 2018-05-06 | End: 2018-05-08

## 2018-05-06 RX ORDER — HEPARIN SODIUM 5000 [USP'U]/ML
5000 INJECTION, SOLUTION INTRAVENOUS; SUBCUTANEOUS EVERY 8 HOURS SCHEDULED
Status: DISCONTINUED | OUTPATIENT
Start: 2018-05-06 | End: 2018-05-09

## 2018-05-06 RX ORDER — DOCUSATE SODIUM 100 MG/1
100 CAPSULE, LIQUID FILLED ORAL 2 TIMES DAILY PRN
Status: DISCONTINUED | OUTPATIENT
Start: 2018-05-06 | End: 2018-05-10 | Stop reason: HOSPADM

## 2018-05-06 RX ORDER — NITROGLYCERIN 0.4 MG/1
0.4 TABLET SUBLINGUAL
Status: DISCONTINUED | OUTPATIENT
Start: 2018-05-06 | End: 2018-05-10 | Stop reason: HOSPADM

## 2018-05-06 RX ORDER — POTASSIUM CHLORIDE 20 MEQ/1
40 TABLET, EXTENDED RELEASE ORAL ONCE
Status: COMPLETED | OUTPATIENT
Start: 2018-05-06 | End: 2018-05-06

## 2018-05-06 RX ORDER — ALBUTEROL SULFATE 90 UG/1
2 AEROSOL, METERED RESPIRATORY (INHALATION) EVERY 6 HOURS PRN
Status: DISCONTINUED | OUTPATIENT
Start: 2018-05-06 | End: 2018-05-06

## 2018-05-06 RX ORDER — OXYCODONE HYDROCHLORIDE 5 MG/1
5 TABLET ORAL EVERY 4 HOURS PRN
Status: DISCONTINUED | OUTPATIENT
Start: 2018-05-06 | End: 2018-05-10 | Stop reason: HOSPADM

## 2018-05-06 RX ORDER — ESCITALOPRAM OXALATE 10 MG/1
5 TABLET ORAL DAILY
Status: DISCONTINUED | OUTPATIENT
Start: 2018-05-07 | End: 2018-05-10 | Stop reason: HOSPADM

## 2018-05-06 RX ORDER — ONDANSETRON 2 MG/ML
4 INJECTION INTRAMUSCULAR; INTRAVENOUS EVERY 4 HOURS PRN
Status: DISCONTINUED | OUTPATIENT
Start: 2018-05-06 | End: 2018-05-10 | Stop reason: HOSPADM

## 2018-05-06 RX ORDER — ASPIRIN 81 MG/1
81 TABLET, CHEWABLE ORAL DAILY
Status: DISCONTINUED | OUTPATIENT
Start: 2018-05-07 | End: 2018-05-09

## 2018-05-06 RX ADMIN — GABAPENTIN 100 MG: 100 CAPSULE ORAL at 20:56

## 2018-05-06 RX ADMIN — HEPARIN SODIUM 5000 UNITS: 5000 INJECTION, SOLUTION INTRAVENOUS; SUBCUTANEOUS at 17:15

## 2018-05-06 RX ADMIN — ONDANSETRON 4 MG: 2 INJECTION INTRAMUSCULAR; INTRAVENOUS at 22:32

## 2018-05-06 RX ADMIN — METRONIDAZOLE 500 MG: 500 INJECTION, SOLUTION INTRAVENOUS at 17:25

## 2018-05-06 RX ADMIN — Medication 3.38 G: at 00:09

## 2018-05-06 RX ADMIN — OXYCODONE HYDROCHLORIDE 5 MG: 5 TABLET ORAL at 22:32

## 2018-05-06 RX ADMIN — ALBUTEROL SULFATE 2 PUFF: 90 AEROSOL, METERED RESPIRATORY (INHALATION) at 19:52

## 2018-05-06 RX ADMIN — SODIUM CHLORIDE 1971 ML: 0.9 INJECTION, SOLUTION INTRAVENOUS at 00:11

## 2018-05-06 RX ADMIN — POTASSIUM CHLORIDE 40 MEQ: 1500 TABLET, EXTENDED RELEASE ORAL at 02:16

## 2018-05-06 RX ADMIN — HEPARIN SODIUM 5000 UNITS: 5000 INJECTION, SOLUTION INTRAVENOUS; SUBCUTANEOUS at 21:25

## 2018-05-06 RX ADMIN — SODIUM CHLORIDE 125 ML/HR: 0.9 INJECTION, SOLUTION INTRAVENOUS at 17:15

## 2018-05-06 RX ADMIN — IOHEXOL 85 ML: 350 INJECTION, SOLUTION INTRAVENOUS at 04:41

## 2018-05-06 RX ADMIN — CEFAZOLIN SODIUM 1000 MG: 10 INJECTION, POWDER, FOR SOLUTION INTRAVENOUS at 18:22

## 2018-05-06 RX ADMIN — ONDANSETRON 4 MG: 2 INJECTION INTRAMUSCULAR; INTRAVENOUS at 00:09

## 2018-05-06 RX ADMIN — SODIUM CHLORIDE 500 ML: 0.9 INJECTION, SOLUTION INTRAVENOUS at 01:00

## 2018-05-06 NOTE — H&P
Iván Leavitt 50 y o  female MRN: [id number]  Unit/Bed#: ED 14 Encounter: [id number]        Assesment  48yoF with enterocolitis  with possible pneumonia    Plan  NPO w/ [email or web address]  F/u stool cultures and C diff  IV antibx: cefepime/flagyl  Pain control  SQH  Serial abd exams    Chief Complaint: abd pain     History of Present Illness   HX and PE limited by: Adry Martin is a 50 y o  female who presents with abd pain, intermittent, LLQ that started 2 days ago and has progressively been getting worse  Pt states that he has been having darrhoea and poor po intake  + intermittent N, No V  + watery diarrhoea  Has COPD, >30 pack yrs of smoking  Has a hx of C diff  Went to [facility] and was evaluated and got transferred over to [facility] for possible appendicitis  History obtained from the patient      Historical Information   Past Medical History:   Diagnosis Date    Chronic pain     Coronary artery disease     Cubital tunnel syndrome     Depression     GERD (gastroesophageal reflux disease)     History of Clostridium difficile     Ovarian cyst     Psychiatric disorder     depression    Raynaud's disease     Takotsubo cardiomyopathy     Thoracic outlet syndrome     Tobacco abuse     Vitamin D deficiency      Past Surgical History:   Procedure Laterality Date    CARDIAC CATHETERIZATION      CABG    CORONARY ARTERY BYPASS GRAFT N/A 2/12/2016    Procedure: CABG X1; Left  EVH to mid-LAD; ZAHRA;  Surgeon: Sylvie Valdez DO;  Location: [facility];  Service:    [facility] DEBRIDEMENT TENNIS ELBOW      DILATION AND CURETTAGE OF UTERUS      HYSTERECTOMY      HYSTEROSCOPY      LEFT OOPHORECTOMY      SALPINGECTOMY Right      Social History   History   Alcohol Use No     History   Drug Use No     History   Smoking Status    Current Every Day Smoker    Packs/day: 0 50    Years: 30 00   Smokeless Tobacco    Never Used     Exercise History:   Family History:   Family History   Problem Relation Age of Onset    Heart disease Mother     Heart attack Mother     Heart attack Brother 36     s/p stent placement    Diabetes Brother     Heart disease Brother     Cancer Father     Diabetes Sister     Heart disease Sister     Diabetes Sister        Meds/Allergies     (Not in a hospital admission)  No current facility-administered medications for this encounter         Allergies   Allergen Reactions    Toradol [Ketorolac Tromethamine] GI Intolerance       Review of Systems  Negative except above     Laboratory and Diagnostics:  Recent Results (from the past 24 hour(s))   CBC and differential    Collection Time: 05/05/18 10:34 PM   Result Value Ref Range    WBC 12 48 (H) 4 31 - 10 16 Thousand/uL    RBC 4 67 3 81 - 5 12 Million/uL    Hemoglobin 13 7 11 5 - 15 4 g/dL    Hematocrit 41 1 34 8 - 46 1 %    MCV 88 82 - 98 fL    MCH 29 3 26 8 - 34 3 pg    MCHC 33 3 31 4 - 37 4 g/dL    RDW 15 8 (H) 11 6 - 15 1 %    MPV 10 1 8 9 - 12 7 fL    Platelets 737 350 - 600 Thousands/uL    Neutrophils Relative 74 43 - 75 %    Lymphocytes Relative 15 14 - 44 %    Monocytes Relative 11 4 - 12 %    Eosinophils Relative 0 0 - 6 %    Basophils Relative 0 0 - 1 %    Neutrophils Absolute 9 24 (H) 1 85 - 7 62 Thousands/µL    Lymphocytes Absolute 1 87 0 60 - 4 47 Thousands/µL    Monocytes Absolute 1 32 (H) 0 17 - 1 22 Thousand/µL    Eosinophils Absolute 0 00 0 00 - 0 61 Thousand/µL    Basophils Absolute 0 05 0 00 - 0 10 Thousands/µL   Comprehensive metabolic panel    Collection Time: 05/05/18 10:34 PM   Result Value Ref Range    Sodium 136 136 - 145 mmol/L    Potassium 2 7 (LL) 3 5 - 5 3 mmol/L    Chloride 101 100 - 108 mmol/L    CO2 23 21 - 32 mmol/L    Anion Gap 12 4 - 13 mmol/L    BUN 8 5 - 25 mg/dL    Creatinine 1 00 0 60 - 1 30 mg/dL    Glucose 94 65 - 140 mg/dL    Calcium 8 9 8 3 - 10 1 mg/dL    AST 24 5 - 45 U/L    ALT 22 12 - 78 U/L    Alkaline Phosphatase 126 (H) 46 - 116 U/L    Total Protein 7 4 6 4 - 8 2 g/dL    Albumin 3 4 (L) 3 5 - 5 0 g/dL Total Bilirubin 0 20 0 20 - 1 00 mg/dL    eGFR 67 ml/min/1 73sq m   Lipase    Collection Time: 05/05/18 10:34 PM   Result Value Ref Range    Lipase 156 73 - 393 u/L   Troponin I    Collection Time: 05/05/18 10:34 PM   Result Value Ref Range    Troponin I <0 02 <=0 04 ng/mL   Protime-INR    Collection Time: 05/05/18 10:34 PM   Result Value Ref Range    Protime 13 6 12 1 - 14 4 seconds    INR 1 05 0 86 - 1 16   APTT    Collection Time: 05/05/18 10:34 PM   Result Value Ref Range    PTT 30 23 - 35 seconds   Lactic acid, plasma    Collection Time: 05/05/18 10:52 PM   Result Value Ref Range    LACTIC ACID 4 6 (HH) 0 5 - 2 0 mmol/L   Lactic acid, plasma    Collection Time: 05/06/18  4:45 AM   Result Value Ref Range    LACTIC ACID 0 9 0 5 - 2 0 mmol/L   UA w Reflex to Microscopic    Collection Time: 05/06/18  6:22 AM   Result Value Ref Range    Color, UA Yellow     Clarity, UA Slightly Cloudy     Specific Gravity, UA <=1 005 1 003 - 1 030    pH, UA 6 0 4 5 - 8 0    Leukocytes, UA Negative Negative    Nitrite, UA Negative Negative    Protein, UA Negative Negative mg/dl    Glucose, UA Negative Negative mg/dl    Ketones, UA Negative Negative mg/dl    Urobilinogen, UA 0 2 0 2, 1 0 E U /dl E U /dl    Bilirubin, UA Negative Negative    Blood, UA Trace-Intact Negative, Trace-Intact   Urine Microscopic    Collection Time: 05/06/18  6:22 AM   Result Value Ref Range    RBC, UA 2-4 (A) None Seen, 0-5 /hpf    WBC, UA 0-1 (A) None Seen, 0-5, 5-55, 5-65 /hpf    Epithelial Cells Occasional None Seen, Occasional /hpf    Bacteria, UA Occasional None Seen, Occasional /hpf     Blood Culture:   Lab Results   Component Value Date    BLOODCX No Growth After 5 Days  01/30/2018     Urine Culture: No results found for: URINECX  Sputum Culture: No components found for: SPUTUMCX  EKG: N/A   Imaging: I have personally reviewed pertinent reports  5/5 CTAP: enterocolitis, Mesenteric vessels open  No appendicitis    Possible early pneumonia, right middle lobe    Physical Exam:  Gen:[id number]  Lungs: CTA B/L  Abd: soft, mildly distended, tender in B/L, L>R, no peritoneal signs    Vitals:   Vitals:    05/06/18 0922   BP: 104/60   BP Location: Left arm   Pulse: 72   Resp: 16   Temp: 98 4 °F (36 9 °C)   TempSrc: Oral   SpO2: 93%   Weight: 65 7 kg (144 lb 13 5 oz)   Height: 5' 6" (1 676 m)     Temp  Min: 98 4 °F (36 9 °C)  Max: 102 2 °F (39 °C)  IBW: 59 3 kg  Body mass index is 23 38 kg/m²  N/A              Code Status: Prior  Advance Directive and Living Will: Yes    Power of :    POLST:      Counseling / Coordination of Care  Total Critical Care time spent 15 minutes excluding procedures, teaching and family updates          Ricardo Vaca MD

## 2018-05-06 NOTE — SOCIAL WORK
Cm met with the patient and she is aware cm role at discharge  Pt live  in a multi level home and there is a full flight of steps to the 2nd floor where the bed and bath is  The patient has 4 steps to enter the home  She is independent at home with all adls  The patient has a walker at home and was open to [facility] the [facility]  Pt denies any snf , mental health for [facility] rehab in the past  Pt get medication from [facility]   When medically clear pt family will transport home   CM reviewed d/c planning process including the following: identifying help at home, patient preference for d/c planning needs, Discharge Lounge, [facility] Meds to Bed program, availability of treatment team to discuss questions or concerns patient and/or family may have regarding understanding medications and recognizing signs and symptoms once discharged  CM also encouraged patient to follow up with all recommended appointments after discharge  Patient advised of importance for patient and family to participate in managing patients medical well being

## 2018-05-06 NOTE — EMTALA/ACUTE CARE TRANSFER
[address]  [address] [facility] [address]  Dept: [phone]      [id number] TRANSFER CONSENT    NAME Ken PIERCE 1970                              MRN [id number]    I have been informed of my rights regarding examination, treatment, and transfer   by Dr Julius Brizuela MD    Benefits: Specialized equipment and/or services available at the receiving facility (Include comment)________________________    Risks: Potential for delay in receiving treatment, Potential deterioration of medical condition, Loss of IV, Increased discomfort during transfer, Possible worsening of condition or death during transfer      Transfer Request   I acknowledge that my medical condition has been evaluated and explained to me by the emergency department physician or other qualified medical person and/or my attending physician who has recommended and offered to me further medical examination and treatment  I understand the Hospital's obligation with respect to the treatment and stabilization of my emergency medical condition  I nevertheless request to be transferred  I release the Hospital, the doctor, and any other persons caring for me from all responsibility or liability for any injury or ill effects that may result from my transfer and agree to accept all responsibility for the consequences of my choice to transfer, rather than receive stabilizing treatment at the Hospital  I understand that because the transfer is my request, my insurance may not provide reimbursement for the services  The Hospital will assist and direct me and my family in how to make arrangements for transfer, but the hospital is not liable for any fees charged by the transport service    In spite of this understanding, I refuse to consent to further medical examination and treatment which has been offered to me, and request transfer to  [facility] Name, [address] : [place]  I authorize the performance of emergency medical procedures and treatments upon me in both transit and upon arrival at the receiving facility  Additionally, I authorize the release of any and all medical records to the receiving facility and request they be transported with me, if possible  I authorize the performance of emergency medical procedures and treatments upon me in both transit and upon arrival at the receiving facility  Additionally, I authorize the release of any and all medical records to the receiving facility and request they be transported with me, if possible  I understand that the safest mode of transportation during a medical emergency is an ambulance and that the Hospital advocates the use of this mode of transport  Risks of traveling to the receiving facility by car, including absence of medical control, life sustaining equipment, such as oxygen, and medical personnel has been explained to me and I fully understand them  (HORTENCIA CORRECT BOX BELOW)  [  ]  I consent to the stated transfer and to be transported by ambulance/helicopter  [  ]  I consent to the stated transfer, but refuse transportation by ambulance and accept full responsibility for my transportation by car  I understand the risks of non-ambulance transfers and I exonerate the Hospital and its staff from any deterioration in my condition that results from this refusal     X___________________________________________    DATE  18  TIME________  Signature of patient or legally responsible individual signing on patient behalf           RELATIONSHIP TO PATIENT_________________________          Provider Certification    NAME Maximo Geller                                        [facility] 1970                              MRN [id number]    A medical screening exam was performed on the above named patient    Based on the examination:    Condition Necessitating Transfer The primary encounter diagnosis was Nausea vomiting and diarrhea  Diagnoses of Lactic acidosis, Fever, and Tachycardia were also pertinent to this visit  Patient Condition: The patient has been stabilized such that within reasonable medical probability, no material deterioration of the patient condition or the condition of the unborn child(juan a) is likely to result from the transfer    Reason for Transfer: Level of Care needed not available at this facility (surgical evaluation)    Transfer Requirements: Facility [facility] available and qualified personnel available for treatment as acknowledged by    · Agreed to accept transfer and to provide appropriate medical treatment as acknowledged by       [facility]  · Appropriate medical records of the examination and treatment of the patient are provided at the time of transfer   [address] _______  · Transfer will be performed by qualified personnel from    and appropriate transfer equipment as required, including the use of necessary and appropriate life support measures  Provider Certification: I have examined the patient and explained the following risks and benefits of being transferred/refusing transfer to the patient/family:  General risk, such as traffic hazards, adverse weather conditions, rough terrain or turbulence, possible failure of equipment (including vehicle or aircraft), or consequences of actions of persons outside the control of the transport personnel      Based on these reasonable risks and benefits to the patient and/or the unborn child(juan a), and based upon the information available at the time of the patients examination, I certify that the medical benefits reasonably to be expected from the provision of appropriate medical treatments at another medical facility outweigh the increasing risks, if any, to the individuals medical condition, and in the case of labor to the unborn child, from effecting the transfer      X____________________________________________ DATE 05/06/18        TIME_______      ORIGINAL - SEND TO MEDICAL RECORDS   COPY - SEND WITH PATIENT DURING TRANSFER

## 2018-05-06 NOTE — ED PROVIDER NOTES
History  Chief Complaint   Patient presents with    Fever - 9 weeks to 74 years     Pt c/o fever since yesterday - today developed diarrhea and vomiting     HPI     Patient is a pleasant 49-year-old female who reports to the emergency department with nausea, vomiting, dysuria, cough, fever, tachycardia, mild achy substernal nonpleuritic, nonradiating, non tearing chest pain  She has mild achy upper abdominal pain  No CVA tenderness  She does have a history of C diff but reports she does not currently feel like that  Medical decision making: This is a pleasant 49-year-old female, possible UTI versus pneumonia versus C diff infection, will scan abdomen to rule out intra-abdominal infection, doubt cardiac cause given the fever, tachycardia, still, will get troponin  Of note, patient was diagnosed with shingles about 2 weeks ago, she has right-sided shingles, this does not appear to be cellulitic, no neck stiffness, no HA  Prior to Admission Medications   Prescriptions Last Dose Informant Patient Reported? Taking? albuterol (PROVENTIL HFA,VENTOLIN HFA) 90 mcg/act inhaler   Yes Yes   Sig: Inhale 2 puffs every 6 (six) hours as needed for wheezing   albuterol (PROVENTIL HFA,VENTOLIN HFA) 90 mcg/act inhaler   Yes Yes   Sig: Inhale 2 puffs every 6 (six) hours as needed for wheezing   atorvastatin (LIPITOR) 40 mg tablet   No Yes   Sig: TAKE ONE TABLET DAILY WITH DINNER    escitalopram (LEXAPRO) 5 mg tablet   No Yes   Sig: TAKE (1) TABLET DAILY     gabapentin (NEURONTIN) 100 mg capsule   No Yes   Sig: Take 1 capsule (100 mg total) by mouth 3 (three) times a day   metoprolol tartrate (LOPRESSOR) 25 mg tablet   No Yes   Sig: TAKE ONE-HALF TABLET EVERY 8 HOURS    nitroglycerin (NITROSTAT) 0 4 mg SL tablet   Yes Yes   Sig: Place 0 4 mg under the tongue every 5 (five) minutes as needed for chest pain   oxyCODONE (ROXICODONE) 5 mg immediate release tablet   Yes Yes   Sig: Take 5 mg by mouth every 4 (four) hours as needed for moderate pain   potassium chloride (K-DUR,KLOR-CON) 20 mEq tablet   No Yes   Sig: Take 1 tablet (20 mEq total) by mouth 2 (two) times a day      Facility-Administered Medications: None       Past Medical History:   Diagnosis Date    Chronic pain     Coronary artery disease     Cubital tunnel syndrome     Depression     GERD (gastroesophageal reflux disease)     History of Clostridium difficile     Ovarian cyst     Psychiatric disorder     depression    Raynaud's disease     Takotsubo cardiomyopathy     Thoracic outlet syndrome     Tobacco abuse     Vitamin D deficiency        Past Surgical History:   Procedure Laterality Date    CARDIAC CATHETERIZATION      CABG    CORONARY ARTERY BYPASS GRAFT N/A 2/12/2016    Procedure: CABG X1; Left  EVH to mid-LAD; ZAHRA;  Surgeon: Josee Muniz DO;  Location: [facility];  Service:    [address] [place] [address] DEBRIDEMENT TENNIS ELBOW      DILATION AND CURETTAGE OF UTERUS      HYSTERECTOMY      HYSTEROSCOPY      LEFT OOPHORECTOMY      SALPINGECTOMY Right        Family History   Problem Relation Age of Onset    Heart disease Mother     Heart attack Mother     Heart attack Brother 36     s/p stent placement    Diabetes Brother     Heart disease Brother     Cancer Father     Diabetes Sister     Heart disease Sister     Diabetes Sister      I have reviewed and agree with the history as documented  Social History   Substance Use Topics    Smoking status: Current Every Day Smoker     Packs/day: 0 50     Years: 30 00    Smokeless tobacco: Never Used    Alcohol use No        Review of Systems   Constitutional: Positive for chills, fatigue and fever  HENT: Negative for congestion  Respiratory: Positive for shortness of breath  Negative for chest tightness  Cardiovascular: Positive for chest pain  Gastrointestinal: Positive for diarrhea, nausea and vomiting  Negative for abdominal distention and blood in stool  Musculoskeletal: Negative for back pain     Skin: Positive for rash  Physical Exam  ED Triage Vitals [05/05/18 2209]   Temperature Pulse Respirations Blood Pressure SpO2   (!) 102 2 °F (39 °C) (!) 118 20 134/75 96 %      Temp Source Heart Rate Source Patient Position - Orthostatic VS BP Location FiO2 (%)   Temporal Monitor Sitting Right arm --      Pain Score       6           Orthostatic Vital Signs  Vitals:    05/05/18 2209 05/06/18 0230   BP: 134/75    Pulse: (!) 118 92   Patient Position - Orthostatic VS: Sitting        Physical Exam   Constitutional: She is oriented to person, place, and time  She appears well-developed and well-nourished  HENT:   Head: Normocephalic and atraumatic  Right Ear: External ear normal    Left Ear: External ear normal    Eyes: Conjunctivae and EOM are normal    Neck: Normal range of motion  Neck supple  No JVD present  No tracheal deviation present  Cardiovascular: Normal rate, regular rhythm and normal heart sounds  Pulmonary/Chest: Effort normal  No respiratory distress  She has no wheezes  She has no rales  Abdominal: Soft  Bowel sounds are normal  There is tenderness  There is no rebound and no guarding  Musculoskeletal: She exhibits no edema or tenderness  Neurological: She is alert and oriented to person, place, and time  Skin: Skin is warm and dry  Rash noted  No erythema  Psychiatric: She has a normal mood and affect  Thought content normal    Nursing note and vitals reviewed        ED Medications  Medications   ondansetron (ZOFRAN) injection 4 mg (4 mg Intravenous Given 5/5/18 2235)   sodium chloride 0 9 % bolus 500 mL (0 mL Intravenous Stopped 5/6/18 0054)   sodium chloride 0 9 % bolus 1,971 mL (0 mL/kg × 65 7 kg Intravenous Stopped 5/6/18 0200)   ondansetron (ZOFRAN) injection 4 mg (4 mg Intravenous Given 5/6/18 0009)   sodium chloride 0 9 % bolus 500 mL (0 mL Intravenous Stopped 5/6/18 0300)   potassium chloride (K-DUR,KLOR-CON) CR tablet 40 mEq (40 mEq Oral Given 5/6/18 0216)   iohexol (OMNIPAQUE) 350 MG/ML injection (MULTI-DOSE) 85 mL (85 mL Intravenous Given 5/6/18 0441)       Diagnostic Studies  Results Reviewed     Procedure Component Value Units Date/Time    Blood culture #1 [[id number]] Collected:  05/05/18 2240    Lab Status:  Preliminary result Specimen:  Blood from Arm, Left Updated:  05/10/18 1801     Blood Culture No Growth After 4 Days  Blood culture #2 [[id number]] Collected:  05/05/18 2234    Lab Status:  Preliminary result Specimen:  Blood from Arm, Right Updated:  05/10/18 1801     Blood Culture No Growth After 4 Days  Urine Microscopic [[id number]]  (Abnormal) Collected:  05/06/18 0622    Lab Status:  Final result Specimen:  Urine from Urine, Clean Catch Updated:  05/06/18 0700     RBC, UA 2-4 (A) /hpf      WBC, UA 0-1 (A) /hpf      Epithelial Cells Occasional /hpf      Bacteria, UA Occasional /hpf     UA w Reflex to Microscopic [[id number]] Collected:  05/06/18 0622    Lab Status:  Final result Specimen:  Urine from Urine, Clean Catch Updated:  05/06/18 0631     Color, UA Yellow     Clarity, UA Slightly Cloudy     Specific Gravity, UA <=1 005     pH, UA 6 0     Leukocytes, UA Negative     Nitrite, UA Negative     Protein, UA Negative mg/dl      Glucose, UA Negative mg/dl      Ketones, UA Negative mg/dl      Urobilinogen, UA 0 2 E U /dl      Bilirubin, UA Negative     Blood, UA Trace-Intact    Lactic acid, plasma [[id number]]  (Normal) Collected:  05/06/18 0445    Lab Status:  Final result Specimen:  Blood from Arm, Right Updated:  05/06/18 0511     LACTIC ACID 0 9 mmol/L     Narrative:         Result may be elevated if tourniquet was used during collection      Roderick Venegas [[id number]]  (Normal) Collected:  05/05/18 2234    Lab Status:  Final result Specimen:  Blood from Arm, Right Updated:  05/06/18 0020     Protime 13 6 seconds      INR 1 05    APTT [[id number]]  (Normal) Collected:  05/05/18 2234    Lab Status:  Final result Specimen:  Blood from Arm, Right Updated:  05/06/18 0020 PTT 30 seconds     Lactic acid, plasma [[id number]]  (Abnormal) Collected:  05/05/18 2252    Lab Status:  Final result Specimen:  Blood from Arm, Right Updated:  05/05/18 2324     LACTIC ACID 4 6 (HH) mmol/L     Narrative:         Result may be elevated if tourniquet was used during collection  Comprehensive metabolic panel [[id number]]  (Abnormal) Collected:  05/05/18 2234    Lab Status:  Final result Specimen:  Blood from Arm, Right Updated:  05/05/18 2307     Sodium 136 mmol/L      Potassium 2 7 (LL) mmol/L      Chloride 101 mmol/L      CO2 23 mmol/L      Anion Gap 12 mmol/L      BUN 8 mg/dL      Creatinine 1 00 mg/dL      Glucose 94 mg/dL      Calcium 8 9 mg/dL      AST 24 U/L      ALT 22 U/L      Alkaline Phosphatase 126 (H) U/L      Total Protein 7 4 g/dL      Albumin 3 4 (L) g/dL      Total Bilirubin 0 20 mg/dL      eGFR 67 ml/min/1 73sq m     Narrative:         National Kidney Disease Education Program recommendations are as follows:  GFR calculation is accurate only with a steady state creatinine  Chronic Kidney disease less than 60 ml/min/1 73 sq  meters  Kidney failure less than 15 ml/min/1 73 sq  meters  Troponin I [[id number]]  (Normal) Collected:  05/05/18 2234    Lab Status:  Final result Specimen:  Blood from Arm, Right Updated:  05/05/18 2305     Troponin I <0 02 ng/mL     Narrative:         [employer] Chemistry analyzer 99% cutoff is > 0 04 ng/mL in network labs    o cTnI 99% cutoff is useful only when applied to patients in the clinical setting of myocardial ischemia  o cTnI 99% cutoff should be interpreted in the context of clinical history, ECG findings and possibly cardiac imaging to establish correct diagnosis  o cTnI 99% cutoff may be suggestive but clearly not indicative of a coronary event without the clinical setting of myocardial ischemia      Lipase [[id number]]  (Normal) Collected:  05/05/18 2234    Lab Status:  Final result Specimen:  Blood from Arm, Right Updated:  05/05/18 2303     Lipase 156 u/L     CBC and differential [[id number]]  (Abnormal) Collected:  05/05/18 2234    Lab Status:  Final result Specimen:  Blood from Arm, Right Updated:  05/05/18 2249     WBC 12 48 (H) Thousand/uL      RBC 4 67 Million/uL      Hemoglobin 13 7 g/dL      Hematocrit 41 1 %      MCV 88 fL      MCH 29 3 pg      MCHC 33 3 g/dL      RDW 15 8 (H) %      MPV 10 1 fL      Platelets 773 Thousands/uL      Neutrophils Relative 74 %      Lymphocytes Relative 15 %      Monocytes Relative 11 %      Eosinophils Relative 0 %      Basophils Relative 0 %      Neutrophils Absolute 9 24 (H) Thousands/µL      Lymphocytes Absolute 1 87 Thousands/µL      Monocytes Absolute 1 32 (H) Thousand/µL      Eosinophils Absolute 0 00 Thousand/µL      Basophils Absolute 0 05 Thousands/µL                  RADIOLOGY RESULTS   Final Result by  (05/09 1579)      PE Study with CT Abdomen and Pelvis with contrast   Final Result by Aries Mosley DO (05/06 9675)   1  No CT evidence of pulmonary embolism  2   Vague groundglass infiltrate right middle lobe representing either atelectasis or early pneumonia  Clinical correlation recommended  3   Mild abnormal appearance of the large and small bowel  Correlate for enterocolitis  The major findings are in agreement with the preliminary report provided by [facility] which was provided shortly after completion of the exam  The additional finding of  groundglass infiltrate right middle lobe as well as abnormal appearance of    the large and small bowel as described above,   will now be communicated with patient's clinical team by our radiology liaison  Workstation performed: [id number]                    Procedures  Procedures       Phone Contacts  ED Phone Contact    ED Course  ED Course as of May 10 2222   Bill Epps May 06, 2018   0142 IMPRESSION:  1  Fluid within the colon compatible with nonspecific diarrheal state  2  The appendix is at the upper limits of normal in caliber at 7 mm  No periappendiceal stranding is  present  If right lower quadrant symptoms are present, oral or rectal contrast enhanced study could  be performed to assess for filling/nonfilling of the appendix  4206 ABDOMEN:  Liver: Unremarkable  No mass  Gallbladder and bile ducts: The gallbladder is contracted but otherwise normal in appearance  No  calcified stones  No ductal dilation  Pancreas: Unremarkable  No mass  No ductal dilation  Spleen: Unremarkable  No splenomegaly  Adrenals: Unremarkable  No mass  Kidneys and ureters: Unremarkable  No solid mass  No hydronephrosis  Stomach and bowel: Fluid within the colon compatible with nonspecific diarrheal state  Small  amount of hyperdense material in the stomach likely reflects a small oral contrast ingestion No  obstruction  No mucosal thickening  MDM  CritCare Time    Disposition  Final diagnoses:   Nausea vomiting and diarrhea   Lactic acidosis   Fever   Tachycardia     Time reflects when diagnosis was documented in both MDM as applicable and the Disposition within this note     Time User Action Codes Description Comment    5/6/2018  6:36 AM Paige Duenas[address] [R11 2,  R19 7] Nausea vomiting and diarrhea     5/6/2018  6:36 AM Paige Duenas[address] [E87 2] Lactic acidosis     5/6/2018  6:36 AM Aruna Monroe Add [R50 9] Fever     5/6/2018  6:36 AM Bertha Harris Add [R00 0] Tachycardia       ED Disposition     ED Disposition Condition Comment    Transfer to Another [address] should be transferred out to [facility]DARNELL MURGUIA Documentation      Most Recent Value   Patient Condition  The patient has been stabilized such that within reasonable medical probability, no material deterioration of the patient condition or the condition of the unborn child(juan a) is likely to result from the transfer   Reason for Transfer  Level of Care needed not available at this facility [surgical evaluation]   Benefits of Transfer  Specialized equipment and/or services available at the receiving facility (Include comment)________________________   Risks of Transfer  Potential for delay in receiving treatment, Potential deterioration of medical condition, Loss of IV, Increased discomfort during transfer, Possible worsening of condition or death during transfer   Accepting Physician  [address] Name, [address] [place] [address]   Sending MD Yarely Anderson   Provider Certification  General risk, such as traffic hazards, adverse weather conditions, rough terrain or turbulence, possible failure of equipment (including vehicle or aircraft), or consequences of actions of persons outside the control of the transport personnel      RN Documentation      Most [address] [facility] Name, [facility]      Follow-up Information    None       Discharge Medication List as of 5/6/2018  8:30 AM      CONTINUE these medications which have NOT CHANGED    Details   !! albuterol (PROVENTIL HFA,VENTOLIN HFA) 90 mcg/act inhaler Inhale 2 puffs every 6 (six) hours as needed for wheezing, Historical Med      !! albuterol (PROVENTIL HFA,VENTOLIN HFA) 90 mcg/act inhaler Inhale 2 puffs every 6 (six) hours as needed for wheezing, Historical Med      atorvastatin (LIPITOR) 40 mg tablet TAKE ONE TABLET DAILY WITH DINNER , Normal      escitalopram (LEXAPRO) 5 mg tablet TAKE (1) TABLET DAILY , Normal      gabapentin (NEURONTIN) 100 mg capsule Take 1 capsule (100 mg total) by mouth 3 (three) times a day, Starting Fri 4/6/2018, Normal      metoprolol tartrate (LOPRESSOR) 25 mg tablet TAKE ONE-HALF TABLET EVERY 8 HOURS , Normal      nitroglycerin (NITROSTAT) 0 4 mg SL tablet Place 0 4 mg under the tongue every 5 (five) minutes as needed for chest pain, Historical Med      oxyCODONE (ROXICODONE) 5 mg immediate release tablet Take 5 mg by mouth every 4 (four) hours as needed for moderate pain, Historical Med      potassium chloride (K-DUR,KLOR-CON) 20 mEq tablet Take 1 tablet (20 mEq total) by mouth 2 (two) times a day, Starting Tue 2/13/2018, Print      aspirin 81 mg chewable tablet Chew 1 tablet (81 mg total) daily, Starting Fri 4/6/2018, Normal      clopidogrel (PLAVIX) 75 mg tablet Take 1 tablet by mouth daily, Starting Thu 11/23/2017, No Print       !! - Potential duplicate medications found  Please discuss with provider  No discharge procedures on file      ED Provider  Electronically Signed by           Maximilian Soares MD  05/10/18 0702

## 2018-05-06 NOTE — RESPIRATORY THERAPY NOTE
RT Protocol Note  Ken Ball 50 y o  female MRN: [id number]  Unit/Bed#: [facility] 823-01 Encounter: [id number]    Assessment    Active Problems:    * No active hospital problems  *      Home Pulmonary Medications:  Albuterol MDI PRN       Past Medical History:   Diagnosis Date    Chronic pain     Coronary artery disease     Cubital tunnel syndrome     Depression     GERD (gastroesophageal reflux disease)     History of Clostridium difficile     Ovarian cyst     Psychiatric disorder     depression    Raynaud's disease     Takotsubo cardiomyopathy     Thoracic outlet syndrome     Tobacco abuse     Vitamin D deficiency      Social History     Social History    Marital status: /Civil Union     Spouse name: N/A    Number of children: 3    Years of education: N/A     Occupational History    unemployed      Social History Main Topics    Smoking status: Current Every Day Smoker     Packs/day: 0 50     Years: 30 00    Smokeless tobacco: Never Used    Alcohol use No    Drug use: No    Sexual activity: Yes     Other Topics Concern    None     Social History Narrative    None       Subjective    Subjective Data: (P) Patient c/o pain in chest with coughing  Objective    Physical Exam:   Assessment Type: (P) Assess only  General Appearance: (P) Alert, Awake  Respiratory Pattern: (P) Normal  Chest Assessment: (P) Chest expansion symmetrical  Bilateral Breath Sounds: (P) Diminished  R Breath Sounds: (P) Crackles  Location Specific: (P) Yes  Cough: (P) Productive, Strong, Moist  O2 Device: (P) RA    Vitals:  Blood pressure 106/67, pulse 71, temperature 97 8 °F (36 6 °C), temperature source Oral, resp  rate 20, height 5' 6" (1 676 m), weight 65 7 kg (144 lb 13 5 oz), last menstrual period 10/14/2015, SpO2 95 %, not currently breastfeeding  Imaging and other studies: I have personally reviewed pertinent reports        O2 Device: (P) RA     Plan    Respiratory Plan: (P) Home Bronchodilator Patient pathway  Airway Clearance Plan: (P) Flutter     Resp Comments: (P) Patient was assessed for Respiratory and Airway Clearance Protocols  Patient has a Hx of COPD and takes Albuterol MDI PRN at home, but states she barely needs to use it  Patient c/o pain in chest with coughing and having some trouble expectorating mucous, but is able to get a little bit up at times  CXR from today (05/6/18) shows groundglass infiltrate in RML and Emphysematous changes in bilateral upper lobes  BS on right side are with crackles in RML and decreased throughout  Will continue Airway Clearance Protocol with Flutter Valve TID  Respiratory Protocol will be continued with Albuterol MDI PRN under the Home Bronchodilator Pathway  Will continue to monitor

## 2018-05-07 LAB
ANION GAP SERPL CALCULATED.3IONS-SCNC: 11 MMOL/L (ref 4–13)
BASOPHILS # BLD AUTO: 0.02 THOUSANDS/ΜL (ref 0–0.1)
BASOPHILS NFR BLD AUTO: 0 % (ref 0–1)
BUN SERPL-MCNC: 7 MG/DL (ref 5–25)
C DIFF TOX GENS STL QL NAA+PROBE: NORMAL
CALCIUM SERPL-MCNC: 8 MG/DL (ref 8.3–10.1)
CAMPYLOBACTER DNA SPEC NAA+PROBE: NORMAL
CHLORIDE SERPL-SCNC: 109 MMOL/L (ref 100–108)
CO2 SERPL-SCNC: 21 MMOL/L (ref 21–32)
CREAT SERPL-MCNC: 0.66 MG/DL (ref 0.6–1.3)
EOSINOPHIL # BLD AUTO: 0 THOUSAND/ΜL (ref 0–0.61)
EOSINOPHIL NFR BLD AUTO: 0 % (ref 0–6)
ERYTHROCYTE [DISTWIDTH] IN BLOOD BY AUTOMATED COUNT: 16.4 % (ref 11.6–15.1)
GFR SERPL CREATININE-BSD FRML MDRD: 105 ML/MIN/1.73SQ M
GLUCOSE SERPL-MCNC: 78 MG/DL (ref 65–140)
HCT VFR BLD AUTO: 39.1 % (ref 34.8–46.1)
HGB BLD-MCNC: 12.5 G/DL (ref 11.5–15.4)
LYMPHOCYTES # BLD AUTO: 1.66 THOUSANDS/ΜL (ref 0.6–4.47)
LYMPHOCYTES NFR BLD AUTO: 20 % (ref 14–44)
MAGNESIUM SERPL-MCNC: 1.9 MG/DL (ref 1.6–2.6)
MCH RBC QN AUTO: 29.1 PG (ref 26.8–34.3)
MCHC RBC AUTO-ENTMCNC: 32 G/DL (ref 31.4–37.4)
MCV RBC AUTO: 91 FL (ref 82–98)
MONOCYTES # BLD AUTO: 0.51 THOUSAND/ΜL (ref 0.17–1.22)
MONOCYTES NFR BLD AUTO: 6 % (ref 4–12)
NEUTROPHILS # BLD AUTO: 6.24 THOUSANDS/ΜL (ref 1.85–7.62)
NEUTS SEG NFR BLD AUTO: 74 % (ref 43–75)
NRBC BLD AUTO-RTO: 0 /100 WBCS
PLATELET # BLD AUTO: 227 THOUSANDS/UL (ref 149–390)
PMV BLD AUTO: 10.2 FL (ref 8.9–12.7)
POTASSIUM SERPL-SCNC: 3.1 MMOL/L (ref 3.5–5.3)
RBC # BLD AUTO: 4.3 MILLION/UL (ref 3.81–5.12)
SALMONELLA DNA SPEC QL NAA+PROBE: NORMAL
SHIGA TOXIN STX GENE SPEC NAA+PROBE: NORMAL
SHIGELLA DNA SPEC QL NAA+PROBE: NORMAL
SODIUM SERPL-SCNC: 141 MMOL/L (ref 136–145)
WBC # BLD AUTO: 8.44 THOUSAND/UL (ref 4.31–10.16)

## 2018-05-07 PROCEDURE — G8978 MOBILITY CURRENT STATUS: HCPCS

## 2018-05-07 PROCEDURE — 97163 PT EVAL HIGH COMPLEX 45 MIN: CPT

## 2018-05-07 PROCEDURE — 83735 ASSAY OF MAGNESIUM: CPT | Performed by: SURGERY

## 2018-05-07 PROCEDURE — 80048 BASIC METABOLIC PNL TOTAL CA: CPT | Performed by: SURGERY

## 2018-05-07 PROCEDURE — 94760 N-INVAS EAR/PLS OXIMETRY 1: CPT

## 2018-05-07 PROCEDURE — G8980 MOBILITY D/C STATUS: HCPCS

## 2018-05-07 PROCEDURE — 85025 COMPLETE CBC W/AUTO DIFF WBC: CPT | Performed by: SURGERY

## 2018-05-07 PROCEDURE — 94668 MNPJ CHEST WALL SBSQ: CPT

## 2018-05-07 PROCEDURE — G8979 MOBILITY GOAL STATUS: HCPCS

## 2018-05-07 PROCEDURE — 99232 SBSQ HOSP IP/OBS MODERATE 35: CPT | Performed by: SURGERY

## 2018-05-07 RX ADMIN — HEPARIN SODIUM 5000 UNITS: 5000 INJECTION, SOLUTION INTRAVENOUS; SUBCUTANEOUS at 22:15

## 2018-05-07 RX ADMIN — OXYCODONE HYDROCHLORIDE 5 MG: 5 TABLET ORAL at 05:46

## 2018-05-07 RX ADMIN — METRONIDAZOLE 500 MG: 500 INJECTION, SOLUTION INTRAVENOUS at 16:01

## 2018-05-07 RX ADMIN — ESCITALOPRAM OXALATE 5 MG: 10 TABLET, FILM COATED ORAL at 08:42

## 2018-05-07 RX ADMIN — SODIUM CHLORIDE 125 ML/HR: 0.9 INJECTION, SOLUTION INTRAVENOUS at 02:59

## 2018-05-07 RX ADMIN — METRONIDAZOLE 500 MG: 500 INJECTION, SOLUTION INTRAVENOUS at 08:42

## 2018-05-07 RX ADMIN — GABAPENTIN 100 MG: 100 CAPSULE ORAL at 08:42

## 2018-05-07 RX ADMIN — CLOPIDOGREL BISULFATE 75 MG: 75 TABLET ORAL at 08:41

## 2018-05-07 RX ADMIN — OXYCODONE HYDROCHLORIDE 5 MG: 5 TABLET ORAL at 15:59

## 2018-05-07 RX ADMIN — ASPIRIN 81 MG 81 MG: 81 TABLET ORAL at 08:42

## 2018-05-07 RX ADMIN — ONDANSETRON 4 MG: 2 INJECTION INTRAMUSCULAR; INTRAVENOUS at 11:54

## 2018-05-07 RX ADMIN — OXYCODONE HYDROCHLORIDE 5 MG: 5 TABLET ORAL at 11:54

## 2018-05-07 RX ADMIN — GABAPENTIN 100 MG: 100 CAPSULE ORAL at 15:56

## 2018-05-07 RX ADMIN — CEFAZOLIN SODIUM 1000 MG: 10 INJECTION, POWDER, FOR SOLUTION INTRAVENOUS at 17:03

## 2018-05-07 RX ADMIN — CEFAZOLIN SODIUM 1000 MG: 10 INJECTION, POWDER, FOR SOLUTION INTRAVENOUS at 10:00

## 2018-05-07 RX ADMIN — METOPROLOL TARTRATE 25 MG: 25 TABLET, FILM COATED ORAL at 08:41

## 2018-05-07 RX ADMIN — HEPARIN SODIUM 5000 UNITS: 5000 INJECTION, SOLUTION INTRAVENOUS; SUBCUTANEOUS at 13:35

## 2018-05-07 RX ADMIN — METOPROLOL TARTRATE 25 MG: 25 TABLET, FILM COATED ORAL at 22:17

## 2018-05-07 RX ADMIN — OXYCODONE HYDROCHLORIDE 5 MG: 5 TABLET ORAL at 20:32

## 2018-05-07 RX ADMIN — METRONIDAZOLE 500 MG: 500 INJECTION, SOLUTION INTRAVENOUS at 00:41

## 2018-05-07 RX ADMIN — CEFAZOLIN SODIUM 1000 MG: 10 INJECTION, POWDER, FOR SOLUTION INTRAVENOUS at 01:15

## 2018-05-07 RX ADMIN — ATORVASTATIN CALCIUM 40 MG: 40 TABLET, FILM COATED ORAL at 15:56

## 2018-05-07 RX ADMIN — SODIUM CHLORIDE 75 ML/HR: 0.9 INJECTION, SOLUTION INTRAVENOUS at 14:40

## 2018-05-07 RX ADMIN — HEPARIN SODIUM 5000 UNITS: 5000 INJECTION, SOLUTION INTRAVENOUS; SUBCUTANEOUS at 05:47

## 2018-05-07 RX ADMIN — GABAPENTIN 100 MG: 100 CAPSULE ORAL at 22:14

## 2018-05-07 NOTE — CASE MANAGEMENT
Initial Clinical Review    Admission: Date/Time/Statement: 5/6/18 @ 1159     Orders Placed This Encounter   Procedures    Inpatient Admission     Standing Status:   Standing     Number of Occurrences:   1     Order Specific Question:   Admitting Physician     Answer:   Jose Eduardo Cosby     Order Specific Question:   Level of Care     Answer:   Med Surg [16]     Order Specific Question:   Estimated length of stay     Answer:   More than 2 Midnights     Order Specific Question:   Certification     Answer:   I certify that inpatient services are medically necessary for this patient for a duration of greater than two midnights  See H&P and MD Progress Notes for additional information about the patient's course of treatment  ED: Date/Time/Mode of Arrival:   ED Arrival Information     Expected Arrival Acuity Means of Arrival Escorted By Service Admission Type    5/6/2018 08:32 5/6/2018 09:11 Urgent Amb[facility] Surgery-General Urgent    Arrival Complaint    Abdominal Pain         Chief Complaint:   Chief Complaint   Patient presents with    Abdominal Pain     Transfer from [facility] r/o appy     History of Illness: Verenice Collins is a 50 y o  female who presents with abd pain, intermittent, LLQ that started 2 days ago and has progressively been getting worse  Pt states that he has been having darrhoea and poor po intake  + intermittent N, No V  + watery diarrhoea  Has COPD, >30 pack yrs of smoking  Has a hx of C diff   Went to [facility] and was evaluated and got transferred over to [facility] for possible appendicitis       ED Vital Signs:   ED Triage Vitals [05/06/18 0922]   Temperature Pulse Respirations Blood Pressure SpO2   98 4 °F (36 9 °C) 72 16 104/60 93 %      Temp Source Heart Rate Source Patient Position - Orthostatic VS BP Location FiO2 (%)   Oral Monitor Sitting Left arm --      Pain Score       3        Wt Readings from Last 1 Encounters:   05/07/18 66 1 kg (145 lb 11 6 oz)     Abnormal Labs: 5/5 5/7   Potassium 2 7 3 1   Chloride 101 109   Calcium 8 9 8 0   Alkaline Phosphatase 126    Albumin 3 4      Trop WNL  Lactic acid 4 6  WBC 12 48  C diff stool neg  Urine RBC 2-4   Urine WBC 0-1  Blood cultures pending     Diagnostic Test Results:     5/6 CTA chest, CT abdk pelvis - 1  No CT evidence of pulmonary embolism  2   Vague groundglass infiltrate right middle lobe representing either atelectasis or early pneumonia  Clinical correlation recommended  3   Mild abnormal appearance of the large and small bowel  Correlate for enterocolitis  5/5 EKG - Normal sinus rhythm  Right bundle branch block  Abnormal ECG  When compared with ECG of 13-FEB-2018 00:38,  QT has lengthened    ED Treatment:   Medication Administration from 05/06/2018 0831 to 05/06/2018 1526     None        Past Medical/Surgical History:    Active Ambulatory Problems     Diagnosis Date Noted    Smoker 02/08/2016    Non-ST elevation MI (NSTEMI) ([facility] ) 02/13/2016    CAD S/P CABG x 1 2016 02/13/2016    GERD (gastroesophageal reflux disease) 09/11/2016    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 ([facility] ) 09/11/2016    Depression 09/11/2016    Tobacco abuse 09/11/2016    Brain TIA 07/28/2017    Leg pain 07/28/2017    Left carotid stenosis 60/10/7580    Acute systolic CHF (congestive heart failure) ([facility] ) 06/18/2015    Atherosclerotic heart disease of native coronary artery without angina pectoris 03/06/2016    Cardiomyopathy, dilated ([facility] ) 09/16/2015    Chronic obstructive pulmonary disease ([facility] ) 01/02/2018    Ischemic cardiomyopathy 07/07/2015    Raynaud's syndrome without gangrene 03/17/2014    Thoracic outlet syndrome 09/13/2017    Vitamin D deficiency 07/28/2015    Sleep-related breathing disorder 04/05/2018    Insomnia 04/05/2018    Restless leg syndrome 04/05/2018    Sleep walking 04/05/2018    Hypersomnia 04/05/2018    Chronic pain disorder 04/05/2018     Resolved Ambulatory Problems Diagnosis Date Noted    Elevated troponin 02/08/2016    Chest pain 02/08/2016    Numbness of face 09/11/2016    Left sided numbness 07/28/2017    CASTORENA (dyspnea on exertion) 07/28/2017    Leukocytosis 09/10/2017    TIA (transient ischemic attack) 09/10/2017     Past Medical History:   Diagnosis Date    Chronic pain     Coronary artery disease     Cubital tunnel syndrome     Depression     GERD (gastroesophageal reflux disease)     History of Clostridium difficile     Ovarian cyst     Psychiatric disorder     Raynaud's disease     Takotsubo cardiomyopathy     Thoracic outlet syndrome     Tobacco abuse     Vitamin D deficiency      Admitting Diagnosis: Abdominal pain [R10 9]    Age/Sex: 50 y o  female    Assessment/Plan:     48yoF with enterocolitis  with possible pneumonia     Plan  NPO w/ [email or web address] [place]  F/u stool cultures and C diff  IV antibx: cefepime/flagyl  Pain control  SQH  Serial abd exams     Admission Orders:  Scheduled Meds:   Current Facility-Administered Medications:  albuterol 2 puff Inhalation Q6H PRN Isabel Fleming    aspirin 81 mg Oral Daily Isabel Fleming    atorvastatin 40 mg Oral Daily With [email or web address]    cefazolin 1,000 mg Intravenous Q8H Isabel Fleming Last Rate: 1,000 mg (05/07/18 1000)   clopidogrel 75 mg Oral Daily Isabel Fleming    docusate sodium 100 mg Oral BID PRN Isabel Fleming    escitalopram 5 mg Oral Daily Isabel Fleming    gabapentin 100 mg Oral TID Isabel Fleming    heparin (porcine) 5,000 Units Subcutaneous Q8H [address] Isabel Fleming    metoprolol tartrate 25 mg Oral Q12H [address] Isabel Fleming    metroNIDAZOLE 500 mg Intravenous Q8H Isabel Fleming Last Rate: 500 mg (05/07/18 0842)   nicotine 1 patch Transdermal Daily Isabel Fleming    nitroglycerin 0 4 mg Sublingual Q5 Min PRN Isabel Fleming    ondansetron 4 mg Intravenous Q4H PRN Isabel Fleming    oxyCODONE 5 mg Oral Q4H PRN Isabel Fleming    sodium chloride 75 mL/hr Intravenous Continuous Jeremy Anguiano MD Last Rate: 75 mL/hr (05/07/18 3921)     Continuous Infusions:   sodium chloride 75 mL/hr Last Rate: 75 mL/hr (05/07/18 7070)     PRN Meds:   Albuterol x1    docusate sodium    nitroglycerin    Ondansetron IV x 2 in last 24 hrs    oxyCODONE x 3 last 24 hr    SCDs  Ice chips   Up as laron   Diet regular  Cons CM   PT eval/tx   _______________________  5/7 Surgery Progress Note  50 y o  F w/ enterocolitis and poss PNA     Clinically still w/ respiratory symptoms however abd pain has improved  Her only complaint this AM is lower abd tenderness where she believes she had a post-tussive muscle strain  Pt still c/o diarrhea, however no n/v; likely due to viral infection     Plan:  Diet as tolerated  Cont low dose IVF to prevent dehydration  Cont abx  F/u stool and C diff  DVT ppx       Thank you,  [facility] in the [facility] by [employer]i for 2017  Network Utilization Review Department  Phone: [phone]; Fax [phone]  ATTENTION: The Network Utilization Review Department is now centralized for our 7 Facilities  Make a note that we have a new phone and fax numbers for our Department  Please call with any questions or concerns to [phone] and carefully follow the prompts so that you are directed to the right person  All voicemails are confidential  Fax any determinations, approvals, denials, and requests for initial or continue stay review clinical to [phone]  Due to HIGH CALL volume, it would be easier if you could please send faxed requests to expedite your requests and in part, help us provide discharge notifications faster

## 2018-05-07 NOTE — PHYSICAL THERAPY NOTE
Physical Therapy Evaluation     Patient's Name: Jourdan Kelley    Admitting Diagnosis  Abdominal pain [R10 9]    Problem List  Patient Active Problem List   Diagnosis    Smoker    Non-ST elevation [facility]) [facility] )    CAD S/P CABG x 1 2016    GERD (gastroesophageal reflux disease)    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 ([facility])    Depression    Tobacco abuse    Brain TIA    Leg pain    Left carotid stenosis    Acute systolic CHF (congestive heart failure) ([place] [address] )    Atherosclerotic heart disease of native coronary artery without angina pectoris    Cardiomyopathy, dilated ([place] [address] )    Chronic obstructive pulmonary disease ([place] [address] )    Ischemic cardiomyopathy    Raynaud's syndrome without gangrene    Thoracic outlet syndrome    Vitamin D deficiency    Sleep-related breathing disorder    Insomnia    Restless leg syndrome    Sleep walking    Hypersomnia    Chronic pain disorder    Enterocolitis       Past Medical History  Past Medical History:   Diagnosis Date    Chronic pain     Coronary artery disease     Cubital tunnel syndrome     Depression     GERD (gastroesophageal reflux disease)     History of Clostridium difficile     Ovarian cyst     Psychiatric disorder     depression    Raynaud's disease     Takotsubo cardiomyopathy     Thoracic outlet syndrome     Tobacco abuse     Vitamin D deficiency        Past Surgical History  Past Surgical History:   Procedure Laterality Date    CARDIAC CATHETERIZATION      CABG    CORONARY ARTERY BYPASS GRAFT N/A 2/12/2016    Procedure: CABG X1; Left  EVH to mid-LAD; ZAHRA;  Surgeon: Jaylene Vela DO;  [facility]: [facility];  Service:    Nichelle Kohler DEBRIDEMENT TENNIS ELBOW      DILATION AND CURETTAGE OF UTERUS      HYSTERECTOMY      HYSTEROSCOPY      LEFT OOPHORECTOMY      SALPINGECTOMY Right           05/07/18 1112   Note Type   Note type Eval only   Pain Assessment   Pain Assessment 0-10   Pain Score 8   Pain Type Acute pain Pain Location Abdomen   Home Living   Type of [facility] Multi-level; Able to live on main level with bedroom/bathroom; Performs ADLs on one level;Stairs to enter with rails  ([address])   Home Equipment Walker   Prior Function   Level of [place] Independent with ADLs and functional mobility   Lives With Significant other;Family   Receives Help From Family   ADL Assistance Needs assistance   IADLs Needs assistance   Falls in the last 6 months 0   Restrictions/Precautions   Weight Bearing Precautions Per Order No   Braces or Orthoses (NONE)   Other Precautions Pain;Multiple lines; Fall Risk;Contact/isolation   General   Family/Caregiver Present No   Cognition   Overall Cognitive Status WFL   RUE Assessment   RUE Assessment WFL   LUE Assessment   LUE Assessment WFL   RLE Assessment   RLE Assessment WFL   LLE Assessment   LLE Assessment WFL   Bed Mobility   Supine to Sit Unable to assess   Sit to Supine Unable to assess   Transfers   Sit to Stand 7  Independent   Stand to Sit 7  Independent   Ambulation/Elevation   Gait pattern WNL; Forward Flexion  (MILDLY REDUCED GAIT SPPED )   Gait Assistance 7  Independent   Assistive Device Other (Comment)  (PUSHING IV POLE )   Distance 10 FEET   Balance   Static Sitting Normal   Static Standing Good   Ambulatory Good   Endurance Deficit   Endurance Deficit Yes   Endurance Deficit Description PAIN, NAUSEA, FATIGUE    Activity Tolerance   Activity Tolerance Treatment limited secondary to medical complications (Comment); Patient limited by fatigue  (NAUSEA)   Nurse Made Aware RN FAVIAN    Assessment   Prognosis Good   Problem List Pain;Decreased endurance   Assessment PT COMPLETED EVALUATION OF 50YEAR OLD FEMALE ADMITTED TO [facility] ON 5/6/18 FOR ASSESSMENT OF APPENDICITIS AFTER TRANSFERRING FROM [place]  SYMPTOMS INCLUDED LEFT LOWER QUADRANT PAIN X 2 DAYS + WATERY DIARRAHEA  CURRENT DIAGNOSES INCLUDE ENTEROCOLITIS, POSSIBLE PNA, AND R/O C  DIFF   CURRENT MEDICAL AND PHYSICAL INSTABILITIES INCLUDE CONTACT PRECAUTIONS FOR C DIFF, UTILIZATION OF IV FLUIDS, PAIN, NAUSEA, AND A REGRESSION IN FUNCTIONAL STATUS FROM BASELINE  PMH IS SIGNIFICANT FOR RAYNAUDS DISEASE, CAD, CHRONIC PAIN, AND COPD  PRIOR TO THIS ADMISSION PATIENT WITH SIG OTHER AND FAMILY IN IN MULTILEVEL HOME ([address], [facility] WHERE SHE WAS PREVIOUSLY I WITH MOBILITY (RW PRN) AND ADLS  CURRENT IMPAIRMENTS INCLUDE PAIN, DECREASED ACTIVITY TOLERANCE, GROSS FATIGUE, AND GAIT DEVIATIONS  DURING PT EVALUATION PATIENT MOANING IN PAIN AND DRY-HEAVING INTO PINK BASIN  PATIENT REPORTS MOBILIZING SELF TO BATHROOM AND WAS ABLE TO DEMONSTRATE PERFORM I SIT<-->STAND TRANSFER AND 10 FEET OF AMBULATION W/O USE OF AD TO THERAPIST  PATIENT DOES NOT HAVE QUESTIONS/CONCERNS ABOUT D/C HOME WHEN APPROPRIATE AND DEMONSTRATES SAFE FUNCTIONAL MOBILITY  PT D/C RECOMMENDATION IS FOR HOME W/O FOLLOW UP PT  NEEDS WHEN MED TITUS  RECOMMEND CONTINUED SELF AMBULATION THIS ADMISSION  D/C INPT PT SERVICES      Goals   Patient Goals TO BE LESS NAUSEUS    Treatment Day 0   Recommendation   Recommendation Home with family support   Equipment Recommended (NONE )   PT - OK to Discharge Yes  (HOME W/ FAMILY ASSIST PRN WHEN MED TITUS )   Barthel Index   Feeding 5   Bathing 5   Grooming Score 5   Dressing Score 10   Bladder Score 10   Bowels Score 10   Toilet Use Score 10   Transfers (Bed/Chair) Score 15   Mobility (Level Surface) Score 15   Stairs Score 0   Barthel Index Score 85         Maurice White, PT

## 2018-05-07 NOTE — PROGRESS NOTES
Progress Note - General Surgery   Jourdan Kelley 50 y o  female MRN: [id number]  Unit/Bed#: [facility] 823-01 Encounter: [id number]    Assessment:  50 y o  F w/ enterocolitis and poss PNA    Clinically still w/ respiratory symptoms however abd pain has improved  Her only complaint this AM is lower abd tenderness where she believes she had a post-tussive muscle strain  Pt still c/o diarrhea, however no n/v; likely due to viral infection    Plan:  Diet as tolerated  Cont low dose IVF to prevent dehydration  Cont abx  F/u stool and C diff  DVT ppx    Subjective/Objective     Subjective: lower abd pain, no distention n/v overnight, persistent diarrhea    Objective:     Blood pressure 112/59, pulse 72, temperature 99 °F (37 2 °C), temperature source Oral, resp  rate 20, height 5' 6" (1 676 m), weight 65 7 kg (144 lb 13 5 oz), last menstrual period 10/14/2015, SpO2 90 %, not currently breastfeeding  ,Body mass index is 23 38 kg/m²        Intake/Output Summary (Last 24 hours) at 05/07/18 0608  Last data filed at 05/07/18 0257   Gross per 24 hour   Intake           1362 5 ml   Output              400 ml   Net            962 5 ml       Invasive Devices     Peripheral Intravenous Line            Peripheral IV 05/05/18 Right Antecubital 1 day                Physical Exam:   NAD  CV RRR  Pulm CTA  Abd soft, nondistended, point tenderness in abd wall of lower midline    Lab, Imaging and other studies:CBC: No results found for: WBC, HGB, HCT, MCV, PLT, ADJUSTEDWBC, MCH, MCHC, RDW, MPV, NRBC, CMP: No results found for: NA, K, CL, CO2, ANIONGAP, BUN, CREATININE, GLUCOSE, CALCIUM, AST, ALT, ALKPHOS, PROT, ALBUMIN, BILITOT, EGFR, Coagulation: No results found for: PT, INR, APTT  VTE Pharmacologic Prophylaxis: Heparin  VTE Mechanical Prophylaxis: sequential compression device

## 2018-05-07 NOTE — PROGRESS NOTES
Occupational Therapy Cancel Note:  OT orders received and chart reviewed  Attempted OT eval and treat, however, pt declines eval at this time due to nausea, pain  Agreeable to return tomorrow   Will reattempt tomorrow for eval and assist DC plans  HEBER Fowler/L

## 2018-05-08 PROCEDURE — 99232 SBSQ HOSP IP/OBS MODERATE 35: CPT | Performed by: SURGERY

## 2018-05-08 RX ORDER — POTASSIUM CHLORIDE 20 MEQ/1
40 TABLET, EXTENDED RELEASE ORAL ONCE
Status: COMPLETED | OUTPATIENT
Start: 2018-05-08 | End: 2018-05-08

## 2018-05-08 RX ADMIN — CEFAZOLIN SODIUM 1000 MG: 10 INJECTION, POWDER, FOR SOLUTION INTRAVENOUS at 19:25

## 2018-05-08 RX ADMIN — GABAPENTIN 100 MG: 100 CAPSULE ORAL at 10:32

## 2018-05-08 RX ADMIN — HEPARIN SODIUM 5000 UNITS: 5000 INJECTION, SOLUTION INTRAVENOUS; SUBCUTANEOUS at 14:22

## 2018-05-08 RX ADMIN — CEFAZOLIN SODIUM 1000 MG: 10 INJECTION, POWDER, FOR SOLUTION INTRAVENOUS at 12:07

## 2018-05-08 RX ADMIN — CLOPIDOGREL BISULFATE 75 MG: 75 TABLET ORAL at 10:28

## 2018-05-08 RX ADMIN — METRONIDAZOLE 500 MG: 500 INJECTION, SOLUTION INTRAVENOUS at 10:29

## 2018-05-08 RX ADMIN — POTASSIUM CHLORIDE 40 MEQ: 1500 TABLET, EXTENDED RELEASE ORAL at 12:07

## 2018-05-08 RX ADMIN — ASPIRIN 81 MG 81 MG: 81 TABLET ORAL at 10:29

## 2018-05-08 RX ADMIN — METRONIDAZOLE 500 MG: 500 INJECTION, SOLUTION INTRAVENOUS at 17:30

## 2018-05-08 RX ADMIN — GABAPENTIN 100 MG: 100 CAPSULE ORAL at 22:28

## 2018-05-08 RX ADMIN — METRONIDAZOLE 500 MG: 500 INJECTION, SOLUTION INTRAVENOUS at 01:31

## 2018-05-08 RX ADMIN — DOCUSATE SODIUM 100 MG: 100 CAPSULE, LIQUID FILLED ORAL at 10:28

## 2018-05-08 RX ADMIN — HEPARIN SODIUM 5000 UNITS: 5000 INJECTION, SOLUTION INTRAVENOUS; SUBCUTANEOUS at 22:28

## 2018-05-08 RX ADMIN — CEFAZOLIN SODIUM 1000 MG: 10 INJECTION, POWDER, FOR SOLUTION INTRAVENOUS at 02:50

## 2018-05-08 RX ADMIN — OXYCODONE HYDROCHLORIDE 5 MG: 5 TABLET ORAL at 15:35

## 2018-05-08 RX ADMIN — METOPROLOL TARTRATE 25 MG: 25 TABLET, FILM COATED ORAL at 10:28

## 2018-05-08 RX ADMIN — GABAPENTIN 100 MG: 100 CAPSULE ORAL at 17:23

## 2018-05-08 RX ADMIN — ATORVASTATIN CALCIUM 40 MG: 40 TABLET, FILM COATED ORAL at 17:23

## 2018-05-08 RX ADMIN — OXYCODONE HYDROCHLORIDE 5 MG: 5 TABLET ORAL at 10:28

## 2018-05-08 RX ADMIN — HEPARIN SODIUM 5000 UNITS: 5000 INJECTION, SOLUTION INTRAVENOUS; SUBCUTANEOUS at 05:56

## 2018-05-08 RX ADMIN — SODIUM CHLORIDE 75 ML/HR: 0.9 INJECTION, SOLUTION INTRAVENOUS at 05:56

## 2018-05-08 RX ADMIN — METOPROLOL TARTRATE 25 MG: 25 TABLET, FILM COATED ORAL at 22:28

## 2018-05-08 RX ADMIN — ESCITALOPRAM OXALATE 5 MG: 10 TABLET, FILM COATED ORAL at 10:31

## 2018-05-08 NOTE — PROGRESS NOTES
Progress Note - General Surgery   Jourdan Kelley 50 y o  female MRN: [id number]  Unit/Bed#: [facility] 823-01 Encounter: [id number]    Assessment and Plan:  Patient Active Problem List   Diagnosis    Smoker    Non-ST elevation MI (NSTEMI) ([place] [address] )    CAD S/P CABG x 1 2016    GERD (gastroesophageal reflux disease)    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 ([place] [address] )    Depression    Tobacco abuse    Brain TIA    Leg pain    Left carotid stenosis    Acute systolic CHF (congestive heart failure) ([place] [address] )    Atherosclerotic heart disease of native coronary artery without angina pectoris    Cardiomyopathy, dilated ([place] [address] )    Chronic obstructive pulmonary disease ([place] [address] )    Ischemic cardiomyopathy    Raynaud's syndrome without gangrene    Thoracic outlet syndrome    Vitamin D deficiency    Sleep-related breathing disorder    Insomnia    Restless leg syndrome    Sleep walking    Hypersomnia    Chronic pain disorder    Enterocolitis       Assessment:  50 y o  F w/ enterocolitis and poss PNA      Plan:  Regular Diet as tolerated  Serial abd exams   D/c IVF   Cont abx - Ancef /flagyl  Follow up on stool Cx   DVT ppx - SQH & Vneodynes        Subjective: Pt had 1 episode of diarrhea yesterday after breakfast  Didn't try eating any food after that  She is hungry this am & is planning on attempting to eat breakfast  CDiff negative   in LLQ - pt states she has never had a C-Scope     Objective:       Vitals   Vitals:    05/07/18 0900 05/07/18 1543 05/07/18 2217 05/08/18 0002   BP: 113/66 107/66 110/70 105/57   BP Location: Left arm Left arm  Right arm   Pulse: 80 99 60 73   Resp: 18 18 18   Temp: 99 3 °F (37 4 °C) 99 7 °F (37 6 °C)  99 5 °F (37 5 °C)   TempSrc: Oral Oral  Oral   SpO2: 90% 91%  (!) 89%   Weight:       Height:         Temp  Min: 97 8 °F (36 6 °C)  Max: 102 2 °F (39 °C)  IBW: 59 3 kg   Body mass index is 23 52 kg/m²      I/O   I/O       05/06 5924 - 05/07 0700 05/07 0701 - 05/08 0700    P  O   420    I V  (mL/kg) 1212 5 (18 3) 1038 8 (15 7)    IV Piggyback 150 346 7    Total Intake(mL/kg) 1362 5 (20 6) 1805 4 (27 3)    Urine (mL/kg/hr) 600 2350 (1 5)    Emesis/NG output  1 (0)    Stool 0 0 (0)    Total Output 600 2351    Net +762 5 -545  6          Unmeasured Urine Occurrence 1 x     Unmeasured Stool Occurrence 1 x 1 x          Intake/Output Summary (Last 24 hours) at 05/08/18 0553  Last data filed at 05/08/18 0302   Gross per 24 hour   Intake          1805 42 ml   Output             2351 ml   Net          -545 58 ml       Invasive  Devices  Invasive Devices     Peripheral Intravenous Line            Peripheral IV 05/05/18 Right Antecubital 2 days                Active medications  Scheduled Meds:  Current Facility-Administered Medications:  albuterol 2 puff Inhalation Q6H PRN Isabel Fleming    aspirin 81 mg Oral Daily Isabel Fleming    atorvastatin 40 mg Oral Daily With [employer]    cefazolin 1,000 mg Intravenous Q8H Isabel Fleming Last Rate: 1,000 mg (05/08/18 0250)   clopidogrel 75 mg Oral Daily Isabel Fleming    docusate sodium 100 mg Oral BID PRN Isabel Fleming    escitalopram 5 mg Oral Daily Isabel Fleming    gabapentin 100 mg Oral TID Isabel Fleming    heparin (porcine) 5,000 Units Subcutaneous Q8H [address] Isabel Fleming    metoprolol tartrate 25 mg Oral Q12H [address] Isabel Fleming    metroNIDAZOLE 500 mg Intravenous Q8H Isabel Fleming Last Rate: Stopped (05/08/18 0230)   nicotine 1 patch Transdermal Daily Isabel Fleming    nitroglycerin 0 4 mg Sublingual Q5 Min PRN Isabel Fleming    ondansetron 4 mg Intravenous Q4H PRN Isabel Fleming    oxyCODONE 5 mg Oral Q4H PRN Isabel Fleming    sodium chloride 75 mL/hr Intravenous Continuous Moon Prabhakar MD Last Rate: 75 mL/hr (05/07/18 1440)     Continuous Infusions:    sodium chloride 75 mL/hr Last Rate: 75 mL/hr (05/07/18 1440)     PRN Meds:     albuterol 2 puff Q6H PRN   docusate sodium 100 mg BID PRN   nitroglycerin 0 4 mg Q5 Min PRN   ondansetron 4 mg Q4H PRN   oxyCODONE 5 mg Q4H PRN       Physical Exam: /57 (BP Location: Right arm)   Pulse 73   Temp 99 5 °F (37 5 °C) (Oral)   Resp 18   Ht 5' 6" (1 676 m)   Wt 66 1 kg (145 lb 11 6 oz)   LMP 10/14/2015 (LMP Unknown)   SpO2 (!) 89%   BMI 23 52 kg/m²     Physical Exam:  General Appearance: Alert, cooperative, no distress, appears stated age  Lungs: Clear to auscultation bilaterally, respirations unlablored   Heart: Regular rate and rhythm, S1 and S2 normal, no murmur, rub or gallop  Abdomen: Soft, tender in LLQ w/ rebound, non distended, bowel sounds active in all four quadrants,   No masses or organomegaly    Laboratory and Diagnostics:    Results from last 7 days  Lab Units 05/07/18  0536 05/05/18  2234   WBC Thousand/uL 8 44 12 48*   HEMOGLOBIN g/dL 12 5 13 7   HEMATOCRIT % 39 1 41 1   PLATELETS Thousands/uL 227 315   NEUTROS PCT % 74 74   MONOS PCT % 6 11       Results from last 7 days  Lab Units 05/07/18  0536 05/05/18  2234   SODIUM mmol/L 141 136   POTASSIUM mmol/L 3 1* 2 7*   CHLORIDE mmol/L 109* 101   CO2 mmol/L 21 23   BUN mg/dL 7 8   CREATININE mg/dL 0 66 1 00   CALCIUM mg/dL 8 0* 8 9   TOTAL PROTEIN g/dL  --  7 4   BILIRUBIN TOTAL mg/dL  --  0 20   ALK PHOS U/L  --  126*   ALT U/L  --  22   AST U/L  --  24   GLUCOSE RANDOM mg/dL 78 94       Results from last 7 days  Lab Units 05/07/18  0536   MAGNESIUM mg/dL 1 9     Lab Results   Component Value Date    PHOS 3 2 11/21/2017    PHOS 3 3 07/28/2017    PHOS 3 2 07/19/2015    PHOS 2 8 07/17/2015    PHOS 2 8 07/16/2015        Results from last 7 days  Lab Units 05/05/18  2234   INR  1 05   PTT seconds 30     No results found for: TROPONINT  ABG:  Lab Results   Component Value Date    PHART 7 269 (L) 02/12/2016    [id number] 55 5 (HH) 02/12/2016    PO2ART 136 0 (H) 02/12/2016    [id number] 24 9 02/12/2016    BEART -2 6 02/12/2016    SOURCE Line, Arterial 02/12/2016       Blood Culture:   Lab Results   Component Value Date    BLOODCX No Growth at 24 hrs  05/05/2018    BLOODCX No Growth at 24 hrs  05/05/2018    BLOODCX No Growth After 5 Days  01/30/2018     Urine Culture: No results found for: URINECX  Sputum Culture: No components found for: SPUTUMCX    Imaging: I have personally reviewed pertinent reports           VTE Pharmacologic Prophylaxis: Heparin  VTE Mechanical Prophylaxis: sequential compression device

## 2018-05-09 ENCOUNTER — APPOINTMENT (INPATIENT)
Dept: RADIOLOGY | Facility: HOSPITAL | Age: 48
DRG: 351 | End: 2018-05-09
Payer: COMMERCIAL

## 2018-05-09 LAB
ANION GAP SERPL CALCULATED.3IONS-SCNC: 9 MMOL/L (ref 4–13)
BUN SERPL-MCNC: 4 MG/DL (ref 5–25)
CALCIUM SERPL-MCNC: 8.1 MG/DL (ref 8.3–10.1)
CHLORIDE SERPL-SCNC: 108 MMOL/L (ref 100–108)
CO2 SERPL-SCNC: 25 MMOL/L (ref 21–32)
CREAT SERPL-MCNC: 0.62 MG/DL (ref 0.6–1.3)
ERYTHROCYTE [DISTWIDTH] IN BLOOD BY AUTOMATED COUNT: 15.8 % (ref 11.6–15.1)
GFR SERPL CREATININE-BSD FRML MDRD: 107 ML/MIN/1.73SQ M
GLUCOSE SERPL-MCNC: 83 MG/DL (ref 65–140)
HCT VFR BLD AUTO: 40.3 % (ref 34.8–46.1)
HGB BLD-MCNC: 13 G/DL (ref 11.5–15.4)
MCH RBC QN AUTO: 28.9 PG (ref 26.8–34.3)
MCHC RBC AUTO-ENTMCNC: 32.3 G/DL (ref 31.4–37.4)
MCV RBC AUTO: 90 FL (ref 82–98)
PLATELET # BLD AUTO: 207 THOUSANDS/UL (ref 149–390)
PMV BLD AUTO: 10.7 FL (ref 8.9–12.7)
POTASSIUM SERPL-SCNC: 3.1 MMOL/L (ref 3.5–5.3)
RBC # BLD AUTO: 4.5 MILLION/UL (ref 3.81–5.12)
SODIUM SERPL-SCNC: 142 MMOL/L (ref 136–145)
WBC # BLD AUTO: 4.43 THOUSAND/UL (ref 4.31–10.16)

## 2018-05-09 PROCEDURE — 97166 OT EVAL MOD COMPLEX 45 MIN: CPT

## 2018-05-09 PROCEDURE — 80048 BASIC METABOLIC PNL TOTAL CA: CPT | Performed by: SURGERY

## 2018-05-09 PROCEDURE — 85027 COMPLETE CBC AUTOMATED: CPT | Performed by: PHYSICIAN ASSISTANT

## 2018-05-09 PROCEDURE — G8987 SELF CARE CURRENT STATUS: HCPCS

## 2018-05-09 PROCEDURE — G8989 SELF CARE D/C STATUS: HCPCS

## 2018-05-09 PROCEDURE — 99231 SBSQ HOSP IP/OBS SF/LOW 25: CPT | Performed by: PHYSICIAN ASSISTANT

## 2018-05-09 PROCEDURE — 74177 CT ABD & PELVIS W/CONTRAST: CPT

## 2018-05-09 PROCEDURE — G8988 SELF CARE GOAL STATUS: HCPCS

## 2018-05-09 RX ORDER — MAGNESIUM SULFATE HEPTAHYDRATE 40 MG/ML
2 INJECTION, SOLUTION INTRAVENOUS ONCE
Status: COMPLETED | OUTPATIENT
Start: 2018-05-09 | End: 2018-05-09

## 2018-05-09 RX ORDER — POTASSIUM CHLORIDE 14.9 MG/ML
20 INJECTION INTRAVENOUS
Status: COMPLETED | OUTPATIENT
Start: 2018-05-09 | End: 2018-05-09

## 2018-05-09 RX ORDER — DEXTROSE, SODIUM CHLORIDE, AND POTASSIUM CHLORIDE 5; .9; .15 G/100ML; G/100ML; G/100ML
75 INJECTION INTRAVENOUS CONTINUOUS
Status: DISCONTINUED | OUTPATIENT
Start: 2018-05-09 | End: 2018-05-10

## 2018-05-09 RX ADMIN — MAGNESIUM SULFATE HEPTAHYDRATE 2 G: 40 INJECTION, SOLUTION INTRAVENOUS at 10:58

## 2018-05-09 RX ADMIN — ESCITALOPRAM OXALATE 5 MG: 10 TABLET, FILM COATED ORAL at 08:18

## 2018-05-09 RX ADMIN — GABAPENTIN 100 MG: 100 CAPSULE ORAL at 21:53

## 2018-05-09 RX ADMIN — POTASSIUM CHLORIDE 20 MEQ: 200 INJECTION, SOLUTION INTRAVENOUS at 10:58

## 2018-05-09 RX ADMIN — METOPROLOL TARTRATE 25 MG: 25 TABLET, FILM COATED ORAL at 21:52

## 2018-05-09 RX ADMIN — ONDANSETRON 4 MG: 2 INJECTION INTRAMUSCULAR; INTRAVENOUS at 13:10

## 2018-05-09 RX ADMIN — CEFAZOLIN SODIUM 1000 MG: 10 INJECTION, POWDER, FOR SOLUTION INTRAVENOUS at 10:52

## 2018-05-09 RX ADMIN — POTASSIUM CHLORIDE 20 MEQ: 200 INJECTION, SOLUTION INTRAVENOUS at 14:57

## 2018-05-09 RX ADMIN — GABAPENTIN 100 MG: 100 CAPSULE ORAL at 08:17

## 2018-05-09 RX ADMIN — DEXTROSE, SODIUM CHLORIDE, AND POTASSIUM CHLORIDE 75 ML/HR: 5; .9; .15 INJECTION INTRAVENOUS at 14:57

## 2018-05-09 RX ADMIN — OXYCODONE HYDROCHLORIDE 5 MG: 5 TABLET ORAL at 18:02

## 2018-05-09 RX ADMIN — CEFAZOLIN SODIUM 1000 MG: 10 INJECTION, POWDER, FOR SOLUTION INTRAVENOUS at 02:15

## 2018-05-09 RX ADMIN — POTASSIUM CHLORIDE 20 MEQ: 200 INJECTION, SOLUTION INTRAVENOUS at 17:51

## 2018-05-09 RX ADMIN — DOCUSATE SODIUM 100 MG: 100 CAPSULE, LIQUID FILLED ORAL at 08:17

## 2018-05-09 RX ADMIN — CEFAZOLIN SODIUM 1000 MG: 10 INJECTION, POWDER, FOR SOLUTION INTRAVENOUS at 19:44

## 2018-05-09 RX ADMIN — METRONIDAZOLE 500 MG: 500 INJECTION, SOLUTION INTRAVENOUS at 01:35

## 2018-05-09 RX ADMIN — HEPARIN SODIUM 5000 UNITS: 5000 INJECTION, SOLUTION INTRAVENOUS; SUBCUTANEOUS at 05:43

## 2018-05-09 RX ADMIN — IOHEXOL 100 ML: 350 INJECTION, SOLUTION INTRAVENOUS at 17:14

## 2018-05-09 RX ADMIN — HEPARIN SODIUM 5000 UNITS: 5000 INJECTION, SOLUTION INTRAVENOUS; SUBCUTANEOUS at 13:10

## 2018-05-09 RX ADMIN — ASPIRIN 81 MG 81 MG: 81 TABLET ORAL at 08:17

## 2018-05-09 RX ADMIN — METOPROLOL TARTRATE 25 MG: 25 TABLET, FILM COATED ORAL at 08:18

## 2018-05-09 RX ADMIN — CLOPIDOGREL BISULFATE 75 MG: 75 TABLET ORAL at 08:17

## 2018-05-09 RX ADMIN — ATORVASTATIN CALCIUM 40 MG: 40 TABLET, FILM COATED ORAL at 17:52

## 2018-05-09 RX ADMIN — METRONIDAZOLE 500 MG: 500 INJECTION, SOLUTION INTRAVENOUS at 08:18

## 2018-05-09 RX ADMIN — METRONIDAZOLE 500 MG: 500 INJECTION, SOLUTION INTRAVENOUS at 18:02

## 2018-05-09 RX ADMIN — POTASSIUM CHLORIDE 20 MEQ: 200 INJECTION, SOLUTION INTRAVENOUS at 19:48

## 2018-05-09 RX ADMIN — GABAPENTIN 100 MG: 100 CAPSULE ORAL at 17:51

## 2018-05-09 NOTE — OCCUPATIONAL THERAPY NOTE
Occupational Therapy Evaluation      Adrian Palma    5/9/2018    Patient Active Problem List   Diagnosis    Smoker    Non-ST elevation [facility] ([facility]STEMI) [facility]    CAD S/P CABG x 1 2016    GERD (gastroesophageal reflux disease)    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 ([facility])    Depression    Tobacco abuse    Brain TIA    Leg pain    Left carotid stenosis    Acute systolic CHF (congestive heart failure) ([facility])    Atherosclerotic heart disease of native coronary artery without angina pectoris    Cardiomyopathy, dilated [facility] )    Chronic obstructive pulmonary disease ([facility] )    Ischemic cardiomyopathy    Raynaud's syndrome without gangrene    Thoracic outlet syndrome    Vitamin D deficiency    Sleep-related breathing disorder    Insomnia    Restless leg syndrome    Sleep walking    Hypersomnia    Chronic pain disorder    Enterocolitis       Past Medical History:   Diagnosis Date    Chronic pain     Coronary artery disease     Cubital tunnel syndrome     Depression     GERD (gastroesophageal reflux disease)     History of Clostridium difficile     Ovarian cyst     Psychiatric disorder     depression    Raynaud's disease     Takotsubo cardiomyopathy     Thoracic outlet syndrome     Tobacco abuse     Vitamin D deficiency        Past Surgical History:   Procedure Laterality Date    CARDIAC CATHETERIZATION      CABG    CORONARY ARTERY BYPASS GRAFT N/A 2/12/2016    Procedure: CABG X1; Left  EVH to mid-LAD; ZAHRA;  Surgeon: Farida Mcnally DO;  [facility]: [facility];  Service:    Ilia Lilly DEBRIDEMENT TENNIS ELBOW      DILATION AND CURETTAGE OF UTERUS      HYSTERECTOMY      HYSTEROSCOPY      LEFT OOPHORECTOMY      SALPINGECTOMY Right       05/09/18 0908   Note Type   Note type Eval only   Pain Assessment   Pain Assessment No/denies pain   Pain Score No Pain   Home Living   Type of Home House   Home Layout Multi-level;Bed/bath upstairs   Bathroom Shower/Tub Tub/shower unit Bathroom Toilet Standard   Additional Comments no DME at home   Prior Function   Level of [place] Independent with ADLs and functional mobility   Lives With Spouse;Daughter;Family  (7 y/o grandson)   IADLs Independent   Falls in the last 6 months 0   Lifestyle   Autonomy pt reports full independence w all self care, home management, driving etc   Reciprocal Relationships supportive family   Intrinsic Gratification walk around the mall   Psychosocial   Psychosocial (WDL) WDL   Subjective   Subjective "I hope they can figure out why I am so nauseus   ADL   Grooming Assistance 7  Independent   UB Bathing Assistance 7  Independent   LB Bathing Assistance 7  Independent   UB Dressing Assistance 7  Independent   LB Dressing Assistance 7  [address]  7  Independent   Additional Comments pt able to complete all self care by herself with increased time for completion due to general feeling of not feeling well   Bed Mobility   Rolling R 7  Independent   Rolling L 7  Independent   Supine to Sit 7  Independent   Sit to Supine 7  Independent   Transfers   Sit to Stand 7  Independent   Stand to Sit 7  Independent   Stand pivot 7  Independent   Toilet transfer 7  Independent   Functional Mobility   Functional Mobility 7  Independent   Balance   Static Sitting Good   Dynamic Sitting Good   Static Standing Good   Dynamic Standing Fair +   Activity Tolerance   Activity Tolerance Treatment limited secondary to medical complications (Comment)  (limited due to nausea)   Nurse Made Aware appropriate to see   RUE Assessment   RUE Assessment WFL   LUE Assessment   LUE Assessment WFL   Hand Function   Gross Motor Coordination Functional   Fine Motor Coordination Functional   Sensation   Light Touch No apparent deficits   Vision - Complex Assessment   Tracking Able to track stimulus in all quads without difficulty   Acuity Able to read clock/calendar on wall without difficulty   Cognition   Overall Cognitive Status WFL   Arousal/Participation Alert; Cooperative   Attention Within functional limits   Orientation Level Oriented X4   Memory Within functional limits   Following Commands Follows all commands and directions without difficulty   Assessment   Assessment Pt is a 50 y o  female seen for OT evaluation s/p admit to [facility] on 5/6/2018 w/ Enterocolitis  She presented with abd pain, intermittent, LLQ that started 2 days ago and has progressively been getting worse  Pt states that he has been having darrhoea and poor po intake  + intermittent N, No V  + watery diarrhoea Comorbidities affecting pt's functional performance at time of assessment include: previous surgery, COPD and CAD s/p CABG 2016, NSTEMI, depression, TIA, cardiomyopathy, insomnia, restless leg syndrome, chronic pain disorder, depression, +smoker, thoracic outlet syndrome  Personal factors affecting pt at time of IE include:steps to enter environment  Prior to admission, pt was fully independent w all self care, functional mobility  Upon evaluation: Pt demonstrating ability to complete self care by herself  She was educated on self pacing and ECT, safety such as completing self care sitting down, planning ahead etc, as she was noted to fatigue easily  Pt with good understanding  the following    Pt scored   90/100 on the barthel index  Based on findings from OT evaluation and functional performance deficits, pt has been identified as a  Moderate complexity evaluation  From OT standpoint, recommendation at time of d/c would be home w family support       Goals   Patient Goals to get better and go home   Plan   OT Frequency Eval only   Recommendation   OT Discharge Recommendation Home with family support   OT - OK to Discharge Yes   Barthel Index   Feeding 10   Bathing 5   Grooming Score 5   Dressing Score 10   Bladder Score 10   Bowels Score 10   Toilet Use Score 10   Transfers (Bed/Chair) Score 15   Mobility (Level Surface) Score 15   Stairs Score 0   Barthel Index Score 90

## 2018-05-09 NOTE — PROGRESS NOTES
Progress Note - General Surgery   Rosemary Godoy 50 y o  female MRN: [id number]  Unit/Bed#: [facility] 823-01 Encounter: [id number]    Assessment and Plan:  Patient Active Problem List   Diagnosis    Smoker    Non-ST elevation MI (NSTEMI) ([place] [address] )    CAD S/P CABG x 1 2016    GERD (gastroesophageal reflux disease)    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 ([place] [address] )    Depression    Tobacco abuse    Brain TIA    Leg pain    Left carotid stenosis    Acute systolic CHF (congestive heart failure) ([place] [address] )    Atherosclerotic heart disease of native coronary artery without angina pectoris    Cardiomyopathy, dilated ([place] [address] )    Chronic obstructive pulmonary disease ([place] [address] )    Ischemic cardiomyopathy    Raynaud's syndrome without gangrene    Thoracic outlet syndrome    Vitamin D deficiency    Sleep-related breathing disorder    Insomnia    Restless leg syndrome    Sleep walking    Hypersomnia    Chronic pain disorder    Enterocolitis       Assessment:  50 y o  F w/ enterocolitis and poss PNA      Plan: Will obtain CTAP w/ Oral & IV contrast today   Regular Diet as tolerated  Serial abd exams   Cont abx - Ancef /flagyl  Follow up on stool Cx   DVT ppx - SQH & Vneodynes        Subjective: Pt still c/o LLQ pain - physical exam rebound appears out of proportion to her stated mild pain  Her pain is the worst with coughing or straining  States everything she eats immediately comes out as diarrhea  1 episode of emesis after lunch yesterday  Objective:       Vitals   Vitals:    05/08/18 0704 05/08/18 1500 05/08/18 2228 05/08/18 2300   BP: 131/64 122/60 124/70 120/71   BP Location: Left arm Left arm  Left arm   Pulse: 69 72 63 65   Resp: 18 18 18   Temp: 99 3 °F (37 4 °C) 99 2 °F (37 3 °C)  98 9 °F (37 2 °C)   TempSrc: Oral Oral  Oral   SpO2: 92% 94%  94%   Weight:       Height:         Temp  Min: 97 8 °F (36 6 °C)  Max: 102 2 °F (39 °C)  IBW: 59 3 kg   Body mass index is 23 52 kg/m²      I/O   I/O 05/06 0701 - 05/07 0700 05/07 0701 - 05/08 0700    P  O   420    I V  (mL/kg) 1212 5 (18 3) 1038 8 (15 7)    IV Piggyback 150 346 7    Total Intake(mL/kg) 1362 5 (20 6) 1805 4 (27 3)    Urine (mL/kg/hr) 600 2350 (1 5)    Emesis/NG output  1 (0)    Stool 0 0 (0)    Total Output 600 2351    Net +762 5 -545  6          Unmeasured Urine Occurrence 1 x     Unmeasured Stool Occurrence 1 x 1 x          Intake/Output Summary (Last 24 hours) at 05/09/18 0551  Last data filed at 05/09/18 0205   Gross per 24 hour   Intake             2425 ml   Output             1051 ml   Net             1374 ml       Invasive  Devices  Invasive Devices     Peripheral Intravenous Line            Peripheral IV 05/05/18 Right Antecubital 3 days                Active medications  Scheduled Meds:    Current Facility-Administered Medications:  albuterol 2 puff Inhalation Q6H PRN Isabel Fleming    aspirin 81 mg Oral Daily Isabel Fleming    atorvastatin 40 mg Oral Daily With RFinity    cefazolin 1,000 mg Intravenous Q8H Isabel Fleming Last Rate: 1,000 mg (05/09/18 0215)   clopidogrel 75 mg Oral Daily Isabel Fleming    docusate sodium 100 mg Oral BID PRN Isabel Fleming    escitalopram 5 mg Oral Daily Isabel Fleming    gabapentin 100 mg Oral TID Isabel Fleming    heparin (porcine) 5,000 Units Subcutaneous Q8H [facility] Isabel Fleming    metoprolol tartrate 25 mg Oral Q12H [facility] Isabel Fleming    metroNIDAZOLE 500 mg Intravenous Q8H Isabel Fleming Last Rate: Stopped (05/09/18 0205)   nicotine 1 patch Transdermal Daily Isabel Fleming    nitroglycerin 0 4 mg Sublingual Q5 Min PRN Isabel Fleming    ondansetron 4 mg Intravenous Q4H PRN Isabel Fleming    oxyCODONE 5 mg Oral Q4H PRN Isabel Fleming      Continuous Infusions:     PRN Meds:     albuterol 2 puff Q6H PRN   docusate sodium 100 mg BID PRN   nitroglycerin 0 4 mg Q5 Min PRN   ondansetron 4 mg Q4H PRN   oxyCODONE 5 mg Q4H PRN       Physical Exam: /71 (BP Location: Left arm)   Pulse 65   Temp 98 9 °F (37 2 °C) (Oral)   Resp 18   Ht 5' 6" (1 676 m)   Wt 66 1 kg (145 lb 11 6 oz)   LMP 10/14/2015 (LMP Unknown)   SpO2 94%   BMI 23 52 kg/m²     Physical Exam:  General Appearance: Alert, cooperative, no distress, appears stated age  Lungs: Clear to auscultation bilaterally, respirations unlablored   Heart: Regular rate and rhythm, S1 and S2 normal, no murmur, rub or gallop  Abdomen: Soft, tender in LLQ w/ rebound, non distended, bowel sounds active in all four quadrants,   No masses or organomegaly    Laboratory and Diagnostics:    Results from last 7 days  Lab Units 05/07/18  0536 05/05/18  2234   WBC Thousand/uL 8 44 12 48*   HEMOGLOBIN g/dL 12 5 13 7   HEMATOCRIT % 39 1 41 1   PLATELETS Thousands/uL 227 315   NEUTROS PCT % 74 74   MONOS PCT % 6 11       Results from last 7 days  Lab Units 05/07/18  0536 05/05/18  2234   SODIUM mmol/L 141 136   POTASSIUM mmol/L 3 1* 2 7*   CHLORIDE mmol/L 109* 101   CO2 mmol/L 21 23   BUN mg/dL 7 8   CREATININE mg/dL 0 66 1 00   CALCIUM mg/dL 8 0* 8 9   TOTAL PROTEIN g/dL  --  7 4   BILIRUBIN TOTAL mg/dL  --  0 20   ALK PHOS U/L  --  126*   ALT U/L  --  22   AST U/L  --  24   GLUCOSE RANDOM mg/dL 78 94       Results from last 7 days  Lab Units 05/07/18  0536   MAGNESIUM mg/dL 1 9     Lab Results   Component Value Date    PHOS 3 2 11/21/2017    PHOS 3 3 07/28/2017    PHOS 3 2 07/19/2015    PHOS 2 8 07/17/2015    PHOS 2 8 07/16/2015        Results from last 7 days  Lab Units 05/05/18  2234   INR  1 05   PTT seconds 30     No results found for: TROPONINT  ABG:  Lab Results   Component Value Date    PHART 7 269 (L) 02/12/2016    MNU4WMH 55 5 (HH) 02/12/2016    PO2ART 136 0 (H) 02/12/2016    DWH8HMK 24 9 02/12/2016    BEART -2 6 02/12/2016    SOURCE Line, Arterial 02/12/2016       Blood Culture:   Lab Results   Component Value Date    BLOODCX No Growth at 48 hrs  05/05/2018    BLOODCX No Growth at 48 hrs  05/05/2018    BLOODCX No Growth After 5 Days   01/30/2018     Urine Culture: No results found for: URINECX  Sputum Culture: No components found for: SPUTUMCX    Imaging: I have personally reviewed pertinent reports           VTE Pharmacologic Prophylaxis: Heparin  VTE Mechanical Prophylaxis: sequential compression device

## 2018-05-09 NOTE — PROGRESS NOTES
Patient care rounds were completed with the patient's nurse today, Rakesh Varner  We discussed the plan is to keep her NPO for now on resume IV fluid resuscitation  Obtain repeat CT scan of the abdomen and pelvis due to persistent/worsening left lower abdominal pain  Continue IV antibiotic regimen  We reviewed all of the invasive devices/lines/telemetry orders   - None  Pain Assessment / Plan:  - Continue current analgesic regimen  Mobility Assessment / Plan:  - Activity as tolerated  Goals / Barriers for discharge:  - Awaiting further evaluation with CT noted above  - Case management following; case and discharge needs discussed  All questions and concerns were addressed  I spent greater than 10 minutes reviewing the plan with the patient and the nurse, and coordinating her care for the day      Emani Ochoa PA-C  5/9/2018 12:36 PM

## 2018-05-09 NOTE — PROGRESS NOTES
Zuly Ruff aware at bedside assessing pt  pts vs  98 6 TEMP /77 hr 56 RESP 20 SAT ON R/A 96%  No neuro deficits noted

## 2018-05-09 NOTE — PROGRESS NOTES
Barium given to pt  Pt stated " I will try to drink it, I felt nauseated"    Pt medicated with IV Zofran   Pt aware CT scan is at PM

## 2018-05-09 NOTE — PROGRESS NOTES
I was asked to re-evaluate the patient by the nursing staff after she complained of left-sided facial numbness  The patient does report that she is having numbness and tingling sensation across her face  She notes this started on the left side of her face, but is now covering her entire face  She is feeling somewhat anxious  She denies any weakness, speech difficulty, visual changes  She only complains of continued abdominal pain in addition to the numbness  On exam, she has equal motor strength in her bilateral upper and low extremities, normal light touch sensation on bilateral upper and lower extremities , neck, face, scalp, and torso  She has no new facial assymmetry or motor weakness  CN II-XII intact  No evidence of acute CVA at this time  Patient appears to be having some anxiety  Will consider benzodiazepine if worsens or persists  Patient was reassured      Valery Jade PA-C  5/9/2018 1:14 PM

## 2018-05-09 NOTE — SOCIAL WORK
Pt reviewed in care coordination rounds  PT/OT recommendations for home w/family supports  No anticipated d/c needs

## 2018-05-10 VITALS
BODY MASS INDEX: 22.75 KG/M2 | SYSTOLIC BLOOD PRESSURE: 96 MMHG | RESPIRATION RATE: 16 BRPM | HEART RATE: 58 BPM | TEMPERATURE: 97.8 F | OXYGEN SATURATION: 94 % | HEIGHT: 66 IN | WEIGHT: 141.54 LBS | DIASTOLIC BLOOD PRESSURE: 59 MMHG

## 2018-05-10 LAB
ANION GAP SERPL CALCULATED.3IONS-SCNC: 6 MMOL/L (ref 4–13)
BASOPHILS # BLD MANUAL: 0 THOUSAND/UL (ref 0–0.1)
BASOPHILS NFR MAR MANUAL: 0 % (ref 0–1)
BUN SERPL-MCNC: 4 MG/DL (ref 5–25)
CALCIUM SERPL-MCNC: 7.9 MG/DL (ref 8.3–10.1)
CHLORIDE SERPL-SCNC: 113 MMOL/L (ref 100–108)
CO2 SERPL-SCNC: 24 MMOL/L (ref 21–32)
CREAT SERPL-MCNC: 0.54 MG/DL (ref 0.6–1.3)
EOSINOPHIL # BLD MANUAL: 0 THOUSAND/UL (ref 0–0.4)
EOSINOPHIL NFR BLD MANUAL: 0 % (ref 0–6)
ERYTHROCYTE [DISTWIDTH] IN BLOOD BY AUTOMATED COUNT: 16.1 % (ref 11.6–15.1)
GFR SERPL CREATININE-BSD FRML MDRD: 112 ML/MIN/1.73SQ M
GLUCOSE SERPL-MCNC: 89 MG/DL (ref 65–140)
HCT VFR BLD AUTO: 38.9 % (ref 34.8–46.1)
HCT VFR BLD AUTO: 41.3 % (ref 34.8–46.1)
HGB BLD-MCNC: 12.8 G/DL (ref 11.5–15.4)
HGB BLD-MCNC: 13.3 G/DL (ref 11.5–15.4)
LYMPHOCYTES # BLD AUTO: 1.01 THOUSAND/UL (ref 0.6–4.47)
LYMPHOCYTES # BLD AUTO: 21 % (ref 14–44)
MAGNESIUM SERPL-MCNC: 2.3 MG/DL (ref 1.6–2.6)
MCH RBC QN AUTO: 29 PG (ref 26.8–34.3)
MCHC RBC AUTO-ENTMCNC: 32.9 G/DL (ref 31.4–37.4)
MCV RBC AUTO: 88 FL (ref 82–98)
MONOCYTES # BLD AUTO: 0.24 THOUSAND/UL (ref 0–1.22)
MONOCYTES NFR BLD: 5 % (ref 4–12)
NEUTROPHILS # BLD MANUAL: 3.19 THOUSAND/UL (ref 1.85–7.62)
NEUTS SEG NFR BLD AUTO: 66 % (ref 43–75)
NRBC BLD AUTO-RTO: 0 /100 WBCS
PLATELET # BLD AUTO: 196 THOUSANDS/UL (ref 149–390)
PLATELET BLD QL SMEAR: ADEQUATE
PMV BLD AUTO: 11 FL (ref 8.9–12.7)
POIKILOCYTOSIS BLD QL SMEAR: PRESENT
POTASSIUM SERPL-SCNC: 3.7 MMOL/L (ref 3.5–5.3)
RBC # BLD AUTO: 4.41 MILLION/UL (ref 3.81–5.12)
RBC MORPH BLD: PRESENT
SODIUM SERPL-SCNC: 143 MMOL/L (ref 136–145)
VARIANT LYMPHS # BLD AUTO: 8 %
WBC # BLD AUTO: 4.83 THOUSAND/UL (ref 4.31–10.16)

## 2018-05-10 PROCEDURE — 99238 HOSP IP/OBS DSCHRG MGMT 30/<: CPT | Performed by: NURSE PRACTITIONER

## 2018-05-10 PROCEDURE — 85018 HEMOGLOBIN: CPT | Performed by: NURSE PRACTITIONER

## 2018-05-10 PROCEDURE — 83735 ASSAY OF MAGNESIUM: CPT | Performed by: SURGERY

## 2018-05-10 PROCEDURE — 85007 BL SMEAR W/DIFF WBC COUNT: CPT | Performed by: SURGERY

## 2018-05-10 PROCEDURE — 80048 BASIC METABOLIC PNL TOTAL CA: CPT | Performed by: SURGERY

## 2018-05-10 PROCEDURE — 85014 HEMATOCRIT: CPT | Performed by: NURSE PRACTITIONER

## 2018-05-10 PROCEDURE — 85027 COMPLETE CBC AUTOMATED: CPT | Performed by: SURGERY

## 2018-05-10 RX ORDER — ASPIRIN 81 MG/1
81 TABLET, CHEWABLE ORAL DAILY
Qty: 30 TABLET | Refills: 0 | Status: SHIPPED | OUTPATIENT
Start: 2018-05-10 | End: 2018-09-06 | Stop reason: ALTCHOICE

## 2018-05-10 RX ORDER — POTASSIUM CHLORIDE 20 MEQ/1
40 TABLET, EXTENDED RELEASE ORAL ONCE
Status: COMPLETED | OUTPATIENT
Start: 2018-05-10 | End: 2018-05-10

## 2018-05-10 RX ORDER — METHOCARBAMOL 750 MG/1
750 TABLET, FILM COATED ORAL EVERY 6 HOURS SCHEDULED
Status: DISCONTINUED | OUTPATIENT
Start: 2018-05-10 | End: 2018-05-10 | Stop reason: HOSPADM

## 2018-05-10 RX ORDER — CLOPIDOGREL BISULFATE 75 MG/1
75 TABLET ORAL DAILY
Qty: 30 TABLET | Refills: 0
Start: 2018-05-10 | End: 2018-05-25

## 2018-05-10 RX ORDER — METHOCARBAMOL 750 MG/1
750 TABLET, FILM COATED ORAL EVERY 6 HOURS SCHEDULED
Qty: 28 TABLET | Refills: 0 | Status: ON HOLD | OUTPATIENT
Start: 2018-05-10 | End: 2018-08-19 | Stop reason: ALTCHOICE

## 2018-05-10 RX ADMIN — ESCITALOPRAM OXALATE 5 MG: 10 TABLET, FILM COATED ORAL at 08:15

## 2018-05-10 RX ADMIN — GABAPENTIN 100 MG: 100 CAPSULE ORAL at 08:15

## 2018-05-10 RX ADMIN — DEXTROSE, SODIUM CHLORIDE, AND POTASSIUM CHLORIDE 75 ML/HR: 5; .9; .15 INJECTION INTRAVENOUS at 01:19

## 2018-05-10 RX ADMIN — CEFAZOLIN SODIUM 1000 MG: 10 INJECTION, POWDER, FOR SOLUTION INTRAVENOUS at 01:21

## 2018-05-10 RX ADMIN — OXYCODONE HYDROCHLORIDE 5 MG: 5 TABLET ORAL at 08:25

## 2018-05-10 RX ADMIN — POTASSIUM CHLORIDE 40 MEQ: 1500 TABLET, EXTENDED RELEASE ORAL at 08:15

## 2018-05-10 RX ADMIN — METRONIDAZOLE 500 MG: 500 INJECTION, SOLUTION INTRAVENOUS at 00:06

## 2018-05-10 NOTE — PROGRESS NOTES
Nurse / Provider rounds completed with staff nurse, Kelsey Calvillo and patient    Will check H/H today at 1400

## 2018-05-10 NOTE — DISCHARGE SUMMARY
Discharge Summary - Pam Molina 50 y o  female MRN: [id number]    [facility] Encounter: [id number]    Admission Date:   Admission Orders     Ordered        05/06/18 1201  Inpatient Admission  Once               Admitting Diagnosis: Abdominal pain [R10 9]    HPI: per resident:  Pam Molina is a 50 y o  female who presents with abd pain, intermittent, LLQ that started 2 days ago and has progressively been getting worse  Pt states that he has been having darrhoea and poor po intake  + intermittent N, No V  + watery diarrhoea  Has COPD, >30 pack yrs of smoking  Has a hx of C diff  Went to [facility] and was evaluated and got transferred over to [facility] for possible appendicitis         Procedures Performed: No orders of the defined types were placed in this encounter  Summary of Hospital Course: 51 y/o female admitted with abdominal pain intermittently in LLQ for 2 days  Pain continued to worsen and then started with diarrhea and poor intake by mouth  No vomiting but continued with watery diarrhea  Was seen at [facility] and evaluated for possible appendicitis and transferred to [place]  CT scan demonstrated thickened gastric folds related to gastritis, and left pelvic rectus abdominis muscle asymmetric thickening  Antibiotics given, ASA and plavix on hold  Hemoglobin monitored and is stable  Will hold anticoagulation until 5/11/18 and then restart  Discharge home today and follow up with surgery in 2 weeks and GI for the gastritis  Significant Findings, Care, Treatment and Services Provided:   Pe Study With Ct Abdomen And Pelvis With Contrast    Result Date: 5/6/2018  Impression: 1  No CT evidence of pulmonary embolism  2   Vague groundglass infiltrate right middle lobe representing either atelectasis or early pneumonia  Clinical correlation recommended  3   Mild abnormal appearance of the large and small bowel  Correlate for enterocolitis   The major findings are in agreement with the preliminary report provided by [facility] which was provided shortly after completion of the exam  The additional finding of  groundglass infiltrate right middle lobe as well as abnormal appearance of the large and small bowel as described above,   will now be communicated with patient's clinical team by our radiology liaison  Workstation performed: [id number]     Ct Abdomen Pelvis W Contrast    Result Date: 5/9/2018  Impression: 1  Slightly thickened gastric folds could relate to gastritis  Prominent jejunal loops with wall thickening could relate to enteritis  2  Left pelvic rectus abdominis muscle asymmetric thickening measuring 2 2 x 3 2 cm just anterior to the left inferior epigastric vessels could represent a intramuscular hematoma or other process  Follow-up with ultrasound  This was not present on the prior exam  3  Patchy groundglass densities at the right lung base could relate to pneumonia or atelectasis  Workstation performed: [id number]       Complications: none    Discharge Diagnosis: Enterocolitis  Rectus sheath hematoma    Resolved Problems  Date Reviewed: 5/9/2018    None          Condition at Discharge: stable         Discharge instructions/Information to patient and family:   See after visit summary for information provided to patient and family  Provisions for Follow-Up Care:  See after visit summary for information related to follow-up care and any pertinent home health orders  PCP: Cammie Harris PA-C    Disposition: Home    Planned Readmission: No    Discharge Statement   I spent 30 minutes discharging the patient  This time was spent on the day of discharge  I had direct contact with the patient on the day of discharge  Additional documentation is required if more than 30 minutes were spent on discharge  Discharge Medications:  See after visit summary for reconciled discharge medications provided to patient and family

## 2018-05-10 NOTE — PROGRESS NOTES
Progress Note - General Surgery   Verenice Collins 50 y o  female MRN: [id number]  Unit/Bed#: [facility] 823-01 Encounter: [id number]    Assessment and Plan:  Patient Active Problem List   Diagnosis    Smoker    Non-ST elevation MI (NSTEMI) ([facility] )    CAD S/P CABG x 1 2016    GERD (gastroesophageal reflux disease)    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 ([facility] )    Depression    Tobacco abuse    Brain TIA    Leg pain    Left carotid stenosis    Acute systolic CHF (congestive heart failure) ([facility] )    Atherosclerotic heart disease of native coronary artery without angina pectoris    Cardiomyopathy, dilated ([facility] )    Chronic obstructive pulmonary disease ([place] [address] )    Ischemic cardiomyopathy    Raynaud's syndrome without gangrene    Thoracic outlet syndrome    Vitamin D deficiency    Sleep-related breathing disorder    Insomnia    Restless leg syndrome    Sleep walking    Hypersomnia    Chronic pain disorder    Enterocolitis       Assessment:  50 y o  F w/ enterocolitis and poss PNA found to have a rectal sheath hematoma status post forceful coughing     Plan:  Continue abdominal binder  Apply ice to affected area as needed for comfort  Follow-up on a m  Hemoglobin - if stable continue conservative treatment  Pain control   Regular Diet as tolerated  Serial abd exams   Discontinue abx   Follow up on stool Cx   DVT ppx - Vneodynes  Hold SQH, ASA & plavix        Subjective: Pt's CT scan findings were discussed with her last night  It appears that the patient has a rectal sheath hematoma from forceful coughing  still c/o LLQ pain but states it is much improved with the abdominal binder    No emesis yesterday    Objective:       Vitals   Vitals:    05/09/18 0700 05/09/18 1500 05/09/18 2152 05/09/18 2300   BP: 119/70 131/74 118/76 116/57   BP Location: Left arm Left arm  Left arm   Pulse: 58 64 60 58   Resp: 18 18 18   Temp: 98 7 °F (37 1 °C) 97 7 °F (36 5 °C)  97 9 °F (36 6 °C)   TempSrc: Oral Oral  Oral   SpO2: 94% 96%  94%   Weight:       Height:         Temp  Min: 97 7 °F (36 5 °C)  Max: 102 2 °F (39 °C)  IBW: 59 3 kg   Body mass index is 22 88 kg/m²  I/O   I/O       05/06 0701 - 05/07 0700 05/07 0701 - 05/08 0700    P  O   420    I V  (mL/kg) 1212 5 (18 3) 1038 8 (15 7)    IV Piggyback 150 346 7    Total Intake(mL/kg) 1362 5 (20 6) 1805 4 (27 3)    Urine (mL/kg/hr) 600 2350 (1 5)    Emesis/NG output  1 (0)    Stool 0 0 (0)    Total Output 600 2351    Net +762 5 -545  6          Unmeasured Urine Occurrence 1 x     Unmeasured Stool Occurrence 1 x 1 x          Intake/Output Summary (Last 24 hours) at 05/10/18 0506  Last data filed at 05/10/18 0121   Gross per 24 hour   Intake          1954 59 ml   Output              500 ml   Net          1454 59 ml       Invasive  Devices  Invasive Devices     Peripheral Intravenous Line            Peripheral IV 05/05/18 Right Antecubital 4 days    Peripheral IV 05/09/18 Left Forearm less than 1 day                Active medications  Scheduled Meds:    Current Facility-Administered Medications:  albuterol 2 puff Inhalation Q6H PRN Isabel Fleming    atorvastatin 40 mg Oral Daily With Beautylish    barium sulfate 450 mL Oral Once in [facility]    cefazolin 1,000 mg Intravenous Q8H Isabel Fleming Last Rate: 1,000 mg (05/10/18 0121)   clopidogrel 75 mg Oral Daily Isabel Fleming    dextrose 5 % and sodium chloride 0 9 % with KCl 20 mEq/L 75 mL/hr Intravenous Continuous Karcarolyne Ringer PA-C Last Rate: 75 mL/hr (05/10/18 0119)   docusate sodium 100 mg Oral BID PRN Isabel Fleming    escitalopram 5 mg Oral Daily Isabel Fleming    gabapentin 100 mg Oral TID Isabel Fleming    metoprolol tartrate 25 mg Oral Q12H [address] Isabel Fleming    metroNIDAZOLE 500 mg Intravenous Q8H Isabel Fleming Last Rate: Stopped (05/10/18 0121)   nicotine 1 patch Transdermal Daily Isabel Fleming    nitroglycerin 0 4 mg Sublingual Q5 Min PRN Isabel Fleming    ondansetron 4 mg Intravenous Q4H PRN Isabel Fleming    oxyCODONE 5 mg Oral Q4H PRN Isabel Fleming      Continuous Infusions:    dextrose 5 % and sodium chloride 0 9 % with KCl 20 mEq/L 75 mL/hr Last Rate: 75 mL/hr (05/10/18 0119)     PRN Meds:     albuterol 2 puff Q6H PRN   barium sulfate 450 mL Once in imaging   docusate sodium 100 mg BID PRN   nitroglycerin 0 4 mg Q5 Min PRN   ondansetron 4 mg Q4H PRN   oxyCODONE 5 mg Q4H PRN       Physical Exam: /57 (BP Location: Left arm)   Pulse 58   Temp 97 9 °F (36 6 °C) (Oral)   Resp 18   Ht 5' 6" (1 676 m)   Wt 64 3 kg (141 lb 12 1 oz)   LMP 10/14/2015 (LMP Unknown)   SpO2 94%   BMI 22 88 kg/m²     Physical Exam:  General Appearance: Alert, cooperative, no distress, appears stated age  Lungs: Clear to auscultation bilaterally, respirations unlablored   Heart: Regular rate and rhythm, S1 and S2 normal, no murmur, rub or gallop  Abdomen: Soft, tender in LLQ w/ rebound, non distended, bowel sounds active in all four quadrants,   No masses or organomegaly    Laboratory and Diagnostics:    Results from last 7 days  Lab Units 05/09/18  0501 05/07/18  0536 05/05/18  2234   WBC Thousand/uL 4 43 8 44 12 48*   HEMOGLOBIN g/dL 13 0 12 5 13 7   HEMATOCRIT % 40 3 39 1 41 1   PLATELETS Thousands/uL 207 227 315   NEUTROS PCT %  --  74 74   MONOS PCT %  --  6 11       Results from last 7 days  Lab Units 05/09/18  0501 05/07/18  0536 05/05/18  2234   SODIUM mmol/L 142 141 136   POTASSIUM mmol/L 3 1* 3 1* 2 7*   CHLORIDE mmol/L 108 109* 101   CO2 mmol/L 25 21 23   BUN mg/dL 4* 7 8   CREATININE mg/dL 0 62 0 66 1 00   CALCIUM mg/dL 8 1* 8 0* 8 9   TOTAL PROTEIN g/dL  --   --  7 4   BILIRUBIN TOTAL mg/dL  --   --  0 20   ALK PHOS U/L  --   --  126*   ALT U/L  --   --  22   AST U/L  --   --  24   GLUCOSE RANDOM mg/dL 83 78 94       Results from last 7 days  Lab Units 05/07/18  0536   MAGNESIUM mg/dL 1 9     Lab Results   Component Value Date    PHOS 3 2 11/21/2017    PHOS 3 3 07/28/2017    PHOS 3 2 07/19/2015    PHOS 2 8 07/17/2015    PHOS 2 8 07/16/2015        Results from last 7 days  Lab Units 05/05/18  2234   INR  1 05   PTT seconds 30     No results found for: TROPONINT  ABG:  Lab Results   Component Value Date    PHART 7 269 (L) 02/12/2016    [id number] 55 5 (HH) 02/12/2016    PO2ART 136 0 (H) 02/12/2016    XPG8DJR 24 9 02/12/2016    BEART -2 6 02/12/2016    SOURCE Line, Arterial 02/12/2016       Blood Culture:   Lab Results   Component Value Date    BLOODCX No Growth at 72 hrs  05/05/2018    BLOODCX No Growth at 72 hrs  05/05/2018    BLOODCX No Growth After 5 Days  01/30/2018     Urine Culture: No results found for: URINECX  Sputum Culture: No components found for: SPUTUMCX    Imaging: I have personally reviewed pertinent reports           VTE Pharmacologic Prophylaxis: Heparin  VTE Mechanical Prophylaxis: sequential compression device

## 2018-05-11 LAB
BACTERIA BLD CULT: NORMAL
BACTERIA BLD CULT: NORMAL

## 2018-05-17 ENCOUNTER — HOSPITAL ENCOUNTER (EMERGENCY)
Facility: HOSPITAL | Age: 48
Discharge: HOME | End: 2018-05-18
Attending: EMERGENCY MEDICINE | Admitting: EMERGENCY MEDICINE
Payer: COMMERCIAL

## 2018-05-17 DIAGNOSIS — R10.32 ABDOMINAL WALL PAIN IN LEFT LOWER QUADRANT: Primary | ICD-10-CM

## 2018-05-17 DIAGNOSIS — S30.1XXD RECTUS SHEATH HEMATOMA, SUBSEQUENT ENCOUNTER: ICD-10-CM

## 2018-05-17 LAB
ALBUMIN SERPL BCP-MCNC: 3.3 G/DL (ref 3.5–5)
ALP SERPL-CCNC: 113 U/L (ref 46–116)
ALT SERPL W P-5'-P-CCNC: 24 U/L (ref 12–78)
ANION GAP SERPL CALCULATED.3IONS-SCNC: 8 MMOL/L (ref 4–13)
AST SERPL W P-5'-P-CCNC: 22 U/L (ref 5–45)
BACTERIA UR QL AUTO: ABNORMAL /HPF
BASOPHILS # BLD AUTO: 0.05 THOUSANDS/ΜL (ref 0–0.1)
BASOPHILS NFR BLD AUTO: 0 % (ref 0–1)
BILIRUB SERPL-MCNC: 0.5 MG/DL (ref 0.2–1)
BILIRUB UR QL STRIP: NEGATIVE
BUN SERPL-MCNC: 5 MG/DL (ref 5–25)
CALCIUM SERPL-MCNC: 8.2 MG/DL (ref 8.3–10.1)
CHLORIDE SERPL-SCNC: 106 MMOL/L (ref 100–108)
CLARITY UR: CLEAR
CO2 SERPL-SCNC: 25 MMOL/L (ref 21–32)
COLOR UR: YELLOW
CREAT SERPL-MCNC: 0.75 MG/DL (ref 0.6–1.3)
EOSINOPHIL # BLD AUTO: 0.12 THOUSAND/ΜL (ref 0–0.61)
EOSINOPHIL NFR BLD AUTO: 1 % (ref 0–6)
ERYTHROCYTE [DISTWIDTH] IN BLOOD BY AUTOMATED COUNT: 15.3 % (ref 11.6–15.1)
GFR SERPL CREATININE-BSD FRML MDRD: 95 ML/MIN/1.73SQ M
GLUCOSE SERPL-MCNC: 77 MG/DL (ref 65–140)
GLUCOSE UR STRIP-MCNC: NEGATIVE MG/DL
HCT VFR BLD AUTO: 38.1 % (ref 34.8–46.1)
HGB BLD-MCNC: 12.6 G/DL (ref 11.5–15.4)
HGB UR QL STRIP.AUTO: NEGATIVE
KETONES UR STRIP-MCNC: NEGATIVE MG/DL
LACTATE SERPL-SCNC: 1.1 MMOL/L (ref 0.5–2)
LEUKOCYTE ESTERASE UR QL STRIP: ABNORMAL
LIPASE SERPL-CCNC: 72 U/L (ref 73–393)
LYMPHOCYTES # BLD AUTO: 3.19 THOUSANDS/ΜL (ref 0.6–4.47)
LYMPHOCYTES NFR BLD AUTO: 22 % (ref 14–44)
MCH RBC QN AUTO: 29.5 PG (ref 26.8–34.3)
MCHC RBC AUTO-ENTMCNC: 33.1 G/DL (ref 31.4–37.4)
MCV RBC AUTO: 89 FL (ref 82–98)
MONOCYTES # BLD AUTO: 1.51 THOUSAND/ΜL (ref 0.17–1.22)
MONOCYTES NFR BLD AUTO: 10 % (ref 4–12)
NEUTROPHILS # BLD AUTO: 9.78 THOUSANDS/ΜL (ref 1.85–7.62)
NEUTS SEG NFR BLD AUTO: 67 % (ref 43–75)
NITRITE UR QL STRIP: NEGATIVE
NON-SQ EPI CELLS URNS QL MICRO: ABNORMAL /HPF
PH UR STRIP.AUTO: 5.5 [PH] (ref 4.5–8)
PLATELET # BLD AUTO: 458 THOUSANDS/UL (ref 149–390)
PMV BLD AUTO: 9.4 FL (ref 8.9–12.7)
POTASSIUM SERPL-SCNC: 3.5 MMOL/L (ref 3.5–5.3)
PROT SERPL-MCNC: 7.1 G/DL (ref 6.4–8.2)
PROT UR STRIP-MCNC: NEGATIVE MG/DL
RBC # BLD AUTO: 4.27 MILLION/UL (ref 3.81–5.12)
RBC #/AREA URNS AUTO: ABNORMAL /HPF
SODIUM SERPL-SCNC: 139 MMOL/L (ref 136–145)
SP GR UR STRIP.AUTO: <=1.005 (ref 1–1.03)
UROBILINOGEN UR QL STRIP.AUTO: 0.2 E.U./DL
WBC # BLD AUTO: 14.65 THOUSAND/UL (ref 4.31–10.16)
WBC #/AREA URNS AUTO: ABNORMAL /HPF

## 2018-05-17 PROCEDURE — 85025 COMPLETE CBC W/AUTO DIFF WBC: CPT | Performed by: EMERGENCY MEDICINE

## 2018-05-17 PROCEDURE — 83605 ASSAY OF LACTIC ACID: CPT | Performed by: EMERGENCY MEDICINE

## 2018-05-17 PROCEDURE — 36415 COLL VENOUS BLD VENIPUNCTURE: CPT | Performed by: EMERGENCY MEDICINE

## 2018-05-17 PROCEDURE — 96374 THER/PROPH/DIAG INJ IV PUSH: CPT

## 2018-05-17 PROCEDURE — 81001 URINALYSIS AUTO W/SCOPE: CPT | Performed by: EMERGENCY MEDICINE

## 2018-05-17 PROCEDURE — 83690 ASSAY OF LIPASE: CPT | Performed by: EMERGENCY MEDICINE

## 2018-05-17 PROCEDURE — 80053 COMPREHEN METABOLIC PANEL: CPT | Performed by: EMERGENCY MEDICINE

## 2018-05-17 RX ORDER — FENTANYL CITRATE 50 UG/ML
50 INJECTION, SOLUTION INTRAMUSCULAR; INTRAVENOUS ONCE
Status: COMPLETED | OUTPATIENT
Start: 2018-05-17 | End: 2018-05-17

## 2018-05-17 RX ADMIN — FENTANYL CITRATE 50 MCG: 50 INJECTION, SOLUTION INTRAMUSCULAR; INTRAVENOUS at 23:26

## 2018-05-18 ENCOUNTER — APPOINTMENT (EMERGENCY)
Dept: CT IMAGING | Facility: HOSPITAL | Age: 48
End: 2018-05-18
Payer: COMMERCIAL

## 2018-05-18 VITALS
TEMPERATURE: 98.2 F | OXYGEN SATURATION: 99 % | RESPIRATION RATE: 18 BRPM | SYSTOLIC BLOOD PRESSURE: 117 MMHG | DIASTOLIC BLOOD PRESSURE: 73 MMHG | BODY MASS INDEX: 24.05 KG/M2 | WEIGHT: 149.03 LBS | HEART RATE: 85 BPM

## 2018-05-18 PROCEDURE — 74177 CT ABD & PELVIS W/CONTRAST: CPT

## 2018-05-18 PROCEDURE — 99284 EMERGENCY DEPT VISIT MOD MDM: CPT

## 2018-05-18 RX ADMIN — IOHEXOL 100 ML: 350 INJECTION, SOLUTION INTRAVENOUS at 00:22

## 2018-05-18 NOTE — DISCHARGE INSTRUCTIONS
Hematoma   WHAT YOU NEED TO KNOW:   A hematoma is a collection of blood  A bruise is a type of hematoma  A hematoma may form in a muscle or in the tissues just under the skin  A hematoma that forms under the skin will feel like a bump or hard mass  Hematomas can happen anywhere in your body, including in your brain  Your body may break down and absorb a mild hematoma on its own  A more serious hematoma may need treatment  DISCHARGE INSTRUCTIONS:   Medicines: You may need any of the following:  · Prescription pain medicine  may be given  Ask how to take this medicine safely  · NSAIDs , such as ibuprofen, help decrease swelling, pain, and fever  This medicine is available with or without a doctor's order  NSAIDs can cause stomach bleeding or kidney problems in certain people  If you take blood thinner medicine, always ask your healthcare provider if NSAIDs are safe for you  Always read the medicine label and follow directions  · Antibiotics  prevent or treat a bacterial infection  · Take your medicine as directed  Contact your healthcare provider if you think your medicine is not helping or if you have side effects  Tell him of her if you are allergic to any medicine  Keep a list of the medicines, vitamins, and herbs you take  Include the amounts, and when and why you take them  Bring the list or the pill bottles to follow-up visits  Carry your medicine list with you in case of an emergency  Return to the emergency department if:   · You have new or worsening pain, or pain that does not get better with medicine  · You have a fever  · You have trouble moving the body part that has the hematoma  Contact your healthcare provider if:   · You have questions or concerns about your condition or care  Follow up with your healthcare provider as directed: You may need to have surgery if your hematoma is severe  You may also need other tests to make sure there is no other damage that needs to be treated  Write down your questions so you remember to ask them during your visits  Self-care:   · Rest the area  Rest will help your body heal and will also help prevent more damage  · Apply ice as directed  Ice helps reduce swelling  Ice may also help prevent tissue damage  Use an ice pack, or put crushed ice in a bag  Cover it with a towel  Place it on your hematoma for 20 minutes every hour, or as directed  Ask how many times each day to apply ice, and for how many days  · Compress the injury if possible  Lightly wrap the injury with an elastic or soft bandage  This may help control swelling  Ask your healthcare provider how to wrap your injury properly  · Elevate the area as directed  If possible, raise the area above the level of your heart as often as you can  This will help decrease swelling  · Keep the hematoma covered with a bandage  This will help protect the area while it heals  © 2017 [employer]  Information is for End User's use only and may not be sold, redistributed or otherwise used for commercial purposes  All illustrations and images included in CareNotes® are the copyrighted property of [employer]  or [employer]  The above information is an  only  It is not intended as medical advice for individual conditions or treatments  Talk to your doctor, nurse or pharmacist before following any medical regimen to see if it is safe and effective for you

## 2018-05-18 NOTE — ED PROVIDER NOTES
History  Chief Complaint   Patient presents with    Abdominal Pain     Patient has abdominal pain for about 10 days  Patient was dc by [facility] on 5/10  Patient has increased pain today with some vomiting yesterday  Patient returns to the ED for continued complaint of sharp, pinching, burning lower left abdominal wall pain similar to recent admission to [facility]  Patient was seen in the [facility] ED and found to have an abdominal wall hematoma, enterocolitis with an elevated lactate of 4 9  She was transferred for surgical consultation and was treated for 4 days with IV antibiotics with symptom and lab abnormality resolution  She is scheduled for follow-up with surgery and GI for her continued symptoms  She states that the nausea and diarrhea have improved  However she feels that the abdominal wall pain was worsening today, particularly with position changes or palpation  She contacted her surgeon and was told to come to the nearest ED for further evaluation  She has been taking her Percocet and muscle relaxant without relief of symptoms  No hematochezia, melena or hematemesis  No change in symptoms w/PO intake, BM  She does complain of continued dysuria radiating to her left flank  No recent travel or similar sick contacts  Denies f/c, CP, SOB  12 system ROS o/w negative  History provided by:  Patient and medical records  Abdominal Pain   Pain location:  LLQ  Pain quality: burning and sharp    Pain radiates to:  L flank  Pain severity:  Moderate  Onset quality:  Sudden  Duration:  1 day  Timing:  Constant  Progression:  Waxing and waning  Chronicity:  Recurrent  Context: not awakening from sleep, not diet changes and not eating    Relieved by:  Nothing  Worsened by: Movement, coughing, palpation and position changes  Ineffective treatments: Muscle relaxants and Percocets    Associated symptoms: dysuria and nausea    Associated symptoms: no anorexia, no belching, no chest pain, no chills, no constipation, no cough, no diarrhea, no fatigue, no fever, no hematuria, no melena, no shortness of breath, no sore throat, no vaginal bleeding, no vaginal discharge and no vomiting    Risk factors: recent hospitalization    Risk factors: not obese and not pregnant        Prior to Admission Medications   Prescriptions Last Dose Informant Patient Reported? Taking? albuterol (PROVENTIL HFA,VENTOLIN HFA) 90 mcg/act inhaler   Yes No   Sig: Inhale 2 puffs every 6 (six) hours as needed for wheezing   albuterol (PROVENTIL HFA,VENTOLIN HFA) 90 mcg/act inhaler   Yes No   Sig: Inhale 2 puffs every 6 (six) hours as needed for wheezing   aspirin 81 mg chewable tablet   No No   Sig: Chew 1 tablet (81 mg total) daily   atorvastatin (LIPITOR) 40 mg tablet   No No   Sig: TAKE ONE TABLET DAILY WITH DINNER  clopidogrel (PLAVIX) 75 mg tablet   No No   Sig: Take 1 tablet (75 mg total) by mouth daily   escitalopram (LEXAPRO) 5 mg tablet   No No   Sig: TAKE (1) TABLET DAILY     gabapentin (NEURONTIN) 100 mg capsule   No No   Sig: Take 1 capsule (100 mg total) by mouth 3 (three) times a day   methocarbamol (ROBAXIN) 750 mg tablet   No No   Sig: Take 1 tablet (750 mg total) by mouth every 6 (six) hours for 28 doses   metoprolol tartrate (LOPRESSOR) 25 mg tablet   No No   Sig: TAKE ONE-HALF TABLET EVERY 8 HOURS    nitroglycerin (NITROSTAT) 0 4 mg SL tablet   Yes No   Sig: Place 0 4 mg under the tongue every 5 (five) minutes as needed for chest pain   oxyCODONE (ROXICODONE) 5 mg immediate release tablet   Yes No   Sig: Take 5 mg by mouth every 4 (four) hours as needed for moderate pain   potassium chloride (K-DUR,KLOR-CON) 20 mEq tablet   No No   Sig: Take 1 tablet (20 mEq total) by mouth 2 (two) times a day      Facility-Administered Medications: None       Past Medical History:   Diagnosis Date    Chronic pain     Coronary artery disease     Cubital tunnel syndrome     Depression     GERD (gastroesophageal reflux disease)     History of Clostridium difficile     Ovarian cyst     Psychiatric disorder     depression    Raynaud's disease     Takotsubo cardiomyopathy     Thoracic outlet syndrome     Tobacco abuse     Vitamin D deficiency        Past Surgical History:   Procedure Laterality Date    CARDIAC CATHETERIZATION      CABG    CORONARY ARTERY BYPASS GRAFT N/A 2/12/2016    Procedure: CABG X1; Left  EVH to mid-LAD; ZAHRA;  Surgeon: Ericka Smalls DO;  Location: [facility];  Service:    [facility] DEBRIDEMENT TENNIS ELBOW      DILATION AND CURETTAGE OF UTERUS      HYSTERECTOMY      HYSTEROSCOPY      LEFT OOPHORECTOMY      SALPINGECTOMY Right        Family History   Problem Relation Age of Onset    Heart disease Mother     Heart attack Mother     Heart attack Brother 36     s/p stent placement    Diabetes Brother     Heart disease Brother     Cancer Father     Diabetes Sister     Heart disease Sister     Diabetes Sister      I have reviewed and agree with the history as documented  Social History   Substance Use Topics    Smoking status: Current Every Day Smoker     Packs/day: 0 50     Years: 30 00    Smokeless tobacco: Never Used    Alcohol use No        Review of Systems   Constitutional: Negative for chills, diaphoresis, fatigue and fever  HENT: Negative for congestion, rhinorrhea and sore throat  Respiratory: Negative for cough, shortness of breath and wheezing  Cardiovascular: Negative for chest pain, palpitations and leg swelling  Gastrointestinal: Positive for abdominal pain and nausea  Negative for anorexia, constipation, diarrhea, melena and vomiting  Endocrine: Negative for polydipsia, polyphagia and polyuria  Genitourinary: Positive for dysuria  Negative for flank pain, frequency, hematuria, urgency, vaginal bleeding, vaginal discharge and vaginal pain  Musculoskeletal: Negative for arthralgias, back pain and myalgias  Skin: Negative for pallor and rash     Neurological: Negative for dizziness, syncope, weakness, light-headedness, numbness and headaches  Hematological: Negative for adenopathy  Psychiatric/Behavioral: Negative for confusion  All other systems reviewed and are negative  Physical Exam  ED Triage Vitals [05/17/18 2159]   Temperature Pulse Respirations Blood Pressure SpO2   98 2 °F (36 8 °C) 92 18 121/76 98 %      Temp Source Heart Rate Source Patient Position - Orthostatic VS BP Location FiO2 (%)   Temporal Monitor Sitting Left arm --      Pain Score       7           Orthostatic Vital Signs  Vitals:    05/17/18 2159 05/18/18 0030   BP: 121/76 117/73   Pulse: 92 85   Patient Position - Orthostatic VS: Sitting Lying       Physical Exam   Constitutional: She is oriented to person, place, and time  She appears well-developed and well-nourished  No distress  HENT:   Head: Normocephalic and atraumatic  Mouth/Throat: Oropharynx is clear and moist    Poor dentition   Eyes: EOM are normal  Pupils are equal, round, and reactive to light  Neck: Normal range of motion  Neck supple  Cardiovascular: Normal rate, regular rhythm and normal heart sounds  No murmur heard  Pulmonary/Chest: Effort normal and breath sounds normal  No respiratory distress  She has no wheezes  She has no rales  Abdominal: Soft  Bowel sounds are normal  She exhibits no distension  There is tenderness (Left lower quadrant abdominal wall)  There is no rebound and no guarding  No hernia  Musculoskeletal: Normal range of motion  She exhibits no edema or tenderness  Neurological: She is alert and oriented to person, place, and time  She has normal reflexes  Skin: Skin is warm and dry  No rash noted  She is not diaphoretic  No pallor  No external signs of trauma except ecchymoses related to Lovenox injections on right abdominal wall   Psychiatric: She has a normal mood and affect  Her behavior is normal  Thought content normal    Vitals reviewed        ED Medications  Medications   fentanyl citrate (PF) 100 MCG/2ML 50 mcg (50 mcg Intravenous Given 5/17/18 2326)   iohexol (OMNIPAQUE) 350 MG/ML injection (SINGLE-DOSE) 100 mL (100 mL Intravenous Given 5/18/18 0022)       Diagnostic Studies  Results Reviewed     Procedure Component Value Units Date/Time    Urine Microscopic [[id number]]  (Abnormal) Collected:  05/17/18 2334    Lab Status:  Final result Specimen:  Urine from Urine, Clean Catch Updated:  05/17/18 2356     RBC, UA None Seen /hpf      WBC, UA 0-1 (A) /hpf      Epithelial Cells Occasional /hpf      Bacteria, UA None Seen /hpf     UA w Reflex to Microscopic w Reflex to Culture [[id number]]  (Abnormal) Collected:  05/17/18 2334    Lab Status:  Final result Specimen:  Urine from Urine, Clean Catch Updated:  05/17/18 2346     Color, UA Yellow     Clarity, UA Clear     Specific Gravity, UA <=1 005     pH, UA 5 5     Leukocytes, UA Trace (A)     Nitrite, UA Negative     Protein, UA Negative mg/dl      Glucose, UA Negative mg/dl      Ketones, UA Negative mg/dl      Urobilinogen, UA 0 2 E U /dl      Bilirubin, UA Negative     Blood, UA Negative    CMP [[id number]]  (Abnormal) Collected:  05/17/18 2256    Lab Status:  Final result Specimen:  Blood from Arm, Right Updated:  05/17/18 2325     Sodium 139 mmol/L      Potassium 3 5 mmol/L      Chloride 106 mmol/L      CO2 25 mmol/L      Anion Gap 8 mmol/L      BUN 5 mg/dL      Creatinine 0 75 mg/dL      Glucose 77 mg/dL      Calcium 8 2 (L) mg/dL      AST 22 U/L      ALT 24 U/L      Alkaline Phosphatase 113 U/L      Total Protein 7 1 g/dL      Albumin 3 3 (L) g/dL      Total Bilirubin 0 50 mg/dL      eGFR 95 ml/min/1 73sq m     Narrative:         National Kidney Disease Education Program recommendations are as follows:  GFR calculation is accurate only with a steady state creatinine  Chronic Kidney disease less than 60 ml/min/1 73 sq  meters  Kidney failure less than 15 ml/min/1 73 sq  meters      Lactic acid, plasma [[id number]]  (Normal) Collected:  05/17/18 2256 Lab Status:  Final result Specimen:  Blood from Arm, Right Updated:  05/17/18 2320     LACTIC ACID 1 1 mmol/L     Narrative:         Result may be elevated if tourniquet was used during collection  Lipase [[id number]]  (Abnormal) Collected:  05/17/18 2256    Lab Status:  Final result Specimen:  Blood from Arm, Right Updated:  05/17/18 2319     Lipase 72 (L) u/L     CBC and differential [[id number]]  (Abnormal) Collected:  05/17/18 2256    Lab Status:  Final result Specimen:  Blood from Arm, Right Updated:  05/17/18 2302     WBC 14 65 (H) Thousand/uL      RBC 4 27 Million/uL      Hemoglobin 12 6 g/dL      Hematocrit 38 1 %      MCV 89 fL      MCH 29 5 pg      MCHC 33 1 g/dL      RDW 15 3 (H) %      MPV 9 4 fL      Platelets 934 (H) Thousands/uL      Neutrophils Relative 67 %      Lymphocytes Relative 22 %      Monocytes Relative 10 %      Eosinophils Relative 1 %      Basophils Relative 0 %      Neutrophils Absolute 9 78 (H) Thousands/µL      Lymphocytes Absolute 3 19 Thousands/µL      Monocytes Absolute 1 51 (H) Thousand/µL      Eosinophils Absolute 0 12 Thousand/µL      Basophils Absolute 0 05 Thousands/µL                  CT abdomen pelvis with contrast   ED Interpretation by Rosalba Kinney DO (05/18 0110)   Per [facility]:  Left rectus abdominis sheath hematoma is evolving/resolving without evidence of infection or extension  Discussed a filling defect in the right gonadal vein but 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  Procedures  Procedures       Phone Contacts  ED Phone Contact    ED Course  ED Course as of May 18 0112   Fri May 18, 2018   0109    Patient found sleeping comfortably  Results reviewed with patient  Feels better  All questions answered to the patient's satisfaction  Recommend continued supportive treatment at home and follow up with surgery and GI as scheduled                                  MDM  Number of Diagnoses or Management Options  Diagnosis management comments: DDx: Abdominal wall pain -  elijah hematoma, doubt intra-abdominal etiology, expanding hematoma  A/P: Will recheck abdominal labs and lactate, urine, treat symptoms, reevaluate for further work up and disposition  Amount and/or Complexity of Data Reviewed  Clinical lab tests: reviewed and ordered  Tests in the radiology section of CPT®: reviewed  Review and summarize past medical records: yes      CritCare Time    Disposition  Final diagnoses:   Abdominal wall pain in left lower quadrant   Rectus sheath hematoma, subsequent encounter     Time reflects when diagnosis was documented in both MDM as applicable and the Disposition within this note     Time User Action Codes Description Comment    5/18/2018  1:11 AM Christiano Rod Add [R10 32] Abdominal wall pain in left lower quadrant     5/18/2018  1:11 AM Humza Deras Add [S30 1XXD] Rectus sheath hematoma, subsequent encounter       ED Disposition     ED Disposition Condition Comment    Discharge  65 [address] [place] discharge to home/self care  Condition at discharge: Stable        Follow-up Information     Follow up With Specialties Details Why Contact Info    Surgery and GI  Go to As scheduled         Patient's Medications   Discharge Prescriptions    No medications on file     No discharge procedures on file      ED Provider  Electronically Signed by           Joseph Dubois DO  05/18/18 1218

## 2018-05-18 NOTE — ED NOTES
Patient being transported to ct via [id number] [facility] CAROLINE Cummins  05/18/18 0001

## 2018-05-25 ENCOUNTER — OFFICE VISIT (OUTPATIENT)
Dept: FAMILY MEDICINE CLINIC | Facility: CLINIC | Age: 48
End: 2018-05-25
Payer: COMMERCIAL

## 2018-05-25 VITALS
WEIGHT: 142 LBS | HEIGHT: 66 IN | DIASTOLIC BLOOD PRESSURE: 70 MMHG | RESPIRATION RATE: 17 BRPM | SYSTOLIC BLOOD PRESSURE: 110 MMHG | BODY MASS INDEX: 22.82 KG/M2 | OXYGEN SATURATION: 98 % | TEMPERATURE: 98.6 F | HEART RATE: 80 BPM

## 2018-05-25 DIAGNOSIS — Z72.0 TOBACCO USE: ICD-10-CM

## 2018-05-25 DIAGNOSIS — S30.1XXD ABDOMINAL WALL HEMATOMA, SUBSEQUENT ENCOUNTER: Primary | ICD-10-CM

## 2018-05-25 PROCEDURE — T1015 CLINIC SERVICE: HCPCS | Performed by: FAMILY MEDICINE

## 2018-05-25 NOTE — PROGRESS NOTES
History and Physical  Lieutenant Croft 50 y o  female MRN: [id number]      Assessment:   Abdominal wall hematoma  Tobacco use  CAD    Plan:  She will follow up with GI  Continue current meds  Stop smoking  See Cardiology  She will make appt  RTC 4 months    Chief Complaint   Patient presents with    Follow-up     hospital follow up infection lower left abdomen a hematoma     Herpes Zoster        HPI:  Lieutenant Croft is a 50 y o  female who presents for hospital follow up  Initially seen for gastritis and enterocolitis  Seen again in ER 5/17 for continuing pain in LLQ and found to have hematoma  She is doing much better over the last 2 days  Still having some issues with eating   Gets full fast     Historical Information   Past Medical History:   Diagnosis Date    Chronic pain     Coronary artery disease     Cubital tunnel syndrome     Depression     GERD (gastroesophageal reflux disease)     History of Clostridium difficile     Ovarian cyst     Psychiatric disorder     depression    Raynaud's disease     Takotsubo cardiomyopathy     Thoracic outlet syndrome     Tobacco abuse     Vitamin D deficiency      Past Surgical History:   Procedure Laterality Date    CARDIAC CATHETERIZATION      CABG    CORONARY ARTERY BYPASS GRAFT N/A 2/12/2016    Procedure: CABG X1; Left  EVH to mid-LAD; ZAHRA;  Surgeon: Esthela Velasquez DO;  [facility];  Service:    [facility] DEBRIDEMENT TENNIS ELBOW      DILATION AND CURETTAGE OF UTERUS      HYSTERECTOMY      HYSTEROSCOPY      LEFT OOPHORECTOMY      SALPINGECTOMY Right      Social History   History   Alcohol Use No     History   Drug Use No     History   Smoking Status    Current Every Day Smoker    Packs/day: 0 50    Years: 30 00   Smokeless Tobacco    Never Used     Family History   Problem Relation Age of Onset    Heart disease Mother     Heart attack Mother     Heart attack Brother 36     s/p stent placement    Diabetes Brother     Heart disease Brother  Cancer Father     Diabetes Sister     Heart disease Sister     Diabetes Sister        Meds/Allergies   Allergies   Allergen Reactions    Toradol [Ketorolac Tromethamine] GI Intolerance       Meds:    Current Outpatient Prescriptions:     albuterol (PROVENTIL HFA,VENTOLIN HFA) 90 mcg/act inhaler, Inhale 2 puffs every 6 (six) hours as needed for wheezing, Disp: , Rfl:     aspirin 81 mg chewable tablet, Chew 1 tablet (81 mg total) daily, Disp: 30 tablet, Rfl: 0    atorvastatin (LIPITOR) 40 mg tablet, TAKE ONE TABLET DAILY WITH DINNER , Disp: 30 tablet, Rfl: 4    escitalopram (LEXAPRO) 5 mg tablet, TAKE (1) TABLET DAILY  , Disp: 30 tablet, Rfl: 4    gabapentin (NEURONTIN) 100 mg capsule, Take 1 capsule (100 mg total) by mouth 3 (three) times a day, Disp: 90 capsule, Rfl: 0    methocarbamol (ROBAXIN) 750 mg tablet, Take 1 tablet (750 mg total) by mouth every 6 (six) hours for 28 doses, Disp: 28 tablet, Rfl: 0    metoprolol tartrate (LOPRESSOR) 25 mg tablet, TAKE ONE-HALF TABLET EVERY 8 HOURS , Disp: 45 tablet, Rfl: 4    nitroglycerin (NITROSTAT) 0 4 mg SL tablet, Place 0 4 mg under the tongue every 5 (five) minutes as needed for chest pain, Disp: , Rfl:     oxyCODONE (ROXICODONE) 5 mg immediate release tablet, Take 5 mg by mouth every 4 (four) hours as needed for moderate pain, Disp: , Rfl:     potassium chloride (K-DUR,KLOR-CON) 20 mEq tablet, Take 1 tablet (20 mEq total) by mouth 2 (two) times a day, Disp: 20 tablet, Rfl: 0      REVIEW OF SYSTEMS  Review of Systems   Constitutional: Negative  HENT: Negative  Eyes: Negative  Respiratory: Negative  Cardiovascular: Negative  Gastrointestinal:        As per HPI   Psychiatric/Behavioral: Negative          Current Vitals:   Blood Pressure: 110/70 (05/25/18 1008)  Pulse: 80 (05/25/18 1008)  Temperature: 98 6 °F (37 °C) (05/25/18 1008)  Respirations: 17 (05/25/18 1008)  Height: 5' 6" (167 6 cm) (05/25/18 1008)  Weight - Scale: 64 4 kg (142 lb) (05/25/18 1008)  SpO2: 98 % (05/25/18 1008)  Body mass index is 22 92 kg/m²  PHYSICAL EXAMS:  Physical Exam   Constitutional: She is oriented to person, place, and time  She appears well-developed and well-nourished  HENT:   Head: Normocephalic and atraumatic  Right Ear: External ear normal    Left Ear: External ear normal    Eyes: EOM are normal  Pupils are equal, round, and reactive to light  Neck: Normal range of motion  Neck supple  No thyromegaly present  Cardiovascular: Normal rate and regular rhythm  Pulmonary/Chest: Effort normal and breath sounds normal    Abdominal: Soft  Mild tenderness LLQ  No rebound or guarding  Musculoskeletal: She exhibits no edema  Neurological: She is alert and oriented to person, place, and time  Skin: Skin is warm and dry  Psychiatric: She has a normal mood and affect  Lab Results:          Cammie Harris PA-C  5/25/2018, 10:14 AM

## 2018-05-31 ENCOUNTER — OFFICE VISIT (OUTPATIENT)
Dept: SLEEP CENTER | Facility: CLINIC | Age: 48
End: 2018-05-31

## 2018-05-31 VITALS
RESPIRATION RATE: 18 BRPM | WEIGHT: 140 LBS | HEIGHT: 66 IN | HEART RATE: 74 BPM | SYSTOLIC BLOOD PRESSURE: 118 MMHG | BODY MASS INDEX: 22.5 KG/M2 | DIASTOLIC BLOOD PRESSURE: 70 MMHG | OXYGEN SATURATION: 98 %

## 2018-05-31 DIAGNOSIS — F51.3 SLEEP WALKING: ICD-10-CM

## 2018-05-31 DIAGNOSIS — G89.4 CHRONIC PAIN DISORDER: ICD-10-CM

## 2018-05-31 DIAGNOSIS — G47.30 SLEEP-RELATED BREATHING DISORDER: ICD-10-CM

## 2018-05-31 DIAGNOSIS — J42 CHRONIC BRONCHITIS, UNSPECIFIED CHRONIC BRONCHITIS TYPE (HCC): ICD-10-CM

## 2018-05-31 DIAGNOSIS — R45.86 MOOD DISTURBANCE: ICD-10-CM

## 2018-05-31 DIAGNOSIS — G25.81 RESTLESS LEG SYNDROME: ICD-10-CM

## 2018-05-31 DIAGNOSIS — G47.00 INSOMNIA, UNSPECIFIED TYPE: ICD-10-CM

## 2018-05-31 DIAGNOSIS — Z72.0 TOBACCO ABUSE: Primary | Chronic | ICD-10-CM

## 2018-05-31 NOTE — PROGRESS NOTES
Review of Systems      Genitourinary need to urinate more than twice a night and hot flashes at night   Cardiology Frequent chest pain or angina, , palpitations/fluttering feeling in the chest and ankle/leg swelling   Gastrointestinal frequent heartburn/acid reflux and abdominal pain or cramping that disturb sleep    Neurology frequent headaches, awaken with headache, need to move extremities and muscle weakness   Constitutional none   Integumentary none   Psychiatry depression   Musculoskeletal joint pain   Pulmonary shortness of breath with activity, chest tightness, wheezing, frequent cough, snoring and difficulty breathing when lying flat    ENT none   Endocrine none   Hematological none

## 2018-05-31 NOTE — PROGRESS NOTES
Follow-up Note - [address]  50 y o  female  BKU:[birth year]  WQE:[id number]    I saw Kelley Alvarado in sleep clinic today for her sleep disordered breathing, coexisting sleep complaints & medical conditions  She had diagnostic [and therapeutic] sleep studies [and is here to review results and to initiate therapy]  The diagnostic [study] confirmed no evidence for obstructive sleep apnea:  [AHI] 0 3/hour   Minimum oxygen saturation 87% %  [and 66 min of total sleep time was spent with saturations less than 90%  [Intermittent] snoring was noted  There were severe periodic limb movements of sleep [ Index of] 47/ hour  Sleep efficiency was 79% with acceptable sleep architecture  Sleep latency was 26 min  PFSH, Problem List, Medications & Allergies were reviewed in EMR  Interval changes: [none reported ]   She  has a past medical history of Chronic pain; Coronary artery disease; Cubital tunnel syndrome; Depression; GERD (gastroesophageal reflux disease); History of Clostridium difficile; Ovarian cyst; Psychiatric disorder; Raynaud's disease; Takotsubo cardiomyopathy; Thoracic outlet syndrome; Tobacco abuse; and Vitamin D deficiency  She has a current medication list which includes the following prescription(s): albuterol, aspirin, atorvastatin, escitalopram, gabapentin, methocarbamol, metoprolol tartrate, nitroglycerin, oxycodone, and potassium chloride  ROS: reviewed see attached  some recent weight loss because she was unable to eat due to infection  HPI:  She continues to report awakening with choking and gasping  She has shortness of breath, coughing and wheezing  She had recent hospitalization because of respiratory infection and hematoma in the abdominal musculature because of chronic cough  She has restless leg symptoms that delay sleep and her bed partner reports jerking movements during sleep  She has radiculopathy for which she is on gabapentin  Recent episodes of sleep walking    She continues to smoke  Sleep Routine:  She is spending 9 hr in bed and estimates she is getting 4-5 hours sleep  She falls asleep in 30 min  Sleep is interrupted 4-5 times a night and she has difficulty falling back asleep  [She has excessive drowsiness and naps an hour O2 during the daytime  She rated herself at Total score: 6 /24 on the Rayle sleepiness scale ]        On Exam: Vitals are stable: /70   Pulse 74   Resp 18   Ht 5' 6" (1 676 m)   Wt 63 5 kg (140 lb)   LMP 10/14/2015 (LMP Unknown)   SpO2 98%   BMI 22 60 kg/m²   Patient is [well groomed] alert, orientated, cooperative [and in no distress]  Mental state [appeared normal]  Physical findings are essentially unchanged as documented in the initial encounter  IMPRESSION:    1  Tobacco abuse     2  Sleep-related breathing disorder     3  Insomnia, unspecified type     4  Restless leg syndrome     5  Chronic pain disorder     6  Chronic bronchitis, unspecified chronic bronchitis type ([address] )     7  Sleep walking     8  Mood disturbance ([facility] [address] )      Multiple comorbidities as outlined above    PLAN:    1  I reviewed results of the Sleep studies with the patient  2  With respect to above conditions, I counseled on pathophysiology, diagnosis, treatment options, risks and benefits; inter-relationship and effects on symptoms and comorbidities; risks of no treatment; costs and insurance aspects  3   For her sleep disordered breathing, she needs adequate control of her COPD and to stop smoking  Once stable, nocturnal pulse oximetry may be undertaken to justify supplemental oxygen during sleep if necessary  4  She is on gabapentin for radiculopathy and may increase the nighttime dose up to 300 mg q h s   5  Cognitive behavioral therapy was continued, Sleep Hygiene and behavioral techniques to manage Insomnia were discussed    Specifically avoiding daytime naps, keeping a regular sleep routine, limiting her time in bed to 7-1/2 hours, starting an exercise routine and on relaxation techniques  I also advised on safety precaution with respect to her parasomnia activity  6  She had recen adjustment of her antidepressant medication and feels she is doing somewhat better  7  She is due to see GI specialist   Also advised pulmonary evaluation  Thank you for allowing me to participate in the care of this patient     Sincerely,    Authenticated electronically by Richi Rizzo MD on 36/98/49   Board Certified Specialist

## 2018-06-07 DIAGNOSIS — R12 HEART BURN: Primary | ICD-10-CM

## 2018-06-07 RX ORDER — PANTOPRAZOLE SODIUM 40 MG/1
TABLET, DELAYED RELEASE ORAL
Qty: 30 TABLET | Refills: 4 | Status: SHIPPED | OUTPATIENT
Start: 2018-06-07 | End: 2018-11-06 | Stop reason: SDUPTHER

## 2018-06-25 ENCOUNTER — OFFICE VISIT (OUTPATIENT)
Dept: VASCULAR SURGERY | Facility: HOSPITAL | Age: 48
End: 2018-06-25
Payer: COMMERCIAL

## 2018-06-25 VITALS
HEART RATE: 64 BPM | HEIGHT: 66 IN | DIASTOLIC BLOOD PRESSURE: 64 MMHG | SYSTOLIC BLOOD PRESSURE: 120 MMHG | WEIGHT: 140.8 LBS | BODY MASS INDEX: 22.63 KG/M2 | TEMPERATURE: 96.8 F

## 2018-06-25 DIAGNOSIS — M25.512 CHRONIC LEFT SHOULDER PAIN: Primary | ICD-10-CM

## 2018-06-25 DIAGNOSIS — G89.29 CHRONIC LEFT SHOULDER PAIN: Primary | ICD-10-CM

## 2018-06-25 PROCEDURE — 99213 OFFICE O/P EST LOW 20 MIN: CPT | Performed by: SURGERY

## 2018-06-25 NOTE — PATIENT INSTRUCTIONS
Complaints of mostly of left shoulder pain although she does get numbness in her left arm with use  I will rule out a shoulder structural problem before continued evaluation for thoracic outlet  Ask [address] for evaluation

## 2018-06-25 NOTE — PROGRESS NOTES
Assessment/Plan:    Shoulder pain, left  History of left shoulder pain over the clavicular area, multiple concerns regarding chest pain and multiple CTs of her chest were performed  Will ask orthopedics for evaluation of her left shoulder  Diagnoses and all orders for this visit:    Chronic left shoulder pain        Subjective:      Patient ID: Katherine Muniz is a 50 y o  female  HPI left shoulder pain day and nighttime, some numbness of her left arm     The following portions of the patient's history were reviewed and updated as appropriate: allergies, current medications, past family history, past medical history, past social history, past surgical history and problem list     Review of Systems      Objective:      /64 (BP Location: Left arm, Patient Position: Sitting, Cuff Size: Standard)   Pulse 64   Temp (!) 96 8 °F (36 °C) (Tympanic)   Ht 5' 6" (1 676 m)   Wt 63 9 kg (140 lb 12 8 oz)   LMP 10/14/2015 (LMP Unknown)   BMI 22 73 kg/m²          Physical Exam      Left radial artery palpable in all positions left arm  Tenderness over the acromioclavicular area as well as laterally on the shoulder      Vitals:    06/25/18 0959   BP: 120/64   BP Location: Left arm   Patient Position: Sitting   Cuff Size: Standard   Pulse: 64   Temp: (!) 96 8 °F (36 °C)   TempSrc: Tympanic   Weight: 63 9 kg (140 lb 12 8 oz)   Height: 5' 6" (1 676 m)       Patient Active Problem List   Diagnosis    Smoker    Non-ST elevation MI (NSTEMI) ([facility] [address] )    CAD S/P CABG x 1 2016    GERD (gastroesophageal reflux disease)    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 ([facility])    Depression    Tobacco abuse    Brain TIA    Leg pain    Left carotid stenosis    Acute systolic CHF (congestive heart failure) ([facility])    Atherosclerotic heart disease of native coronary artery without angina pectoris    Cardiomyopathy, dilated ([facility])    Chronic obstructive pulmonary disease ([facility] [address] )    Ischemic cardiomyopathy    Raynaud's syndrome without gangrene    Thoracic outlet syndrome    Vitamin D deficiency    Sleep-related breathing disorder    Insomnia    Restless leg syndrome    Sleep walking    Hypersomnia    Chronic pain disorder    Enterocolitis    Shoulder pain, left       Past Surgical History:   Procedure Laterality Date    CARDIAC CATHETERIZATION      CABG    CORONARY ARTERY BYPASS GRAFT N/A 2/12/2016    Procedure: CABG X1; Left  EVH to mid-LAD; ZAHRA;  Surgeon: Obed Abarca DO;  [facility];  Service:    [facility] DEBRIDEMENT TENNIS ELBOW      DILATION AND CURETTAGE OF UTERUS      HYSTERECTOMY      HYSTEROSCOPY      LEFT OOPHORECTOMY      SALPINGECTOMY Right        Family History   Problem Relation Age of Onset    Heart disease Mother     Heart attack Mother     Heart attack Brother 36        s/p stent placement    Diabetes Brother     Heart disease Brother     Cancer Father     Diabetes Sister     Heart disease Sister     Diabetes Sister        Social History     Social History    Marital status: /Civil Union     Spouse name: N/A    Number of children: 3    Years of education: N/A     Occupational History    unemployed      Social History Main Topics    Smoking status: Current Every Day Smoker     Packs/day: 0 50     Years: 30 00    Smokeless tobacco: Never Used    Alcohol use No    Drug use: No    Sexual activity: Yes     Other Topics Concern    Not on file     Social History Narrative    No narrative on file       Allergies   Allergen Reactions    Toradol [Ketorolac Tromethamine] GI Intolerance         Current Outpatient Prescriptions:     albuterol (PROVENTIL HFA,VENTOLIN HFA) 90 mcg/act inhaler, Inhale 2 puffs every 6 (six) hours as needed for wheezing, Disp: , Rfl:     aspirin 81 mg chewable tablet, Chew 1 tablet (81 mg total) daily, Disp: 30 tablet, Rfl: 0    atorvastatin (LIPITOR) 40 mg tablet, TAKE ONE TABLET DAILY WITH DINNER , Disp: 30 tablet, Rfl: 4   escitalopram (LEXAPRO) 5 mg tablet, TAKE (1) TABLET DAILY  , Disp: 30 tablet, Rfl: 4    gabapentin (NEURONTIN) 100 mg capsule, Take 1 capsule (100 mg total) by mouth 3 (three) times a day, Disp: 90 capsule, Rfl: 0    methocarbamol (ROBAXIN) 750 mg tablet, Take 1 tablet (750 mg total) by mouth every 6 (six) hours for 28 doses, Disp: 28 tablet, Rfl: 0    metoprolol tartrate (LOPRESSOR) 25 mg tablet, TAKE ONE-HALF TABLET EVERY 8 HOURS , Disp: 45 tablet, Rfl: 4    nitroglycerin (NITROSTAT) 0 4 mg SL tablet, Place 0 4 mg under the tongue every 5 (five) minutes as needed for chest pain, Disp: , Rfl:     oxyCODONE (ROXICODONE) 5 mg immediate release tablet, Take 5 mg by mouth every 4 (four) hours as needed for moderate pain, Disp: , Rfl:     pantoprazole (PROTONIX) 40 mg tablet, TAKE (1) TABLET BY MOUTH DAILY  , Disp: 30 tablet, Rfl: 4    potassium chloride (K-DUR,KLOR-CON) 20 mEq tablet, Take 1 tablet (20 mEq total) by mouth 2 (two) times a day, Disp: 20 tablet, Rfl: 0

## 2018-06-25 NOTE — ASSESSMENT & PLAN NOTE
History of left shoulder pain over the clavicular area, multiple concerns regarding chest pain and multiple CTs of her chest were performed  Will ask orthopedics for evaluation of her left shoulder

## 2018-06-29 ENCOUNTER — APPOINTMENT (OUTPATIENT)
Dept: RADIOLOGY | Facility: MEDICAL CENTER | Age: 48
End: 2018-06-29
Payer: COMMERCIAL

## 2018-06-29 ENCOUNTER — OFFICE VISIT (OUTPATIENT)
Dept: OBGYN CLINIC | Facility: CLINIC | Age: 48
End: 2018-06-29
Payer: COMMERCIAL

## 2018-06-29 VITALS
HEIGHT: 66 IN | WEIGHT: 140 LBS | BODY MASS INDEX: 22.5 KG/M2 | DIASTOLIC BLOOD PRESSURE: 74 MMHG | SYSTOLIC BLOOD PRESSURE: 110 MMHG | HEART RATE: 87 BPM

## 2018-06-29 DIAGNOSIS — R20.0 LEFT ARM NUMBNESS: ICD-10-CM

## 2018-06-29 DIAGNOSIS — M25.512 ACUTE PAIN OF LEFT SHOULDER: Primary | ICD-10-CM

## 2018-06-29 DIAGNOSIS — M25.512 ACUTE PAIN OF LEFT SHOULDER: ICD-10-CM

## 2018-06-29 DIAGNOSIS — M25.512 ARTHRALGIA OF LEFT ACROMIOCLAVICULAR JOINT: ICD-10-CM

## 2018-06-29 DIAGNOSIS — M25.512 CHRONIC LEFT SHOULDER PAIN: ICD-10-CM

## 2018-06-29 DIAGNOSIS — G89.29 CHRONIC LEFT SHOULDER PAIN: ICD-10-CM

## 2018-06-29 PROCEDURE — 20605 DRAIN/INJ JOINT/BURSA W/O US: CPT | Performed by: PHYSICIAN ASSISTANT

## 2018-06-29 PROCEDURE — 72050 X-RAY EXAM NECK SPINE 4/5VWS: CPT

## 2018-06-29 PROCEDURE — 73030 X-RAY EXAM OF SHOULDER: CPT

## 2018-06-29 PROCEDURE — 99203 OFFICE O/P NEW LOW 30 MIN: CPT | Performed by: PHYSICIAN ASSISTANT

## 2018-06-29 RX ORDER — LIDOCAINE HYDROCHLORIDE 10 MG/ML
1 INJECTION, SOLUTION INFILTRATION; PERINEURAL
Status: COMPLETED | OUTPATIENT
Start: 2018-06-29 | End: 2018-06-29

## 2018-06-29 RX ORDER — BETAMETHASONE SODIUM PHOSPHATE AND BETAMETHASONE ACETATE 3; 3 MG/ML; MG/ML
6 INJECTION, SUSPENSION INTRA-ARTICULAR; INTRALESIONAL; INTRAMUSCULAR; SOFT TISSUE
Status: COMPLETED | OUTPATIENT
Start: 2018-06-29 | End: 2018-06-29

## 2018-06-29 RX ADMIN — BETAMETHASONE SODIUM PHOSPHATE AND BETAMETHASONE ACETATE 6 MG: 3; 3 INJECTION, SUSPENSION INTRA-ARTICULAR; INTRALESIONAL; INTRAMUSCULAR; SOFT TISSUE at 12:19

## 2018-06-29 RX ADMIN — LIDOCAINE HYDROCHLORIDE 1 ML: 10 INJECTION, SOLUTION INFILTRATION; PERINEURAL at 12:19

## 2018-06-29 NOTE — PATIENT INSTRUCTIONS
Patient elected cortisone injection in the [facility] [place], operated by [facility] joint today  We will get EMG studies of the left upper extremity and see the patient back after this  Ice to left shoulder 20 min 1-2 times today

## 2018-06-29 NOTE — PROGRESS NOTES
Assessment:    1  Acute pain of left shoulder    2  Chronic left shoulder pain    3  Left arm numbness    4  Arthralgia of left acromioclavicular joint      Plan:  Patient elected cortisone injection in the [facility] joint today  We will get EMG studies of the left upper extremity and see the patient back after this  Ice to left shoulder 20 min 1-2 times today  Chief Complaint:  Left anterior shoulder pain in left arm numbness  ELENI Yanes is a 50 y o  female that appears much older than stated age  She has had 3 MIs and had open heart surgery in 3 strokes in the past   She is right hand dominant but complains of left shoulder pain and numbness in the left arm  She saw a vascular to be worked up for carotid pathology and they felt the pain she was having was related to her shoulder and referred her here for follow-up  She had had previous left elbow surgery by Dr Miranda Sommer many years ago  She states the numbness in her arm goes on to her hands  She denies specific neck pain  She does have some tightness in her neck  She is not working      Past Medical History:   Diagnosis Date    Chronic pain     Coronary artery disease     Cubital tunnel syndrome     Depression     GERD (gastroesophageal reflux disease)     History of Clostridium difficile     Ovarian cyst     Psychiatric disorder     depression    Raynaud's disease     Takotsubo cardiomyopathy     Thoracic outlet syndrome     Tobacco abuse     Vitamin D deficiency      Past Surgical History:   Procedure Laterality Date    CARDIAC CATHETERIZATION      CABG    CORONARY ARTERY BYPASS GRAFT N/A 2/12/2016    Procedure: CABG X1; Left  EVH to mid-LAD; ZAHRA;  Surgeon: Nelson Connor DO;  [facility];  Service:    [facility] DEBRIDEMENT TENNIS ELBOW      DILATION AND CURETTAGE OF UTERUS      HYSTERECTOMY      HYSTEROSCOPY      LEFT OOPHORECTOMY      SALPINGECTOMY Right      Allergies   Allergen Reactions    Toradol [Ketorolac Tromethamine] GI Intolerance       Current Outpatient Prescriptions:     albuterol (PROVENTIL HFA,VENTOLIN HFA) 90 mcg/act inhaler, Inhale 2 puffs every 6 (six) hours as needed for wheezing, Disp: , Rfl:     aspirin 81 mg chewable tablet, Chew 1 tablet (81 mg total) daily, Disp: 30 tablet, Rfl: 0    atorvastatin (LIPITOR) 40 mg tablet, TAKE ONE TABLET DAILY WITH DINNER , Disp: 30 tablet, Rfl: 4    escitalopram (LEXAPRO) 5 mg tablet, TAKE (1) TABLET DAILY  , Disp: 30 tablet, Rfl: 4    gabapentin (NEURONTIN) 100 mg capsule, Take 1 capsule (100 mg total) by mouth 3 (three) times a day, Disp: 90 capsule, Rfl: 0    methocarbamol (ROBAXIN) 750 mg tablet, Take 1 tablet (750 mg total) by mouth every 6 (six) hours for 28 doses, Disp: 28 tablet, Rfl: 0    metoprolol tartrate (LOPRESSOR) 25 mg tablet, TAKE ONE-HALF TABLET EVERY 8 HOURS , Disp: 45 tablet, Rfl: 4    nitroglycerin (NITROSTAT) 0 4 mg SL tablet, Place 0 4 mg under the tongue every 5 (five) minutes as needed for chest pain, Disp: , Rfl:     oxyCODONE (ROXICODONE) 5 mg immediate release tablet, Take 5 mg by mouth every 4 (four) hours as needed for moderate pain, Disp: , Rfl:     pantoprazole (PROTONIX) 40 mg tablet, TAKE (1) TABLET BY MOUTH DAILY  , Disp: 30 tablet, Rfl: 4    potassium chloride (K-DUR,KLOR-CON) 20 mEq tablet, Take 1 tablet (20 mEq total) by mouth 2 (two) times a day, Disp: 20 tablet, Rfl: 0    Social History     Occupational History    unemployed      Social History Main Topics    Smoking status: Current Every Day Smoker     Packs/day: 0 50     Years: 30 00    Smokeless tobacco: Never Used    Alcohol use No    Drug use: No    Sexual activity: Yes     Review of Systems   Constitutional: Positive for fever  HENT: Positive for sore throat  Respiratory: Positive for cough, shortness of breath and wheezing  Cardiovascular: Positive for chest pain, palpitations and leg swelling  Genitourinary: Positive for frequency     Musculoskeletal: Positive for arthralgias and myalgias  Neurological: Positive for numbness and headaches  Psychiatric/Behavioral: Positive for decreased concentration  Objective:  Blood pressure 110/74, pulse 87, height 5' 6" (1 676 m), weight 63 5 kg (140 lb), last menstrual period 10/14/2015, not currently breastfeeding  Body mass index is 22 6 kg/m²  Physical Exam   Constitutional: Patient is oriented to person, place, and time  Patient appears well-developed and well-nourished, thin  No distress  HENT:   Head: Normocephalic   limited dentition  Eyes: Conjunctivae are normal  Right eye exhibits no discharge  Left eye exhibits no discharge  No scleral icterus  Cardiovascular: Normal rate  Pulmonary/Chest: Effort normal    Neurological: Patient is alert and oriented to person, place, and time  Skin: Skin is warm and dry  No rash noted  Patient is not diaphoretic  No erythema  No pallor  Psychiatric: Patient has a normal mood and affect  Patient's behavior is normal  Judgment and thought content normal      Ortho Exam   patient has no skin rashes or erythema or warmth about the left shoulder  She has some tenderness with palpation at the [facility] joint  No pain over the rotator cuff distribution  Forward flexion 160  Internal rotation symmetric behind back  No weakness with supraspinatus empty can testing  Negative weakness with external rotation  Positive pain at the [facility] joint with cross chest maneuver  Negative Spurling's maneuver bilaterally in the neck  She maintains cervical lordotic curvature  No tenderness with palpation of spinous processes in the cervical spine  Cervical ROM symmetric to both sides  Equal  strength  Decreased sensation throughout the left arm primarily from the mid upper arm and distal  No worsening of symptoms with Tinel's at the wrist or elbow  Deep tendon reflexes at the biceps triceps and brachioradialis are grossly symmetric      I have personally reviewed pertinent films in PACS and my interpretation is  x-rays left shoulder grossly within normal limits with mild narrowing of the AC joint  Cervical x-rays notes slight DJD  Medium joint arthrocentesis  Date/Time: 6/29/2018 12:19 PM  Consent given by: patient  Timeout: Immediately prior to procedure a time out was called to verify the correct patient, procedure, equipment, support staff and site/side marked as required   Supporting Documentation  Indications: pain   Procedure Details  Location: shoulder - L acromioclavicular  Needle size: 22 G  Ultrasound guidance: no  Approach: superior  Medications administered: 1 mL lidocaine 1 %; 6 mg betamethasone acetate-betamethasone sodium phosphate 6 (3-3) mg/mL          Eileen Mustafa PA-C  Portions of the record may have been created with voice recognition software   Occasional wrong word or "sound a like" substitutions may have occurred due to the inherent limitations of voice recognition software

## 2018-07-02 ENCOUNTER — TELEPHONE (OUTPATIENT)
Dept: FAMILY MEDICINE CLINIC | Facility: CLINIC | Age: 48
End: 2018-07-02

## 2018-07-03 ENCOUNTER — TELEPHONE (OUTPATIENT)
Dept: FAMILY MEDICINE CLINIC | Facility: CLINIC | Age: 48
End: 2018-07-03

## 2018-07-20 ENCOUNTER — OFFICE VISIT (OUTPATIENT)
Dept: PULMONOLOGY | Facility: HOSPITAL | Age: 48
End: 2018-07-20
Payer: COMMERCIAL

## 2018-07-20 ENCOUNTER — HOSPITAL ENCOUNTER (OUTPATIENT)
Dept: NON INVASIVE DIAGNOSTICS | Facility: HOSPITAL | Age: 48
Discharge: HOME | End: 2018-07-20
Attending: INTERNAL MEDICINE
Payer: COMMERCIAL

## 2018-07-20 VITALS
SYSTOLIC BLOOD PRESSURE: 116 MMHG | DIASTOLIC BLOOD PRESSURE: 60 MMHG | WEIGHT: 141 LBS | OXYGEN SATURATION: 96 % | BODY MASS INDEX: 22.76 KG/M2 | HEART RATE: 80 BPM

## 2018-07-20 DIAGNOSIS — J44.9 CHRONIC OBSTRUCTIVE PULMONARY DISEASE, UNSPECIFIED COPD TYPE (HCC): Primary | ICD-10-CM

## 2018-07-20 DIAGNOSIS — J44.9 CHRONIC OBSTRUCTIVE PULMONARY DISEASE, UNSPECIFIED COPD TYPE (HCC): ICD-10-CM

## 2018-07-20 PROCEDURE — 99245 OFF/OP CONSLTJ NEW/EST HI 55: CPT | Performed by: INTERNAL MEDICINE

## 2018-07-20 PROCEDURE — 94060 EVALUATION OF WHEEZING: CPT | Performed by: INTERNAL MEDICINE

## 2018-07-20 PROCEDURE — 93225 XTRNL ECG REC<48 HRS REC: CPT

## 2018-07-20 PROCEDURE — 93226 XTRNL ECG REC<48 HR SCAN A/R: CPT

## 2018-07-20 PROCEDURE — 94618 PULMONARY STRESS TESTING: CPT | Performed by: INTERNAL MEDICINE

## 2018-07-20 NOTE — PATIENT INSTRUCTIONS
Start Stiolto 2 puffs once a day  Portable oxygen wear at night and with exertion  Tobacco cessation

## 2018-07-20 NOTE — ASSESSMENT & PLAN NOTE
Antonio Prado has moderate COPD on her office spirometry not clearly resolved with bronchodilator as it was before  I think she would benefit from maintenance inhaled therapy so I started her on Stiolto 2 puffs once a day  I instructed to continue to use her rescue inhaler as needed  Unfortunately she also qualifies for oxygen given her 6 min walk test   She had a desaturation after about 5 min to 83 with subsequent bradycardia to the 40s  Given her metoprolol I did order 48 hr Holter monitor to assure that her heart rate and rhythm are within normal limits  I have ordered her oxygen explained to her the concentrator and to wear it at night and with exertion  We talked about COPD, the nature of the disease and exacerbations  We also talked about the paramount importance of tobacco cessation  I instructed to try using lozenges in addition to weaning down her cigarettes she is currently down from 20 cigarettes a day to 7 cigarettes a day and will continue to work toward stopping  At her next visit she is willing to consider will to consider Wellbutrin or Chantix  I have also ordered a test for alpha-1 level and phenotype

## 2018-07-20 NOTE — PROGRESS NOTES
Assessment/Plan: Moderate COPD (chronic obstructive pulmonary disease) ([facility] )  Neftali Asher has moderate COPD on her office spirometry not clearly resolved with bronchodilator as it was before  I think she would benefit from maintenance inhaled therapy so I started her on Stiolto 2 puffs once a day  I instructed to continue to use her rescue inhaler as needed  Unfortunately she also qualifies for oxygen given her 6 min walk test   She had a desaturation after about 5 min to 83 with subsequent bradycardia to the 40s  Given her metoprolol I did order 48 hr Holter monitor to assure that her heart rate and rhythm are within normal limits  I have ordered her oxygen explained to her the concentrator and to wear it at night and with exertion  We talked about COPD, the nature of the disease and exacerbations  We also talked about the paramount importance of tobacco cessation  I instructed to try using lozenges in addition to weaning down her cigarettes she is currently down from 20 cigarettes a day to 7 cigarettes a day and will continue to work toward stopping  At her next visit she is willing to consider will to consider Wellbutrin or Chantix  I have also ordered a test for alpha-1 level and phenotype  Her ambulatory pulse ox dropped to 83%, she increased to 95% with 2 L     Diagnoses and all orders for this visit:    Chronic obstructive pulmonary disease, unspecified COPD type ([facility] )  -     POCT spirometry  with bronchodilator  -     POCT 6 minute walk  -     tiotropium-olodaterol (STIOLTO RESPIMAT) 2 5-2 5 MCG/ACT inhaler; Inhale 2 puffs daily  -     Holter monitor - 48 hour; Future  -     Home Oxygen with Portability          Subjective:      Patient ID: Mary Perez is a 50 y o  female  Logan Macario is here for an initial consultation with regards to her breathing    She has a very complicated history including several strokes coronary artery disease resulting and coronary artery bypass grafting 3 years ago, and chronic respiratory issues presumably diagnosed as COPD  She suffers from significant shortness of breath particularly with stairs at night and when she is in the hot humid air  She complains of wheezing and chronic cough  She denies any allergies or acid reflux  She smokes 7 cigarettes a day down from 1 pack a day for 35 years  She is retired and disabled since her CABG  The following portions of the patient's history were reviewed and updated as appropriate: allergies, current medications, past family history, past medical history, past social history, past surgical history and problem list     Review of Systems   Constitutional: Negative  Negative for unexpected weight change  HENT: Negative  Negative for postnasal drip  Eyes: Negative  Respiratory: Positive for cough and shortness of breath  Negative for wheezing  Cardiovascular: Negative  Negative for chest pain and leg swelling  Gastrointestinal: Negative  Endocrine: Negative  Genitourinary: Negative  Musculoskeletal: Negative  Allergic/Immunologic: Negative  Neurological: Negative  Hematological: Negative  Objective:      /60   Pulse 80   Wt 64 kg (141 lb)   LMP 10/14/2015 (LMP Unknown)   SpO2 96%   BMI 22 76 kg/m²          Physical Exam   Constitutional: She is oriented to person, place, and time  She appears well-developed and well-nourished  HENT:   Head: Normocephalic  Eyes: Pupils are equal, round, and reactive to light  Neck: Normal range of motion  Neck supple  Cardiovascular: Normal rate  No murmur heard  Pulmonary/Chest: Effort normal and breath sounds normal  No respiratory distress  She has no wheezes  She has no rales  Abdominal: Soft  Musculoskeletal: Normal range of motion  She exhibits no edema  Neurological: She is alert and oriented to person, place, and time  Skin: Skin is warm and dry

## 2018-07-23 ENCOUNTER — OFFICE VISIT (OUTPATIENT)
Dept: FAMILY MEDICINE CLINIC | Facility: CLINIC | Age: 48
End: 2018-07-23
Payer: COMMERCIAL

## 2018-07-23 VITALS
HEIGHT: 66 IN | WEIGHT: 139 LBS | SYSTOLIC BLOOD PRESSURE: 114 MMHG | DIASTOLIC BLOOD PRESSURE: 68 MMHG | BODY MASS INDEX: 22.34 KG/M2 | RESPIRATION RATE: 18 BRPM | TEMPERATURE: 98.8 F | HEART RATE: 94 BPM | OXYGEN SATURATION: 97 %

## 2018-07-23 DIAGNOSIS — Z09 FOLLOW UP: Primary | ICD-10-CM

## 2018-07-23 PROCEDURE — T1015 CLINIC SERVICE: HCPCS | Performed by: FAMILY MEDICINE

## 2018-07-23 NOTE — PROGRESS NOTES
History and Physical  Maximo Geller 50 y o  female MRN: [id number]      Assessment:   Depression  Chronic pain  CAD  COPD      Plan:  Continue current meds  She will take Holter to be read later today  RTC 4 months or sooner if needed  Chief Complaint   Patient presents with    Follow-up     review sleep study        HPI:  Maximo Geller is a 50 y o  female who presents for above  She was ordered 02 therapy last week and it will be delivered tomorrow  She also did Holter monitor that she needs to drop off after this appt  Otherwise she is doing well  She continues to see Pain Management        Historical Information   Past Medical History:   Diagnosis Date    Chronic pain     Coronary artery disease     Cubital tunnel syndrome     Depression     GERD (gastroesophageal reflux disease)     History of Clostridium difficile     Ovarian cyst     Psychiatric disorder     depression    Raynaud's disease     Takotsubo cardiomyopathy     Thoracic outlet syndrome     Tobacco abuse     Vitamin D deficiency      Past Surgical History:   Procedure Laterality Date    CARDIAC CATHETERIZATION      CABG    CORONARY ARTERY BYPASS GRAFT N/A 2/12/2016    Procedure: CABG X1; Left  EVH to mid-LAD; ZAHRA;  Surgeon: Luis Miguel Vargas DO;  Location: [facility];  Service:    Nighat Smith DEBRIDEMENT TENNIS ELBOW      DILATION AND CURETTAGE OF UTERUS      HYSTERECTOMY      HYSTEROSCOPY      LEFT OOPHORECTOMY      SALPINGECTOMY Right      Social History   History   Alcohol Use No     History   Drug Use No     History   Smoking Status    Current Every Day Smoker    Packs/day: 0 50    Years: 30 00   Smokeless Tobacco    Never Used     Family History   Problem Relation Age of Onset    Heart disease Mother     Heart attack Mother     Heart attack Brother 36        s/p stent placement    Diabetes Brother     Heart disease Brother     Cancer Father     Diabetes Sister     Heart disease Sister     Diabetes Sister Meds/Allergies   Allergies   Allergen Reactions    Toradol [Ketorolac Tromethamine] GI Intolerance       Meds:    Current Outpatient Prescriptions:     albuterol (PROVENTIL HFA,VENTOLIN HFA) 90 mcg/act inhaler, Inhale 2 puffs every 6 (six) hours as needed for wheezing, Disp: , Rfl:     aspirin 81 mg chewable tablet, Chew 1 tablet (81 mg total) daily, Disp: 30 tablet, Rfl: 0    atorvastatin (LIPITOR) 40 mg tablet, TAKE ONE TABLET DAILY WITH DINNER , Disp: 30 tablet, Rfl: 4    escitalopram (LEXAPRO) 5 mg tablet, TAKE (1) TABLET DAILY  , Disp: 30 tablet, Rfl: 4    gabapentin (NEURONTIN) 100 mg capsule, Take 1 capsule (100 mg total) by mouth 3 (three) times a day, Disp: 90 capsule, Rfl: 0    methocarbamol (ROBAXIN) 750 mg tablet, Take 1 tablet (750 mg total) by mouth every 6 (six) hours for 28 doses, Disp: 28 tablet, Rfl: 0    metoprolol tartrate (LOPRESSOR) 25 mg tablet, TAKE ONE-HALF TABLET EVERY 8 HOURS , Disp: 45 tablet, Rfl: 4    nitroglycerin (NITROSTAT) 0 4 mg SL tablet, Place 0 4 mg under the tongue every 5 (five) minutes as needed for chest pain, Disp: , Rfl:     oxyCODONE (ROXICODONE) 5 mg immediate release tablet, Take 5 mg by mouth every 4 (four) hours as needed for moderate pain, Disp: , Rfl:     pantoprazole (PROTONIX) 40 mg tablet, TAKE (1) TABLET BY MOUTH DAILY  , Disp: 30 tablet, Rfl: 4    potassium chloride (K-DUR,KLOR-CON) 20 mEq tablet, Take 1 tablet (20 mEq total) by mouth 2 (two) times a day, Disp: 20 tablet, Rfl: 0    tiotropium-olodaterol (STIOLTO RESPIMAT) 2 5-2 5 MCG/ACT inhaler, Inhale 2 puffs daily, Disp: 1 Inhaler, Rfl: 5      REVIEW OF SYSTEMS  Review of Systems    Current Vitals:   Blood Pressure: 114/68 (07/23/18 1045)  Pulse: 94 (07/23/18 1045)  Temperature: 98 8 °F (37 1 °C) (07/23/18 1045)  Respirations: 18 (07/23/18 1045)  Height: 5' 6" (167 6 cm) (07/23/18 1045)  Weight - Scale: 63 kg (139 lb) (07/23/18 1045)  SpO2: 97 % (07/23/18 1045)  Body mass index is 22 44 kg/m²       PHYSICAL EXAMS:  Physical Exam    Lab Results:          Nicole Levin PA-C  7/23/2018, 10:59 AM

## 2018-07-25 PROCEDURE — 93227 XTRNL ECG REC<48 HR R&I: CPT | Performed by: INTERNAL MEDICINE

## 2018-08-18 ENCOUNTER — APPOINTMENT (EMERGENCY)
Dept: RADIOLOGY | Facility: HOSPITAL | Age: 48
End: 2018-08-18
Payer: COMMERCIAL

## 2018-08-18 ENCOUNTER — HOSPITAL ENCOUNTER (OUTPATIENT)
Facility: HOSPITAL | Age: 48
Setting detail: OBSERVATION
Discharge: HOME | End: 2018-08-20
Attending: EMERGENCY MEDICINE | Admitting: INTERNAL MEDICINE
Payer: COMMERCIAL

## 2018-08-18 DIAGNOSIS — R07.9 CHEST PAIN: Primary | ICD-10-CM

## 2018-08-18 DIAGNOSIS — R07.89 BURNING CHEST PAIN: ICD-10-CM

## 2018-08-18 DIAGNOSIS — Z72.0 TOBACCO ABUSE: Chronic | ICD-10-CM

## 2018-08-18 DIAGNOSIS — Z95.1 S/P CABG X 1: ICD-10-CM

## 2018-08-18 DIAGNOSIS — J44.9 MODERATE COPD (CHRONIC OBSTRUCTIVE PULMONARY DISEASE) (HCC): ICD-10-CM

## 2018-08-18 DIAGNOSIS — E87.6 HYPOKALEMIA: ICD-10-CM

## 2018-08-18 LAB
BASOPHILS # BLD AUTO: 0.1 THOUSANDS/ΜL (ref 0–0.1)
BASOPHILS NFR BLD AUTO: 1 % (ref 0–1)
EOSINOPHIL # BLD AUTO: 0.06 THOUSAND/ΜL (ref 0–0.61)
EOSINOPHIL NFR BLD AUTO: 0 % (ref 0–6)
ERYTHROCYTE [DISTWIDTH] IN BLOOD BY AUTOMATED COUNT: 15.7 % (ref 11.6–15.1)
HCT VFR BLD AUTO: 44.3 % (ref 34.8–46.1)
HGB BLD-MCNC: 14 G/DL (ref 11.5–15.4)
IMM GRANULOCYTES # BLD AUTO: 0.06 THOUSAND/UL (ref 0–0.2)
IMM GRANULOCYTES NFR BLD AUTO: 0 % (ref 0–2)
LYMPHOCYTES # BLD AUTO: 5.42 THOUSANDS/ΜL (ref 0.6–4.47)
LYMPHOCYTES NFR BLD AUTO: 35 % (ref 14–44)
MCH RBC QN AUTO: 29 PG (ref 26.8–34.3)
MCHC RBC AUTO-ENTMCNC: 31.6 G/DL (ref 31.4–37.4)
MCV RBC AUTO: 92 FL (ref 82–98)
MONOCYTES # BLD AUTO: 0.7 THOUSAND/ΜL (ref 0.17–1.22)
MONOCYTES NFR BLD AUTO: 5 % (ref 4–12)
NEUTROPHILS # BLD AUTO: 9.28 THOUSANDS/ΜL (ref 1.85–7.62)
NEUTS SEG NFR BLD AUTO: 59 % (ref 43–75)
NRBC BLD AUTO-RTO: 0 /100 WBCS
PLATELET # BLD AUTO: 386 THOUSANDS/UL (ref 149–390)
PMV BLD AUTO: 9.6 FL (ref 8.9–12.7)
RBC # BLD AUTO: 4.83 MILLION/UL (ref 3.81–5.12)
WBC # BLD AUTO: 15.62 THOUSAND/UL (ref 4.31–10.16)

## 2018-08-18 PROCEDURE — 93005 ELECTROCARDIOGRAM TRACING: CPT

## 2018-08-18 PROCEDURE — 96375 TX/PRO/DX INJ NEW DRUG ADDON: CPT

## 2018-08-18 PROCEDURE — 80053 COMPREHEN METABOLIC PANEL: CPT | Performed by: EMERGENCY MEDICINE

## 2018-08-18 PROCEDURE — 71045 X-RAY EXAM CHEST 1 VIEW: CPT

## 2018-08-18 PROCEDURE — 83735 ASSAY OF MAGNESIUM: CPT | Performed by: EMERGENCY MEDICINE

## 2018-08-18 PROCEDURE — 36415 COLL VENOUS BLD VENIPUNCTURE: CPT | Performed by: EMERGENCY MEDICINE

## 2018-08-18 PROCEDURE — 84484 ASSAY OF TROPONIN QUANT: CPT | Performed by: EMERGENCY MEDICINE

## 2018-08-18 PROCEDURE — 85025 COMPLETE CBC W/AUTO DIFF WBC: CPT | Performed by: EMERGENCY MEDICINE

## 2018-08-18 RX ORDER — NITROGLYCERIN 0.4 MG/1
0.4 TABLET SUBLINGUAL ONCE
Status: COMPLETED | OUTPATIENT
Start: 2018-08-18 | End: 2018-08-18

## 2018-08-18 RX ORDER — ASPIRIN 81 MG/1
324 TABLET ORAL ONCE
Status: COMPLETED | OUTPATIENT
Start: 2018-08-18 | End: 2018-08-19

## 2018-08-18 RX ADMIN — FAMOTIDINE 20 MG: 10 INJECTION, SOLUTION INTRAVENOUS at 23:57

## 2018-08-18 RX ADMIN — NITROGLYCERIN 0.4 MG: 0.4 TABLET SUBLINGUAL at 23:59

## 2018-08-19 PROBLEM — E87.6 HYPOKALEMIA: Status: ACTIVE | Noted: 2018-08-19

## 2018-08-19 PROBLEM — R07.89 BURNING CHEST PAIN: Status: ACTIVE | Noted: 2018-08-19

## 2018-08-19 PROBLEM — R10.13 DYSPEPSIA: Status: ACTIVE | Noted: 2018-08-19

## 2018-08-19 LAB
ALBUMIN SERPL BCP-MCNC: 3.3 G/DL (ref 3.5–5)
ALBUMIN SERPL BCP-MCNC: 4 G/DL (ref 3.5–5)
ALP SERPL-CCNC: 133 U/L (ref 46–116)
ALP SERPL-CCNC: 160 U/L (ref 46–116)
ALT SERPL W P-5'-P-CCNC: 15 U/L (ref 12–78)
ALT SERPL W P-5'-P-CCNC: 8 U/L (ref 12–78)
AMPHETAMINES SERPL QL SCN: NEGATIVE
ANION GAP SERPL CALCULATED.3IONS-SCNC: 11 MMOL/L (ref 4–13)
ANION GAP SERPL CALCULATED.3IONS-SCNC: 8 MMOL/L (ref 4–13)
AST SERPL W P-5'-P-CCNC: 14 U/L (ref 5–45)
AST SERPL W P-5'-P-CCNC: 18 U/L (ref 5–45)
BACTERIA UR QL AUTO: ABNORMAL /HPF
BARBITURATES UR QL: NEGATIVE
BENZODIAZ UR QL: NEGATIVE
BILIRUB SERPL-MCNC: 0.2 MG/DL (ref 0.2–1)
BILIRUB SERPL-MCNC: 0.3 MG/DL (ref 0.2–1)
BILIRUB UR QL STRIP: NEGATIVE
BUN SERPL-MCNC: 3 MG/DL (ref 5–25)
BUN SERPL-MCNC: 4 MG/DL (ref 5–25)
CALCIUM SERPL-MCNC: 8.3 MG/DL (ref 8.3–10.1)
CALCIUM SERPL-MCNC: 9 MG/DL (ref 8.3–10.1)
CHLORIDE SERPL-SCNC: 104 MMOL/L (ref 100–108)
CHLORIDE SERPL-SCNC: 113 MMOL/L (ref 100–108)
CK SERPL-CCNC: 64 U/L (ref 26–192)
CLARITY UR: CLEAR
CO2 SERPL-SCNC: 23 MMOL/L (ref 21–32)
CO2 SERPL-SCNC: 25 MMOL/L (ref 21–32)
COCAINE UR QL: NEGATIVE
COLOR UR: YELLOW
CREAT SERPL-MCNC: 0.71 MG/DL (ref 0.6–1.3)
CREAT SERPL-MCNC: 0.92 MG/DL (ref 0.6–1.3)
ERYTHROCYTE [DISTWIDTH] IN BLOOD BY AUTOMATED COUNT: 15.6 % (ref 11.6–15.1)
GFR SERPL CREATININE-BSD FRML MDRD: 101 ML/MIN/1.73SQ M
GFR SERPL CREATININE-BSD FRML MDRD: 74 ML/MIN/1.73SQ M
GLUCOSE SERPL-MCNC: 110 MG/DL (ref 65–140)
GLUCOSE SERPL-MCNC: 91 MG/DL (ref 65–140)
GLUCOSE UR STRIP-MCNC: NEGATIVE MG/DL
HCT VFR BLD AUTO: 39.7 % (ref 34.8–46.1)
HGB BLD-MCNC: 12.7 G/DL (ref 11.5–15.4)
HGB UR QL STRIP.AUTO: NEGATIVE
KETONES UR STRIP-MCNC: NEGATIVE MG/DL
LEUKOCYTE ESTERASE UR QL STRIP: NEGATIVE
MAGNESIUM SERPL-MCNC: 1.8 MG/DL (ref 1.6–2.6)
MAGNESIUM SERPL-MCNC: 2 MG/DL (ref 1.6–2.6)
MCH RBC QN AUTO: 29.3 PG (ref 26.8–34.3)
MCHC RBC AUTO-ENTMCNC: 32 G/DL (ref 31.4–37.4)
MCV RBC AUTO: 92 FL (ref 82–98)
METHADONE UR QL: NEGATIVE
NITRITE UR QL STRIP: NEGATIVE
NON-SQ EPI CELLS URNS QL MICRO: ABNORMAL /HPF
OPIATES UR QL SCN: POSITIVE
PCP UR QL: NEGATIVE
PH UR STRIP.AUTO: 6 [PH] (ref 4.5–8)
PHOSPHATE SERPL-MCNC: 2.4 MG/DL (ref 2.7–4.5)
PLATELET # BLD AUTO: 311 THOUSANDS/UL (ref 149–390)
PMV BLD AUTO: 10 FL (ref 8.9–12.7)
POTASSIUM SERPL-SCNC: 2.7 MMOL/L (ref 3.5–5.3)
POTASSIUM SERPL-SCNC: 3.8 MMOL/L (ref 3.5–5.3)
PROT SERPL-MCNC: 6.7 G/DL (ref 6.4–8.2)
PROT SERPL-MCNC: 8 G/DL (ref 6.4–8.2)
PROT UR STRIP-MCNC: NEGATIVE MG/DL
RBC # BLD AUTO: 4.33 MILLION/UL (ref 3.81–5.12)
RBC #/AREA URNS AUTO: ABNORMAL /HPF
SODIUM SERPL-SCNC: 140 MMOL/L (ref 136–145)
SODIUM SERPL-SCNC: 144 MMOL/L (ref 136–145)
SP GR UR STRIP.AUTO: 1.01 (ref 1–1.03)
THC UR QL: POSITIVE
TROPONIN I SERPL-MCNC: <0.02 NG/ML
UROBILINOGEN UR QL STRIP.AUTO: 0.2 E.U./DL
WBC # BLD AUTO: 10.53 THOUSAND/UL (ref 4.31–10.16)
WBC #/AREA URNS AUTO: ABNORMAL /HPF

## 2018-08-19 PROCEDURE — 94640 AIRWAY INHALATION TREATMENT: CPT

## 2018-08-19 PROCEDURE — 99218 PR INITIAL OBSERVATION CARE/DAY 30 MINUTES: CPT | Performed by: PHYSICIAN ASSISTANT

## 2018-08-19 PROCEDURE — 99225 PR SBSQ OBSERVATION CARE/DAY 25 MINUTES: CPT | Performed by: INTERNAL MEDICINE

## 2018-08-19 PROCEDURE — 94760 N-INVAS EAR/PLS OXIMETRY 1: CPT

## 2018-08-19 PROCEDURE — 96365 THER/PROPH/DIAG IV INF INIT: CPT

## 2018-08-19 PROCEDURE — 84484 ASSAY OF TROPONIN QUANT: CPT | Performed by: EMERGENCY MEDICINE

## 2018-08-19 PROCEDURE — 84484 ASSAY OF TROPONIN QUANT: CPT | Performed by: PHYSICIAN ASSISTANT

## 2018-08-19 PROCEDURE — 96366 THER/PROPH/DIAG IV INF ADDON: CPT

## 2018-08-19 PROCEDURE — 36415 COLL VENOUS BLD VENIPUNCTURE: CPT | Performed by: EMERGENCY MEDICINE

## 2018-08-19 PROCEDURE — 80307 DRUG TEST PRSMV CHEM ANLYZR: CPT | Performed by: INTERNAL MEDICINE

## 2018-08-19 PROCEDURE — 80053 COMPREHEN METABOLIC PANEL: CPT | Performed by: PHYSICIAN ASSISTANT

## 2018-08-19 PROCEDURE — 84100 ASSAY OF PHOSPHORUS: CPT | Performed by: PHYSICIAN ASSISTANT

## 2018-08-19 PROCEDURE — 83735 ASSAY OF MAGNESIUM: CPT | Performed by: PHYSICIAN ASSISTANT

## 2018-08-19 PROCEDURE — 93005 ELECTROCARDIOGRAM TRACING: CPT

## 2018-08-19 PROCEDURE — 99285 EMERGENCY DEPT VISIT HI MDM: CPT

## 2018-08-19 PROCEDURE — 85027 COMPLETE CBC AUTOMATED: CPT | Performed by: PHYSICIAN ASSISTANT

## 2018-08-19 PROCEDURE — 82550 ASSAY OF CK (CPK): CPT | Performed by: PHYSICIAN ASSISTANT

## 2018-08-19 PROCEDURE — 81001 URINALYSIS AUTO W/SCOPE: CPT | Performed by: INTERNAL MEDICINE

## 2018-08-19 RX ORDER — POTASSIUM CHLORIDE 20 MEQ/1
40 TABLET, EXTENDED RELEASE ORAL ONCE
Status: COMPLETED | OUTPATIENT
Start: 2018-08-19 | End: 2018-08-19

## 2018-08-19 RX ORDER — ALBUTEROL SULFATE 90 UG/1
2 AEROSOL, METERED RESPIRATORY (INHALATION) EVERY 6 HOURS PRN
Status: DISCONTINUED | OUTPATIENT
Start: 2018-08-19 | End: 2018-08-19

## 2018-08-19 RX ORDER — IPRATROPIUM BROMIDE AND ALBUTEROL SULFATE 2.5; .5 MG/3ML; MG/3ML
3 SOLUTION RESPIRATORY (INHALATION)
Status: DISCONTINUED | OUTPATIENT
Start: 2018-08-19 | End: 2018-08-20 | Stop reason: HOSPADM

## 2018-08-19 RX ORDER — ATORVASTATIN CALCIUM 40 MG/1
40 TABLET, FILM COATED ORAL
Status: DISCONTINUED | OUTPATIENT
Start: 2018-08-19 | End: 2018-08-20 | Stop reason: HOSPADM

## 2018-08-19 RX ORDER — SODIUM CHLORIDE 9 MG/ML
125 INJECTION, SOLUTION INTRAVENOUS CONTINUOUS
Status: DISCONTINUED | OUTPATIENT
Start: 2018-08-19 | End: 2018-08-19

## 2018-08-19 RX ORDER — GABAPENTIN 100 MG/1
100 CAPSULE ORAL 3 TIMES DAILY
Status: DISCONTINUED | OUTPATIENT
Start: 2018-08-19 | End: 2018-08-20 | Stop reason: HOSPADM

## 2018-08-19 RX ORDER — MAGNESIUM HYDROXIDE/ALUMINUM HYDROXICE/SIMETHICONE 120; 1200; 1200 MG/30ML; MG/30ML; MG/30ML
30 SUSPENSION ORAL EVERY 4 HOURS PRN
Status: DISCONTINUED | OUTPATIENT
Start: 2018-08-19 | End: 2018-08-20 | Stop reason: HOSPADM

## 2018-08-19 RX ORDER — ASPIRIN 81 MG/1
81 TABLET, CHEWABLE ORAL DAILY
Status: DISCONTINUED | OUTPATIENT
Start: 2018-08-19 | End: 2018-08-20 | Stop reason: HOSPADM

## 2018-08-19 RX ORDER — ALBUTEROL SULFATE 2.5 MG/3ML
2.5 SOLUTION RESPIRATORY (INHALATION) EVERY 4 HOURS PRN
Status: DISCONTINUED | OUTPATIENT
Start: 2018-08-19 | End: 2018-08-20 | Stop reason: HOSPADM

## 2018-08-19 RX ORDER — POTASSIUM CHLORIDE 14.9 MG/ML
20 INJECTION INTRAVENOUS ONCE
Status: COMPLETED | OUTPATIENT
Start: 2018-08-19 | End: 2018-08-19

## 2018-08-19 RX ORDER — POTASSIUM CHLORIDE 20 MEQ/1
20 TABLET, EXTENDED RELEASE ORAL 2 TIMES DAILY
Status: DISCONTINUED | OUTPATIENT
Start: 2018-08-19 | End: 2018-08-20 | Stop reason: HOSPADM

## 2018-08-19 RX ORDER — OXYCODONE HYDROCHLORIDE 5 MG/1
5 TABLET ORAL EVERY 4 HOURS PRN
Status: DISCONTINUED | OUTPATIENT
Start: 2018-08-19 | End: 2018-08-20 | Stop reason: HOSPADM

## 2018-08-19 RX ORDER — METHOCARBAMOL 500 MG/1
750 TABLET, FILM COATED ORAL EVERY 6 HOURS SCHEDULED
Status: DISCONTINUED | OUTPATIENT
Start: 2018-08-19 | End: 2018-08-19

## 2018-08-19 RX ORDER — PANTOPRAZOLE SODIUM 40 MG/1
40 TABLET, DELAYED RELEASE ORAL DAILY
Status: DISCONTINUED | OUTPATIENT
Start: 2018-08-19 | End: 2018-08-19

## 2018-08-19 RX ORDER — PANTOPRAZOLE SODIUM 40 MG/1
40 TABLET, DELAYED RELEASE ORAL
Status: DISCONTINUED | OUTPATIENT
Start: 2018-08-19 | End: 2018-08-20 | Stop reason: HOSPADM

## 2018-08-19 RX ORDER — NICOTINE 21 MG/24HR
1 PATCH, TRANSDERMAL 24 HOURS TRANSDERMAL DAILY
Status: DISCONTINUED | OUTPATIENT
Start: 2018-08-19 | End: 2018-08-20 | Stop reason: HOSPADM

## 2018-08-19 RX ORDER — ESCITALOPRAM OXALATE 10 MG/1
5 TABLET ORAL DAILY
Status: DISCONTINUED | OUTPATIENT
Start: 2018-08-19 | End: 2018-08-20 | Stop reason: HOSPADM

## 2018-08-19 RX ORDER — NITROGLYCERIN 0.4 MG/1
0.4 TABLET SUBLINGUAL
Status: DISCONTINUED | OUTPATIENT
Start: 2018-08-19 | End: 2018-08-20 | Stop reason: HOSPADM

## 2018-08-19 RX ADMIN — GABAPENTIN 100 MG: 100 CAPSULE ORAL at 20:51

## 2018-08-19 RX ADMIN — OXYCODONE HYDROCHLORIDE 5 MG: 5 TABLET ORAL at 05:06

## 2018-08-19 RX ADMIN — ESCITALOPRAM OXALATE 5 MG: 10 TABLET ORAL at 08:29

## 2018-08-19 RX ADMIN — ATORVASTATIN CALCIUM 40 MG: 40 TABLET, FILM COATED ORAL at 16:55

## 2018-08-19 RX ADMIN — POTASSIUM CHLORIDE 20 MEQ: 200 INJECTION, SOLUTION INTRAVENOUS at 01:04

## 2018-08-19 RX ADMIN — SODIUM CHLORIDE 125 ML/HR: 0.9 INJECTION, SOLUTION INTRAVENOUS at 01:04

## 2018-08-19 RX ADMIN — POTASSIUM CHLORIDE 20 MEQ: 1500 TABLET, EXTENDED RELEASE ORAL at 19:14

## 2018-08-19 RX ADMIN — NITROGLYCERIN 1 INCH: 20 OINTMENT TOPICAL at 03:40

## 2018-08-19 RX ADMIN — METOPROLOL TARTRATE 12.5 MG: 25 TABLET ORAL at 19:15

## 2018-08-19 RX ADMIN — GABAPENTIN 100 MG: 100 CAPSULE ORAL at 16:55

## 2018-08-19 RX ADMIN — METOPROLOL TARTRATE 12.5 MG: 25 TABLET ORAL at 12:21

## 2018-08-19 RX ADMIN — ALUMINUM HYDROXIDE, MAGNESIUM HYDROXIDE, AND SIMETHICONE 30 ML: 200; 200; 20 SUSPENSION ORAL at 08:51

## 2018-08-19 RX ADMIN — ASPIRIN 81 MG 81 MG: 81 TABLET ORAL at 08:29

## 2018-08-19 RX ADMIN — PANTOPRAZOLE SODIUM 40 MG: 40 TABLET, DELAYED RELEASE ORAL at 05:06

## 2018-08-19 RX ADMIN — GABAPENTIN 100 MG: 100 CAPSULE ORAL at 08:29

## 2018-08-19 RX ADMIN — POTASSIUM CHLORIDE 20 MEQ: 1500 TABLET, EXTENDED RELEASE ORAL at 08:29

## 2018-08-19 RX ADMIN — IPRATROPIUM BROMIDE AND ALBUTEROL SULFATE 3 ML: .5; 3 SOLUTION RESPIRATORY (INHALATION) at 15:57

## 2018-08-19 RX ADMIN — OXYCODONE HYDROCHLORIDE 5 MG: 5 TABLET ORAL at 16:55

## 2018-08-19 RX ADMIN — OXYCODONE HYDROCHLORIDE 5 MG: 5 TABLET ORAL at 12:25

## 2018-08-19 RX ADMIN — PANTOPRAZOLE SODIUM 40 MG: 40 TABLET, DELAYED RELEASE ORAL at 16:55

## 2018-08-19 RX ADMIN — POTASSIUM CHLORIDE 40 MEQ: 1500 TABLET, EXTENDED RELEASE ORAL at 01:03

## 2018-08-19 RX ADMIN — ASPIRIN 324 MG: 81 TABLET, COATED ORAL at 00:30

## 2018-08-19 RX ADMIN — ENOXAPARIN SODIUM 40 MG: 40 INJECTION, SOLUTION INTRAVENOUS; SUBCUTANEOUS at 12:20

## 2018-08-19 RX ADMIN — IPRATROPIUM BROMIDE AND ALBUTEROL SULFATE 3 ML: .5; 3 SOLUTION RESPIRATORY (INHALATION) at 20:32

## 2018-08-19 NOTE — ED PROVIDER NOTES
History  Chief Complaint   Patient presents with    Numbness     left sided numbness started 1 hour ago  pt states that this is the way she felt when she had her prior strokes    Chest Pain     chest tightness in left side of chest started at the same time     29-year-old female presents with left facial throbbing left jaw numbness heartburn and a burning sensation of someone sitting on her chest   This all started 1 hour ago at rest   She was short of breath at home but that has improved since she she was placed on oxygen here in the emergency department she does have a history of COPD mom and was recently prescribed oxygen but she was not using it at the time of symptom onset  She feels shaky all over  She is complaining of numbness to the left face jaw and hand  She denies any fever or chills she has had no cough she has been nausea but no history of vomiting no diaphoresis she has had some minimal edema to the legs there has been no recent medication changes except her oxygen no recent stress tests  Prior to Admission Medications   Prescriptions Last Dose Informant Patient Reported? Taking?   aspirin 81 mg chewable tablet  Self No No   Sig: Chew 1 tablet (81 mg total) daily   atorvastatin (LIPITOR) 40 mg tablet  Self No No   Sig: TAKE ONE TABLET DAILY WITH DINNER    escitalopram (LEXAPRO) 5 mg tablet  Self No No   Sig: TAKE (1) TABLET DAILY     gabapentin (NEURONTIN) 100 mg capsule  Self No No   Sig: Take 1 capsule (100 mg total) by mouth 3 (three) times a day   metoprolol tartrate (LOPRESSOR) 25 mg tablet  Self No No   Sig: TAKE ONE-HALF TABLET EVERY 8 HOURS    nitroglycerin (NITROSTAT) 0 4 mg SL tablet  Self Yes No   Sig: Place 0 4 mg under the tongue every 5 (five) minutes as needed for chest pain   oxyCODONE (ROXICODONE) 5 mg immediate release tablet  Self Yes No   Sig: Take 5 mg by mouth every 4 (four) hours as needed for moderate pain   pantoprazole (PROTONIX) 40 mg tablet  Self No No Sig: TAKE (1) TABLET BY MOUTH DAILY  potassium chloride (K-DUR,KLOR-CON) 20 mEq tablet  Self No No   Sig: Take 1 tablet (20 mEq total) by mouth 2 (two) times a day   tiotropium-olodaterol (STIOLTO RESPIMAT) 2 5-2 5 MCG/ACT inhaler   No No   Sig: Inhale 2 puffs daily      Facility-Administered Medications: None       Past Medical History:   Diagnosis Date    Chronic pain     [facility]     Coronary artery disease     Cubital tunnel syndrome     Depression     GERD (gastroesophageal reflux disease)     History of Clostridium difficile     Ovarian cyst     Psychiatric disorder     depression    Raynaud's disease     Stroke ([facility] [address] )     Takotsubo cardiomyopathy     Thoracic outlet syndrome     Tobacco abuse     Vitamin D deficiency        Past Surgical History:   Procedure Laterality Date    CARDIAC CATHETERIZATION      CABG    CORONARY ARTERY BYPASS GRAFT N/A 2/12/2016    Procedure: CABG X1; Left  EVH to mid-LAD; ZAHRA;  Surgeon: Juan Flores DO;  Location: [facility];  Service:    Josey Lutz DEBRIDEMENT TENNIS ELBOW      DILATION AND CURETTAGE OF UTERUS      HYSTERECTOMY      HYSTEROSCOPY      LEFT OOPHORECTOMY      SALPINGECTOMY Right        Family History   Problem Relation Age of Onset    Heart disease Mother     Heart attack Mother     Heart attack Brother 36        s/p stent placement    Diabetes Brother     Heart disease Brother     Cancer Father     Diabetes Sister     Heart disease Sister     Diabetes Sister      I have reviewed and agree with the history as documented  Social History   Substance Use Topics    Smoking status: Current Every Day Smoker     Packs/day: 0 50     Years: 30 00     Types: Cigarettes    Smokeless tobacco: Never Used    Alcohol use No        Review of Systems   Constitutional: Positive for chills  Negative for activity change, appetite change, diaphoresis and fever     HENT: Negative for congestion, ear pain, rhinorrhea, sneezing and sore throat  Eyes: Negative for discharge  Respiratory: Positive for shortness of breath  Negative for cough  Cardiovascular: Positive for chest pain and leg swelling (ankles)  Gastrointestinal: Positive for nausea  Negative for abdominal pain, blood in stool, diarrhea and vomiting  Endocrine: Negative for polyuria  Genitourinary: Negative for difficulty urinating and dysuria  Musculoskeletal: Negative for back pain and myalgias  Skin: Negative for rash  Neurological: Positive for numbness (left face, jaw)  Negative for dizziness, speech difficulty, weakness, light-headedness and headaches  Hematological: Negative for adenopathy  Psychiatric/Behavioral: Negative for confusion  All other systems reviewed and are negative  Physical Exam  Physical Exam   Constitutional: She is oriented to person, place, and time  She appears well-developed  No distress  HENT:   Head: Normocephalic and atraumatic  Right Ear: External ear normal    Left Ear: External ear normal    Nose: Nose normal    Mouth/Throat: Oropharynx is clear and moist  No oropharyngeal exudate  Eyes: Conjunctivae and EOM are normal  Pupils are equal, round, and reactive to light  Right eye exhibits no discharge  Left eye exhibits no discharge  Neck: Normal range of motion  Neck supple  No midline or paraspinous tenderness   Cardiovascular: Normal rate, regular rhythm, normal heart sounds and intact distal pulses  Pulmonary/Chest: Effort normal and breath sounds normal  No respiratory distress  Abdominal: Soft  Bowel sounds are normal  She exhibits no distension and no mass  There is no tenderness  There is no rebound and no guarding  Back no midline or CVA tenderness   Musculoskeletal: Normal range of motion  She exhibits no edema, tenderness or deformity  Neurological: She is alert and oriented to person, place, and time  No cranial nerve deficit or sensory deficit   She exhibits normal muscle tone  Coordination normal    Skin: Skin is warm and dry  She is not diaphoretic  Psychiatric: She has a normal mood and affect  Vitals reviewed        Vital Signs  ED Triage Vitals [08/18/18 2256]   Temperature Pulse Respirations Blood Pressure SpO2   98 2 °F (36 8 °C) 75 20 162/94 99 %      Temp Source Heart Rate Source Patient Position - Orthostatic VS BP Location FiO2 (%)   Temporal Monitor Sitting Right arm --      Pain Score       Worst Possible Pain           Vitals:    08/19/18 0300 08/19/18 0432 08/19/18 0458 08/19/18 0755   BP: 127/71 136/75  112/60   Pulse: 64 57 (!) 53 60   Patient Position - Orthostatic VS: Lying Sitting  Lying       Visual Acuity      ED Medications  Medications   nicotine (NICODERM CQ) 14 mg/24hr TD 24 hr patch 1 patch (1 patch Transdermal Not Given 8/19/18 0853)   albuterol (PROVENTIL HFA,VENTOLIN HFA) inhaler 2 puff (not administered)   aspirin chewable tablet 81 mg (81 mg Oral Given 8/19/18 0829)   atorvastatin (LIPITOR) tablet 40 mg (not administered)   escitalopram (LEXAPRO) tablet 5 mg (5 mg Oral Given 8/19/18 0829)   gabapentin (NEURONTIN) capsule 100 mg (100 mg Oral Given 8/19/18 0829)   metoprolol tartrate (LOPRESSOR) tablet 12 5 mg (12 5 mg Oral Not Given 8/19/18 0458)   oxyCODONE (ROXICODONE) IR tablet 5 mg (5 mg Oral Given 8/19/18 0506)   pantoprazole (PROTONIX) EC tablet 40 mg (40 mg Oral Given 8/19/18 0506)   potassium chloride (K-DUR,KLOR-CON) CR tablet 20 mEq (20 mEq Oral Given 8/19/18 0829)   aluminum-magnesium hydroxide-simethicone (MYLANTA) 200-200-20 mg/5 mL oral suspension 30 mL (30 mL Oral Given 8/19/18 0851)   aspirin (ECOTRIN LOW STRENGTH) EC tablet 324 mg (324 mg Oral Given 8/19/18 0030)   nitroglycerin (NITROSTAT) SL tablet 0 4 mg (0 4 mg Sublingual Given 8/18/18 1499)   famotidine (PEPCID) injection 20 mg (20 mg Intravenous Given 8/18/18 3341)   potassium chloride (K-DUR,KLOR-CON) CR tablet 40 mEq (40 mEq Oral Given 8/19/18 0103)   potassium chloride 20 mEq IVPB (premix) (0 mEq Intravenous Stopped 8/19/18 0344)   nitroglycerin (NITRO-BID) 2 % TD ointment 1 inch (1 inch Topical Given 8/19/18 0340)       Diagnostic Studies  Results Reviewed     Procedure Component Value Units Date/Time    Troponin I [[id number]]     Lab Status:  No result Specimen:  Blood     Troponin I [[id number]]  (Normal) Collected:  08/19/18 0938    Lab Status:  Final result Specimen:  Blood from Arm, Left Updated:  08/19/18 1011     Troponin I <0 02 ng/mL     Comprehensive metabolic panel [[id number]]  (Abnormal) Collected:  08/19/18 0429    Lab Status:  Final result Specimen:  Blood from Arm, Left Updated:  08/19/18 0527     Sodium 144 mmol/L      Potassium 3 8 mmol/L      Chloride 113 (H) mmol/L      CO2 23 mmol/L      Anion Gap 8 mmol/L      BUN 3 (L) mg/dL      Creatinine 0 71 mg/dL      Glucose 91 mg/dL      Calcium 8 3 mg/dL      AST 14 U/L      ALT 8 (L) U/L      Alkaline Phosphatase 133 (H) U/L      Total Protein 6 7 g/dL      Albumin 3 3 (L) g/dL      Total Bilirubin 0 20 mg/dL      eGFR 101 ml/min/1 73sq m     Narrative:         National Kidney Disease Education Program recommendations are as follows:  GFR calculation is accurate only with a steady state creatinine  Chronic Kidney disease less than 60 ml/min/1 73 sq  meters  Kidney failure less than 15 ml/min/1 73 sq  meters      Magnesium [[id number]]  (Normal) Collected:  08/19/18 0429    Lab Status:  Final result Specimen:  Blood from Arm, Left Updated:  08/19/18 0527     Magnesium 1 8 mg/dL     Phosphorus [[id number]]  (Abnormal) Collected:  08/19/18 0429    Lab Status:  Final result Specimen:  Blood from Arm, Left Updated:  08/19/18 0527     Phosphorus 2 4 (L) mg/dL     Troponin I [[id number]]  (Normal) Collected:  08/19/18 0427    Lab Status:  Final result Specimen:  Blood from Arm, Left Updated:  08/19/18 0525     Troponin I <0 02 ng/mL     CK (with reflex to MB) [[id number]]  (Normal) Collected:  08/19/18 0427    Lab Status: Final result Specimen:  Blood from Arm, Left Updated:  08/19/18 0522     Total CK 64 U/L     CBC (With Platelets) [[id number]]  (Abnormal) Collected:  08/19/18 0429    Lab Status:  Final result Specimen:  Blood from Arm, Left Updated:  08/19/18 0507     WBC 10 53 (H) Thousand/uL      RBC 4 33 Million/uL      Hemoglobin 12 7 g/dL      Hematocrit 39 7 %      MCV 92 fL      MCH 29 3 pg      MCHC 32 0 g/dL      RDW 15 6 (H) %      Platelets 562 Thousands/uL      MPV 10 0 fL     Troponin I [[id number]]  (Normal) Collected:  08/19/18 0330    Lab Status:  Final result Specimen:  Blood from Arm, Right Updated:  08/19/18 0404     Troponin I <0 02 ng/mL     [facility] draw [[id number]] Collected:  08/18/18 2347    Lab Status:  Final result Specimen:  Blood Updated:  08/19/18 0103    Narrative: The following orders were created for panel order [facility] draw  Procedure                               Abnormality         Status                     ---------                               -----------         ------                     Chapis Batter Top on QUEU[[id number]]                            Final result               Gold top on BCFU[[id number]]                                  Final result                 Please view results for these tests on the individual orders      Comprehensive metabolic panel [[id number]]  (Abnormal) Collected:  08/18/18 2346    Lab Status:  Final result Specimen:  Blood from Arm, Right Updated:  08/19/18 0018     Sodium 140 mmol/L      Potassium 2 7 (LL) mmol/L      Chloride 104 mmol/L      CO2 25 mmol/L      Anion Gap 11 mmol/L      BUN 4 (L) mg/dL      Creatinine 0 92 mg/dL      Glucose 110 mg/dL      Calcium 9 0 mg/dL      AST 18 U/L      ALT 15 U/L      Alkaline Phosphatase 160 (H) U/L      Total Protein 8 0 g/dL      Albumin 4 0 g/dL      Total Bilirubin 0 30 mg/dL      eGFR 74 ml/min/1 73sq m     Narrative:         National Kidney Disease Education Program recommendations are as follows:  GFR calculation is accurate only with a steady state creatinine  Chronic Kidney disease less than 60 ml/min/1 73 sq  meters  Kidney failure less than 15 ml/min/1 73 sq  meters  Magnesium [[id number]]  (Normal) Collected:  08/18/18 2346    Lab Status:  Final result Specimen:  Blood from Arm, Right Updated:  08/19/18 0015     Magnesium 2 0 mg/dL     Troponin I [[id number]]  (Normal) Collected:  08/18/18 2346    Lab Status:  Final result Specimen:  Blood from Arm, Right Updated:  08/19/18 0012     Troponin I <0 02 ng/mL     CBC and differential [[id number]]  (Abnormal) Collected:  08/18/18 2346    Lab Status:  Final result Specimen:  Blood from Arm, Right Updated:  08/18/18 2353     WBC 15 62 (H) Thousand/uL      RBC 4 83 Million/uL      Hemoglobin 14 0 g/dL      Hematocrit 44 3 %      MCV 92 fL      MCH 29 0 pg      MCHC 31 6 g/dL      RDW 15 7 (H) %      MPV 9 6 fL      Platelets 871 Thousands/uL      nRBC 0 /100 WBCs      Neutrophils Relative 59 %      Immat GRANS % 0 %      Lymphocytes Relative 35 %      Monocytes Relative 5 %      Eosinophils Relative 0 %      Basophils Relative 1 %      Neutrophils Absolute 9 28 (H) Thousands/µL      Immature Grans Absolute 0 06 Thousand/uL      Lymphocytes Absolute 5 42 (H) Thousands/µL      Monocytes Absolute 0 70 Thousand/µL      Eosinophils Absolute 0 06 Thousand/µL      Basophils Absolute 0 10 Thousands/µL                  XR chest 1 view portable   ED Interpretation by Ami Sol MD (08/19 8506)   Read by me; Radiologist to provide formal interpretation   No acute process no signif change vs  2/12/18                 Procedures  ECG 12 Lead Documentation  Date/Time: 8/18/2018 11:01 PM  Performed by: Bethany Carter  Authorized by: Bethany Carter     Indications / Diagnosis:  Cp  ECG reviewed by me, the ED Provider: yes    Patient location:  ED  Previous ECG:     Previous ECG:  Compared to current    Comparison ECG info:  5/5/18    Similarity:  No change  Rate:     ECG rate:  73 ECG rate assessment: normal    Rhythm:     Rhythm: sinus rhythm    QRS:     QRS axis:  Normal  Other findings:     Other findings: prolonged qTc interval    Comments:      RBBB no acute ischemic changes    ECG 12 Lead Documentation  Date/Time: 8/19/2018 3:39 AM  Performed by: Celso Amador  Authorized by: Celso Amador     Indications / Diagnosis:  Recheck cp  ECG reviewed by me, the ED Provider: yes    Patient location:  ED  Previous ECG:     Previous ECG:  Compared to current    Comparison ECG info:  61    Similarity:  No change  Rate:     ECG rate:  61    ECG rate assessment: normal    Rhythm:     Rhythm: sinus rhythm    QRS:     QRS axis:  Normal  Comments:      QTc improved no acute ischemic chagnes           Phone Contacts  ED Phone Contact    ED Course  ED Course as of Aug 19 1113   Sun Aug 19, 2018   0325 Cp improved with NTG from 10 to 7 continued chest burning will contact [facility] for obs     0328 Case reviewed with Lilly Briones [facility] recommends obs tele          HEART Risk Score      Most Recent Value   History  1 Filed at: 08/19/2018 1111   ECG  1 Filed at: 08/19/2018 1111   Age  1 Filed at: 08/19/2018 1111   Risk Factors  2 Filed at: 08/19/2018 1111   Troponin  0 Filed at: 08/19/2018 1111   Heart Score Risk Calculator   History  1 Filed at: 08/19/2018 1111   ECG  1 Filed at: 08/19/2018 1111   Age  1 Filed at: 08/19/2018 1111   Risk Factors  2 Filed at: 08/19/2018 1111   Troponin  0 Filed at: 08/19/2018 1111   HEART Score  5 Filed at: 08/19/2018 1111   HEART Score  5 Filed at: 08/19/2018 1111                Stroke Assessment     Row Name 08/18/18 0490             NIH Stroke Scale    Interval Baseline      Level of Consciousness (1a ) 0      LOC Questions (1b ) 0      LOC Commands (1c ) 0      Best Gaze (2 ) 0      Visual (3 ) 0      Facial Palsy (4 ) 0      Motor Arm, Left (5a ) 0      Motor Arm, Right (5b ) 0      Motor Leg, Left (6a ) 0      Motor Leg, Right (6b ) 0      Limb Ataxia (7 ) 0      Sensory (8 ) 0      Best Language (9 ) 0      Dysarthria (10 ) 0      Extinction and Inattention (11 ) (Formerly Neglect) 0      Total 0                          MDM  Number of Diagnoses or Management Options  Chest pain:   Hypokalemia:   Diagnosis management comments: Mdm: will evaluate for ACS; in triage patient complained of left-sided numbness in triage but on my interview was localized to left jaw, left arm   Current presentation is not consistent with stroke or TIA  Review of prior records indicated she had an overall negative CTA of the head and neck in November of 2017 with a normal brain MRI for that admission  Will initiate treatment today with aspirin and nitro and reassessed the patient  CritCare Time    Disposition  Final diagnoses:   Chest pain   Hypokalemia     Time reflects when diagnosis was documented in both MDM as applicable and the Disposition within this note     Time User Action Codes Description Comment    8/19/2018  3:32 AM Mat Neth Add [R07 9] Chest pain     8/19/2018  3:33 AM Mat Neth Add [E87 6] Hypokalemia     8/19/2018 11:13 AM Ephraim Beltran Add [R07 89] Burning chest pain     8/19/2018 11:13 AM Ephraim Moss Add [Z95 1] CAD S/P CABG x 1 2016       ED Disposition     ED Disposition Condition Comment    Admit  Case was discussed with Brijesh Cabello, [facility]  and the patient's admission status was agreed to be Admission Status: observation status to the service of Dr Richmond Van           Follow-up Information    None         Current Discharge Medication List      CONTINUE these medications which have NOT CHANGED    Details   aspirin 81 mg chewable tablet Chew 1 tablet (81 mg total) daily  Qty: 30 tablet, Refills: 0    Comments: May resume on May 11, 2018  Associated Diagnoses: Coronary artery disease with angina pectoris, unspecified vessel or lesion type, unspecified whether native or transplanted heart (HCC)      atorvastatin (LIPITOR) 40 mg tablet TAKE ONE TABLET DAILY WITH DINNER  Qty: 30 tablet, Refills: 4    Associated Diagnoses: Dyslipidemia      escitalopram (LEXAPRO) 5 mg tablet TAKE (1) TABLET DAILY  Qty: 30 tablet, Refills: 4    Associated Diagnoses: Depression, unspecified depression type      gabapentin (NEURONTIN) 100 mg capsule Take 1 capsule (100 mg total) by mouth 3 (three) times a day  Qty: 90 capsule, Refills: 0    Associated Diagnoses: Back pain, unspecified back location, unspecified back pain laterality, unspecified chronicity      metoprolol tartrate (LOPRESSOR) 25 mg tablet TAKE ONE-HALF TABLET EVERY 8 HOURS  Qty: 45 tablet, Refills: 4    Associated Diagnoses: Essential hypertension, benign      nitroglycerin (NITROSTAT) 0 4 mg SL tablet Place 0 4 mg under the tongue every 5 (five) minutes as needed for chest pain      oxyCODONE (ROXICODONE) 5 mg immediate release tablet Take 5 mg by mouth every 4 (four) hours as needed for moderate pain      pantoprazole (PROTONIX) 40 mg tablet TAKE (1) TABLET BY MOUTH DAILY  Qty: 30 tablet, Refills: 4    Associated Diagnoses: Heart burn      potassium chloride (K-DUR,KLOR-CON) 20 mEq tablet Take 1 tablet (20 mEq total) by mouth 2 (two) times a day  Qty: 20 tablet, Refills: 0    Associated Diagnoses: Hypokalemia      tiotropium-olodaterol (STIOLTO RESPIMAT) 2 5-2 5 MCG/ACT inhaler Inhale 2 puffs daily  Qty: 1 Inhaler, Refills: 5    Associated Diagnoses: Chronic obstructive pulmonary disease, unspecified COPD type (HCC)         STOP taking these medications       albuterol (PROVENTIL HFA,VENTOLIN HFA) 90 mcg/act inhaler Comments:   Reason for Stopping:             No discharge procedures on file      ED Provider  Electronically Signed by           Blair Ochoa MD  08/19/18 33 Rodney Ferreira MD  08/19/18 1570

## 2018-08-19 NOTE — PLAN OF CARE
Problem: DISCHARGE PLANNING - CARE MANAGEMENT  Goal: Discharge to post-acute care or home with appropriate resources  INTERVENTIONS:  - Conduct assessment to determine patient/family and health care team treatment goals, and need for post-acute services based on payer coverage, community resources, and patient preferences, and barriers to discharge  - Address psychosocial, clinical, and financial barriers to discharge as identified in assessment in conjunction with the patient/family and health care team  - Arrange appropriate level of post-acute services according to patient's   needs and preference and payer coverage in collaboration with the physician and health care team  - Communicate with and update the patient/family, physician, and health care team regarding progress on the discharge plan  - Arrange appropriate transportation to post-acute venues  Pt's  will give the pt a ride home     Outcome: Progressing

## 2018-08-19 NOTE — PROGRESS NOTES
[facility] Progress Note  Patient: Rosemary Godoy 50 y o  female   MRN: [id number]  PCP: Alpa Singh PA-C  Unit/Bed#: 831-60 Encounter: [id number]  Date Of Visit: 08/19/18    Assessment:    Principal Problem:    Burning chest pain  Active Problems:    CAD S/P CABG x 1 2016    GERD (gastroesophageal reflux disease)    Tobacco abuse    Atherosclerotic heart disease of native coronary artery without angina pectoris    Mod[facility]) [facility]    Ischemic cardiomyopathy    Hypokalemia    Dyspepsia      Plan:    · Telemetry overnight observation due to burning substernal chest pain radiating from the epigastric region to the the back of her throat  · Serial troponin negative, chest pain atypical and appears gastrointestinal in etiology, suspect acid reflux or perhaps peptic ulcer disease, biliary disease doubtful in light of normal LFTs  · Continue usual cardiac medications especially beta-blocker, aspirin & SL NTG PRN; cardiology consult tomorrow morning regarding further evaluation of chest pain,? inpatient stress test  · Continue oral antacid as needed for dyspepsia, increase PPI to twice daily, GI consult regarding further management of dyspepsia ( she has never had GI endoscopy before ), abdominal ultrasound if epigastric burning discomfort persists  · Smoking cessation was emphasized to her, give DuoNeb treatments as needed  · Potassium chloride and magnesium oxide supplements as needed  · Urine drug screen      VTE Pharmacologic Prophylaxis:   Pharmacologic: Enoxaparin (Lovenox)  Mechanical VTE Prophylaxis in Place: Yes    Patient Centered Rounds: I have performed bedside rounds with nursing staff today  Current Length of Stay: 0 day(s)    Current Patient Status: Observation     Code Status: Level 1 - Full Code      Subjective:   Patient seen and examined this morning   Still experiencing burning epigastric to substernal chest discomfort, alleviated with Maalox  Denies nausea, vomiting, diarrhea, dysphagia, or recent weight loss  Objective:     Vitals:   Temp (24hrs), Av 7 °F (36 5 °C), Min:97 2 °F (36 2 °C), Max:98 2 °F (36 8 °C)    HR:  [53-86] 60  Resp:  [14-22] 18  BP: (112-162)/(60-94) 112/60  SpO2:  [98 %-100 %] 98 %  Body mass index is 23 06 kg/m²  Input and Output Summary (last 24 hours): Intake/Output Summary (Last 24 hours) at 18 1158  Last data filed at 18 0917   Gross per 24 hour   Intake              470 ml   Output                0 ml   Net              470 ml       Physical Exam:   General: in no overt distress  HEENT: edentulous, not pale, not jaundiced  Chest: clear lungs, no wheeze  Heart: S1 S2 audible, regular  Abd: soft, no distension, nontender, bowel sounds present, no rebound or guarding  Psych: appropriately oriented in all spheres, blunt affect  Neuro: Awake, alert, moves all extremities equally, essentially nonfocal      Additional Data:     Labs:      Results from last 7 days  Lab Units 18  0429 18  2346   WBC Thousand/uL 10 53* 15 62*   HEMOGLOBIN g/dL 12 7 14 0   HEMATOCRIT % 39 7 44 3   PLATELETS Thousands/uL 311 386   NEUTROS PCT %  --  59   LYMPHS PCT %  --  35   MONOS PCT %  --  5   EOS PCT %  --  0       Results from last 7 days  Lab Units 18  0429   SODIUM mmol/L 144   POTASSIUM mmol/L 3 8   CHLORIDE mmol/L 113*   CO2 mmol/L 23   BUN mg/dL 3*   CREATININE mg/dL 0 71   CALCIUM mg/dL 8 3   TOTAL PROTEIN g/dL 6 7   BILIRUBIN TOTAL mg/dL 0 20   ALK PHOS U/L 133*   ALT U/L 8*   AST U/L 14   GLUCOSE RANDOM mg/dL 91         Invalid input(s): NICHOLAS    * I Have Reviewed All Lab Data Listed Above  * Additional Pertinent Lab Tests Reviewed:  [facility] Admission Reviewed    Imaging:      Imaging Personally Reviewed by Myself Includes:  CXR    Recent Cultures (last 7 days):           Last 24 Hours Medication List:     Current Facility-Administered Medications:  albuterol 2 5 mg Nebulization Q4H PRN Owen Taylor MD   aluminum-magnesium hydroxide-simethicone 30 mL Oral Q4H PRN Owen Taylor MD   aspirin 81 mg Oral Daily Daniel Winter PA-C   atorvastatin 40 mg Oral Daily With Paula Swain PA-C   escitalopram 5 mg Oral Daily Rellene Orn, PA-PEYMAN   gabapentin 100 mg Oral TID Daniel Winter, PA-PEYMAN   ipratropium-albuterol 3 mL Nebulization 4x Daily Owen Taylor MD   metoprolol tartrate 12 5 mg Oral Q8H Daniel Winter, ANA PAULA   nicotine 1 patch Transdermal Daily Roslyn Burgess   oxyCODONE 5 mg Oral Q4H PRN Daniel Winter PA-C   pantoprazole 40 mg Oral BID AC Owen Taylor MD   potassium chloride 20 mEq Oral BID Daniel Winter PA-C        Today, Patient Was Seen By: Owen Taylor MD    ** Please Note: Dragon 360 Dictation voice to text software may have been used in the creation of this document   **

## 2018-08-19 NOTE — H&P
H&P- Silvia Esteban 1970, 50 y o  female MRN: [id number]    Unit/Bed#: RM04 Encounter: [id number]    Primary Care Provider: Clay Joseph PA-C   Date and time admitted to hospital: 8/18/2018 11:12 PM        * Burning chest pain   Assessment & Plan    - admit for observation  - chest wall burning reported to be relieved with nitro  - initial troponin negative, less than 0 02, continue monitor q 3 hours  - will check CK  -has had CABG in past  - leave nitropaste on as placed by ER        Cardiomyopathy, dilated ([facility] )   Assessment & Plan    - history of Takasubo cardiomyopathy and past        GERD (gastroesophageal reflux disease)   Assessment & Plan    -continue Protonix as ordered            History and Physical - [facility]    Patient Information: Silvia Esteban 50 y o  female MRN: [id number]  Unit/Bed#: SH75 Encounter: [id number]  Admitting Physician: Otoniel Peck PA-C  PCP: Clay Joseph PA-C  Date of Admission:  08/19/18    Assessment/Plan:    Hospital Problem List:     Principal Problem:    Burning chest pain  Active Problems:    Cardiomyopathy, dilated (HCC)    GERD (gastroesophageal reflux disease)        VTE Prophylaxis: Pharmacologic VTE Prophylaxis contraindicated due to Abdominal wall hematoma  / sequential compression device   Code Status:  Full  POLST: There is no POLST form on file for this patient (pre-hospital)    Anticipated Length of Stay:  Patient will be admitted on an Observation basis with an anticipated length of stay of  > 2 midnights  Justification for Hospital Stay:  Chest wall burning with past medical history of coronary artery disease    Total Time for Visit, including Counseling / Coordination of Care: 30 minutes  Greater than 50% of this total time spent on direct patient counseling and coordination of care      Chief Complaint:   Chest wall burning    History of Present Illness:    Silvia Esteban is a 50 y o  female with extensive past medical history including coronary artery disease, takotsubo cardiomyopathy, CABG x1, anxiety, GERD, stroke, Raynaud's disease, depression, chronic pain, thoracic outlet syndrome, and others as listed below who presented to the emergency department for evaluation of chest wall burning 1 hour prior to coming the emergency department, also states she feels shaky all over, and shortness of breath that was approved with oxygen  While in emergency department to lab studies and radiologic studies were obtained, troponin initially noted to be negative  While in emergency department she did receive 1 sublingual nitroglycerin, followed by 1 inch of nitropaste which she stated did decrease the pain little bit  Upon disturbances examine patient patient noted to appear older than stated age, in no acute distress, conscious and alert and oriented x4, lungs equal and clear bilaterally, abdomen soft nontender, no peripheral edema noted  Patient being admitted for observation  Review of Systems:    Review of Systems   Constitutional: Negative  HENT: Negative  Eyes: Negative  Respiratory: Positive for shortness of breath  Cardiovascular: Positive for chest pain  Gastrointestinal: Negative  Endocrine: Negative  Genitourinary: Negative  Musculoskeletal: Negative  Skin: Negative  Allergic/Immunologic: Negative  Neurological: Negative  Hematological: Negative  Psychiatric/Behavioral: Negative          Past Medical and Surgical History:     Past Medical History:   Diagnosis Date    Chronic pain     [facility]     Coronary artery disease     Cubital tunnel syndrome     Depression     GERD (gastroesophageal reflux disease)     History of Clostridium difficile     Ovarian cyst     Psychiatric disorder     depression    Raynaud's disease     Stroke ([facility] )     Takotsubo cardiomyopathy     Thoracic outlet syndrome     Tobacco abuse     Vitamin D deficiency Past Surgical History:   Procedure Laterality Date    CARDIAC CATHETERIZATION      CABG    CORONARY ARTERY BYPASS GRAFT N/A 2/12/2016    Procedure: CABG X1; Left  EVH to mid-LAD; ZAHRA;  Surgeon: Sammie Candelario DO;  [facility]: [facility];  Service:    Mayme Jorge DEBRIDEMENT TENNIS ELBOW      DILATION AND CURETTAGE OF UTERUS      HYSTERECTOMY      HYSTEROSCOPY      LEFT OOPHORECTOMY      SALPINGECTOMY Right        Meds/Allergies:    Prior to Admission medications    Medication Sig Start Date End Date Taking? Authorizing Provider   aspirin 81 mg chewable tablet Chew 1 tablet (81 mg total) daily 5/10/18   ROXANA Nguyen   atorvastatin (LIPITOR) 40 mg tablet TAKE ONE TABLET DAILY WITH DINNER  5/4/18   Bo Connor PA-C   escitalopram (LEXAPRO) 5 mg tablet TAKE (1) TABLET DAILY  5/4/18   Bo Connor PA-C   gabapentin (NEURONTIN) 100 mg capsule Take 1 capsule (100 mg total) by mouth 3 (three) times a day 4/6/18   Bo Connor PA-C   methocarbamol (ROBAXIN) 750 mg tablet Take 1 tablet (750 mg total) by mouth every 6 (six) hours for 28 doses 5/10/18 6/25/18  ROXANA Nguyen   metoprolol tartrate (LOPRESSOR) 25 mg tablet TAKE ONE-HALF TABLET EVERY 8 HOURS  5/4/18   Bo Connor PA-C   nitroglycerin (NITROSTAT) 0 4 mg SL tablet Place 0 4 mg under the tongue every 5 (five) minutes as needed for chest pain    Historical Provider, MD   oxyCODONE (ROXICODONE) 5 mg immediate release tablet Take 5 mg by mouth every 4 (four) hours as needed for moderate pain    Historical Provider, MD   pantoprazole (PROTONIX) 40 mg tablet TAKE (1) TABLET BY MOUTH DAILY   6/7/18   Bo Connor PA-C   potassium chloride (K-DUR,KLOR-CON) 20 mEq tablet Take 1 tablet (20 mEq total) by mouth 2 (two) times a day 2/13/18   Anthony Villegas MD   tiotropium-olodaterol (STIOLTO RESPIMAT) 2 5-2 5 MCG/ACT inhaler Inhale 2 puffs daily 7/20/18   Coleen Savage DO   albuterol (PROVENTIL HFA,VENTOLIN HFA) 90 mcg/act inhaler Inhale 2 puffs every 6 (six) hours as needed for wheezing  8/19/18  Historical Provider, MD HEAD have reviewed home medications with patient personally  Allergies: Allergies   Allergen Reactions    Toradol [Ketorolac Tromethamine] GI Intolerance       Social History:     Marital Status: /Civil Union   Occupation:  Disabled  Patient Pre-hospital Living Situation:  Home  Patient Pre-hospital Level of Mobility:  As allowed  Patient Pre-hospital Diet Restrictions:  As tolerated  Substance Use History:   History   Alcohol Use No     History   Smoking Status    Current Every Day Smoker    Packs/day: 0 50    Years: 30 00    Types: Cigarettes   Smokeless Tobacco    Never Used     History   Drug Use No       Family History:    non-contributory    Physical Exam:     Vitals:   Blood Pressure: 127/71 (08/19/18 0300)  Pulse: 64 (08/19/18 0300)  Temperature: 97 7 °F (36 5 °C) (08/19/18 0121)  Temp Source: Temporal (08/19/18 0121)  Respirations: 20 (08/19/18 0300)  Height: 5' 6" (167 6 cm) (08/18/18 2256)  Weight - Scale: 64 5 kg (142 lb 3 2 oz) (08/18/18 2312)  SpO2: 100 % (08/19/18 0300)    Physical Exam   Constitutional: She is oriented to person, place, and time  She appears well-developed and well-nourished  No distress  HENT:   Head: Normocephalic and atraumatic  Mouth/Throat: No oropharyngeal exudate  Eyes: Conjunctivae and EOM are normal  Pupils are equal, round, and reactive to light  No scleral icterus  Neck: Normal range of motion  Neck supple  No JVD present  No tracheal deviation present  No thyromegaly present  Cardiovascular: Normal rate, regular rhythm and normal heart sounds  Pulmonary/Chest: Effort normal and breath sounds normal  No stridor  No respiratory distress  Abdominal: Soft  Bowel sounds are normal  She exhibits no distension  There is no tenderness  Musculoskeletal: Normal range of motion  She exhibits no edema, tenderness or deformity     Lymphadenopathy:     She has no cervical adenopathy  Neurological: She is alert and oriented to person, place, and time  No cranial nerve deficit  Skin: Skin is warm and dry  Additional Data:     Lab Results: I have personally reviewed pertinent reports  Results from last 7 days  Lab Units 08/18/18  2346   WBC Thousand/uL 15 62*   HEMOGLOBIN g/dL 14 0   HEMATOCRIT % 44 3   PLATELETS Thousands/uL 386   NEUTROS PCT % 59   LYMPHS PCT % 35   MONOS PCT % 5   EOS PCT % 0       Results from last 7 days  Lab Units 08/18/18  2346   SODIUM mmol/L 140   POTASSIUM mmol/L 2 7*   CHLORIDE mmol/L 104   CO2 mmol/L 25   BUN mg/dL 4*   CREATININE mg/dL 0 92   CALCIUM mg/dL 9 0   TOTAL PROTEIN g/dL 8 0   BILIRUBIN TOTAL mg/dL 0 30   ALK PHOS U/L 160*   ALT U/L 15   AST U/L 18   GLUCOSE RANDOM mg/dL 110           Imaging: I have personally reviewed pertinent reports  No results found  EKG, Pathology, and Other Studies Reviewed on Admission:   · EKG:     [facility] Yes     ** Please Note: This note has been constructed using a voice recognition system   **

## 2018-08-19 NOTE — ASSESSMENT & PLAN NOTE
- admit for observation  - chest wall burning reported to be relieved with nitro  - initial troponin negative, less than 0 02, continue monitor q 3 hours  - will check CK  -has had CABG in past  - leave nitropaste on as placed by ER

## 2018-08-19 NOTE — SOCIAL WORK
Met with pt to discuss role as  in helping pt to develop discharge plan and to help pt carry out their plan  Pt lives in a multi - level house with her   Pt has 3 [address]UL[address] with a railing  Pt has 13 steps on the inside  Pt has her bedroom and bathroom on the 2nd floor  Pt has a cane at home which she uses   Pt has 02 from [employer] which she uses at 2 liters continuously   Pt is independent with ADL's  Pt's  does the cooking and cleaning  Pt does not drive her  drives her to appts  Pt had Jcarlos WALTERA in the past  Pt has had no other services in the home  Pt's PCP is Dr Alem Garcia  Pt's pulmonologist is Coleen Savage  Pt uses [facility] in [place]  Pt with no Case Management needs will continue to follow

## 2018-08-20 VITALS
WEIGHT: 139.1 LBS | HEART RATE: 75 BPM | DIASTOLIC BLOOD PRESSURE: 57 MMHG | TEMPERATURE: 97 F | BODY MASS INDEX: 22.35 KG/M2 | RESPIRATION RATE: 18 BRPM | SYSTOLIC BLOOD PRESSURE: 108 MMHG | HEIGHT: 66 IN | OXYGEN SATURATION: 96 %

## 2018-08-20 PROBLEM — G44.89 HEADACHE SYNDROME: Status: ACTIVE | Noted: 2018-08-20

## 2018-08-20 LAB
APTT PPP: 28 SECONDS (ref 24–36)
ATRIAL RATE: 61 BPM
ATRIAL RATE: 62 BPM
ATRIAL RATE: 73 BPM
INR PPP: 1.1 (ref 0.86–1.17)
P AXIS: 74 DEGREES
P AXIS: 82 DEGREES
P AXIS: 89 DEGREES
PR INTERVAL: 162 MS
PR INTERVAL: 172 MS
PR INTERVAL: 172 MS
PROTHROMBIN TIME: 13.7 SECONDS (ref 11.8–14.2)
QRS AXIS: 103 DEGREES
QRS AXIS: 93 DEGREES
QRS AXIS: 99 DEGREES
QRSD INTERVAL: 134 MS
QRSD INTERVAL: 138 MS
QRSD INTERVAL: 140 MS
QT INTERVAL: 450 MS
QT INTERVAL: 460 MS
QT INTERVAL: 474 MS
QTC INTERVAL: 456 MS
QTC INTERVAL: 477 MS
QTC INTERVAL: 506 MS
T WAVE AXIS: 54 DEGREES
T WAVE AXIS: 79 DEGREES
T WAVE AXIS: 80 DEGREES
VENTRICULAR RATE: 61 BPM
VENTRICULAR RATE: 62 BPM
VENTRICULAR RATE: 73 BPM

## 2018-08-20 PROCEDURE — 85730 THROMBOPLASTIN TIME PARTIAL: CPT | Performed by: PHYSICIAN ASSISTANT

## 2018-08-20 PROCEDURE — 93010 ELECTROCARDIOGRAM REPORT: CPT | Performed by: INTERNAL MEDICINE

## 2018-08-20 PROCEDURE — 99214 OFFICE O/P EST MOD 30 MIN: CPT | Performed by: INTERNAL MEDICINE

## 2018-08-20 PROCEDURE — 85610 PROTHROMBIN TIME: CPT | Performed by: PHYSICIAN ASSISTANT

## 2018-08-20 PROCEDURE — 94760 N-INVAS EAR/PLS OXIMETRY 1: CPT

## 2018-08-20 PROCEDURE — 94640 AIRWAY INHALATION TREATMENT: CPT

## 2018-08-20 PROCEDURE — 99217 PR OBSERVATION CARE DISCHARGE MANAGEMENT: CPT | Performed by: FAMILY MEDICINE

## 2018-08-20 RX ORDER — ACETAMINOPHEN 325 MG/1
650 TABLET ORAL EVERY 6 HOURS PRN
Status: DISCONTINUED | OUTPATIENT
Start: 2018-08-20 | End: 2018-08-20 | Stop reason: HOSPADM

## 2018-08-20 RX ORDER — ALBUTEROL SULFATE 90 UG/1
2 AEROSOL, METERED RESPIRATORY (INHALATION) EVERY 6 HOURS PRN
Qty: 8.5 G | Refills: 0 | Status: SHIPPED | OUTPATIENT
Start: 2018-08-20 | End: 2020-03-19 | Stop reason: ALTCHOICE

## 2018-08-20 RX ORDER — NICOTINE 21 MG/24HR
1 PATCH, TRANSDERMAL 24 HOURS TRANSDERMAL DAILY
Qty: 28 PATCH | Refills: 0 | Status: SHIPPED | OUTPATIENT
Start: 2018-08-21 | End: 2020-03-19 | Stop reason: ALTCHOICE

## 2018-08-20 RX ADMIN — ESCITALOPRAM OXALATE 5 MG: 10 TABLET ORAL at 08:44

## 2018-08-20 RX ADMIN — ASPIRIN 81 MG 81 MG: 81 TABLET ORAL at 08:44

## 2018-08-20 RX ADMIN — ACETAMINOPHEN 650 MG: 325 TABLET, FILM COATED ORAL at 14:50

## 2018-08-20 RX ADMIN — IPRATROPIUM BROMIDE AND ALBUTEROL SULFATE 3 ML: .5; 3 SOLUTION RESPIRATORY (INHALATION) at 13:09

## 2018-08-20 RX ADMIN — ENOXAPARIN SODIUM 40 MG: 40 INJECTION, SOLUTION INTRAVENOUS; SUBCUTANEOUS at 08:44

## 2018-08-20 RX ADMIN — METOPROLOL TARTRATE 12.5 MG: 25 TABLET ORAL at 14:19

## 2018-08-20 RX ADMIN — PANTOPRAZOLE SODIUM 40 MG: 40 TABLET, DELAYED RELEASE ORAL at 06:17

## 2018-08-20 RX ADMIN — OXYCODONE HYDROCHLORIDE 5 MG: 5 TABLET ORAL at 08:52

## 2018-08-20 RX ADMIN — GABAPENTIN 100 MG: 100 CAPSULE ORAL at 08:44

## 2018-08-20 RX ADMIN — POTASSIUM CHLORIDE 20 MEQ: 1500 TABLET, EXTENDED RELEASE ORAL at 08:43

## 2018-08-20 RX ADMIN — IPRATROPIUM BROMIDE AND ALBUTEROL SULFATE 3 ML: .5; 3 SOLUTION RESPIRATORY (INHALATION) at 07:19

## 2018-08-20 NOTE — ASSESSMENT & PLAN NOTE
-resolved  -ACS ruled out, negative troponin, no EKG changes, no events on telemetry  -appreciate Cardiology input, stress test 1 year ago, no additional testing  -continue home medication regimen, outpatient cardiology follow-up was previously with Dr Saad Watson

## 2018-08-20 NOTE — DISCHARGE SUMMARY
Discharge- Verenice Collins 1970, 50 y o  female MRN: [id number]    Unit/Bed#: 417-01 Encounter: [id number]    Primary Care Provider: Rosy Soto PA-C   Date and time admitted to hospital: 8/18/2018 11:12 PM        * Burning chest pain   Assessment & Plan    -resolved  -ACS ruled out, negative troponin, no EKG changes, no events on telemetry  -appreciate Cardiology input, stress test 1 year ago, no additional testing  -continue home medication regimen, outpatient cardiology follow-up was previously with Dr Mirta Whaley            Headache syndrome   Assessment & Plan    Improved  Will give 1 dose Tylenol prior to dischage  No neurologic deficits  Her headache may be related to her chronic oxycodone use as well as marijuana use, patient's urine drug screen positive for THC        Moderate COPD (chronic obstructive pulmonary disease) ([facility] )   Assessment & Plan    -controlled, not in exacerbation  -patient uses oxygen on exertion  -continue outpatient f/u with Pulmonology, continue Stiolto, added ventolin inhaler PRN        Atherosclerotic heart disease of native coronary artery without angina pectoris   Assessment & Plan    Continue home regimen           Tobacco abuse   Assessment & Plan    Counseled tobacco cessation  Nicotine patch Rx to pharmacy        GERD (gastroesophageal reflux disease)   Assessment & Plan    -continue Protonix as ordered          Discharging Physician / Practitioner: Kenji Bird MD  PCP: Rosy Soto PA-C  Admission Date:   Admission Orders     Ordered        08/19/18 0332  Place in Observation (expected length of stay for this patient is less than two midnights)  Once             Discharge Date: 08/20/18    Resolved Problems  Date Reviewed: 8/20/2018    None          Consultations During Hospital Stay:  · Cardiology    Procedures Performed:     · XR chest portable 8/19  Impression:       Mild right basilar atelectatic changes       Significant Findings / Test Results:     · WBC 10 53, Hb 12 7 Hct 39 7, Plt 311    Incidental Findings:   · None    Test Results Pending at Discharge (will require follow up): · None     Outpatient Tests Requested:  · None    Complications:  None    Reason for Admission: chest pain    Hospital Course:     Selene Olvera is a 50 y o  female patient who originally presented to the hospital on 8/18/2018 due to chest pain with prior history of CAD s/p stent placement, tobacco abuse and HTN  Cardiac workup was performed and ACS was ruled out  The patient was evaluated by Cardiology and no further cardiac testing was recommended  The patient was discharged in stable condition  She is to follow up with her PCP and her Cardiologist      Please see above list of diagnoses and related plan for additional information  Condition at Discharge: stable     Discharge Day Visit / Exam:     Subjective:  Patient seen and examined  She states that her chest pain has resolved, denies chest pain, SOB or palpitations  She is c/o generalized headache mostly in forehead region which she states has improved with oxycodone  The patient denies focal deficits, lightheadedness or dizziness  Has recurrent nausea but no vomiting, for which she will have outpatient GI follow up  Denies dysuria or urinary urgency or frequency  Vitals: Blood Pressure: 108/57 (08/20/18 1415)  Pulse: 75 (08/20/18 1415)  Temperature: (!) 97 °F (36 1 °C) (08/20/18 0728)  Temp Source: Temporal (08/20/18 0728)  Respirations: 18 (08/20/18 0728)  Height: 5' 6" (167 6 cm) (08/19/18 0432)  Weight - Scale: 63 1 kg (139 lb 1 6 oz) (08/20/18 0600)  SpO2: 96 % (room air) (08/20/18 1311)    Exam:     Physical Exam   Constitutional: She is oriented to person, place, and time  No distress  HENT:   Head: Normocephalic and atraumatic  Poor dentition   Eyes: Conjunctivae are normal    Neck: No JVD present  Cardiovascular: Normal rate and regular rhythm  No murmur heard  Pulmonary/Chest: No respiratory distress  She has no wheezes  She has no rales  Abdominal: Soft  She exhibits no distension  There is no tenderness  There is no guarding  Musculoskeletal: She exhibits no edema  Neurological: She is alert and oriented to person, place, and time  Skin: Skin is warm and dry  Psychiatric: She has a normal mood and affect  Discussion with Family: Patient    Discharge instructions/Information to patient and family:   See after visit summary for information provided to patient and family  Provisions for Follow-Up Care:  See after visit summary for information related to follow-up care and any pertinent home health orders  Disposition:     Home    For Discharges to [facility] SNF:   · Not Applicable to this Patient - Not Applicable to this Patient    Planned Readmission: No     Discharge Statement:  I spent 35 minutes discharging the patient  This time was spent on the day of discharge  I had direct contact with the patient on the day of discharge  Greater than 50% of the total time was spent examining patient, answering all patient questions, arranging and discussing plan of care with patient as well as directly providing post-discharge instructions  Additional time then spent on discharge activities  Discharge Medications:  See after visit summary for reconciled discharge medications provided to patient and family        ** Please Note: This note has been constructed using a voice recognition system **

## 2018-08-20 NOTE — PROGRESS NOTES
Consultation - Cardiology   Joaquin Graza 50 y o  female MRN: [id number]  Unit/Bed#: 747-96 Encounter: [id number]  08/20/18  9:56 AM      Assessment:  Principal Problem:    Burning chest pain  Active Problems:    CAD S/P CABG x 1 2016    GERD (gastroesophageal reflux disease)    Tobacco abuse    Atherosclerotic heart disease of native coronary artery without angina pectoris    Moderate COPD (chronic obstructive pulmonary disease) ([facility] [address] )    Ischemic cardiomyopathy    Hypokalemia    Dyspepsia    Chief Complaint   Patient presents with    Numbness     left sided numbness started 1 hour ago  pt states that this is the way she felt when she had her prior strokes    Chest Pain     chest tightness in left side of chest started at the same time     Admitting diagnosis:  Hypokalemia [E87 6]  Chest pain [R07 9]      Impression:    60-year-old with history of CABG x1-SVG-mid LAD for mid LAD bridging with associated wall motion abnormalities and recurrent presentations with chest pains, CABG performed in 2016, repeat coronary angiography in 2017 showing patent vessels, presenting with multiple complaints including left-sided headache, left-sided weakness, dry heaving, abdominal pain and vomiting  Plan:    Chest pain:  Clearly reproducible, negative ECG and enzymes, last coronary angiography was 1 year ago  No further evaluation at this time  Left-sided weakness: It is not clearly objective on exam   She would still benefit from a CT of the head to evaluate for any TIA/CVA  She has a prior history of 50-69% left carotid artery stenosis on Doppler-2017  Overall her presentation could be consistent with a complex migraine  Hypertension:  Controlled    Coronary artery disease and prior CABG:  Details are described above:  Continue aspirin, beta-blocker-dose decreased to 12 5 twice daily of metoprolol  Heart rates are borderline  Continue the same dose  Continue aspirin and statin    Smoking cessation is essential     Dyslipidemia:  Most recent LDL of 119-November 2017: It has been much better in the past   Continue to follow as an outpatient  I am not sure if she was compliant at that time  History of Present Illness   Physician Requesting Consult: Nickolas Ortiz MD   Reason for Consult / Principal Problem:  Chest pain  HPI: Mary Perez is a 50y o  year old female With the history of coronary artery disease, coronary artery bypass grafting with SVG-lad due to bridging involving the mid LAD with associated wall motion abnormalities and recurrent presentations with chest pains-CABG was done in 2016, chronic pain, thoracic outlet syndrome, history of takotsubo cardiomyopathy, most recent ejection fraction 60% in 2017, most recent coronary angiography also in 2017 September which showed patent SVG-lad as well as mild disease in the other vessels  She presents with symptoms of left-sided headache eventually progressing to bilateral, left-sided weakness, dry heaving and vomiting, abdominal discomfort and chest pain that is clearly reproducible  ECG did not show any acute ischemic changes  Serial troponins have been negative  Vitals have been stable  On exam she has clearly reproducible tenderness in the chest which is the chest pain she complains of  In addition there might be some subjective weakness on the left side although was not consistent on exam     Consults    Review of Systems:  feels ill, fatigued and generally weak  Review of Systems   HENT: Negative  Eyes: Negative  Respiratory: Positive for chest tightness  Negative for apnea, cough, choking, shortness of breath, wheezing and stridor  Cardiovascular: Positive for chest pain  Negative for palpitations and leg swelling  Gastrointestinal: Positive for abdominal pain, nausea and vomiting  Negative for abdominal distention, anal bleeding, blood in stool, constipation, diarrhea and rectal pain  Endocrine: Negative  Genitourinary: Negative  Musculoskeletal: Negative  Skin: Negative  Allergic/Immunologic: Negative  Neurological: Positive for weakness  Negative for dizziness, tremors, seizures, syncope, facial asymmetry, speech difficulty, light-headedness, numbness and headaches  Hematological: Negative  Psychiatric/Behavioral: Negative          Historical Information   Past Medical History:   Diagnosis Date    Chronic pain     [facility]     Coronary artery disease     Cubital tunnel syndrome     Depression     GERD (gastroesophageal reflux disease)     History of Clostridium difficile     Ovarian cyst     Psychiatric disorder     depression    Raynaud's disease     Stroke ([facility] [address] )     Takotsubo cardiomyopathy     Thoracic outlet syndrome     Tobacco abuse     Vitamin D deficiency      Past Surgical History:   Procedure Laterality Date    CARDIAC CATHETERIZATION      CABG    CORONARY ARTERY BYPASS GRAFT N/A 2/12/2016    Procedure: CABG X1; Left  EVH to mid-LAD; ZAHRA;  Surgeon: Kandace Prader, DO;  Location: [facility];  Service:    Cecelia ALVAREZ TENNIS ELBOW      DILATION AND CURETTAGE OF UTERUS      HYSTERECTOMY      HYSTEROSCOPY      LEFT OOPHORECTOMY      SALPINGECTOMY Right      History   Alcohol Use No     History   Drug Use No     History   Smoking Status    Current Every Day Smoker    Packs/day: 0 50    Years: 30 00    Types: Cigarettes   Smokeless Tobacco    Never Used     Family History:   Family History   Problem Relation Age of Onset    Heart disease Mother     Heart attack Mother     Heart attack Brother 36        s/p stent placement    Diabetes Brother     Heart disease Brother     Cancer Father     Diabetes Sister     Heart disease Sister     Diabetes Sister      No family history of premature CAD or Sudden Cardiac Death    Meds/Allergies     Current Facility-Administered Medications:     albuterol inhalation solution 2 5 mg, 2 5 mg, Nebulization, Q4H PRN, Ortiz Stratton MD    aluminum-magnesium hydroxide-simethicone (MYLANTA) 200-200-20 mg/5 mL oral suspension 30 mL, 30 mL, Oral, Q4H PRN, Ortiz Stratton MD, 30 mL at 08/19/18 0851    aspirin chewable tablet 81 mg, 81 mg, Oral, Daily, Bob Baeza PA-C, 81 mg at 08/20/18 0844    atorvastatin (LIPITOR) tablet 40 mg, 40 mg, Oral, Daily With Brian Mohs, PA-C, 40 mg at 08/19/18 1655    enoxaparin (LOVENOX) subcutaneous injection 40 mg, 40 mg, Subcutaneous, Q24H [address], Ortiz Stratton MD, 40 mg at 08/20/18 0844    escitalopram (LEXAPRO) tablet 5 mg, 5 mg, Oral, Daily, Bob Baeza PA-C, 5 mg at 08/20/18 0844    gabapentin (NEURONTIN) capsule 100 mg, 100 mg, Oral, TID, Bob Baeza PA-C, 100 mg at 08/20/18 0844    ipratropium-albuterol (DUO-NEB) 0 5-2 5 mg/3 mL inhalation solution 3 mL, 3 mL, Nebulization, 4x Daily, Ortiz Stratton MD, 3 mL at 08/20/18 0719    metoprolol tartrate (LOPRESSOR) tablet 12 5 mg, 12 5 mg, Oral, Q8H, Bob Baeza PA-C, 12 5 mg at 08/19/18 1915    nicotine (NICODERM CQ) 14 mg/24hr TD 24 hr patch 1 patch, 1 patch, Transdermal, Daily, Bbo Baeza PA-C    nitroglycerin (NITROSTAT) SL tablet 0 4 mg, 0 4 mg, Sublingual, Q5 Min PRN, Ortiz Stratton MD    oxyCODONE (ROXICODONE) IR tablet 5 mg, 5 mg, Oral, Q4H PRN, Bob Baeza PA-C, 5 mg at 08/20/18 0852    pantoprazole (PROTONIX) EC tablet 40 mg, 40 mg, Oral, BID AC, Ortiz Stratton MD, 40 mg at 08/20/18 0617    potassium chloride (K-DUR,KLOR-CON) CR tablet 20 mEq, 20 mEq, Oral, BID, Bob Baeza PA-C, 20 mEq at 08/20/18 7115  Allergies   Allergen Reactions    Toradol [Ketorolac Tromethamine] GI Intolerance       Objective   Vitals: Blood pressure 112/57, pulse 66, temperature (!) 97 °F (36 1 °C), temperature source Temporal, resp   rate 18, height 5' 6" (1 676 m), weight 63 1 kg (139 lb 1 6 oz), last menstrual period 10/14/2015, SpO2 97 %, not currently breastfeeding , Body mass index is 22 45 kg/m² , Orthostatic Blood Pressures      Most Recent Value   Blood Pressure  112/57 filed at 08/20/2018 7466   Patient Position - Orthostatic VS  Lying filed at 08/20/2018 1698            Intake/Output Summary (Last 24 hours) at 08/20/18 0956  Last data filed at 08/19/18 1300   Gross per 24 hour   Intake              240 ml   Output                0 ml   Net              240 ml       Weight (last 2 days)     Date/Time   Weight    08/20/18 0600  63 1 (139 1)    08/19/18 0432  64 8 (142 86)    08/19/18 0337  64 8 (142 8)    08/19/18 0121  --    Comment rows:    OBSERV: Patient states the pain in chest is more a 'burning' pain  at 08/19/18 0121    08/18/18 2312  64 5 (142 2)              Invasive Devices     Peripheral Intravenous Line            Peripheral IV 08/19/18 Right Antecubital 1 day                    Physical Exam:  GEN: Niecy Espinoza appears well, alert and oriented x 3, pleasant and cooperative   HEENT: pupils equal, round, and reactive to light; extraocular muscles intact  NECK: supple, no carotid bruits or JVD  HEART/Cardiovascular: regular rhythm, normal S1 and S2, no murmurs, clicks, gallops or rubs , clearly reproducible tenderness of the chest   LUNGS/Respiratory: clear to auscultation bilaterally; no wheezes, rales, or rhonchi   ABDOMEN/GI: normal bowel sounds, soft, no tenderness, no distention  EXTREMITIES/Musculoskeletal: peripheral pulses normal; no clubbing, cyanosis, or edema  NEURO: no focal findings , inconsistent left-sided weakness    SKIN: normal without suspicious lesions on exposed skin    Lab Results:   Admission on 08/18/2018   Component Date Value    WBC 08/18/2018 15 62*    RBC 08/18/2018 4 83     Hemoglobin 08/18/2018 14 0     Hematocrit 08/18/2018 44 3     MCV 08/18/2018 92     MCH 08/18/2018 29 0     MCHC 08/18/2018 31 6     RDW 08/18/2018 15 7*    MPV 08/18/2018 9 6     Platelets 04/09/8796 386     nRBC 08/18/2018 0     Neutrophils Relative 08/18/2018 59     Immat GRANS % 08/18/2018 0     Lymphocytes Relative 08/18/2018 35     Monocytes Relative 08/18/2018 5     Eosinophils Relative 08/18/2018 0     Basophils Relative 08/18/2018 1     Neutrophils Absolute 08/18/2018 9 28*    Immature Grans Absolute 08/18/2018 0 06     Lymphocytes Absolute 08/18/2018 5 42*    Monocytes Absolute 08/18/2018 0 70     Eosinophils Absolute 08/18/2018 0 06     Basophils Absolute 08/18/2018 0 10     Sodium 08/18/2018 140     Potassium 08/18/2018 2 7*    Chloride 08/18/2018 104     CO2 08/18/2018 25     Anion Gap 08/18/2018 11     BUN 08/18/2018 4*    Creatinine 08/18/2018 0 92     Glucose 08/18/2018 110     Calcium 08/18/2018 9 0     AST 08/18/2018 18     ALT 08/18/2018 15     Alkaline Phosphatase 08/18/2018 160*    Total Protein 08/18/2018 8 0     Albumin 08/18/2018 4 0     Total Bilirubin 08/18/2018 0 30     eGFR 08/18/2018 74     Magnesium 08/18/2018 2 0     Troponin I 08/18/2018 <0 02     Troponin I 08/19/2018 <0 02     Total CK 08/19/2018 64     Sodium 08/19/2018 144     Potassium 08/19/2018 3 8     Chloride 08/19/2018 113*    CO2 08/19/2018 23     Anion Gap 08/19/2018 8     BUN 08/19/2018 3*    Creatinine 08/19/2018 0 71     Glucose 08/19/2018 91     Calcium 08/19/2018 8 3     AST 08/19/2018 14     ALT 08/19/2018 8*    Alkaline Phosphatase 08/19/2018 133*    Total Protein 08/19/2018 6 7     Albumin 08/19/2018 3 3*    Total Bilirubin 08/19/2018 0 20     eGFR 08/19/2018 101     Magnesium 08/19/2018 1 8     Phosphorus 08/19/2018 2 4*    WBC 08/19/2018 10 53*    RBC 08/19/2018 4 33     Hemoglobin 08/19/2018 12 7     Hematocrit 08/19/2018 39 7     MCV 08/19/2018 92     MCH 08/19/2018 29 3     MCHC 08/19/2018 32 0     RDW 08/19/2018 15 6*    Platelets 94/02/3154 311     MPV 08/19/2018 10 0     Troponin I 08/19/2018 <0 02     Troponin I 08/19/2018 <0 02     Troponin I 08/19/2018 <0 02     Troponin I 08/19/2018 <0 02     Amph/Meth UR 08/19/2018 Negative     Barbiturate Ur 08/19/2018 Negative     Benzodiazepine Urine 08/19/2018 Negative     Cocaine Urine 08/19/2018 Negative     Methadone Urine 08/19/2018 Negative     Opiate Urine 08/19/2018 Positive*    PCP Ur 08/19/2018 Negative     THC Urine 08/19/2018 Positive*    Clarity, UA 08/19/2018 Clear     Color, UA 08/19/2018 Yellow     Specific Gravity, UA 08/19/2018 1 015     pH, UA 08/19/2018 6 0     Glucose, UA 08/19/2018 Negative     Ketones, UA 08/19/2018 Negative     Blood, UA 08/19/2018 Negative     Protein, UA 08/19/2018 Negative     Nitrite, UA 08/19/2018 Negative     Bilirubin, UA 08/19/2018 Negative     Urobilinogen, UA 08/19/2018 0 2     Leukocytes, UA 08/19/2018 Negative     WBC, UA 08/19/2018 0-1*    RBC, UA 08/19/2018 0-1*    Bacteria, UA 08/19/2018 Occasional     Epithelial Cells 08/19/2018 Occasional     Protime 08/20/2018 13 7     INR 08/20/2018 1 10     PTT 08/20/2018 28          Imaging: I have personally reviewed pertinent reports  EKG:  No events    Counseling / Coordination of Care  Total floor / unit time spent today 45 minutes  Greater than 50% of total time was spent with the patient and / or family counseling and / or coordination of care  We will continue to follow with the patient throughout their hospitalization  Thank you for allowing us to participate in their care     Rajat Greenberg MD

## 2018-08-20 NOTE — ASSESSMENT & PLAN NOTE
Improved  Will give 1 dose Tylenol prior to dischage  No neurologic deficits  Her headache may be related to her chronic oxycodone use as well as marijuana use, patient's urine drug screen positive for [facility]

## 2018-08-20 NOTE — NURSING NOTE
Pt walked off unit by RN, accompanied by her significant other  Discharge instructions given to pt  Verbal understanding of same  Pt in no acute distress

## 2018-08-20 NOTE — CASE MANAGEMENT
Continued Stay Review    Date: 08/20/18    Vital Signs: /57 (BP Location: Left arm)   Pulse 66   Temp (!) 97 °F (36 1 °C) (Temporal)   Resp 18   Ht 5' 6" (1 676 m)   Wt 63 1 kg (139 lb 1 6 oz)   LMP 10/14/2015 (LMP Unknown)   SpO2 97%   BMI 22 45 kg/m²     Medications:   Scheduled Meds:   Current Facility-Administered Medications:  albuterol 2 5 mg Nebulization Q4H PRN Josie Hartley MD   aluminum-magnesium hydroxide-simethicone 30 mL Oral Q4H PRN Josie Hartley MD   aspirin 81 mg Oral Daily Stephen Zamora PA-C   atorvastatin 40 mg Oral Daily With Jeannette Golden PA-C   enoxaparin 40 mg Subcutaneous Q24H [facility] Josie Hartley MD   escitalopram 5 mg Oral Daily Ledy Chapa PA-C   gabapentin 100 mg Oral TID Ledy Chapa PA-C   ipratropium-albuterol 3 mL Nebulization 4x Daily Josie Hartley MD   metoprolol tartrate 12 5 mg Oral Q8H Ledy Chapa PA-C   nicotine 1 patch Transdermal Daily Ledy Chapa PA-C   nitroglycerin 0 4 mg Sublingual Q5 Min PRN Josie Hartley MD   oxyCODONE 5 mg Oral Q4H PRN Ledy Chapa PA-C   pantoprazole 40 mg Oral BID AC Josie Hartley MD   potassium chloride 20 mEq Oral BID Ledy Chapa PA-C     Continuous Infusions:    PRN Meds:   albuterol    aluminum-magnesium hydroxide-simethicone    nitroglycerin    oxyCODONE    Abnormal Labs/Diagnostic Results: Trop -- neg x3      Age/Sex: 50 y o  female     Assessment/Plan:   Assessment:  Principal Problem:    Burning chest pain  Active Problems:    CAD S/P CABG x 1 2016    GERD (gastroesophageal reflux disease)    Tobacco abuse    Atherosclerotic heart disease of native coronary artery without angina pectoris    Moderate COPD (chronic obstructive pulmonary disease) ([facility] [address] )    Ischemic cardiomyopathy    Hypokalemia    Dyspepsia      Impression:   22-year-old with history of CABG x1-SVG-mid LAD for mid LAD bridging with associated wall motion abnormalities and recurrent presentations with chest pains, CABG performed in 2016, repeat coronary angiography in 2017 showing patent vessels, presenting with multiple complaints including left-sided headache, left-sided weakness, dry heaving, abdominal pain and vomiting      Plan:   Chest pain:  Clearly reproducible, negative ECG and enzymes, last coronary angiography was 1 year ago  No further evaluation at this time      Left-sided weakness: It is not clearly objective on exam   She would still benefit from a CT of the head to evaluate for any TIA/CVA  She has a prior history of 50-69% left carotid artery stenosis on Doppler-2017  Overall her presentation could be consistent with a complex migraine      Hypertension:  Controlled     Coronary artery disease and prior CABG:  Details are described above:  Continue aspirin, beta-blocker-dose decreased to 12 5 twice daily of metoprolol  Heart rates are borderline  Continue the same dose  Continue aspirin and statin  Smoking cessation is essential      Dyslipidemia:  Most recent LDL of 119-November 2017: It has been much better in the past   Continue to follow as an outpatient    I am not sure if she was compliant at that time

## 2018-08-20 NOTE — PLAN OF CARE
Problem: DISCHARGE PLANNING - CARE MANAGEMENT  Goal: Discharge to post-acute care or home with appropriate resources  INTERVENTIONS:  - Conduct assessment to determine patient/family and health care team treatment goals, and need for post-acute services based on payer coverage, community resources, and patient preferences, and barriers to discharge  - Address psychosocial, clinical, and financial barriers to discharge as identified in assessment in conjunction with the patient/family and health care team  - Arrange appropriate level of post-acute services according to patient's   needs and preference and payer coverage in collaboration with the physician and health care team  - Communicate with and update the patient/family, physician, and health care team regarding progress on the discharge plan  - Arrange appropriate transportation to post-acute venues  Pt's  will give the pt a ride home      Outcome: Completed Date Met: 08/20/18

## 2018-08-20 NOTE — ASSESSMENT & PLAN NOTE
-controlled, not in exacerbation  -patient uses oxygen on exertion  -continue outpatient f/u with Pulmonology, continue Stiolto, added ventolin inhaler PRN

## 2018-08-20 NOTE — SOCIAL WORK
Pt is being discharged today  Pt will set up her own follow up appts  Case Management reviewed discharge planning process including the following: identifying help that is needed at home, pt's preference for discharge needs and Meds at [facility]  Reviewed with Pt that any member of the healthcare team can answer questions regarding : medications, jmportance of recognizing  Signs and symptoms of any  medical problems  Case Management also encouraged pt to follow up with all recommended appointments after discharge

## 2018-09-06 DIAGNOSIS — I10 ESSENTIAL HYPERTENSION, BENIGN: Primary | ICD-10-CM

## 2018-09-06 RX ORDER — ASPIRIN 81 MG
TABLET, DELAYED RELEASE (ENTERIC COATED) ORAL
Qty: 30 TABLET | Refills: 4 | Status: SHIPPED | OUTPATIENT
Start: 2018-09-06 | End: 2020-03-19 | Stop reason: SDUPTHER

## 2018-10-05 DIAGNOSIS — F32.A DEPRESSION, UNSPECIFIED DEPRESSION TYPE: ICD-10-CM

## 2018-10-05 RX ORDER — ESCITALOPRAM OXALATE 5 MG/1
TABLET ORAL
Qty: 30 TABLET | Refills: 4 | Status: SHIPPED | OUTPATIENT
Start: 2018-10-05 | End: 2019-03-01 | Stop reason: SDUPTHER

## 2018-10-17 ENCOUNTER — APPOINTMENT (EMERGENCY)
Dept: CT IMAGING | Facility: HOSPITAL | Age: 48
End: 2018-10-17
Payer: COMMERCIAL

## 2018-10-17 ENCOUNTER — HOSPITAL ENCOUNTER (EMERGENCY)
Facility: HOSPITAL | Age: 48
Discharge: HOME | End: 2018-10-17
Attending: EMERGENCY MEDICINE | Admitting: EMERGENCY MEDICINE
Payer: COMMERCIAL

## 2018-10-17 VITALS
HEIGHT: 66 IN | WEIGHT: 136.24 LBS | DIASTOLIC BLOOD PRESSURE: 77 MMHG | BODY MASS INDEX: 21.9 KG/M2 | OXYGEN SATURATION: 96 % | SYSTOLIC BLOOD PRESSURE: 126 MMHG | HEART RATE: 71 BPM | RESPIRATION RATE: 20 BRPM | TEMPERATURE: 97.8 F

## 2018-10-17 DIAGNOSIS — R51.9 HEADACHE: Primary | ICD-10-CM

## 2018-10-17 DIAGNOSIS — R93.89 ABNORMAL MRI: ICD-10-CM

## 2018-10-17 LAB
ALBUMIN SERPL BCP-MCNC: 3.8 G/DL (ref 3.5–5)
ALP SERPL-CCNC: 172 U/L (ref 46–116)
ALT SERPL W P-5'-P-CCNC: 18 U/L (ref 12–78)
ANION GAP SERPL CALCULATED.3IONS-SCNC: 8 MMOL/L (ref 4–13)
AST SERPL W P-5'-P-CCNC: 35 U/L (ref 5–45)
BACTERIA UR QL AUTO: ABNORMAL /HPF
BASOPHILS # BLD AUTO: 0.09 THOUSANDS/ΜL (ref 0–0.1)
BASOPHILS NFR BLD AUTO: 1 % (ref 0–1)
BILIRUB SERPL-MCNC: 0.5 MG/DL (ref 0.2–1)
BILIRUB UR QL STRIP: NEGATIVE
BUN SERPL-MCNC: 2 MG/DL (ref 5–25)
CALCIUM SERPL-MCNC: 9.6 MG/DL (ref 8.3–10.1)
CHLORIDE SERPL-SCNC: 106 MMOL/L (ref 100–108)
CLARITY UR: ABNORMAL
CO2 SERPL-SCNC: 31 MMOL/L (ref 21–32)
COLOR UR: YELLOW
CREAT SERPL-MCNC: 0.65 MG/DL (ref 0.6–1.3)
EOSINOPHIL # BLD AUTO: 0.09 THOUSAND/ΜL (ref 0–0.61)
EOSINOPHIL NFR BLD AUTO: 1 % (ref 0–6)
ERYTHROCYTE [DISTWIDTH] IN BLOOD BY AUTOMATED COUNT: 15.5 % (ref 11.6–15.1)
EXT PREG TEST URINE: NORMAL
GFR SERPL CREATININE-BSD FRML MDRD: 105 ML/MIN/1.73SQ M
GLUCOSE SERPL-MCNC: 76 MG/DL (ref 65–140)
GLUCOSE UR STRIP-MCNC: NEGATIVE MG/DL
HCT VFR BLD AUTO: 43.7 % (ref 34.8–46.1)
HGB BLD-MCNC: 14 G/DL (ref 11.5–15.4)
HGB UR QL STRIP.AUTO: NEGATIVE
IMM GRANULOCYTES # BLD AUTO: 0.03 THOUSAND/UL (ref 0–0.2)
IMM GRANULOCYTES NFR BLD AUTO: 0 % (ref 0–2)
KETONES UR STRIP-MCNC: NEGATIVE MG/DL
LEUKOCYTE ESTERASE UR QL STRIP: ABNORMAL
LYMPHOCYTES # BLD AUTO: 3.4 THOUSANDS/ΜL (ref 0.6–4.47)
LYMPHOCYTES NFR BLD AUTO: 26 % (ref 14–44)
MCH RBC QN AUTO: 29.7 PG (ref 26.8–34.3)
MCHC RBC AUTO-ENTMCNC: 32 G/DL (ref 31.4–37.4)
MCV RBC AUTO: 93 FL (ref 82–98)
MONOCYTES # BLD AUTO: 0.58 THOUSAND/ΜL (ref 0.17–1.22)
MONOCYTES NFR BLD AUTO: 4 % (ref 4–12)
NEUTROPHILS # BLD AUTO: 9.03 THOUSANDS/ΜL (ref 1.85–7.62)
NEUTS SEG NFR BLD AUTO: 68 % (ref 43–75)
NITRITE UR QL STRIP: NEGATIVE
NON-SQ EPI CELLS URNS QL MICRO: ABNORMAL /HPF
NRBC BLD AUTO-RTO: 0 /100 WBCS
PH UR STRIP.AUTO: 6.5 [PH] (ref 4.5–8)
PLATELET # BLD AUTO: 397 THOUSANDS/UL (ref 149–390)
PMV BLD AUTO: 10 FL (ref 8.9–12.7)
POTASSIUM SERPL-SCNC: 4.4 MMOL/L (ref 3.5–5.3)
PROT SERPL-MCNC: 8 G/DL (ref 6.4–8.2)
PROT UR STRIP-MCNC: NEGATIVE MG/DL
RBC # BLD AUTO: 4.72 MILLION/UL (ref 3.81–5.12)
RBC #/AREA URNS AUTO: ABNORMAL /HPF
SODIUM SERPL-SCNC: 145 MMOL/L (ref 136–145)
SP GR UR STRIP.AUTO: <=1.005 (ref 1–1.03)
TROPONIN I SERPL-MCNC: <0.02 NG/ML
UROBILINOGEN UR QL STRIP.AUTO: 0.2 E.U./DL
WBC # BLD AUTO: 13.22 THOUSAND/UL (ref 4.31–10.16)
WBC #/AREA URNS AUTO: ABNORMAL /HPF

## 2018-10-17 PROCEDURE — 36415 COLL VENOUS BLD VENIPUNCTURE: CPT | Performed by: EMERGENCY MEDICINE

## 2018-10-17 PROCEDURE — 99284 EMERGENCY DEPT VISIT MOD MDM: CPT

## 2018-10-17 PROCEDURE — 80053 COMPREHEN METABOLIC PANEL: CPT | Performed by: EMERGENCY MEDICINE

## 2018-10-17 PROCEDURE — 81001 URINALYSIS AUTO W/SCOPE: CPT | Performed by: EMERGENCY MEDICINE

## 2018-10-17 PROCEDURE — 93005 ELECTROCARDIOGRAM TRACING: CPT

## 2018-10-17 PROCEDURE — 96374 THER/PROPH/DIAG INJ IV PUSH: CPT

## 2018-10-17 PROCEDURE — 85025 COMPLETE CBC W/AUTO DIFF WBC: CPT | Performed by: EMERGENCY MEDICINE

## 2018-10-17 PROCEDURE — 81025 URINE PREGNANCY TEST: CPT | Performed by: EMERGENCY MEDICINE

## 2018-10-17 PROCEDURE — 96375 TX/PRO/DX INJ NEW DRUG ADDON: CPT

## 2018-10-17 PROCEDURE — 84484 ASSAY OF TROPONIN QUANT: CPT | Performed by: EMERGENCY MEDICINE

## 2018-10-17 PROCEDURE — 96361 HYDRATE IV INFUSION ADD-ON: CPT

## 2018-10-17 PROCEDURE — 87086 URINE CULTURE/COLONY COUNT: CPT | Performed by: EMERGENCY MEDICINE

## 2018-10-17 RX ORDER — OLANZAPINE 5 MG/1
10 TABLET, ORALLY DISINTEGRATING ORAL ONCE
Status: COMPLETED | OUTPATIENT
Start: 2018-10-17 | End: 2018-10-17

## 2018-10-17 RX ORDER — ACETAMINOPHEN 325 MG/1
650 TABLET ORAL EVERY 6 HOURS PRN
Qty: 30 TABLET | Refills: 0 | Status: SHIPPED | OUTPATIENT
Start: 2018-10-17 | End: 2020-03-19 | Stop reason: ALTCHOICE

## 2018-10-17 RX ORDER — AMOXICILLIN AND CLAVULANATE POTASSIUM 875; 125 MG/1; MG/1
1 TABLET, FILM COATED ORAL EVERY 12 HOURS SCHEDULED
COMMUNITY
End: 2020-03-19 | Stop reason: ALTCHOICE

## 2018-10-17 RX ORDER — CLOPIDOGREL BISULFATE 75 MG/1
75 TABLET ORAL DAILY
COMMUNITY
End: 2019-04-26 | Stop reason: SDUPTHER

## 2018-10-17 RX ORDER — DEXAMETHASONE SODIUM PHOSPHATE 10 MG/ML
10 INJECTION, SOLUTION INTRAMUSCULAR; INTRAVENOUS ONCE
Status: COMPLETED | OUTPATIENT
Start: 2018-10-17 | End: 2018-10-17

## 2018-10-17 RX ORDER — METOCLOPRAMIDE HYDROCHLORIDE 5 MG/ML
10 INJECTION INTRAMUSCULAR; INTRAVENOUS ONCE
Status: COMPLETED | OUTPATIENT
Start: 2018-10-17 | End: 2018-10-17

## 2018-10-17 RX ORDER — METHYLPREDNISOLONE SODIUM SUCCINATE 125 MG/2ML
125 INJECTION, POWDER, LYOPHILIZED, FOR SOLUTION INTRAMUSCULAR; INTRAVENOUS ONCE
Status: DISCONTINUED | OUTPATIENT
Start: 2018-10-17 | End: 2018-10-17

## 2018-10-17 RX ORDER — DIPHENHYDRAMINE HYDROCHLORIDE 50 MG/ML
50 INJECTION INTRAMUSCULAR; INTRAVENOUS ONCE
Status: COMPLETED | OUTPATIENT
Start: 2018-10-17 | End: 2018-10-17

## 2018-10-17 RX ADMIN — SODIUM CHLORIDE 1000 ML: 0.9 INJECTION, SOLUTION INTRAVENOUS at 14:25

## 2018-10-17 RX ADMIN — DEXAMETHASONE SODIUM PHOSPHATE 10 MG: 10 INJECTION, SOLUTION INTRAMUSCULAR; INTRAVENOUS at 14:22

## 2018-10-17 RX ADMIN — OLANZAPINE 10 MG: 5 TABLET, ORALLY DISINTEGRATING ORAL at 14:20

## 2018-10-17 RX ADMIN — METOCLOPRAMIDE 10 MG: 5 INJECTION, SOLUTION INTRAMUSCULAR; INTRAVENOUS at 14:21

## 2018-10-17 RX ADMIN — DIPHENHYDRAMINE HYDROCHLORIDE 50 MG: 50 INJECTION, SOLUTION INTRAMUSCULAR; INTRAVENOUS at 14:20

## 2018-10-17 NOTE — DISCHARGE INSTRUCTIONS
As we discussed I would like you to follow up with her family doctor for re-evaluation  Here MRI is abnormal but the management will be a repeat MRI in 3 weeks done by her family doctor  If it is still abnormal, then he will need to follow up with Neurology  The [facility] should call you if your Lyme disease test is abnormal because he will need to be on a different antibiotic likely discussed if he have significant worsening of symptoms or any other concerns please come back to the emergency room for re-evaluation    Please use the doses described in the scripts including  - Tylenol 650mg every 6 hours  - Motrin 400mg every 6 hours  There is no benefit of doses above the above doses for pain  They can be used together at the exact same time as they work in different ways  I also usually give riboflavin 400mg and magnesium oxide 400mg for headaches; there are various papers that support that these medications can be used daily for prophyalxis (to prevent the headache from coming back on) and if it does come back, it will be less severe than preivous headaches  That being said, if you have worsening of your headache or develop any stroke-like symptoms or if anything else is concerning you you're always welcomed back here for re-evaluation as we discussed  Acute Headache, Ambulatory Care   GENERAL INFORMATION:   An acute headache  is pain or discomfort that starts suddenly and gets worse quickly  The cause of an acute headache may not be known  It may be triggered by stress, fatigue, hormones, food, or trauma    Common related symptoms include the following:   · Fever    · Sinus pressure    · Loss of memory    · Nausea or vomiting    · Problems with your vision, such as watery or red eyes, loss of vision, or pain in bright light    · Stiff neck    · Tenderness of the head and neck area    · Trouble staying awake, or being less alert than usual     · Weakness or less energy  Seek immediate care for the following symptoms:   · Severe pain    · A headache that occurs after a blow to the head, a fall, or other trauma     · Confusion or forgetfulness    · Numbness on one side of your face or body  Treatment for an acute headache  may include medicine to decrease pain  You may also need biofeedback or cognitive behavioral therapy  Ask your healthcare provider about these and other treatments for an acute headache  Manage my symptoms:   · Apply heat  on your head for 20 to 30 minutes every 2 hours for as many days as directed  Heat helps decrease pain and muscle spasms  You may alternate heat and ice  · Apply ice  on your head for 15 to 20 minutes every hour or as directed  Use an ice pack, or put crushed ice in a plastic bag  Cover it with a towel  Ice helps decrease pain  · Relax your muscles  Lie down in a comfortable position and close your eyes  Relax your muscles slowly  Start at your toes and work your way up your body  · Keep a record of your headaches  Write down when your headaches start and stop  Include your symptoms and what you were doing when the headache began  Record what you ate or drank for 24 hours before the headache started  Describe the pain and where it hurts  Keep track of what you did to treat your headache and whether it worked  Follow up with your healthcare provider as directed:  Bring your headache record with you when you see your healthcare provider  Write down your questions so you remember to ask them during your visits  CARE AGREEMENT:   You have the right to help plan your care  Learn about your health condition and how it may be treated  Discuss treatment options with your caregivers to decide what care you want to receive  You always have the right to refuse treatment  The above information is an  only  It is not intended as medical advice for individual conditions or treatments   Talk to your doctor, nurse or pharmacist before following any medical regimen to see if it is safe and effective for you  © 2014 [address] is for End User's use only and may not be sold, redistributed or otherwise used for commercial purposes  All illustrations and images included in CareNotes® are the copyrighted property of A D [employer] M [employer]  or [employer]

## 2018-10-17 NOTE — ED PROVIDER NOTES
Final Diagnosis:  1  Headache    2  Abnormal MRI      Chief Complaint   Patient presents with    Dizziness     pt c/o dizziness, headache, nausea, diarrhea and vomitting since since after being d/c'd from other hospital due to cellulitis and left sided weakness     This is a 50year old female presenting for evaluation of abnormal MRI  The patient states that she was at [facility]; she went in because she was having swelling around her left eye (she shows me a picture on her phone of mild left eye swelling with faint redness) and stated that she had a tiny bit of pain with movement of her eye  She says that after an MRI she was diagnosed with "a spot on my brain " The patient was discharged home and told to f/u with her PCP  Per DC summary papers that she has, she has a diagnosis of "preseptal cellulitis " The patient was treated with abx  Since taking it the redness and swelling improved though would sometimes wake up and see some swelling around the ey (there is non present)  She has a headache as well that is primarily left sided, feels like previous migraine  She tried no medication  The  says that while she was in the hospital as well as afterwards, she had an abnormal gait, "where it looks like she is walking drunk sometimes "   It's not always there  Denies f/ch   +nauasea, +vomiting (NBNB, a "couple times  ")   No CP/SOB  No focal weakness that is new  She has chronic left sided weakness and sometimes sensory changes (left arm, left leg, left face)  Denies any urinary tract infection symptoms (burning, itching, pain, blood)  She does have increased urinary frequency chronically, it isn't acutely changed  Has a chronic cough that is unchanged in setting of known COPD patient who is still smoking  Denies falls, trauma  Per  + patient, the reason they are here is primarily because they called the PCP about the "spot on the brain" and was told to go to the ER for an evaluation     I attempted to:  - request records from [facility] --> pending fax  - request read from ER --> awaiting fax  - call PCP to find out story --> no answer  Hx of headaches; per previous DC summary, "Her headache may be related to her chronic oxycodone use as well as marijuana use, patient's urine drug screen positive for THC"  PMH:  - ICM; Hx of Takotsubo's Cardiomyopathy  - Ovarian cyst  - Depression  - GERD  - Hx of C diff  - Raynaud's disease, Chronic pain  - Thoracic outlet syndrome  - Stroke  - COPD  PSH:  - Oophrectomy, salpingectomy  - CABG (x1-SVG-mid LAD for mid LAD)  - Hysterectomy  +smoking  No alcohol/drugs  PE:   Vitals:    10/17/18 1312   BP: 137/89   BP Location: Right arm   Pulse: 81   Resp: 20   Temp: 97 8 °F (36 6 °C)   TempSrc: Temporal   SpO2: 98%   Weight: 61 8 kg (136 lb 3 9 oz)   Height: 5' 6" (1 676 m)   General: VSS, NAD, awake, alert  Well-nourished, well-developed  Appears stated age  Head: Normocephalic, atraumatic, nontender  Eyes: PERRL, EOM-I  No diplopia  No hyphema  No subconjunctival hemorrhages  Symmetrical lids  ENT: Atraumatic external nose and ears  Pharynx normal    MMM  No malocclusion  No stridor  Normal phonation  No drooling  Normal swallowing  Base of mouth is soft  No mastoid tenderness  Neck: Symmetric, trachea midline  No JVD  No midline neck tenderness  CV: RRR  +S1/S2  No murmurs or gallops  Peripheral pulses +2 throughout  No chest wall tenderness  Lungs:   Unlabored No retractions  CTAB, lungs sounds equal bilateral    No crepitus  No tachypnea  No paradoxical motion  Abd: +BS, soft, NT/ND  No guarding  No rigidity  No rebound  No hepatosplenomegaly noted  No peritoneal signs  Psoas/obturator/heel strike signs are absent  MSK:   Extremities without tenderness or gross deformity  FROM, weakness as above  No lower extremity edema  Back:   No C/T/L-spine tenderness  No CVAT  Skin: Dry, intact     Neuro: AAOx3, GCS 15, CN II-XII grossly intact  Motor grossly intact except for right sided weakness  C-spine: NT, supple  No midline tenderness  Kernig's Brudzinski's negative  EHL/FHL/PF/DF/KF/KE/HF/HE 5/5  M/U/R/A nerve sensation bilateral intact and equal    strength 5/5 on right, 4/5 on left  Seems like there is a voluntary component to this  Good capillary refill  2+ radial pulses, bilaterally equal    BICEPS/TRICEPS 5/5  Shoulder abduction/adduction 5/5  No pronator drift  No aphasia/dysarthria  CN 2-12 intact  Normal finger to nose  Normal rapid alternating movements of hands  No nystagmus  No facial droop  Stroke Assessment     Row Name 10/17/18 1404             NIH Stroke Scale    Interval Baseline      Level of Consciousness (1a ) 0      LOC Questions (1b ) 0      LOC Commands (1c ) 0      Best Gaze (2 ) 0      Visual (3 ) 0      Facial Palsy (4 ) 0      Motor Arm, Left (5a ) 1      Motor Arm, Right (5b ) 0      Motor Leg, Left (6a ) 1      Motor Leg, Right (6b ) 0      Limb Ataxia (7 ) 0      Sensory (8 ) 0      Best Language (9 ) 0      Dysarthria (10 ) 0      Extinction and Inattention (11 ) (Formerly Neglect) 0      Total 2                First Filed Value   TPA Decision  Patient not a TPA candidate  Patient is not a candidate options  Unclear time of onset outside appropriate time window  Sensory grossly intact  Psychiatric/Behavioral: Appropriate mood and affect   Exam: deferred  A:  - abnormal MRI  - migraine headache  P:  - discussed doing CTH  - discussed doing labs  - while awaiting records from other facility    - dispo per workup  - 13 point ROS was performed and all are normal unless stated in the history above  - Nursing note reviewed  Vitals reviewed  - Orders placed by myself and/or advanced practitioner / resident     - Previous chart was reviewed  - No language barrier    - History obtained from patient  - There are no limitations to the history obtained     - Critical care time: Not applicable for this patient  ED Course as of Oct 17 1505   Wed Oct 17, 2018   1345 Procedure Note: EKG  Date/Time: 10/17/18 1:45 PM   Performed by: Becka Caceres by: Deidre Lim   Indications / Diagnosis: headache  ECG reviewed by me, the ED Provider: yes   The EKG demonstrates:  Rhythm: HR 74 normal sinus  Intervals: normal intervals  Axis: normal axis  QRS/Blocks: RBBB  ST Changes: No acute ST Changes, no STD/YULIANA  No old  1345 HEART Score = [3]  [0] History = Highly / Moderately / Slightly Suspicious  [0] EKG = Significant STD / Non-specific repolarization / Normal  [1] Age = >65, 45-65, <45  [2] Risk Factors (0, 1-2, 3+): Cholesterol, HTN, DM, Cigarettes, FH, Obesity  [0] Troponin: 3+ x normal, 1-3x normal, <normal    Low Score (0-3 points), risk of MACE of 0 9-1 7%  1448 I updated the patient about the MRI report  It demonstrated "focus of increased T2/FLAIR signal hyper-intensity within the right radiata measuring 0 7cm  Differetial includes: focal demyelination, mild chronic microvascular disease, sequela of chronic migraine headaches comma inflammatory processes as Lyme disease  The patient does have history of migraines, she has a history of stroke as well  Given the history I think this is likely chronic abnormality and also fits with her deficits that are already known and again and chronic  Will treat as such, likely discharge home, follow up with Neurology as an outpatient  7570 Reviewing records:   - echo normal  - EKG looks same as today's  - DC summary:  Per discharge summary the patient was admitted the hospital with evidence of both a facial cellulitis and possible CVA  She did have a carotid ultrasound that is just a possible stenosis but the MRA was normal   The patient's cellulitis improved while in the hospital and the patient was then discharged home  She was changing Plavix for aspirin patient was sent home on antibiotics    She is supposed to follow up with the primary care doctor and get a follow-up MRI in 3 weeks  - Lyme disease test is pending she had a full workup including sed rate, homocystine, they were normal    1500 I reviewed all labs, imaging, results with the patient  Will DC      1504 Patient's headache is resolved  I discussed that I do not think a CT had will add anything some trying to discontinue it  1505 I reviewed all testing with the patient  I gave oral return precautions for what to return for in addition to the written return precautions  The patient verbalized understanding of the discharge instructions and warnings that would necessitate return to the Emergency Department  I specifically highlighted areas of special concern regarding the written and verbal discharge instructions and return precautions  All questions were answered prior to discharge        Medications   sodium chloride 0 9 % bolus 1,000 mL (1,000 mL Intravenous New Bag 10/17/18 1425)   diphenhydrAMINE (BENADRYL) injection 50 mg (50 mg Intravenous Given 10/17/18 1420)   OLANZapine (ZyPREXA ZYDIS) dispersible tablet 10 mg (10 mg Oral Given 10/17/18 1420)   metoclopramide (REGLAN) injection 10 mg (10 mg Intravenous Given 10/17/18 1421)   dexamethasone (PF) (DECADRON) injection 10 mg (10 mg Intravenous Given 10/17/18 1422)     CT head without contrast    (Results Pending)     Orders Placed This Encounter   Procedures    Urine culture    CT head without contrast    CBC and differential    Comprehensive metabolic panel    Troponin I    UA w Reflex to Microscopic w Reflex to Culture    Urine Microscopic    Insert peripheral IV    POCT pregnancy, urine    ECG 12 lead     Labs Reviewed   CBC AND DIFFERENTIAL - Abnormal        Result Value Ref Range Status    WBC 13 22 (*) 4 31 - 10 16 Thousand/uL Final    RBC 4 72  3 81 - 5 12 Million/uL Final    Hemoglobin 14 0  11 5 - 15 4 g/dL Final    Hematocrit 43 7  34 8 - 46 1 % Final    MCV 93  82 - 98 fL Final MCH 29 7  26 8 - 34 3 pg Final    MCHC 32 0  31 4 - 37 4 g/dL Final    RDW 15 5 (*) 11 6 - 15 1 % Final    MPV 10 0  8 9 - 12 7 fL Final    Platelets 352 (*) 489 - 390 Thousands/uL Final    nRBC 0  /100 WBCs Final    Neutrophils Relative 68  43 - 75 % Final    Immat GRANS % 0  0 - 2 % Final    Lymphocytes Relative 26  14 - 44 % Final    Monocytes Relative 4  4 - 12 % Final    Eosinophils Relative 1  0 - 6 % Final    Basophils Relative 1  0 - 1 % Final    Neutrophils Absolute 9 03 (*) 1 85 - 7 62 Thousands/µL Final    Immature Grans Absolute 0 03  0 00 - 0 20 Thousand/uL Final    Lymphocytes Absolute 3 40  0 60 - 4 47 Thousands/µL Final    Monocytes Absolute 0 58  0 17 - 1 22 Thousand/µL Final    Eosinophils Absolute 0 09  0 00 - 0 61 Thousand/µL Final    Basophils Absolute 0 09  0 00 - 0 10 Thousands/µL Final   COMPREHENSIVE METABOLIC PANEL - Abnormal     Sodium 145  136 - 145 mmol/L Final    Potassium 4 4  3 5 - 5 3 mmol/L Final    Comment: Slightly Hemolyzed; Results May be Affected    Chloride 106  100 - 108 mmol/L Final    CO2 31  21 - 32 mmol/L Final    ANION GAP 8  4 - 13 mmol/L Final    BUN 2 (*) 5 - 25 mg/dL Final    Creatinine 0 65  0 60 - 1 30 mg/dL Final    Comment: Standardized to IDMS reference method    Glucose 76  65 - 140 mg/dL Final    Comment:   If the patient is fasting, the ADA then defines impaired fasting glucose as > 100 mg/dL and diabetes as > or equal to 123 mg/dL  Specimen collection should occur prior to Sulfasalazine administration due to the potential for falsely depressed results  Specimen collection should occur prior to Sulfapyridine administration due to the potential for falsely elevated results  Calcium 9 6  8 3 - 10 1 mg/dL Final    AST 35  5 - 45 U/L Final    Comment: Slightly Hemolyzed; Results May be Affected  Specimen collection should occur prior to Sulfasalazine administration due to the potential for falsely depressed results       ALT 18  12 - 78 U/L Final Comment:   Specimen collection should occur prior to Sulfasalazine administration due to the potential for falsely depressed results  Alkaline Phosphatase 172 (*) 46 - 116 U/L Final    Total Protein 8 0  6 4 - 8 2 g/dL Final    Albumin 3 8  3 5 - 5 0 g/dL Final    Total Bilirubin 0 50  0 20 - 1 00 mg/dL Final    eGFR 105  ml/min/1 73sq m Final    Narrative:     National Kidney Disease Education Program recommendations are as follows:  GFR calculation is accurate only with a steady state creatinine  Chronic Kidney disease less than 60 ml/min/1 73 sq  meters  Kidney failure less than 15 ml/min/1 73 sq  meters  UA W REFLEX TO MICROSCOPIC WITH REFLEX TO CULTURE - Abnormal     Color, UA Yellow   Final    Clarity, UA Slightly Cloudy   Final    Specific Gravity, UA <=1 005  1 003 - 1 030 Final    pH, UA 6 5  4 5 - 8 0 Final    Leukocytes, UA Moderate (*) Negative Final    Nitrite, UA Negative  Negative Final    Protein, UA Negative  Negative mg/dl Final    Glucose, UA Negative  Negative mg/dl Final    Ketones, UA Negative  Negative mg/dl Final    Urobilinogen, UA 0 2  0 2, 1 0 E U /dl E U /dl Final    Bilirubin, UA Negative  Negative Final    Blood, UA Negative  Negative Final   URINE MICROSCOPIC - Abnormal     RBC, UA 0-1 (*) None Seen, 0-5 /hpf Final    WBC, UA 20-30 (*) None Seen, 0-5, 5-55, 5-65 /hpf Final    Epithelial Cells Moderate (*) None Seen, Occasional /hpf Final    Bacteria, UA Moderate (*) None Seen, Occasional /hpf Final   TROPONIN I - Normal    Troponin I <0 02  <=0 04 ng/mL Final    Comment: 3Autovalidation override  [employer] Chemistry analyzer 99% cutoff is > 0 04 ng/mL in network labs     o cTnI 99% cutoff is useful only when applied to patients in the clinical setting of myocardial ischemia   o cTnI 99% cutoff should be interpreted in the context of clinical history, ECG findings and possibly cardiac imaging to establish correct diagnosis     o cTnI 99% cutoff may be suggestive but clearly not indicative of a coronary event without the clinical setting of myocardial ischemia  POCT PREGNANCY, URINE - Normal    EXT PREG TEST UR (Ref: Negative) NEG   Final   URINE CULTURE     Time reflects when diagnosis was documented in both MDM as applicable and the Disposition within this note     Time User Action Codes Description Comment    10/17/2018  3:02 PM Derotha  Add [R51] Headache     10/17/2018  3:02 PM Derotha  Add [R93 89] Abnormal MRI       ED Disposition     ED Disposition Condition Comment    Discharge  65 Britney Frazier discharge to home/self care  Condition at discharge: Good        Follow-up Information     Follow up With Specialties Details Why Contact Info Additional [address] [facility] Emergency Department Emergency Medicine Go to If symptoms worsen Caleb Aguilar Saeid 0177  [phone] [place] ED, [facility], [place], [address] [facility], [address] Rosita Magallanes, PA-C Family Medicine, Physician Assistant Call in 1 day To make appointment for re-evaluation in 1 week [address] [place] [address], [place] [id number] [phone]           Patient's Medications   Discharge Prescriptions    ACETAMINOPHEN (TYLENOL) 325 MG TABLET    Take 2 tablets (650 mg total) by mouth every 6 (six) hours as needed for mild pain or fever       Start Date: 10/17/2018End Date: --       Order Dose: 650 mg       Quantity: 30 tablet    Refills: 0    MAGNESIUM OXIDE (MAG-OX) 400 MG    Take 1 tablet (400 mg total) by mouth daily       Start Date: 10/17/2018End Date: --       Order Dose: 400 mg       Quantity: 20 tablet    Refills: 0    RIBOFLAVIN 400 MG CAPS    Take 1 capsule (400 mg total) by mouth daily       Start Date: 10/17/2018End Date: --       Order Dose: 400 mg       Quantity: 20 each    Refills: 0     No discharge procedures on file  Prior to Admission Medications   Prescriptions Last Dose Informant Patient Reported? Taking?    ASPIR-LOW 81 MG EC tablet   No Yes Sig: TAKE ONE TABLET DAILY  albuterol (PROAIR HFA) 90 mcg/act inhaler   No Yes   Sig: Inhale 2 puffs every 6 (six) hours as needed for wheezing   amoxicillin-clavulanate (AUGMENTIN) 875-125 mg per tablet  Self Yes Yes   Sig: Take 1 tablet by mouth every 12 (twelve) hours   atorvastatin (LIPITOR) 40 mg tablet  Self No Yes   Sig: TAKE ONE TABLET DAILY WITH DINNER  clopidogrel (PLAVIX) 75 mg tablet  Self Yes Yes   Sig: Take 75 mg by mouth daily   escitalopram (LEXAPRO) 5 mg tablet   No Yes   Sig: TAKE (1) TABLET DAILY  gabapentin (NEURONTIN) 100 mg capsule  Self No Yes   Sig: Take 1 capsule (100 mg total) by mouth 3 (three) times a day   metoprolol tartrate (LOPRESSOR) 25 mg tablet   No Yes   Sig: TAKE ONE-HALF TABLET EVERY 8 HOURS    nicotine (NICODERM CQ) 14 mg/24hr TD 24 hr patch   No Yes   Sig: Place 1 patch on the skin daily   nitroglycerin (NITROSTAT) 0 4 mg SL tablet  Self Yes Yes   Sig: Place 0 4 mg under the tongue every 5 (five) minutes as needed for chest pain   oxyCODONE (ROXICODONE) 5 mg immediate release tablet  Self Yes Yes   Sig: Take 5 mg by mouth every 4 (four) hours as needed for moderate pain   pantoprazole (PROTONIX) 40 mg tablet  Self No Yes   Sig: TAKE (1) TABLET BY MOUTH DAILY  potassium chloride (K-DUR,KLOR-CON) 20 mEq tablet  Self No Yes   Sig: Take 1 tablet (20 mEq total) by mouth 2 (two) times a day   tiotropium-olodaterol (STIOLTO RESPIMAT) 2 5-2 5 MCG/ACT inhaler   No Yes   Sig: Inhale 2 puffs daily      Facility-Administered Medications: None       Portions of the record may have been created with voice recognition software  Occasional wrong word or "sound a like" substitutions may have occurred due to the inherent limitations of voice recognition software  Read the chart carefully and recognize, using context, where substitutions have occurred      Electronically signed by:  Charo Cottrell MD  10/17/18 1504       Faviola Mccormick MD  10/17/18 [address] [place] [address]

## 2018-10-18 LAB — BACTERIA UR CULT: NORMAL

## 2018-11-06 DIAGNOSIS — R12 HEART BURN: ICD-10-CM

## 2018-11-06 RX ORDER — PANTOPRAZOLE SODIUM 40 MG/1
TABLET, DELAYED RELEASE ORAL
Qty: 30 TABLET | Refills: 4 | Status: SHIPPED | OUTPATIENT
Start: 2018-11-06 | End: 2019-04-26 | Stop reason: SDUPTHER

## 2018-11-08 LAB
ATRIAL RATE: 74 BPM
P AXIS: 19 DEGREES
PR INTERVAL: 170 MS
QRS AXIS: 99 DEGREES
QRSD INTERVAL: 142 MS
QT INTERVAL: 444 MS
QTC INTERVAL: 492 MS
T WAVE AXIS: 30 DEGREES
VENTRICULAR RATE: 74 BPM

## 2018-11-08 PROCEDURE — 93010 ELECTROCARDIOGRAM REPORT: CPT | Performed by: INTERNAL MEDICINE

## 2018-12-17 ENCOUNTER — TELEPHONE (OUTPATIENT)
Dept: PULMONOLOGY | Facility: CLINIC | Age: 48
End: 2018-12-17

## 2018-12-17 NOTE — TELEPHONE ENCOUNTER
[phone] would like a call back to verify patient's oxygen recertification   Please advise

## 2018-12-21 ENCOUNTER — TELEPHONE (OUTPATIENT)
Dept: PULMONOLOGY | Facility: CLINIC | Age: 48
End: 2018-12-21

## 2018-12-21 NOTE — TELEPHONE ENCOUNTER
I called Sabinahenry Keyshawn from [address] back to see what it is she needed for for [facility]  I left her a message to return my call  I did let her know I will be in the office today until 3 pm and I will be off all week from 12/24/2018 to 12/28/2018

## 2019-01-02 NOTE — TELEPHONE ENCOUNTER
Elizabeth Owens from [address] calling again about the medical necessity recert that she sent on 11/30  She is asking for a call back at 21 [phone]    Fax number [phone]

## 2019-03-01 DIAGNOSIS — F32.A DEPRESSION, UNSPECIFIED DEPRESSION TYPE: ICD-10-CM

## 2019-03-01 DIAGNOSIS — I10 ESSENTIAL HYPERTENSION, BENIGN: ICD-10-CM

## 2019-03-01 RX ORDER — ESCITALOPRAM OXALATE 5 MG/1
TABLET ORAL
Qty: 30 TABLET | Refills: 3 | Status: SHIPPED | OUTPATIENT
Start: 2019-03-01 | End: 2022-05-10 | Stop reason: SDUPTHER

## 2019-04-26 DIAGNOSIS — E78.5 DYSLIPIDEMIA: ICD-10-CM

## 2019-04-26 DIAGNOSIS — R12 HEART BURN: ICD-10-CM

## 2019-04-26 DIAGNOSIS — I25.10 CORONARY ARTERY DISEASE INVOLVING NATIVE HEART WITHOUT ANGINA PECTORIS, UNSPECIFIED VESSEL OR LESION TYPE: Primary | ICD-10-CM

## 2019-04-26 RX ORDER — PANTOPRAZOLE SODIUM 40 MG/1
TABLET, DELAYED RELEASE ORAL
Qty: 30 TABLET | Refills: 4 | Status: SHIPPED | OUTPATIENT
Start: 2019-04-26 | End: 2020-01-08 | Stop reason: SDUPTHER

## 2019-04-26 RX ORDER — ATORVASTATIN CALCIUM 80 MG/1
TABLET, FILM COATED ORAL
Qty: 30 TABLET | Refills: 4 | Status: SHIPPED | OUTPATIENT
Start: 2019-04-26 | End: 2020-01-08 | Stop reason: SDUPTHER

## 2019-04-26 RX ORDER — CLOPIDOGREL BISULFATE 75 MG/1
TABLET ORAL
Qty: 30 TABLET | Refills: 4 | Status: SHIPPED | OUTPATIENT
Start: 2019-04-26 | End: 2020-01-08 | Stop reason: SDUPTHER

## 2019-06-27 DIAGNOSIS — I10 ESSENTIAL HYPERTENSION, BENIGN: ICD-10-CM

## 2019-07-26 DIAGNOSIS — F32.A DEPRESSION, UNSPECIFIED DEPRESSION TYPE: ICD-10-CM

## 2019-07-29 RX ORDER — ESCITALOPRAM OXALATE 5 MG/1
TABLET ORAL
Qty: 30 TABLET | Refills: 4 | OUTPATIENT
Start: 2019-07-29

## 2019-10-08 DIAGNOSIS — I25.10 CORONARY ARTERY DISEASE INVOLVING NATIVE HEART WITHOUT ANGINA PECTORIS, UNSPECIFIED VESSEL OR LESION TYPE: ICD-10-CM

## 2019-10-08 DIAGNOSIS — E78.5 DYSLIPIDEMIA: ICD-10-CM

## 2019-10-08 RX ORDER — ATORVASTATIN CALCIUM 80 MG/1
TABLET, FILM COATED ORAL
Qty: 30 TABLET | Refills: 4 | OUTPATIENT
Start: 2019-10-08

## 2019-10-08 RX ORDER — CLOPIDOGREL BISULFATE 75 MG/1
TABLET ORAL
Qty: 30 TABLET | Refills: 4 | OUTPATIENT
Start: 2019-10-08

## 2019-10-08 RX ORDER — DOXYCYCLINE HYCLATE 50 MG/1
CAPSULE, GELATIN COATED ORAL
Qty: 15 TABLET | Refills: 4 | OUTPATIENT
Start: 2019-10-08

## 2019-11-07 DIAGNOSIS — I25.10 CORONARY ARTERY DISEASE INVOLVING NATIVE HEART WITHOUT ANGINA PECTORIS, UNSPECIFIED VESSEL OR LESION TYPE: ICD-10-CM

## 2019-11-07 DIAGNOSIS — E78.5 DYSLIPIDEMIA: ICD-10-CM

## 2019-11-07 RX ORDER — ATORVASTATIN CALCIUM 80 MG/1
TABLET, FILM COATED ORAL
Qty: 30 TABLET | Refills: 4 | OUTPATIENT
Start: 2019-11-07

## 2019-11-07 RX ORDER — CLOPIDOGREL BISULFATE 75 MG/1
TABLET ORAL
Qty: 30 TABLET | Refills: 4 | OUTPATIENT
Start: 2019-11-07

## 2019-12-06 DIAGNOSIS — I25.10 CORONARY ARTERY DISEASE INVOLVING NATIVE HEART WITHOUT ANGINA PECTORIS, UNSPECIFIED VESSEL OR LESION TYPE: ICD-10-CM

## 2019-12-06 DIAGNOSIS — E78.5 DYSLIPIDEMIA: ICD-10-CM

## 2019-12-06 RX ORDER — FAMOTIDINE 40 MG/1
TABLET, FILM COATED ORAL
Qty: 30 TABLET | Refills: 4 | OUTPATIENT
Start: 2019-12-06

## 2019-12-06 RX ORDER — UBIDECARENONE 100 MG
CAPSULE ORAL
Refills: 4 | OUTPATIENT
Start: 2019-12-06

## 2019-12-06 RX ORDER — ATORVASTATIN CALCIUM 80 MG/1
TABLET, FILM COATED ORAL
Qty: 30 TABLET | Refills: 4 | OUTPATIENT
Start: 2019-12-06

## 2019-12-06 RX ORDER — CLOPIDOGREL BISULFATE 75 MG/1
TABLET ORAL
Qty: 30 TABLET | Refills: 4 | OUTPATIENT
Start: 2019-12-06

## 2020-01-08 DIAGNOSIS — E78.5 DYSLIPIDEMIA: ICD-10-CM

## 2020-01-08 DIAGNOSIS — I25.10 CORONARY ARTERY DISEASE INVOLVING NATIVE HEART WITHOUT ANGINA PECTORIS, UNSPECIFIED VESSEL OR LESION TYPE: ICD-10-CM

## 2020-01-08 DIAGNOSIS — R12 HEART BURN: ICD-10-CM

## 2020-01-08 RX ORDER — ATORVASTATIN CALCIUM 80 MG/1
TABLET, FILM COATED ORAL
Qty: 30 TABLET | Refills: 0 | OUTPATIENT
Start: 2020-01-08

## 2020-01-08 RX ORDER — FAMOTIDINE 40 MG/1
TABLET, FILM COATED ORAL
Qty: 30 TABLET | Refills: 0 | OUTPATIENT
Start: 2020-01-08

## 2020-01-08 RX ORDER — CLOPIDOGREL BISULFATE 75 MG/1
TABLET ORAL
Qty: 30 TABLET | Refills: 0 | OUTPATIENT
Start: 2020-01-08

## 2020-01-09 RX ORDER — ATORVASTATIN CALCIUM 80 MG/1
80 TABLET, FILM COATED ORAL DAILY
Qty: 30 TABLET | Refills: 0 | Status: SHIPPED | OUTPATIENT
Start: 2020-01-09 | End: 2020-03-19 | Stop reason: SDUPTHER

## 2020-01-09 RX ORDER — PANTOPRAZOLE SODIUM 40 MG/1
40 TABLET, DELAYED RELEASE ORAL DAILY
Qty: 30 TABLET | Refills: 0 | Status: SHIPPED | OUTPATIENT
Start: 2020-01-09 | End: 2020-03-19 | Stop reason: SDUPTHER

## 2020-01-09 RX ORDER — CLOPIDOGREL BISULFATE 75 MG/1
75 TABLET ORAL DAILY
Qty: 30 TABLET | Refills: 0 | Status: SHIPPED | OUTPATIENT
Start: 2020-01-09 | End: 2020-03-19 | Stop reason: SDUPTHER

## 2020-02-07 DIAGNOSIS — E78.5 DYSLIPIDEMIA: ICD-10-CM

## 2020-02-07 DIAGNOSIS — R12 HEART BURN: ICD-10-CM

## 2020-02-07 DIAGNOSIS — I10 ESSENTIAL HYPERTENSION, BENIGN: ICD-10-CM

## 2020-02-07 DIAGNOSIS — I25.10 CORONARY ARTERY DISEASE INVOLVING NATIVE HEART WITHOUT ANGINA PECTORIS, UNSPECIFIED VESSEL OR LESION TYPE: ICD-10-CM

## 2020-02-07 RX ORDER — FAMOTIDINE 40 MG/1
TABLET, FILM COATED ORAL
Qty: 30 TABLET | Refills: 4 | OUTPATIENT
Start: 2020-02-07

## 2020-02-07 RX ORDER — CLOPIDOGREL BISULFATE 75 MG/1
TABLET ORAL
Qty: 30 TABLET | Refills: 4 | OUTPATIENT
Start: 2020-02-07

## 2020-02-07 RX ORDER — PANTOPRAZOLE SODIUM 40 MG/1
TABLET, DELAYED RELEASE ORAL
Qty: 30 TABLET | Refills: 4 | OUTPATIENT
Start: 2020-02-07

## 2020-02-07 RX ORDER — ATORVASTATIN CALCIUM 80 MG/1
TABLET, FILM COATED ORAL
Qty: 30 TABLET | Refills: 4 | OUTPATIENT
Start: 2020-02-07

## 2020-03-06 DIAGNOSIS — I10 ESSENTIAL HYPERTENSION, BENIGN: ICD-10-CM

## 2020-03-06 DIAGNOSIS — R12 HEART BURN: ICD-10-CM

## 2020-03-06 DIAGNOSIS — E78.5 DYSLIPIDEMIA: ICD-10-CM

## 2020-03-06 DIAGNOSIS — I25.10 CORONARY ARTERY DISEASE INVOLVING NATIVE HEART WITHOUT ANGINA PECTORIS, UNSPECIFIED VESSEL OR LESION TYPE: ICD-10-CM

## 2020-03-09 RX ORDER — PANTOPRAZOLE SODIUM 40 MG/1
TABLET, DELAYED RELEASE ORAL
Qty: 30 TABLET | Refills: 4 | OUTPATIENT
Start: 2020-03-09

## 2020-03-09 RX ORDER — CLOPIDOGREL BISULFATE 75 MG/1
TABLET ORAL
Qty: 30 TABLET | Refills: 4 | OUTPATIENT
Start: 2020-03-09

## 2020-03-09 RX ORDER — ATORVASTATIN CALCIUM 80 MG/1
TABLET, FILM COATED ORAL
Qty: 30 TABLET | Refills: 4 | OUTPATIENT
Start: 2020-03-09

## 2020-03-19 ENCOUNTER — OFFICE VISIT (OUTPATIENT)
Dept: FAMILY MEDICINE CLINIC | Facility: CLINIC | Age: 50
End: 2020-03-19
Payer: COMMERCIAL

## 2020-03-19 VITALS
SYSTOLIC BLOOD PRESSURE: 124 MMHG | HEART RATE: 92 BPM | TEMPERATURE: 98.8 F | RESPIRATION RATE: 18 BRPM | OXYGEN SATURATION: 97 % | DIASTOLIC BLOOD PRESSURE: 82 MMHG | WEIGHT: 124 LBS | HEIGHT: 66 IN | BODY MASS INDEX: 19.93 KG/M2

## 2020-03-19 DIAGNOSIS — Z12.11 SCREENING FOR COLON CANCER: ICD-10-CM

## 2020-03-19 DIAGNOSIS — I42.0 DILATED CARDIOMYOPATHY (HCC): Primary | ICD-10-CM

## 2020-03-19 DIAGNOSIS — R61 NIGHT SWEATS: ICD-10-CM

## 2020-03-19 DIAGNOSIS — E78.5 DYSLIPIDEMIA: ICD-10-CM

## 2020-03-19 DIAGNOSIS — R12 HEART BURN: ICD-10-CM

## 2020-03-19 DIAGNOSIS — I10 ESSENTIAL HYPERTENSION, BENIGN: ICD-10-CM

## 2020-03-19 DIAGNOSIS — J44.9 CHRONIC OBSTRUCTIVE PULMONARY DISEASE, UNSPECIFIED COPD TYPE (HCC): ICD-10-CM

## 2020-03-19 DIAGNOSIS — Z12.31 ENCOUNTER FOR SCREENING MAMMOGRAM FOR BREAST CANCER: ICD-10-CM

## 2020-03-19 DIAGNOSIS — Z00.00 PHYSICAL EXAM, ANNUAL: ICD-10-CM

## 2020-03-19 DIAGNOSIS — E55.9 VITAMIN D DEFICIENCY: ICD-10-CM

## 2020-03-19 DIAGNOSIS — I25.10 CORONARY ARTERY DISEASE INVOLVING NATIVE HEART WITHOUT ANGINA PECTORIS, UNSPECIFIED VESSEL OR LESION TYPE: ICD-10-CM

## 2020-03-19 PROCEDURE — 99214 OFFICE O/P EST MOD 30 MIN: CPT | Performed by: FAMILY MEDICINE

## 2020-03-19 PROCEDURE — T1015 CLINIC SERVICE: HCPCS | Performed by: FAMILY MEDICINE

## 2020-03-19 RX ORDER — ASPIRIN 81 MG/1
81 TABLET ORAL DAILY
Qty: 30 TABLET | Refills: 4 | Status: SHIPPED | OUTPATIENT
Start: 2020-03-19 | End: 2020-07-17

## 2020-03-19 RX ORDER — ATORVASTATIN CALCIUM 80 MG/1
80 TABLET, FILM COATED ORAL DAILY
Qty: 30 TABLET | Refills: 5 | Status: SHIPPED | OUTPATIENT
Start: 2020-03-19 | End: 2020-08-14

## 2020-03-19 RX ORDER — PANTOPRAZOLE SODIUM 40 MG/1
40 TABLET, DELAYED RELEASE ORAL DAILY
Qty: 30 TABLET | Refills: 3 | Status: SHIPPED | OUTPATIENT
Start: 2020-03-19 | End: 2020-06-16

## 2020-03-19 RX ORDER — CLOPIDOGREL BISULFATE 75 MG/1
75 TABLET ORAL DAILY
Qty: 30 TABLET | Refills: 5 | Status: SHIPPED | OUTPATIENT
Start: 2020-03-19 | End: 2020-08-14

## 2020-03-19 NOTE — PROGRESS NOTES
Assessment/Plan:     Diagnoses and all orders for this visit:    Dilated cardiomyopathy ([facility] )  -     Echo complete with contrast if indicated; Future  -     Ambulatory referral to Cardiology; Future    Encounter for screening mammogram for breast cancer  -     Mammo screening bilateral w 3d & cad; Future    Screening for colon cancer  -     Ambulatory referral to Gastroenterology; Future    Coronary artery disease involving native heart without angina pectoris, unspecified vessel or lesion type  -     clopidogrel (PLAVIX) 75 mg tablet; Take 1 tablet (75 mg total) by mouth daily  -     CBC and differential; Future  -     Comprehensive metabolic panel; Future    Chronic obstructive pulmonary disease, unspecified COPD type ([facility] )  -     tiotropium-olodaterol (STIOLTO RESPIMAT) 2 5-2 5 MCG/ACT inhaler; Inhale 2 puffs daily    Dyslipidemia  -     atorvastatin (LIPITOR) 80 mg tablet; Take 1 tablet (80 mg total) by mouth daily  -     Lipid panel; Future    Essential hypertension, benign  -     metoprolol tartrate (LOPRESSOR) 25 mg tablet; Take 0 5 tablets (12 5 mg total) by mouth every 12 (twelve) hours  -     aspirin (Aspir-Low) 81 mg EC tablet; Take 1 tablet (81 mg total) by mouth daily    Heart burn  -     pantoprazole (PROTONIX) 40 mg tablet; Take 1 tablet (40 mg total) by mouth daily    Vitamin D deficiency  -     Vitamin D 25 hydroxy; Future    Night sweats  -     TSH, 3rd generation with Free T4 reflex; Future    Physical exam, annual        Discussed need to take meds as directed and call for refills before running out  Labs as ordered  Cardiology referral along with echocardiogram  RTC 6 seeks or sooner if needed          Subjective:        Patient ID: Carley Alfonso is a 48 y o  female  Chief Complaint   Patient presents with    Medication Refill       49 yo female presents for med refill  She has been seen since 7/18  She was seen last month at [facility] for costochondritis    Today she reports she does not feel well  And is out of medication  She has been waking up "soaking wet" at night x 1 month  She feels like her whole left side is "aching and numb"  occ chest pain  The following portions of the patient's history were reviewed and updated as appropriate: allergies, current medications, past family history, past medical history, past social history, past surgical history and problem list     Patient Active Problem List   Diagnosis    Smoker    Non-ST elevation MI (NSTEMI) ([place] [address] )    CAD S/P CABG x 1 2016    GERD (gastroesophageal reflux disease)    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 ([place] [address] )    Depression    Tobacco abuse    Brain TIA    Leg pain    Left carotid stenosis    Acute systolic CHF (congestive heart failure) ([place] [address] )    Atherosclerotic heart disease of native coronary artery without angina pectoris    Cardiomyopathy, dilated ([place] [address] )    Moderate COPD (chronic obstructive pulmonary disease) ([place] [address] )    Ischemic cardiomyopathy    Raynaud's syndrome without gangrene    Thoracic outlet syndrome    Vitamin D deficiency    Sleep-related breathing disorder    Insomnia    Restless leg syndrome    Sleep walking    Hypersomnia    Chronic pain disorder    Enterocolitis    Shoulder pain, left    Burning chest pain    Hypokalemia    Dyspepsia    Headache syndrome       Current Outpatient Medications   Medication Sig Dispense Refill    aspirin (Aspir-Low) 81 mg EC tablet Take 1 tablet (81 mg total) by mouth daily 30 tablet 4    atorvastatin (LIPITOR) 80 mg tablet Take 1 tablet (80 mg total) by mouth daily 30 tablet 5    clopidogrel (PLAVIX) 75 mg tablet Take 1 tablet (75 mg total) by mouth daily 30 tablet 5    escitalopram (LEXAPRO) 5 mg tablet TAKE ONE (1) TABLET BY MOUTH DAILY   30 tablet 3    metoprolol tartrate (LOPRESSOR) 25 mg tablet Take 0 5 tablets (12 5 mg total) by mouth every 12 (twelve) hours 30 tablet 5    nitroglycerin (NITROSTAT) 0 4 mg SL tablet Place 0 4 mg under the tongue every 5 (five) minutes as needed for chest pain      oxyCODONE (ROXICODONE) 5 mg immediate release tablet Take 5 mg by mouth every 4 (four) hours as needed for moderate pain      pantoprazole (PROTONIX) 40 mg tablet Take 1 tablet (40 mg total) by mouth daily 30 tablet 3    tiotropium-olodaterol (STIOLTO RESPIMAT) 2 5-2 5 MCG/ACT inhaler Inhale 2 puffs daily 1 Inhaler 5     No current facility-administered medications for this visit           Past Medical History:   Diagnosis Date    Chronic pain     [facility]) [facility]     Coronary artery disease     Cubital tunnel syndrome     Depression     GERD (gastroesophageal reflux disease)     History of Clostridium difficile     Ovarian cyst     Psychiatric disorder     depression    Raynaud's disease     Stroke ([facility] )     Takotsubo cardiomyopathy     Thoracic outlet syndrome     Tobacco abuse     Vitamin D deficiency         Past Surgical History:   Procedure Laterality Date    CARDIAC CATHETERIZATION      CABG    CORONARY ARTERY BYPASS GRAFT N/A 2/12/2016    Procedure: CABG X1; Left  EVH to mid-LAD; ZAHRA;  Surgeon: Fabricio Pan DO;  [facility];  Service:    [facility] DEBRIDEMENT TENNIS ELBOW      DILATION AND CURETTAGE OF UTERUS      HYSTERECTOMY      HYSTEROSCOPY      LEFT OOPHORECTOMY      SALPINGECTOMY Right         Social History     Socioeconomic History    Marital status: /Civil Union     Spouse name: Not on file    Number of children: 3    Years of education: Not on file    Highest education level: Not on file   Occupational History    Occupation: unemployed   Social Needs    Financial resource strain: Not on file    Food insecurity:     Worry: Not on file     Inability: Not on file   [employer] needs:     Medical: Not on file     Non-medical: Not on file   Tobacco Use    Smoking status: Current Every Day Smoker     Packs/day: 0 50     Years: 30 00 Pack years: 15 00     Types: Cigarettes    Smokeless tobacco: Never Used   Substance and Sexual Activity    Alcohol use: No    Drug use: No    Sexual activity: Yes   Lifestyle    Physical activity:     Days per week: Not on file     Minutes per session: Not on file    Stress: Not on file   Relationships    Social connections:     Talks on phone: Not on file     Gets together: Not on file     Attends Sabianist service: Not on file     Active member of club or organization: Not on file     Attends meetings of clubs or organizations: Not on file     Relationship status: Not on file    Intimate partner violence:     Fear of current or ex partner: Not on file     Emotionally abused: Not on file     Physically abused: Not on file     Forced sexual activity: Not on file   Other Topics Concern    Not on file   Social History Narrative    Not on file        Review of Systems   Constitutional: Positive for fatigue  HENT: Negative  Eyes: Negative  Respiratory: Positive for shortness of breath  Cardiovascular: Positive for chest pain  Gastrointestinal: Negative  Endocrine: Negative  Genitourinary: Negative  Musculoskeletal: Negative  Skin: Negative  Allergic/Immunologic: Negative  Neurological: Negative  Hematological: Negative  Psychiatric/Behavioral:        Depression with anxiety         Objective:      /82   Pulse 92   Temp 98 8 °F (37 1 °C) (Tympanic)   Resp 18   Ht 5' 6" (1 676 m)   Wt 56 2 kg (124 lb)   LMP 10/14/2015 (LMP Unknown)   SpO2 97%   BMI 20 01 kg/m²          Physical Exam   Constitutional: She is oriented to person, place, and time  She appears well-developed and well-nourished  HENT:   Head: Normocephalic and atraumatic  Right Ear: External ear normal    Left Ear: External ear normal    Eyes: Pupils are equal, round, and reactive to light  Neck: Normal range of motion  Neck supple  No thyromegaly present     Cardiovascular: Normal rate, regular rhythm and normal heart sounds  No murmur heard  Pulmonary/Chest: Effort normal and breath sounds normal  She has no wheezes  She has no rales  Abdominal: Soft  Bowel sounds are normal  There is no tenderness  Musculoskeletal: She exhibits no edema  Lymphadenopathy:     She has no cervical adenopathy  Neurological: She is alert and oriented to person, place, and time  Psychiatric: She has a normal mood and affect  Nursing note and vitals reviewed

## 2020-06-16 DIAGNOSIS — R12 HEART BURN: ICD-10-CM

## 2020-06-16 RX ORDER — PANTOPRAZOLE SODIUM 40 MG/1
TABLET, DELAYED RELEASE ORAL
Qty: 30 TABLET | Refills: 4 | Status: SHIPPED | OUTPATIENT
Start: 2020-06-16 | End: 2020-11-16

## 2020-07-15 DIAGNOSIS — I10 ESSENTIAL HYPERTENSION, BENIGN: ICD-10-CM

## 2020-07-17 RX ORDER — ASPIRIN 81 MG/1
TABLET, COATED ORAL
Qty: 30 TABLET | Refills: 4 | Status: SHIPPED | OUTPATIENT
Start: 2020-07-17 | End: 2020-12-18

## 2020-08-05 ENCOUNTER — TELEPHONE (OUTPATIENT)
Dept: PULMONOLOGY | Facility: CLINIC | Age: 50
End: 2020-08-05

## 2020-08-14 DIAGNOSIS — I10 ESSENTIAL HYPERTENSION, BENIGN: ICD-10-CM

## 2020-08-14 DIAGNOSIS — I25.10 CORONARY ARTERY DISEASE INVOLVING NATIVE HEART WITHOUT ANGINA PECTORIS, UNSPECIFIED VESSEL OR LESION TYPE: ICD-10-CM

## 2020-08-14 DIAGNOSIS — E78.5 DYSLIPIDEMIA: ICD-10-CM

## 2020-08-14 RX ORDER — ATORVASTATIN CALCIUM 80 MG/1
TABLET, FILM COATED ORAL
Qty: 30 TABLET | Refills: 4 | Status: SHIPPED | OUTPATIENT
Start: 2020-08-14 | End: 2021-06-16 | Stop reason: SDUPTHER

## 2020-08-14 RX ORDER — CLOPIDOGREL BISULFATE 75 MG/1
TABLET ORAL
Qty: 30 TABLET | Refills: 4 | Status: SHIPPED | OUTPATIENT
Start: 2020-08-14 | End: 2021-06-16 | Stop reason: SDUPTHER

## 2020-08-18 ENCOUNTER — OFFICE VISIT (OUTPATIENT)
Dept: FAMILY MEDICINE CLINIC | Facility: CLINIC | Age: 50
End: 2020-08-18
Payer: COMMERCIAL

## 2020-08-18 VITALS
SYSTOLIC BLOOD PRESSURE: 100 MMHG | HEART RATE: 83 BPM | OXYGEN SATURATION: 98 % | DIASTOLIC BLOOD PRESSURE: 58 MMHG | BODY MASS INDEX: 18.45 KG/M2 | RESPIRATION RATE: 16 BRPM | WEIGHT: 114.8 LBS | HEIGHT: 66 IN | TEMPERATURE: 98 F

## 2020-08-18 DIAGNOSIS — Z72.0 TOBACCO USE: ICD-10-CM

## 2020-08-18 DIAGNOSIS — R09.89 LEFT CAROTID BRUIT: ICD-10-CM

## 2020-08-18 DIAGNOSIS — E55.9 VITAMIN D DEFICIENCY: ICD-10-CM

## 2020-08-18 DIAGNOSIS — F41.9 ANXIETY: Primary | ICD-10-CM

## 2020-08-18 DIAGNOSIS — R07.9 CHEST PAIN, UNSPECIFIED TYPE: ICD-10-CM

## 2020-08-18 DIAGNOSIS — I42.0 DILATED CARDIOMYOPATHY (HCC): ICD-10-CM

## 2020-08-18 PROCEDURE — 99214 OFFICE O/P EST MOD 30 MIN: CPT | Performed by: FAMILY MEDICINE

## 2020-08-18 PROCEDURE — T1015 CLINIC SERVICE: HCPCS | Performed by: FAMILY MEDICINE

## 2020-08-18 RX ORDER — HYDROXYZINE HYDROCHLORIDE 10 MG/1
10 TABLET, FILM COATED ORAL 2 TIMES DAILY PRN
Qty: 30 TABLET | Refills: 1 | Status: SHIPPED | OUTPATIENT
Start: 2020-08-18
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
## 2020-08-19 ENCOUNTER — TELEPHONE (OUTPATIENT)
Dept: FAMILY MEDICINE CLINIC | Facility: CLINIC | Age: 50
End: 2020-08-19

## 2020-08-19 NOTE — TELEPHONE ENCOUNTER
Called patient on all 3 number listed all are out of service number I am unable to communicate I was calling to tell her I was able to schedule her Echo for Augt 24 Monday at 9:00am and also schedule her Vas caroid for October 1st  Her cardiology appointment was going to have to wait they said they were going to call her to schedule her  I will send all appointment and referal by mail for patient

## 2020-09-14 ENCOUNTER — TELEPHONE (OUTPATIENT)
Dept: CARDIOLOGY CLINIC | Facility: HOSPITAL | Age: 50
End: 2020-09-14

## 2020-09-14 NOTE — TELEPHONE ENCOUNTER
Attempted to contact Bambi multiple times for an appointment  She has many no show appointments and cancellations since 2016

## 2020-11-16 DIAGNOSIS — R12 HEART BURN: ICD-10-CM

## 2020-11-16 RX ORDER — PANTOPRAZOLE SODIUM 40 MG/1
TABLET, DELAYED RELEASE ORAL
Qty: 30 TABLET | Refills: 4 | Status: SHIPPED | OUTPATIENT
Start: 2020-11-16 | End: 2021-06-16 | Stop reason: SDUPTHER

## 2020-12-18 DIAGNOSIS — I10 ESSENTIAL HYPERTENSION, BENIGN: ICD-10-CM

## 2020-12-18 RX ORDER — ASPIRIN 81 MG/1
TABLET, COATED ORAL
Qty: 30 TABLET | Refills: 4 | Status: SHIPPED | OUTPATIENT
Start: 2020-12-18

## 2021-05-20 ENCOUNTER — OFFICE VISIT (OUTPATIENT)
Dept: FAMILY MEDICINE CLINIC | Facility: CLINIC | Age: 51
End: 2021-05-20
Payer: COMMERCIAL

## 2021-05-20 VITALS
DIASTOLIC BLOOD PRESSURE: 72 MMHG | TEMPERATURE: 98.3 F | HEART RATE: 102 BPM | SYSTOLIC BLOOD PRESSURE: 116 MMHG | OXYGEN SATURATION: 97 % | RESPIRATION RATE: 18 BRPM | WEIGHT: 113 LBS | HEIGHT: 66 IN | BODY MASS INDEX: 18.16 KG/M2

## 2021-05-20 DIAGNOSIS — Z12.31 ENCOUNTER FOR SCREENING MAMMOGRAM FOR BREAST CANCER: ICD-10-CM

## 2021-05-20 DIAGNOSIS — S43.401D SPRAIN OF RIGHT SHOULDER, UNSPECIFIED SHOULDER SPRAIN TYPE, SUBSEQUENT ENCOUNTER: ICD-10-CM

## 2021-05-20 DIAGNOSIS — Z23 ENCOUNTER FOR IMMUNIZATION: Primary | ICD-10-CM

## 2021-05-20 DIAGNOSIS — Z12.11 SCREENING FOR COLON CANCER: ICD-10-CM

## 2021-05-20 PROCEDURE — T1015 CLINIC SERVICE: HCPCS | Performed by: FAMILY MEDICINE

## 2021-05-20 PROCEDURE — 99213 OFFICE O/P EST LOW 20 MIN: CPT | Performed by: FAMILY MEDICINE

## 2021-05-20 RX ORDER — NAPROXEN 500 MG/1
500 TABLET ORAL 2 TIMES DAILY WITH MEALS
Qty: 30 TABLET | Refills: 2 | Status: CANCELLED | OUTPATIENT
Start: 2021-05-20

## 2021-05-20 NOTE — PROGRESS NOTES
Assessment/Plan:       Problem List Items Addressed This Visit     None      Visit Diagnoses     Encounter for immunization    -  Primary    Encounter for screening mammogram for breast cancer        Relevant Orders    Mammo screening bilateral w 3d & cad    Screening for colon cancer        Relevant Orders    Cologuard    Sprain of right shoulder, unspecified shoulder sprain type, subsequent encounter        Relevant Orders    Ambulatory referral to Orthopedic Surgery    XR shoulder 2+ vw right    Ambulatory referral to Physical Therapy    XR humerus right          Discussed with Dr Dung Mendoza:    Patient presents with right arm and shoulder pain  Symptoms are 10/10 on pain scale  She admits to restricted range of motion  That also evident on physical examination  Unable to complete exam due to significant pain  Patient will be referred to orthopedic surgery for possible biceps tear or shoulder sprain  Patient also to get an xray of shoulder and humerus of right upper extremity  Additionally patient will get referral to see physical therapy  Patient takes Percocet at home due to chronic pain syndrome  Patient encouraged to use pain management  Subjective:      Patient ID: Cullen Estrada is a 46 y o  female a past medical history significant of coronary artery disease, COPD presents to the office ER visit  Two days ago patient was walking with her dog who subsequently pulled the sling  Patient potentially could have dislocated shoulder  She heard a pop of the being pulled by the dog  Patient indicates that there is significant muscle cramping in the right upper extremity  There is associated restricted range of motion  Pain is a 10/10 on a pain scale  Pain is throbbing sensation  Patient indicates that symptoms got worse  She was seen in [facility] ER [facility] ago  Patient was discharged it ibuprofen 600 mg and told to see a family doctor    Additionally patient complains of muscle spasms, lack of proper circulation as a result of pain and chills  Patient denies chest pain, nausea, fevers  HPI    The following portions of the patient's history were reviewed and updated as appropriate: allergies, current medications, past family history, past medical history, past social history, past surgical history and problem list     Review of Systems   Constitutional: Negative for chills and fever  HENT: Negative for ear pain and sore throat  Eyes: Negative for pain and visual disturbance  Respiratory: Negative for cough and shortness of breath  Cardiovascular: Negative for chest pain and palpitations  Gastrointestinal: Negative for abdominal pain and vomiting  Genitourinary: Negative for dysuria and hematuria  Musculoskeletal: Negative for arthralgias and back pain  Shoulder pain   Skin: Negative for color change and rash  Neurological: Negative for seizures and syncope  All other systems reviewed and are negative  Objective:    /72   Pulse 102   Temp 98 3 °F (36 8 °C)   Resp 18   Ht 5' 6" (1 676 m)   Wt 51 3 kg (113 lb)   LMP 10/14/2015 (LMP Unknown)   SpO2 97%   BMI 18 24 kg/m²      Physical Exam  Constitutional:       Appearance: Normal appearance  HENT:      Head: Normocephalic and atraumatic  Eyes:      General: Scleral icterus: xray hard  Cardiovascular:      Rate and Rhythm: Normal rate and regular rhythm  Pulses: Normal pulses  Heart sounds: Normal heart sounds  Pulmonary:      Effort: Pulmonary effort is normal       Breath sounds: Normal breath sounds  Abdominal:      General: Abdomen is flat  Bowel sounds are normal       Palpations: Abdomen is soft  Musculoskeletal: Normal range of motion  General: No swelling or tenderness  Comments: Limited range of motion on right shoulder  Tenderness to palpation over AC joint  Distal R hand is warm  No sensory deficits  Tenderness on right biceps  Ecchymosis present  Skin:     General: Skin is warm  Capillary Refill: Capillary refill takes less than 2 seconds  Neurological:      General: No focal deficit present  Mental Status: She is alert and oriented to person, place, and time     Psychiatric:         Mood and Affect: Mood normal          Behavior: Behavior normal

## 2021-05-21 ENCOUNTER — OFFICE VISIT (OUTPATIENT)
Dept: OBGYN CLINIC | Facility: CLINIC | Age: 51
End: 2021-05-21
Payer: COMMERCIAL

## 2021-05-21 ENCOUNTER — APPOINTMENT (OUTPATIENT)
Dept: RADIOLOGY | Facility: MEDICAL CENTER | Age: 51
End: 2021-05-21
Payer: COMMERCIAL

## 2021-05-21 VITALS
SYSTOLIC BLOOD PRESSURE: 122 MMHG | WEIGHT: 113 LBS | HEIGHT: 66 IN | BODY MASS INDEX: 18.16 KG/M2 | DIASTOLIC BLOOD PRESSURE: 78 MMHG | HEART RATE: 89 BPM

## 2021-05-21 DIAGNOSIS — S42.294A OTHER CLOSED NONDISPLACED FRACTURE OF PROXIMAL END OF RIGHT HUMERUS, INITIAL ENCOUNTER: ICD-10-CM

## 2021-05-21 DIAGNOSIS — S43.401D SPRAIN OF RIGHT SHOULDER, UNSPECIFIED SHOULDER SPRAIN TYPE, SUBSEQUENT ENCOUNTER: ICD-10-CM

## 2021-05-21 DIAGNOSIS — S42.254A CLOSED NONDISPLACED FRACTURE OF GREATER TUBEROSITY OF RIGHT HUMERUS, INITIAL ENCOUNTER: Primary | ICD-10-CM

## 2021-05-21 DIAGNOSIS — M25.511 RIGHT SHOULDER PAIN, UNSPECIFIED CHRONICITY: ICD-10-CM

## 2021-05-21 PROBLEM — S43.401A SPRAIN OF SHOULDER, RIGHT: Status: ACTIVE | Noted: 2021-05-21

## 2021-05-21 PROBLEM — S42.201A CLOSED FRACTURE OF RIGHT PROXIMAL HUMERUS: Status: ACTIVE | Noted: 2021-05-21

## 2021-05-21 PROCEDURE — 73030 X-RAY EXAM OF SHOULDER: CPT

## 2021-05-21 PROCEDURE — 99213 OFFICE O/P EST LOW 20 MIN: CPT | Performed by: ORTHOPAEDIC SURGERY

## 2021-05-21 PROCEDURE — 23620 CLTX GR HMRL TBRS FX WO MNPJ: CPT | Performed by: ORTHOPAEDIC SURGERY

## 2021-05-21 NOTE — PROGRESS NOTES
Assessment:     1  Other closed nondisplaced fracture of proximal end of right humerus, initial encounter    2  Sprain of right shoulder, unspecified shoulder sprain type, subsequent encounter    3  Closed nondisplaced fracture of greater tuberosity of right humerus, initial encounter          Plan:     Problem List Items Addressed This Visit        Musculoskeletal and Integument    Closed nondisplaced fracture of greater tuberosity of right humerus    Closed fracture of right proximal humerus - Primary    Relevant Orders    XR shoulder 2+ vw right            70-year-old female with a right nondisplaced greater tuberosity fracture of the right shoulder  I reviewed the radiographs with her  We discussed what to expect  She should wear the sling for the most part, but was counseled to come out of it and do elbow range of motion as well as pendulum exercises  She will follow up in 5-6 weeks with x-rays of the right shoulder  I anticipate starting her with physical therapy at that time  Patient ID: Levi Christopher is a 46 y o  female  Chief Complaint:   right shoulder injury    HPI:  70-year-old female who was taking her dog into the house on May 17th  She was going up the steps the dog pulled when he saw another dog and she felt a pop and had severe pain in her right shoulder  She went to the outside ER at that time with a told her she had a muscle strain and was given anti-inflammatories  No x-rays were taken  She later, on yesterday, saw her primary care provider who did order x-rays but they were never taken  She was referred to Orthopedics and this is her evaluation today  She complains of a lot of pain  She has been removing her arm from the sling occasionally to do some gentle motion  She is right-hand dominant  She has noticed a lot of bruising and discomfort with shoulder range of motion  She denies numbness or tingling in the hand and fingers        Allergy:  Allergies   Allergen Reactions    Toradol [Ketorolac Tromethamine] GI Intolerance       Medications:  all current active meds have been reviewed    Past Medical History:  Past Medical History:   Diagnosis Date    Chronic pain     [facility] obstructive [facility] disease) ([facility] )     Coronary artery disease     Cubital tunnel syndrome     Depression     GERD (gastroesophageal reflux disease)     History of Clostridium difficile     Ovarian cyst     Psychiatric disorder     depression    Raynaud's disease     Stroke ([facility] )     Takotsubo cardiomyopathy     Thoracic outlet syndrome     Tobacco abuse     Vitamin D deficiency        Past Surgical History:  Past Surgical History:   Procedure Laterality Date    CARDIAC CATHETERIZATION      CABG    CORONARY ARTERY BYPASS GRAFT N/A 2/12/2016    Procedure: CABG X1; Left  EVH to mid-LAD; ZAHRA;  Surgeon: Hina Lew DO;  [facility]: [facility];  Service:    Natalio Lees DEBRIDEMENT TENNIS ELBOW      DILATION AND CURETTAGE OF UTERUS      HYSTERECTOMY      HYSTEROSCOPY      LEFT OOPHORECTOMY      SALPINGECTOMY Right        Family History:  Family History   Problem Relation Age of Onset    Heart disease Mother     Heart attack Mother     Heart attack Brother 36        s/p stent placement    Diabetes Brother     Heart disease Brother     Cancer Father     Diabetes Sister     Heart disease Sister     Diabetes Sister        Social History:  Social History     Substance and Sexual Activity   Alcohol Use No     Social History     Substance and Sexual Activity   Drug Use No     Social History     Tobacco Use   Smoking Status Current Every Day Smoker    Packs/day: 0 50    Years: 30 00    Pack years: 15 00    Types: Cigarettes   Smokeless Tobacco Never Used           ROS:  Review of Systems   Constitutional: Negative  Negative for chills and fever  HENT: Negative  Negative for ear pain and sore throat  Eyes: Negative  Negative for pain and visual disturbance  Respiratory: Negative  Negative for cough and shortness of breath  Cardiovascular: Negative  Negative for chest pain and palpitations  Gastrointestinal: Negative  Negative for abdominal pain and vomiting  Endocrine: Negative  Genitourinary: Negative  Negative for dysuria and hematuria  Musculoskeletal: Positive for arthralgias  Negative for back pain  Skin: Negative  Negative for color change and rash  Allergic/Immunologic: Negative  Neurological: Negative  Negative for seizures and syncope  Hematological: Negative  Psychiatric/Behavioral: Negative  All other systems reviewed and are negative  Objective:  BP Readings from Last 1 Encounters:   05/21/21 122/78      Wt Readings from Last 1 Encounters:   05/21/21 51 3 kg (113 lb)        BMI:   Estimated body mass index is 18 24 kg/m² as calculated from the following:    Height as of this encounter: 5' 6" (1 676 m)  Weight as of this encounter: 51 3 kg (113 lb)  EXAM:   Physical Exam  Vitals signs reviewed  Constitutional:       General: She is not in acute distress  Appearance: She is well-developed  She is not diaphoretic  HENT:      Head: Normocephalic and atraumatic  Eyes:      General:         Right eye: No discharge  Left eye: No discharge  Neck:      Musculoskeletal: Normal range of motion and neck supple  Trachea: No tracheal deviation  Cardiovascular:      Rate and Rhythm: Normal rate and regular rhythm  Pulmonary:      Effort: Pulmonary effort is normal  No respiratory distress  Breath sounds: Normal breath sounds  Chest:      Chest wall: No tenderness  Abdominal:      General: There is no distension  Palpations: Abdomen is soft  Tenderness: There is no abdominal tenderness  Skin:     General: Skin is warm and dry  Findings: No erythema  Neurological:      Mental Status: She is alert     Psychiatric:         Behavior: Behavior normal        Right Shoulder Exam     Comments:    Tender to palpation around the proximal humerus  Ecchymosis around the medial aspect of the arm  No lacerations or abrasions  EPL, FPL, abbuction, abduction are intact, sensation is intact to light touch of the median, radial, and ulnar nerve distribution, there is brisk cap refill of the fingers, there is a palpable radial pulse            Left Shoulder Exam   Left shoulder exam is normal     Tenderness   The patient is experiencing no tenderness  Range of Motion   The patient has normal left shoulder ROM  Muscle Strength   The patient has normal left shoulder strength  Tests   Apprehension: negative  Denney test: negative  Cross arm: negative  Impingement: negative  Drop arm: negative    Other   Erythema: absent  Sensation: normal  Pulse: present                 Radiographs:  I have personally reviewed pertinent films in PACS and my interpretation is Nondisplaced fracture of the right greater tuberosity    Fracture / Dislocation Treatment    Date/Time: 5/21/2021 10:15 AM  Performed by: Briana Jonas MD  Authorized by: Briana Jonas MD     Patient Location:  Clinic  Other Assisting Provider: No    Verbal consent obtained?: Yes    Consent given by:  Patient  Patient states understanding of procedure being performed: Yes    Patient identity confirmed:  Verbally with patient  Injury location:  Shoulder  Location details:  Right shoulder  Injury type:  Fracture  Fracture type: greater humeral tuberosity    Neurovascular status: Neurovascularly intact    Distal perfusion: normal    Neurological function: normal    Range of motion: reduced    Manipulation performed?: No    Immobilization:  Sling  Neurovascular status: Neurovascularly intact    Distal perfusion: normal    Neurological function: normal    Range of motion: unchanged    Patient tolerance:  Patient tolerated the procedure well with no immediate complications

## 2021-06-08 PROBLEM — S42.201A CLOSED FRACTURE OF RIGHT PROXIMAL HUMERUS: Status: RESOLVED | Noted: 2021-05-21 | Resolved: 2021-06-08

## 2021-06-08 PROBLEM — E87.6 HYPOKALEMIA: Status: RESOLVED | Noted: 2018-08-19 | Resolved: 2021-06-08

## 2021-06-08 PROBLEM — S42.254A CLOSED NONDISPLACED FRACTURE OF GREATER TUBEROSITY OF RIGHT HUMERUS: Status: RESOLVED | Noted: 2021-05-21 | Resolved: 2021-06-08

## 2021-06-08 PROBLEM — K52.9 ENTEROCOLITIS: Status: RESOLVED | Noted: 2018-05-06 | Resolved: 2021-06-08

## 2021-06-16 ENCOUNTER — OFFICE VISIT (OUTPATIENT)
Dept: FAMILY MEDICINE CLINIC | Facility: CLINIC | Age: 51
End: 2021-06-16
Payer: COMMERCIAL

## 2021-06-16 VITALS
DIASTOLIC BLOOD PRESSURE: 58 MMHG | TEMPERATURE: 98.8 F | RESPIRATION RATE: 18 BRPM | WEIGHT: 122 LBS | BODY MASS INDEX: 19.61 KG/M2 | OXYGEN SATURATION: 96 % | HEIGHT: 66 IN | HEART RATE: 79 BPM | SYSTOLIC BLOOD PRESSURE: 110 MMHG

## 2021-06-16 DIAGNOSIS — R12 HEART BURN: ICD-10-CM

## 2021-06-16 DIAGNOSIS — Z23 NEED FOR TDAP VACCINATION: Primary | ICD-10-CM

## 2021-06-16 DIAGNOSIS — Z87.891 SMOKING HISTORY: ICD-10-CM

## 2021-06-16 DIAGNOSIS — I10 ESSENTIAL HYPERTENSION, BENIGN: ICD-10-CM

## 2021-06-16 DIAGNOSIS — I42.0 DILATED CARDIOMYOPATHY (HCC): ICD-10-CM

## 2021-06-16 DIAGNOSIS — E78.5 DYSLIPIDEMIA: ICD-10-CM

## 2021-06-16 DIAGNOSIS — I25.10 CORONARY ARTERY DISEASE INVOLVING NATIVE HEART WITHOUT ANGINA PECTORIS, UNSPECIFIED VESSEL OR LESION TYPE: ICD-10-CM

## 2021-06-16 PROCEDURE — T1015 CLINIC SERVICE: HCPCS | Performed by: FAMILY MEDICINE

## 2021-06-16 PROCEDURE — 99213 OFFICE O/P EST LOW 20 MIN: CPT | Performed by: FAMILY MEDICINE

## 2021-06-16 PROCEDURE — 3008F BODY MASS INDEX DOCD: CPT | Performed by: ORTHOPAEDIC SURGERY

## 2021-06-16 RX ORDER — OXYCODONE HYDROCHLORIDE AND ACETAMINOPHEN 5; 325 MG/1; MG/1
TABLET ORAL
COMMUNITY
Start: 2021-05-10

## 2021-06-16 RX ORDER — CLOPIDOGREL BISULFATE 75 MG/1
75 TABLET ORAL DAILY
Qty: 30 TABLET | Refills: 4 | Status: SHIPPED | OUTPATIENT
Start: 2021-06-16 | End: 2022-05-10 | Stop reason: SDUPTHER

## 2021-06-16 RX ORDER — DOXEPIN HYDROCHLORIDE 10 MG/1
CAPSULE ORAL
COMMUNITY
Start: 2021-05-10 | End: 2022-05-10

## 2021-06-16 RX ORDER — ATORVASTATIN CALCIUM 80 MG/1
80 TABLET, FILM COATED ORAL DAILY
Qty: 30 TABLET | Refills: 4 | Status: SHIPPED | OUTPATIENT
Start: 2021-06-16 | End: 2022-05-10 | Stop reason: SDUPTHER

## 2021-06-16 RX ORDER — PANTOPRAZOLE SODIUM 40 MG/1
40 TABLET, DELAYED RELEASE ORAL DAILY
Qty: 30 TABLET | Refills: 4 | Status: SHIPPED | OUTPATIENT
Start: 2021-06-16 | End: 2022-05-10 | Stop reason: SDUPTHER

## 2021-06-16 NOTE — PROGRESS NOTES
Assessment/Plan:     Problem List Items Addressed This Visit     None      Visit Diagnoses     Need for Tdap vaccination    -  Primary    Relevant Orders    Tdap vaccine greater than or equal to 6yo IM    Heart burn        Relevant Medications    pantoprazole (PROTONIX) 40 mg tablet    Essential hypertension, benign        Relevant Medications    metoprolol tartrate (LOPRESSOR) 25 mg tablet    Other Relevant Orders    CBC and differential    Dyslipidemia        Relevant Medications    atorvastatin (LIPITOR) 80 mg tablet    Other Relevant Orders    Lipid panel    Comprehensive metabolic panel    Coronary artery disease involving native heart without angina pectoris, unspecified vessel or lesion type        Relevant Medications    clopidogrel (PLAVIX) 75 mg tablet    metoprolol tartrate (LOPRESSOR) 25 mg tablet    Other Relevant Orders    Comprehensive metabolic panel    Ambulatory referral to Cardiology    Smoking history        Relevant Orders    Complete PFT with post bronchodilator    Dilated cardiomyopathy (HCC)        Relevant Medications    clopidogrel (PLAVIX) 75 mg tablet    metoprolol tartrate (LOPRESSOR) 25 mg tablet    Other Relevant Orders    Echo complete with contrast if indicated          Discussed with Dr Juliet Jones:    14-year-old female past medical history of some coronary artery disease, hypertension, hyperlipidemia presents to the office today for follow-up visit  Patient will be given a script to complete lab work including lipid panel and CBC  Additionally patient will be given a script to refill all her medication management  Patient vital signs is within good range  She is counseled on lifestyle modification including diet and exercise  Patient also understands that her smoking history places a role in her lung pathology  Patient is also follow-up with orthopedic surgeon Dr Coates Heading next week for right shoulder fracture     Additionally patient given a script for echocardiogram and a referral to see Cardiology due to significant past medical history and lack of follow-up within last 5 years  Also give the patient a script for pulmonary function tests due to smoking history and COPD history  She is to follow-up in a month  Subjective:      Patient ID: Lesley Peng is a 46 y o  female with past medical history of coronary artery disease, GERD, depression, COPD presents to office today for physical examination  Patient has a history of smoking  His typically smokes about 10 cigarettes a day  She dose have a marijuana card and does use marijuana  Patient denies alcohol use  Patient endorses shortness of breath with associated chest tightness likely secondary to COPD history  Patient denies nausea and vomiting  Patient denies fevers, chills, diaphoresis  Patient denies abdominal pain, nausea and vomiting  Patient has not seen a cardiologist in over 5 years  Patient admits to being compliant with all her medications  She is otherwise hemodynamically stable  The following portions of the patient's history were reviewed and updated as appropriate: allergies, current medications, past family history, past medical history, past social history, past surgical history and problem list       Review of Systems   Constitutional: Negative for chills and fever  HENT: Negative for ear pain and sore throat  Eyes: Negative for pain and visual disturbance  Respiratory: Positive for shortness of breath  Negative for cough  Cardiovascular: Negative for chest pain and palpitations  Gastrointestinal: Negative for abdominal pain and vomiting  Endocrine: Negative  Genitourinary: Negative for dysuria and hematuria  Musculoskeletal: Negative for arthralgias and back pain  Skin: Negative for color change and rash  Neurological: Negative for seizures and syncope  Psychiatric/Behavioral: Negative  Negative for decreased concentration, dysphoric mood and suicidal ideas     All other systems reviewed and are negative  Objective:    /58   Pulse 79   Temp 98 8 °F (37 1 °C)   Resp 18   Ht 5' 6" (1 676 m)   Wt 55 3 kg (122 lb)   LMP 10/14/2015 (LMP Unknown)   SpO2 96%   BMI 19 69 kg/m²        Physical Exam  Constitutional:       Appearance: Normal appearance  HENT:      Head: Normocephalic and atraumatic  Cardiovascular:      Rate and Rhythm: Normal rate and regular rhythm  Pulses: Normal pulses  Heart sounds: Normal heart sounds  Pulmonary:      Effort: Pulmonary effort is normal       Breath sounds: Normal breath sounds  Abdominal:      General: Abdomen is flat  Bowel sounds are normal       Palpations: Abdomen is soft  Musculoskeletal:         General: No swelling or tenderness  Normal range of motion  Skin:     General: Skin is warm  Capillary Refill: Capillary refill takes less than 2 seconds  Neurological:      General: No focal deficit present  Mental Status: She is alert and oriented to person, place, and time     Psychiatric:         Mood and Affect: Mood normal          Behavior: Behavior normal

## 2021-06-25 ENCOUNTER — OFFICE VISIT (OUTPATIENT)
Dept: OBGYN CLINIC | Facility: CLINIC | Age: 51
End: 2021-06-25

## 2021-06-25 ENCOUNTER — APPOINTMENT (OUTPATIENT)
Dept: RADIOLOGY | Facility: MEDICAL CENTER | Age: 51
End: 2021-06-25
Payer: COMMERCIAL

## 2021-06-25 VITALS
HEIGHT: 66 IN | SYSTOLIC BLOOD PRESSURE: 120 MMHG | HEART RATE: 73 BPM | BODY MASS INDEX: 19.61 KG/M2 | WEIGHT: 122 LBS | DIASTOLIC BLOOD PRESSURE: 77 MMHG

## 2021-06-25 DIAGNOSIS — S42.254D CLOSED NONDISPLACED FRACTURE OF GREATER TUBEROSITY OF RIGHT HUMERUS WITH ROUTINE HEALING, SUBSEQUENT ENCOUNTER: Primary | ICD-10-CM

## 2021-06-25 DIAGNOSIS — S42.254A CLOSED NONDISPLACED FRACTURE OF GREATER TUBEROSITY OF RIGHT HUMERUS, INITIAL ENCOUNTER: ICD-10-CM

## 2021-06-25 PROCEDURE — 99024 POSTOP FOLLOW-UP VISIT: CPT | Performed by: ORTHOPAEDIC SURGERY

## 2021-06-25 PROCEDURE — 73030 X-RAY EXAM OF SHOULDER: CPT

## 2021-06-25 PROCEDURE — 3008F BODY MASS INDEX DOCD: CPT | Performed by: ORTHOPAEDIC SURGERY

## 2021-06-25 NOTE — PROGRESS NOTES
Assessment:     1  Closed nondisplaced fracture of greater tuberosity of right humerus with routine healing, subsequent encounter          Plan:     Problem List Items Addressed This Visit        Musculoskeletal and Integument    Closed nondisplaced fracture of greater tuberosity of right humerus - Primary    Relevant Orders    XR shoulder 2+ vw right            55-year-old female with a right nondisplaced greater tuberosity fracture of the right shoulder  She is doing excellent at this time with great range of motion  She will continue working on her range of motion  She was encouraged to avoid any active abduction or overhead lifting  Follow up with me in six weeks with x-rays of the right shoulder  Patient ID: Sheeba Gracia is a 46 y o  female  Chief Complaint:   right shoulder injury    HPI:  55-year-old female who was taking her dog into the house on May 17th  She was going up the steps the dog pulled when he saw another dog and she felt a pop and had severe pain in her right shoulder  She was found have a nondisplaced greater tuberosity fracture  She states she has been doing very well and is feeling like her motion is returning        Allergy:  Allergies   Allergen Reactions    Toradol [Ketorolac Tromethamine] GI Intolerance       Medications:  all current active meds have been reviewed    Past Medical History:  Past Medical History:   Diagnosis Date    Coronary artery disease     Cubital tunnel syndrome     Depression     GERD (gastroesophageal reflux disease)     History of Clostridium difficile     Ovarian cyst     St[facility] )     Takotsubo cardiomyopathy     Thoracic outlet syndrome     Tobacco abuse     Vitamin D deficiency        Past Surgical History:  Past Surgical History:   Procedure Laterality Date    CARDIAC CATHETERIZATION      CABG    CORONARY ARTERY BYPASS GRAFT N/A 2/12/2016    Procedure: CABG X1; Left  EVH to mid-LAD; ZAHRA;  Surgeon: Verito Mendoza DO; Location: [facility];  Service:     DEBRIDEMENT TENNIS ELBOW      DILATION AND CURETTAGE OF UTERUS      HYSTERECTOMY      HYSTEROSCOPY      LEFT OOPHORECTOMY      SALPINGECTOMY Right        Family History:  Family History   Problem Relation Age of Onset    Heart disease Mother     Heart attack Mother     Heart attack Brother 36        s/p stent placement    Diabetes Brother     Heart disease Brother     Cancer Father     Diabetes Sister     Heart disease Sister     Diabetes Sister        Social History:  Social History     Substance and Sexual Activity   Alcohol Use No     Social History     Substance and Sexual Activity   Drug Use No     Social History     Tobacco Use   Smoking Status Current Every Day Smoker    Packs/day: 0 50    Years: 30 00    Pack years: 15 00    Types: Cigarettes   Smokeless Tobacco Never Used           ROS:  Review of Systems   All other systems reviewed and are negative  Objective:  BP Readings from Last 1 Encounters:   06/25/21 120/77      Wt Readings from Last 1 Encounters:   06/25/21 55 3 kg (122 lb)        BMI:   Estimated body mass index is 19 69 kg/m² as calculated from the following:    Height as of this encounter: 5' 6" (1 676 m)  Weight as of this encounter: 55 3 kg (122 lb)  EXAM:   Physical Exam  Right Shoulder Exam     Comments:  Passive forward flexion is full, external rotation is to 40°, resolved ecchymosis, no swelling                Radiographs:  I have personally reviewed pertinent films in PACS and my interpretation is Nondisplaced fracture of the right greater tuberosity  Alignment remains anatomic

## 2022-03-05 ENCOUNTER — HOSPITAL ENCOUNTER (EMERGENCY)
Facility: HOSPITAL | Age: 52
Discharge: HOME | End: 2022-03-05
Attending: EMERGENCY MEDICINE
Payer: COMMERCIAL

## 2022-03-05 ENCOUNTER — APPOINTMENT (EMERGENCY)
Dept: CT IMAGING | Facility: HOSPITAL | Age: 52
End: 2022-03-05
Payer: COMMERCIAL

## 2022-03-05 VITALS
TEMPERATURE: 97.1 F | DIASTOLIC BLOOD PRESSURE: 84 MMHG | HEART RATE: 98 BPM | OXYGEN SATURATION: 98 % | WEIGHT: 123.02 LBS | SYSTOLIC BLOOD PRESSURE: 133 MMHG | RESPIRATION RATE: 16 BRPM | BODY MASS INDEX: 19.86 KG/M2

## 2022-03-05 DIAGNOSIS — M54.2 CERVICAL MUSCLE PAIN: Primary | ICD-10-CM

## 2022-03-05 PROCEDURE — 99284 EMERGENCY DEPT VISIT MOD MDM: CPT | Performed by: PHYSICIAN ASSISTANT

## 2022-03-05 PROCEDURE — 72125 CT NECK SPINE W/O DYE: CPT

## 2022-03-05 PROCEDURE — 99284 EMERGENCY DEPT VISIT MOD MDM: CPT

## 2022-03-05 RX ORDER — METHYLPREDNISOLONE 4 MG/1
TABLET ORAL
Qty: 21 TABLET | Refills: 0 | Status: SHIPPED | OUTPATIENT
Start: 2022-03-05 | End: 2022-05-10

## 2022-03-05 RX ORDER — PREDNISONE 20 MG/1
40 TABLET ORAL ONCE
Status: COMPLETED | OUTPATIENT
Start: 2022-03-05 | End: 2022-03-05

## 2022-03-05 RX ORDER — METHOCARBAMOL 500 MG/1
500 TABLET, FILM COATED ORAL ONCE
Status: COMPLETED | OUTPATIENT
Start: 2022-03-05 | End: 2022-03-05

## 2022-03-05 RX ADMIN — PREDNISONE 40 MG: 20 TABLET ORAL at 19:48

## 2022-03-05 RX ADMIN — METHOCARBAMOL 500 MG: 500 TABLET ORAL at 19:48

## 2022-03-07 NOTE — ED PROVIDER NOTES
History  Chief Complaint   Patient presents with    Back Pain     upper back pain X3 weeks, does see pain management for same, had injections for same last month  Had a 1/2 percocet 3hrs ago  63-year-old female presents seeking evaluation for acute on chronic upper back pain that has been present for approximately 3 weeks  She reports that does follow with pain management for same and she does have injections as well  Patient states that she took approximately 1/2 of a Percocet 3 hours ago prior to arrival   Overall the patient has a chronically unwell appearance but does not appear to be acutely distressed  Patient appears older than stated age  No reported traumatic onset  Patient states the area is very tender to palpation  She expresses concern for a "lump" over the painful area  Allergies reviewed          Prior to Admission Medications   Prescriptions Last Dose Informant Patient Reported? Taking? Aspirin Low Dose 81 MG EC tablet   No No   Sig: TAKE (1) TABLET BY MOUTH DAILY  atorvastatin (LIPITOR) 80 mg tablet   No No   Sig: Take 1 tablet (80 mg total) by mouth daily   clopidogrel (PLAVIX) 75 mg tablet   No No   Sig: Take 1 tablet (75 mg total) by mouth daily   doxepin (SINEquan) 10 mg capsule   Yes No   escitalopram (LEXAPRO) 5 mg tablet   No No   Sig: TAKE ONE (1) TABLET BY MOUTH DAILY     hydrOXYzine HCL (ATARAX) 10 mg tablet   No No   Sig: Take 1 tablet (10 mg total) by mouth 2 (two) times a day as needed for anxiety   metoprolol tartrate (LOPRESSOR) 25 mg tablet   No No   Sig: Take 1 tablet (25 mg total) by mouth every 12 (twelve) hours   nitroglycerin (NITROSTAT) 0 4 mg SL tablet  Self Yes No   Sig: Place 0 4 mg under the tongue every 5 (five) minutes as needed for chest pain   oxyCODONE-acetaminophen (PERCOCET) 5-325 mg per tablet   Yes No   pantoprazole (PROTONIX) 40 mg tablet   No No   Sig: Take 1 tablet (40 mg total) by mouth daily   tiotropium-olodaterol (STIOLTO RESPIMAT) 2 5-2 5 MCG/ACT inhaler   No No   Sig: Inhale 2 puffs daily      Facility-Administered Medications: None       Past Medical History:   Diagnosis Date    Coronary artery disease     Cubital tunnel syndrome     Depression     GERD (gastroesophageal reflux disease)     History of Clostridium difficile     Ovarian cyst     Stroke ([facility] [address] )     Takotsubo cardiomyopathy     Thoracic outlet syndrome     Tobacco abuse     Vitamin D deficiency        Past Surgical History:   Procedure Laterality Date    CARDIAC CATHETERIZATION      CABG    CORONARY ARTERY BYPASS GRAFT N/A 2/12/2016    Procedure: CABG X1; Left  EVH to mid-LAD; ZAHRA;  Surgeon: Bassem Martin DO;  [facility]: [facility];  Service:    [facility] DEBRIDEMENT TENNIS ELBOW      DILATION AND CURETTAGE OF UTERUS      HYSTERECTOMY      HYSTEROSCOPY      LEFT OOPHORECTOMY      SALPINGECTOMY Right        Family History   Problem Relation Age of Onset    Heart disease Mother     Heart attack Mother     Heart attack Brother 36        s/p stent placement    Diabetes Brother     Heart disease Brother     Cancer Father     Diabetes Sister     Heart disease Sister     Diabetes Sister      I have reviewed and agree with the history as documented  E-Cigarette/Vaping    E-Cigarette Use Never User      E-Cigarette/Vaping Substances    Nicotine No     THC No     CBD No     Flavoring No     Other No     Unknown No      Social History     Tobacco Use    Smoking status: Current Every Day Smoker     Packs/day: 0 50     Years: 30 00     Pack years: 15 00     Types: Cigarettes    Smokeless tobacco: Never Used   Vaping Use    Vaping Use: Never used   Substance Use Topics    Alcohol use: No    Drug use: No       Review of Systems   Constitutional: Negative for chills and fever  Respiratory: Negative for chest tightness and shortness of breath  Cardiovascular: Negative for chest pain  Gastrointestinal: Negative for abdominal pain     Musculoskeletal: Positive for back pain and neck pain  Neurological: Negative for weakness and numbness  All other systems reviewed and are negative  Physical Exam  Physical Exam  Vitals and nursing note reviewed  Constitutional:       General: She is not in acute distress  Appearance: She is well-developed  HENT:      Head: Normocephalic and atraumatic  Eyes:      Conjunctiva/sclera: Conjunctivae normal    Neck:        Comments: Tenderness of the lower cervical spine to the right of the midline  Cardiovascular:      Rate and Rhythm: Normal rate and regular rhythm  Heart sounds: No murmur heard  Pulmonary:      Effort: Pulmonary effort is normal  No respiratory distress  Breath sounds: Normal breath sounds  Abdominal:      Palpations: Abdomen is soft  Tenderness: There is no abdominal tenderness  Musculoskeletal:      Cervical back: Normal range of motion and neck supple  Skin:     General: Skin is warm and dry  Neurological:      Mental Status: She is alert  Vital Signs  ED Triage Vitals [03/05/22 1909]   Temperature Pulse Respirations Blood Pressure SpO2   (!) 97 1 °F (36 2 °C) 98 16 133/84 98 %      Temp Source Heart Rate Source Patient Position - Orthostatic VS BP Location FiO2 (%)   Temporal Monitor Sitting Left arm --      Pain Score       8           Vitals:    03/05/22 1909   BP: 133/84   Pulse: 98   Patient Position - Orthostatic VS: Sitting         Visual Acuity      ED Medications  Medications   predniSONE tablet 40 mg (40 mg Oral Given 3/5/22 1948)   methocarbamol (ROBAXIN) tablet 500 mg (500 mg Oral Given 3/5/22 1948)       Diagnostic Studies  Results Reviewed     None                 CT spine cervical without contrast   Final Result by Franki Saab MD (03/05 1935)      No evidence of a posterior midline soft tissue mass to account for patient's symptoms  No cervical spine fracture or traumatic malalignment  Moderate multilevel degenerative changes        Severe emphysema at the lung apices  Workstation performed: [id number]                    Procedures  Procedures         ED Course                               SBIRT 22yo+      Most Recent Value   SBIRT (24 yo +)    In order to provide better care to our patients, we are screening all of our patients for alcohol and drug use  Would it be okay to ask you these screening questions? Yes Filed at: 03/05/2022 1912   Initial Alcohol Screen: US AUDIT-C     1  How often do you have a drink containing alcohol? 0 Filed at: 03/05/2022 1912   2  How many drinks containing alcohol do you have on a typical day you are drinking? 0 Filed at: 03/05/2022 1912   3a  Male UNDER 65: How often do you have five or more drinks on one occasion? 0 Filed at: 03/05/2022 1912   3b  FEMALE Any Age, or MALE 65+: How often do you have 4 or more drinks on one occassion? 0 Filed at: 03/05/2022 1912   Audit-C Score 0 Filed at: 03/05/2022 1912   JACK: How many times in the past year have you    Used an illegal drug or used a prescription medication for non-medical reasons? Never Filed at: 03/05/2022 1912                    MDM  Number of Diagnoses or Management Options  Cervical muscle pain  Diagnosis management comments: Cervical muscle tenderness secondary to likely spasm  Will trial muscle relaxers and steroids P recommend heating pad  Comp spine follow-up  Patient educated regarding their diagnosis and given return and follow-up instructions  Patient was advised to returned to the ED with worsening symptoms or concerns  Patient is understanding of and in agreement with the treatment plan  There are no questions at the time of discharge         Amount and/or Complexity of Data Reviewed  Tests in the radiology section of CPT®: reviewed and ordered    Risk of Complications, Morbidity, and/or Mortality  Presenting problems: low  Diagnostic procedures: low  Management options: low    Patient Progress  Patient progress: stable      Disposition  Final diagnoses:   Cervical muscle pain     Time reflects when diagnosis was documented in both MDM as applicable and the Disposition within this note     Time User Action Codes Description Comment    3/5/2022  7:43 PM Rubén Iverson [M54 2] Cervical muscle pain       ED Disposition     ED Disposition Condition Date/Time Comment    Discharge Stable Sat Mar 5, 2022  7:43 PM Corinna Abts discharge to home/self care              Follow-up Information     Follow up With Specialties Details Why Contact Info Additional Information    [facility]   [phone]          Discharge Medication List as of 3/5/2022  7:44 PM      START taking these medications    Details   methylPREDNISolone 4 MG tablet therapy pack Use as directed on package, Normal         CONTINUE these medications which have NOT CHANGED    Details   Aspirin Low Dose 81 MG EC tablet TAKE (1) TABLET BY MOUTH DAILY , Normal      atorvastatin (LIPITOR) 80 mg tablet Take 1 tablet (80 mg total) by mouth daily, Starting Wed 6/16/2021, Normal      clopidogrel (PLAVIX) 75 mg tablet Take 1 tablet (75 mg total) by mouth daily, Starting Wed 6/16/2021, Normal      doxepin (SINEquan) 10 mg capsule Starting Mon 5/10/2021, Historical Med      escitalopram (LEXAPRO) 5 mg tablet TAKE ONE (1) TABLET BY MOUTH DAILY , Normal      hydrOXYzine HCL (ATARAX) 10 mg tablet Take 1 tablet (10 mg total) by mouth 2 (two) times a day as needed for anxiety, Starting Tue 8/18/2020, Normal      metoprolol tartrate (LOPRESSOR) 25 mg tablet Take 1 tablet (25 mg total) by mouth every 12 (twelve) hours, Starting Wed 6/16/2021, Normal      nitroglycerin (NITROSTAT) 0 4 mg SL tablet Place 0 4 mg under the tongue every 5 (five) minutes as needed for chest pain, Historical Med      oxyCODONE-acetaminophen (PERCOCET) 5-325 mg per tablet Starting Mon 5/10/2021, Historical Med      pantoprazole (PROTONIX) 40 mg tablet Take 1 tablet (40 mg total) by mouth daily, Starting Wed 6/16/2021, Normal      tiotropium-olodaterol (STIOLTO RESPIMAT) 2 5-2 5 MCG/ACT inhaler Inhale 2 puffs daily, Starting Thu 3/19/2020, Normal                 PDMP Review     None          ED Provider  Electronically Signed by           Renetta Rubinstein, PA-C  03/07/22 1016

## 2022-03-08 ENCOUNTER — TELEPHONE (OUTPATIENT)
Dept: PHYSICAL THERAPY | Facility: OTHER | Age: 52
End: 2022-03-08

## 2022-03-08 NOTE — TELEPHONE ENCOUNTER
Nurse reached out to discuss recent referral entered for  Comprehensive Spine program and offerings  Nurse unable to [facility] for patient as her voice MB has not been set up yet      Referral deferred for f/u attempt

## 2022-04-05 DIAGNOSIS — Z23 NEED FOR SHINGLES VACCINE: Primary | ICD-10-CM

## 2022-04-05 PROBLEM — E78.5 HYPERLIPIDEMIA: Status: ACTIVE | Noted: 2020-07-23

## 2022-04-05 RX ORDER — ZOSTER VACCINE RECOMBINANT, ADJUVANTED 50 MCG/0.5
0.5 KIT INTRAMUSCULAR ONCE
Qty: 1 EACH | Refills: 1 | Status: SHIPPED | OUTPATIENT
Start: 2022-04-05 | End: 2022-04-05

## 2022-04-08 ENCOUNTER — TELEPHONE (OUTPATIENT)
Dept: PHYSICAL THERAPY | Facility: OTHER | Age: 52
End: 2022-04-08

## 2022-04-08 ENCOUNTER — VBI (OUTPATIENT)
Dept: ADMINISTRATIVE | Facility: OTHER | Age: 52
End: 2022-04-08

## 2022-04-08 NOTE — TELEPHONE ENCOUNTER
Call placed to the patient per Comprehensive Spine Program referral     Voice message left for patient to call back  Phone number and hours of business provided  This the 2nd attempt to reach the patient       Referral closed per protocol

## 2022-05-10 ENCOUNTER — OFFICE VISIT (OUTPATIENT)
Dept: FAMILY MEDICINE CLINIC | Facility: CLINIC | Age: 52
End: 2022-05-10
Payer: COMMERCIAL

## 2022-05-10 VITALS
BODY MASS INDEX: 18.42 KG/M2 | RESPIRATION RATE: 18 BRPM | HEART RATE: 84 BPM | WEIGHT: 114.6 LBS | TEMPERATURE: 97.3 F | HEIGHT: 66 IN | SYSTOLIC BLOOD PRESSURE: 110 MMHG | OXYGEN SATURATION: 97 % | DIASTOLIC BLOOD PRESSURE: 70 MMHG

## 2022-05-10 DIAGNOSIS — F17.200 TOBACCO USE DISORDER: ICD-10-CM

## 2022-05-10 DIAGNOSIS — I10 ESSENTIAL HYPERTENSION, BENIGN: ICD-10-CM

## 2022-05-10 DIAGNOSIS — Z13.29 THYROID DISORDER SCREEN: ICD-10-CM

## 2022-05-10 DIAGNOSIS — Z13.820 OSTEOPOROSIS SCREENING: ICD-10-CM

## 2022-05-10 DIAGNOSIS — E78.5 DYSLIPIDEMIA: ICD-10-CM

## 2022-05-10 DIAGNOSIS — I25.5 ISCHEMIC CARDIOMYOPATHY: ICD-10-CM

## 2022-05-10 DIAGNOSIS — Z00.00 ANNUAL PHYSICAL EXAM: Primary | ICD-10-CM

## 2022-05-10 DIAGNOSIS — Z12.4 CERVICAL CANCER SCREENING: ICD-10-CM

## 2022-05-10 DIAGNOSIS — E55.9 VITAMIN D DEFICIENCY: ICD-10-CM

## 2022-05-10 DIAGNOSIS — K21.9 CHRONIC GERD: ICD-10-CM

## 2022-05-10 DIAGNOSIS — F32.A DEPRESSION, UNSPECIFIED DEPRESSION TYPE: ICD-10-CM

## 2022-05-10 DIAGNOSIS — J44.9 EMPHYSEMA WITH CHRONIC BRONCHITIS (HCC): ICD-10-CM

## 2022-05-10 DIAGNOSIS — G47.00 INSOMNIA, UNSPECIFIED TYPE: ICD-10-CM

## 2022-05-10 DIAGNOSIS — I25.10 CORONARY ARTERY DISEASE INVOLVING NATIVE HEART WITHOUT ANGINA PECTORIS, UNSPECIFIED VESSEL OR LESION TYPE: ICD-10-CM

## 2022-05-10 PROBLEM — R12 HEART BURN: Status: ACTIVE | Noted: 2022-05-10

## 2022-05-10 PROBLEM — J44.89 EMPHYSEMA WITH CHRONIC BRONCHITIS: Status: ACTIVE | Noted: 2018-01-02

## 2022-05-10 PROCEDURE — 99396 PREV VISIT EST AGE 40-64: CPT | Performed by: FAMILY MEDICINE

## 2022-05-10 PROCEDURE — T1015 CLINIC SERVICE: HCPCS | Performed by: FAMILY MEDICINE

## 2022-05-10 RX ORDER — BACLOFEN 10 MG/1
TABLET ORAL
COMMUNITY
Start: 2022-04-20

## 2022-05-10 RX ORDER — DOXEPIN HYDROCHLORIDE 10 MG/1
10 CAPSULE ORAL
Qty: 90 CAPSULE | Refills: 1 | Status: SHIPPED | OUTPATIENT
Start: 2022-05-10

## 2022-05-10 RX ORDER — CLOPIDOGREL BISULFATE 75 MG/1
75 TABLET ORAL DAILY
Qty: 30 TABLET | Refills: 3 | Status: SHIPPED | OUTPATIENT
Start: 2022-05-10

## 2022-05-10 RX ORDER — ESCITALOPRAM OXALATE 5 MG/1
5 TABLET ORAL DAILY
Qty: 90 TABLET | Refills: 3 | Status: SHIPPED | OUTPATIENT
Start: 2022-05-10

## 2022-05-10 RX ORDER — PANTOPRAZOLE SODIUM 40 MG/1
40 TABLET, DELAYED RELEASE ORAL DAILY
Qty: 90 TABLET | Refills: 3 | Status: SHIPPED | OUTPATIENT
Start: 2022-05-10

## 2022-05-10 RX ORDER — ATORVASTATIN CALCIUM 80 MG/1
80 TABLET, FILM COATED ORAL DAILY
Qty: 90 TABLET | Refills: 3 | Status: SHIPPED | OUTPATIENT
Start: 2022-05-10

## 2022-05-10 RX ORDER — BUPROPION HYDROCHLORIDE 150 MG/1
150 TABLET, EXTENDED RELEASE ORAL 2 TIMES DAILY
Qty: 180 TABLET | Refills: 2 | Status: SHIPPED | OUTPATIENT
Start: 2022-05-10

## 2022-05-10 NOTE — PROGRESS NOTES
ADULT ANNUAL PHYSICAL  [facility]    NAME: Jaxson Givens  AGE: 46 y o   SEX: female  : 1970     DATE: 5/10/2022     Assessment and Plan:     Problem List Items Addressed This Visit        Digestive    Chronic GERD     Restart pantoprazole 40 mg PO daily         Relevant Medications    pantoprazole (PROTONIX) 40 mg tablet    Other Relevant Orders    Ambulatory Referral to Gastroenterology       Respiratory    Emphysema with chronic bronchitis ([address] )    Relevant Orders    Ambulatory Referral to Pulmonology       Cardiovascular and Mediastinum    Ischemic cardiomyopathy     Needs cardiology follow up  Clarification on clopidogrel therapy length         Relevant Medications    metoprolol tartrate (LOPRESSOR) 25 mg tablet    clopidogrel (PLAVIX) 75 mg tablet    Other Relevant Orders    Ambulatory Referral to Cardiology    Coronary artery disease involving native heart without angina pectoris    Relevant Medications    metoprolol tartrate (LOPRESSOR) 25 mg tablet    clopidogrel (PLAVIX) 75 mg tablet    Other Relevant Orders    Ambulatory Referral to Cardiology    Essential hypertension, benign    Relevant Medications    metoprolol tartrate (LOPRESSOR) 25 mg tablet    Other Relevant Orders    CBC and differential    Comprehensive metabolic panel       Other    Depression (Chronic)     Restart escitalopram         Relevant Medications    escitalopram (LEXAPRO) 5 mg tablet    buPROPion (Wellbutrin SR) 150 mg 12 hr tablet    doxepin (SINEquan) 10 mg capsule    Tobacco use disorder     Wellbutrin BID  Smoking cessation measures discussed         Relevant Medications    buPROPion (Wellbutrin SR) 150 mg 12 hr tablet    Vitamin D deficiency    Relevant Orders    Vitamin D 25 hydroxy    Insomnia    Relevant Medications    doxepin (SINEquan) 10 mg capsule      Other Visit Diagnoses     Annual physical exam    -  Primary    Dyslipidemia        Relevant Medications    atorvastatin (LIPITOR) 80 mg tablet    Other Relevant Orders    Lipid Panel with Direct LDL reflex    Thyroid disorder screen        Relevant Orders    TSH, 3rd generation with Free T4 reflex    Cervical cancer screening        Relevant Orders    Ambulatory Referral to Gynecology    Osteoporosis screening        Relevant Orders    DXA bone density spine hip and pelvis          Immunizations and preventive care screenings were discussed with patient today  Appropriate education was printed on patient's after visit summary  Counseling:  Alcohol/drug use: discussed moderation in alcohol intake, the recommendations for healthy alcohol use, and avoidance of illicit drug use  Dental Health: discussed importance of regular tooth brushing, flossing, and dental visits  Injury prevention: discussed safety/seat belts, safety helmets, smoke detectors, carbon dioxide detectors, and smoking near bedding or upholstery  Sexual health: discussed sexually transmitted diseases, partner selection, use of condoms, avoidance of unintended pregnancy, and contraceptive alternatives  · Exercise: the importance of regular exercise/physical activity was discussed  Recommend exercise 3-5 times per week for at least 30 minutes  Return in about 2 months (around 7/10/2022) for Recheck labs, insomnia  Chief Complaint:     Chief Complaint   Patient presents with    Physical Exam      History of Present Illness:     Adult Annual Physical   Patient here for a comprehensive physical exam  The patient reports problems - chronic, generalized pain  Sees Dr Ravinder Santoro on [facility] and [address] in [facility]  Has been out of several medications  Those were refilled today  States she also has a medical marijuana card  Discussed use of alternate method for administration rather than smoking given emphysema   Will refer to pulmonology for eval     Diet and Physical Activity  · Diet/Nutrition: well balanced diet, limited junk food and consuming 3-5 servings of fruits/vegetables daily  · Exercise: walking, 3-4 times a week on average and less than 30 minutes on average  Depression Screening  PHQ-2/9 Depression Screening         General Health  · Sleep: sleeps poorly and gets 4-6 hours of sleep on average  · Hearing: normal - bilateral   · Vision: no vision problems, most recent eye exam <1 year ago and wears glasses  · Dental: brushes teeth twice daily  /GYN Health  · Patient is: postmenopausal  · Last menstrual period: 2020   · Contraceptive method: none  Review of Systems:     Review of Systems   Constitutional: Positive for appetite change (reduced due to pain)  Negative for activity change, fatigue and unexpected weight change  HENT: Negative for congestion, ear pain, hearing loss, nosebleeds, rhinorrhea, sinus pain and trouble swallowing  Eyes: Negative for photophobia  Respiratory: Positive for cough  Negative for choking, shortness of breath and wheezing  Cardiovascular: Positive for leg swelling (end of day)  Negative for chest pain and palpitations  Gastrointestinal: Negative for abdominal pain, blood in stool, constipation, diarrhea and nausea  Heartburn   Endocrine: Negative for polydipsia, polyphagia and polyuria  Genitourinary: Negative for difficulty urinating, dysuria, frequency, hematuria and urgency  Musculoskeletal: Positive for arthralgias (chronic generalized) and myalgias (chronic generalized)  Negative for back pain  Skin: Negative for color change, rash and wound  Neurological: Negative for dizziness, tremors, syncope, weakness and headaches  Psychiatric/Behavioral: Positive for sleep disturbance  Negative for agitation, confusion, self-injury and suicidal ideas        Past Medical History:     Past Medical History:   Diagnosis Date    Coronary artery disease     Cubital tunnel syndrome     Depression     GERD (gastroesophageal reflux disease)     History of Clostridium difficile     Ovarian cyst     St[facility] [address] [address]     Takotsubo cardiomyopathy     Thoracic outlet syndrome     Tobacco abuse     Vitamin D deficiency       Past Surgical History:     Past Surgical History:   Procedure Laterality Date    CARDIAC CATHETERIZATION      CABG    CORONARY ARTERY BYPASS GRAFT N/A 2/12/2016    Procedure: CABG X1; Left  EVH to mid-LAD; ZAHRA;  Surgeon: Ronald Cartagena DO;  [facility];  Service:    [facility] DEBRIDEMENT TENNIS ELBOW      DILATION AND CURETTAGE OF UTERUS      HYSTERECTOMY      HYSTEROSCOPY      LEFT OOPHORECTOMY      SALPINGECTOMY Right       Social History:     Social History     Socioeconomic History    Marital status: /Civil Union     Spouse name: None    Number of children: 3    Years of education: None    Highest education level: None   Occupational History    Occupation: unemployed   Tobacco Use    Smoking status: Current Every Day Smoker     Packs/day: 0 50     Years: 30 00     Pack years: 15 00     Types: Cigarettes    Smokeless tobacco: Never Used   Vaping Use    Vaping Use: Never used   Substance and Sexual Activity    Alcohol use: No    Drug use: No    Sexual activity: Yes   Other Topics Concern    None   Social History Narrative    None     Social Determinants of Health     Financial Resource Strain: Not on file   Food Insecurity: Not on file   Transportation Needs: Not on file   Physical Activity: Not on file   Stress: Not on file   Social Connections: Not on file   Intimate Partner Violence: Not on file   Housing Stability: Not on file      Family History:     Family History   Problem Relation Age of Onset    Heart disease Mother     Heart attack Mother     Heart attack Brother 36        s/p stent placement    Diabetes Brother     Heart disease Brother     Cancer Father     Diabetes Sister     Heart disease Sister     Diabetes Sister       Current Medications:     Current Outpatient Medications   Medication Sig Dispense Refill    Aspirin Low Dose 81 MG EC tablet TAKE (1) TABLET BY MOUTH DAILY  30 tablet 4    atorvastatin (LIPITOR) 80 mg tablet Take 1 tablet (80 mg total) by mouth daily 90 tablet 3    baclofen 10 mg tablet       clopidogrel (PLAVIX) 75 mg tablet Take 1 tablet (75 mg total) by mouth daily 30 tablet 3    doxepin (SINEquan) 10 mg capsule Take 1 capsule (10 mg total) by mouth daily at bedtime 90 capsule 1    escitalopram (LEXAPRO) 5 mg tablet Take 1 tablet (5 mg total) by mouth daily 90 tablet 3    hydrOXYzine HCL (ATARAX) 10 mg tablet Take 1 tablet (10 mg total) by mouth 2 (two) times a day as needed for anxiety 30 tablet 1    metoprolol tartrate (LOPRESSOR) 25 mg tablet Take 1 tablet (25 mg total) by mouth every 12 (twelve) hours 180 tablet 3    nitroglycerin (NITROSTAT) 0 4 mg SL tablet Place 0 4 mg under the tongue every 5 (five) minutes as needed for chest pain      oxyCODONE-acetaminophen (PERCOCET) 5-325 mg per tablet       pantoprazole (PROTONIX) 40 mg tablet Take 1 tablet (40 mg total) by mouth daily 90 tablet 3    tiotropium-olodaterol (STIOLTO RESPIMAT) 2 5-2 5 MCG/ACT inhaler Inhale 2 puffs daily 1 Inhaler 5    buPROPion (Wellbutrin SR) 150 mg 12 hr tablet Take 1 tablet (150 mg total) by mouth 2 (two) times a day 180 tablet 2     No current facility-administered medications for this visit  Allergies: Allergies   Allergen Reactions    Toradol [Ketorolac Tromethamine] GI Intolerance    Gabapentin Swelling      Physical Exam:     /70 (BP Location: Left arm, Patient Position: Sitting, Cuff Size: Standard)   Pulse 84   Temp (!) 97 3 °F (36 3 °C) (Axillary)   Resp 18   Ht 5' 6" (1 676 m)   Wt 52 kg (114 lb 9 6 oz)   LMP 10/14/2015 (LMP Unknown)   SpO2 97%   BMI 18 50 kg/m²     Physical Exam  Vitals and nursing note reviewed  Constitutional:       General: She is not in acute distress  Appearance: She is well-developed     HENT:      Head: Normocephalic and atraumatic  Eyes:      Conjunctiva/sclera: Conjunctivae normal    Cardiovascular:      Rate and Rhythm: Normal rate and regular rhythm  Heart sounds: No murmur heard  Pulmonary:      Effort: Pulmonary effort is normal  No respiratory distress  Breath sounds: Normal breath sounds  Abdominal:      Palpations: Abdomen is soft  Tenderness: There is no abdominal tenderness  Musculoskeletal:      Cervical back: Neck supple  Skin:     General: Skin is warm and dry  Neurological:      Mental Status: She is alert and oriented to person, place, and time     Psychiatric:         Mood and Affect: Mood normal          Behavior: Behavior normal           Alondra Holm 29

## 2022-05-10 NOTE — PATIENT INSTRUCTIONS

## 2022-05-12 ENCOUNTER — TELEPHONE (OUTPATIENT)
Dept: GASTROENTEROLOGY | Facility: CLINIC | Age: 52
End: 2022-05-12

## 2022-05-19 ENCOUNTER — TELEPHONE (OUTPATIENT)
Dept: GASTROENTEROLOGY | Facility: CLINIC | Age: 52
End: 2022-05-19

## 2022-05-24 DIAGNOSIS — Z12.11 COLON CANCER SCREENING: Primary | ICD-10-CM

## 2022-05-26 ENCOUNTER — APPOINTMENT (OUTPATIENT)
Dept: LAB | Facility: HOSPITAL | Age: 52
End: 2022-05-26
Payer: COMMERCIAL

## 2022-05-26 ENCOUNTER — CONSULT (OUTPATIENT)
Dept: CARDIOLOGY CLINIC | Facility: HOSPITAL | Age: 52
End: 2022-05-26
Payer: COMMERCIAL

## 2022-05-26 VITALS
WEIGHT: 120 LBS | HEIGHT: 66 IN | HEART RATE: 92 BPM | BODY MASS INDEX: 19.29 KG/M2 | DIASTOLIC BLOOD PRESSURE: 76 MMHG | SYSTOLIC BLOOD PRESSURE: 118 MMHG

## 2022-05-26 DIAGNOSIS — I45.10 RBBB: ICD-10-CM

## 2022-05-26 DIAGNOSIS — K21.9 GASTROESOPHAGEAL REFLUX DISEASE, UNSPECIFIED WHETHER ESOPHAGITIS PRESENT: Chronic | ICD-10-CM

## 2022-05-26 DIAGNOSIS — F17.200 SMOKER: ICD-10-CM

## 2022-05-26 DIAGNOSIS — Z13.29 THYROID DISORDER SCREEN: ICD-10-CM

## 2022-05-26 DIAGNOSIS — I10 ESSENTIAL HYPERTENSION, BENIGN: ICD-10-CM

## 2022-05-26 DIAGNOSIS — I25.10 CORONARY ARTERY DISEASE INVOLVING NATIVE HEART WITHOUT ANGINA PECTORIS, UNSPECIFIED VESSEL OR LESION TYPE: ICD-10-CM

## 2022-05-26 DIAGNOSIS — I25.5 ISCHEMIC CARDIOMYOPATHY: Primary | ICD-10-CM

## 2022-05-26 DIAGNOSIS — E55.9 VITAMIN D DEFICIENCY: ICD-10-CM

## 2022-05-26 DIAGNOSIS — E78.5 DYSLIPIDEMIA: ICD-10-CM

## 2022-05-26 DIAGNOSIS — Z95.1 S/P CABG X 1: ICD-10-CM

## 2022-05-26 LAB
25(OH)D3 SERPL-MCNC: 17.3 NG/ML (ref 30–100)
ALBUMIN SERPL BCP-MCNC: 3.7 G/DL (ref 3.5–5)
ALP SERPL-CCNC: 185 U/L (ref 46–116)
ALT SERPL W P-5'-P-CCNC: 24 U/L (ref 12–78)
ANION GAP SERPL CALCULATED.3IONS-SCNC: 12 MMOL/L (ref 4–13)
AST SERPL W P-5'-P-CCNC: 25 U/L (ref 5–45)
BASOPHILS # BLD AUTO: 0.11 THOUSANDS/ΜL (ref 0–0.1)
BASOPHILS NFR BLD AUTO: 1 % (ref 0–1)
BILIRUB SERPL-MCNC: 0.47 MG/DL (ref 0.2–1)
BUN SERPL-MCNC: 4 MG/DL (ref 5–25)
CALCIUM SERPL-MCNC: 9.1 MG/DL (ref 8.3–10.1)
CHLORIDE SERPL-SCNC: 106 MMOL/L (ref 100–108)
CHOLEST SERPL-MCNC: 112 MG/DL
CO2 SERPL-SCNC: 28 MMOL/L (ref 21–32)
CREAT SERPL-MCNC: 0.82 MG/DL (ref 0.6–1.3)
EOSINOPHIL # BLD AUTO: 0.07 THOUSAND/ΜL (ref 0–0.61)
EOSINOPHIL NFR BLD AUTO: 1 % (ref 0–6)
ERYTHROCYTE [DISTWIDTH] IN BLOOD BY AUTOMATED COUNT: 14.1 % (ref 11.6–15.1)
GFR SERPL CREATININE-BSD FRML MDRD: 82 ML/MIN/1.73SQ M
GLUCOSE P FAST SERPL-MCNC: 88 MG/DL (ref 65–99)
HCT VFR BLD AUTO: 45.4 % (ref 34.8–46.1)
HDLC SERPL-MCNC: 42 MG/DL
HGB BLD-MCNC: 14.4 G/DL (ref 11.5–15.4)
IMM GRANULOCYTES # BLD AUTO: 0.07 THOUSAND/UL (ref 0–0.2)
IMM GRANULOCYTES NFR BLD AUTO: 1 % (ref 0–2)
LDLC SERPL CALC-MCNC: 57 MG/DL (ref 0–100)
LYMPHOCYTES # BLD AUTO: 3.81 THOUSANDS/ΜL (ref 0.6–4.47)
LYMPHOCYTES NFR BLD AUTO: 28 % (ref 14–44)
MCH RBC QN AUTO: 29.2 PG (ref 26.8–34.3)
MCHC RBC AUTO-ENTMCNC: 31.7 G/DL (ref 31.4–37.4)
MCV RBC AUTO: 92 FL (ref 82–98)
MONOCYTES # BLD AUTO: 0.56 THOUSAND/ΜL (ref 0.17–1.22)
MONOCYTES NFR BLD AUTO: 4 % (ref 4–12)
NEUTROPHILS # BLD AUTO: 9.16 THOUSANDS/ΜL (ref 1.85–7.62)
NEUTS SEG NFR BLD AUTO: 65 % (ref 43–75)
NRBC BLD AUTO-RTO: 0 /100 WBCS
PLATELET # BLD AUTO: 312 THOUSANDS/UL (ref 149–390)
PMV BLD AUTO: 9.7 FL (ref 8.9–12.7)
POTASSIUM SERPL-SCNC: 3.9 MMOL/L (ref 3.5–5.3)
PROT SERPL-MCNC: 7.2 G/DL (ref 6.4–8.2)
RBC # BLD AUTO: 4.93 MILLION/UL (ref 3.81–5.12)
SODIUM SERPL-SCNC: 146 MMOL/L (ref 136–145)
TRIGL SERPL-MCNC: 64 MG/DL
TSH SERPL DL<=0.05 MIU/L-ACNC: 0.72 UIU/ML (ref 0.45–4.5)
WBC # BLD AUTO: 13.78 THOUSAND/UL (ref 4.31–10.16)

## 2022-05-26 PROCEDURE — 80061 LIPID PANEL: CPT

## 2022-05-26 PROCEDURE — 84443 ASSAY THYROID STIM HORMONE: CPT

## 2022-05-26 PROCEDURE — 85025 COMPLETE CBC W/AUTO DIFF WBC: CPT

## 2022-05-26 PROCEDURE — 80053 COMPREHEN METABOLIC PANEL: CPT

## 2022-05-26 PROCEDURE — 82306 VITAMIN D 25 HYDROXY: CPT

## 2022-05-26 PROCEDURE — 99244 OFF/OP CNSLTJ NEW/EST MOD 40: CPT | Performed by: INTERNAL MEDICINE

## 2022-05-26 PROCEDURE — 36415 COLL VENOUS BLD VENIPUNCTURE: CPT

## 2022-05-27 DIAGNOSIS — E55.9 VITAMIN D DEFICIENCY: Primary | ICD-10-CM

## 2022-05-27 RX ORDER — ERGOCALCIFEROL 1.25 MG/1
50000 CAPSULE ORAL WEEKLY
Qty: 13 CAPSULE | Refills: 1 | Status: SHIPPED | OUTPATIENT
Start: 2022-05-27

## 2022-06-01 PROBLEM — I45.10 RBBB: Status: ACTIVE | Noted: 2022-06-01

## 2022-06-01 PROCEDURE — 93000 ELECTROCARDIOGRAM COMPLETE: CPT | Performed by: INTERNAL MEDICINE

## 2022-06-01 NOTE — PROGRESS NOTES
Consultation - Cardiology   Sonia Prabhakar 46 y o  female MRN: [id number]  Unit/Bed#:  Encounter: [id number]  Physician Requesting Consult: No att  providers found  Reason for Consult / Principal Problem: CAD, re establish with [facility]    Assessment:  1  CAD  2  S/P SVG to LAD for symptomatic myocardiac bridging in 2016  3  HTN  4  Hypercholesterolemia  5  Tobacco abuse  6  GERD  7  RBBB    Plan:  Clinically she is stable  Her heartburn symptoms are atypical for myocardial ischemia  I have reviewed her medications and made no changes  She will     History of Present Illness     HPI: Sonia Prabhakar is a 46y o  year old female who has known CABG ( VG to LAD in 2016 for symptomatic myocardial bridge )  She wishes to re establish cardiac care with [facility]  She is very active and denies exertional CP or SOB  She does get a lot of heart burn and L arm numbness  Her weight is down from 151 to 120 over the past several years  /76    She continues to smoke 6 cigs a day  ECG - NSR, RBBB            Review of Systems:    Alert awake oriented, comfortable, denies any complaints  No fevers chills nausea vomiting  No weakness, dizziness, seizures  no cough, shortness of breath, or wheezing  Denies any palpitations, chest pain, diaphoresis  Denies leg edema, pain or paresthesias  Denies any skin rashes  Denies abdominal pain, bloody stools, masses  Denies any depression or suicidal ideations      Historical Information   Past Medical History:   Diagnosis Date    Coronary artery disease     Cubital tunnel syndrome     Depression     GERD (gastroesophageal reflux disease)     History of Clostridium difficile     Ovarian cyst     Stroke ([facility] [address]     Takotsubo cardiomyopathy     Thoracic outlet syndrome     Tobacco abuse     Vitamin D deficiency      Past Surgical History:   Procedure Laterality Date    CARDIAC CATHETERIZATION      CABG    CORONARY ARTERY BYPASS GRAFT N/A 2/12/2016    Procedure: CABG X1; Left  EVH to mid-LAD; ZAHRA;  Surgeon: Prerna Stone, DO;  [facility]: [facility];  Service:     DEBRIDEMENT TENNIS ELBOW      DILATION AND CURETTAGE OF UTERUS      HYSTERECTOMY      HYSTEROSCOPY      LEFT OOPHORECTOMY      SALPINGECTOMY Right      Social History     Substance and Sexual Activity   Alcohol Use No     Social History     Substance and Sexual Activity   Drug Use No     Social History     Tobacco Use   Smoking Status Current Every Day Smoker    Packs/day: 0 50    Years: 30 00    Pack years: 15 00    Types: Cigarettes   Smokeless Tobacco Never Used     Family History: non-contributory    Meds/Allergies   all current active meds have been reviewed  Allergies   Allergen Reactions    Toradol [Ketorolac Tromethamine] GI Intolerance    Gabapentin Swelling       Objective   Vitals: Blood pressure 118/76, pulse 92, height 5' 6" (1 676 m), weight 54 4 kg (120 lb), last menstrual period 10/14/2015, not currently breastfeeding , Body mass index is 19 37 kg/m²  ,   Weight (last 2 days)     None                    Physical Exam:  GEN: Lesa Bolton appears well, alert and oriented x 3, pleasant and cooperative   HEENT: pupils equal, round, and reactive to light; extraocular muscles intact  NECK: supple, no carotid bruits   HEART: regular rhythm, normal S1 and S2, no murmurs, clicks, gallops or rubs   LUNGS: clear to auscultation bilaterally; no wheezes, rales, or rhonchi   ABDOMEN: normal bowel sounds, soft, no tenderness, no distention  EXTREMITIES: peripheral pulses normal; no clubbing, cyanosis, or edema  NEURO: no focal findings   SKIN: normal without suspicious lesions on exposed skin    Lab Results:   Appointment on 05/26/2022   Component Date Value Ref Range Status    WBC 05/26/2022 13 78 (A) 4 31 - 10 16 Thousand/uL Final    RBC 05/26/2022 4 93  3 81 - 5 12 Million/uL Final    Hemoglobin 05/26/2022 14 4  11 5 - 15 4 g/dL Final    Hematocrit 05/26/2022 45 4  34 8 - 46 1 % Final    MCV 05/26/2022 92  82 - 98 fL Final    MCH 05/26/2022 29 2  26 8 - 34 3 pg Final    MCHC 05/26/2022 31 7  31 4 - 37 4 g/dL Final    RDW 05/26/2022 14 1  11 6 - 15 1 % Final    MPV 05/26/2022 9 7  8 9 - 12 7 fL Final    Platelets 04/24/4188 312  149 - 390 Thousands/uL Final    nRBC 05/26/2022 0  /100 WBCs Final    Neutrophils Relative 05/26/2022 65  43 - 75 % Final    Immat GRANS % 05/26/2022 1  0 - 2 % Final    Lymphocytes Relative 05/26/2022 28  14 - 44 % Final    Monocytes Relative 05/26/2022 4  4 - 12 % Final    Eosinophils Relative 05/26/2022 1  0 - 6 % Final    Basophils Relative 05/26/2022 1  0 - 1 % Final    Neutrophils Absolute 05/26/2022 9 16 (A) 1 85 - 7 62 Thousands/µL Final    Immature Grans Absolute 05/26/2022 0 07  0 00 - 0 20 Thousand/uL Final    Lymphocytes Absolute 05/26/2022 3 81  0 60 - 4 47 Thousands/µL Final    Monocytes Absolute 05/26/2022 0 56  0 17 - 1 22 Thousand/µL Final    Eosinophils Absolute 05/26/2022 0 07  0 00 - 0 61 Thousand/µL Final    Basophils Absolute 05/26/2022 0 11 (A) 0 00 - 0 10 Thousands/µL Final    Sodium 05/26/2022 146 (A) 136 - 145 mmol/L Final    Potassium 05/26/2022 3 9  3 5 - 5 3 mmol/L Final    Chloride 05/26/2022 106  100 - 108 mmol/L Final    CO2 05/26/2022 28  21 - 32 mmol/L Final    ANION GAP 05/26/2022 12  4 - 13 mmol/L Final    BUN 05/26/2022 4 (A) 5 - 25 mg/dL Final    Creatinine 05/26/2022 0 82  0 60 - 1 30 mg/dL Final    Standardized to IDMS reference method    Glucose, Fasting 05/26/2022 88  65 - 99 mg/dL Final    Specimen collection should occur prior to Sulfasalazine administration due to the potential for falsely depressed results  Specimen collection should occur prior to Sulfapyridine administration due to the potential for falsely elevated results      Calcium 05/26/2022 9 1  8 3 - 10 1 mg/dL Final    AST 05/26/2022 25  5 - 45 U/L Final    Specimen collection should occur prior to Sulfasalazine administration due to the potential for falsely depressed results   ALT 05/26/2022 24  12 - 78 U/L Final    Specimen collection should occur prior to Sulfasalazine administration due to the potential for falsely depressed results   Alkaline Phosphatase 05/26/2022 185 (A) 46 - 116 U/L Final    Total Protein 05/26/2022 7 2  6 4 - 8 2 g/dL Final    Albumin 05/26/2022 3 7  3 5 - 5 0 g/dL Final    Total Bilirubin 05/26/2022 0 47  0 20 - 1 00 mg/dL Final    Use of this assay is not recommended for patients undergoing treatment with eltrombopag due to the potential for falsely elevated results   eGFR 05/26/2022 82  ml/min/1 73sq m Final    Cholesterol 05/26/2022 112  See Comment mg/dL Final    Cholesterol:         Pediatric <18 Years        Desirable          <170 mg/dL      Borderline High    170-199 mg/dL      High               >=200 mg/dL        Adult >=18 Years            Desirable         <200 mg/dL      Borderline High   200-239 mg/dL      High              >239 mg/dL      Triglycerides 05/26/2022 64  See Comment mg/dL Final    Triglyceride:     0-9Y            <75mg/dL     10Y-17Y         <90 mg/dL       >=18Y     Normal          <150 mg/dL     Borderline High 150-199 mg/dL     High            200-499 mg/dL        Very High       >499 mg/dL    Specimen collection should occur prior to N-Acetylcysteine or Metamizole administration due to the potential for falsely depressed results   HDL, Direct 05/26/2022 42 (A) >=50 mg/dL Final    Specimen collection should occur prior to Metamizole administration due to the potential for falsley depressed results   LDL Calculated 05/26/2022 57  0 - 100 mg/dL Final    LDL Cholesterol:     Optimal           <100 mg/dl     Near Optimal      100-129 mg/dl     Above Optimal       Borderline High 130-159 mg/dl       High            160-189 mg/dl       Very High       >189 mg/dl         This screening LDL is a calculated result     It does not have the accuracy of the Direct Measured LDL in the monitoring of patients with hyperlipidemia and/or statin therapy  Direct Measure LDL (VAC478) must be ordered separately in these patients   TSH 3RD GENERATON 05/26/2022 0 720  0 450 - 4 500 uIU/mL Final    The recommended reference ranges for TSH during pregnancy are as follows:   First trimester 0 1 to 2 5 uIU/mL   Second trimester  0 2 to 3 0 uIU/mL   Third trimester 0 3 to 3 0 uIU/m    Note: Normal ranges may not apply to patients who are transgender, non-binary, or whose legal sex, sex at birth, and gender identity differ  Adult TSH (3rd generation) reference range follows the recommended guidelines of the American Thyroid Association, January, 2020   Vit D, 25-Hydroxy 05/26/2022 17 3 (A) 30 0 - 100 0 ng/mL Final   Consult on 05/26/2022   Component Date Value Ref Range Status    Interpretation 06/01/2022    Final    NSR  RBBB  Results from last 7 days   Lab Units 05/26/22  1335   WBC Thousand/uL 13 78*   HEMOGLOBIN g/dL 14 4   HEMATOCRIT % 45 4   PLATELETS Thousands/uL 312         Results from last 7 days   Lab Units 05/26/22  1335   POTASSIUM mmol/L 3 9   CHLORIDE mmol/L 106   CO2 mmol/L 28   BUN mg/dL 4*   CREATININE mg/dL 0 82   CALCIUM mg/dL 9 1   ALK PHOS U/L 185*   ALT U/L 24   AST U/L 25             Imaging: I have personally reviewed pertinent reports

## 2022-06-22 ENCOUNTER — TELEPHONE (OUTPATIENT)
Dept: PULMONOLOGY | Facility: CLINIC | Age: 52
End: 2022-06-22

## 2022-06-22 ENCOUNTER — OFFICE VISIT (OUTPATIENT)
Dept: PULMONOLOGY | Facility: CLINIC | Age: 52
End: 2022-06-22
Payer: COMMERCIAL

## 2022-06-22 VITALS
BODY MASS INDEX: 18.8 KG/M2 | WEIGHT: 117 LBS | OXYGEN SATURATION: 96 % | DIASTOLIC BLOOD PRESSURE: 69 MMHG | HEART RATE: 89 BPM | TEMPERATURE: 98.5 F | HEIGHT: 66 IN | SYSTOLIC BLOOD PRESSURE: 103 MMHG

## 2022-06-22 DIAGNOSIS — F17.200 NICOTINE DEPENDENCE, UNCOMPLICATED, UNSPECIFIED NICOTINE PRODUCT TYPE: ICD-10-CM

## 2022-06-22 DIAGNOSIS — R93.89 ABNORMAL CT OF THE CHEST: ICD-10-CM

## 2022-06-22 DIAGNOSIS — J44.9 EMPHYSEMA WITH CHRONIC BRONCHITIS (HCC): ICD-10-CM

## 2022-06-22 DIAGNOSIS — J44.9 EMPHYSEMA WITH CHRONIC BRONCHITIS (HCC): Primary | ICD-10-CM

## 2022-06-22 PROCEDURE — 94618 PULMONARY STRESS TESTING: CPT | Performed by: INTERNAL MEDICINE

## 2022-06-22 PROCEDURE — 99244 OFF/OP CNSLTJ NEW/EST MOD 40: CPT | Performed by: INTERNAL MEDICINE

## 2022-06-22 RX ORDER — ALBUTEROL SULFATE 90 UG/1
2 AEROSOL, METERED RESPIRATORY (INHALATION) EVERY 6 HOURS PRN
Qty: 18 G | Refills: 11 | Status: SHIPPED | OUTPATIENT
Start: 2022-06-22

## 2022-06-22 RX ORDER — IPRATROPIUM BROMIDE AND ALBUTEROL SULFATE 2.5; .5 MG/3ML; MG/3ML
3 SOLUTION RESPIRATORY (INHALATION) EVERY 6 HOURS PRN
Qty: 360 ML | Refills: 11 | Status: SHIPPED | OUTPATIENT
Start: 2022-06-22 | End: 2022-07-22

## 2022-06-22 RX ORDER — BUDESONIDE, GLYCOPYRROLATE, AND FORMOTEROL FUMARATE 160; 9; 4.8 UG/1; UG/1; UG/1
2 AEROSOL, METERED RESPIRATORY (INHALATION) EVERY 12 HOURS
Qty: 10.7 G | Refills: 11 | Status: SHIPPED | OUTPATIENT
Start: 2022-06-22

## 2022-06-22 NOTE — LETTER
June 22, 2022     Patient: Justin Meyers  YOB: 1970  Date of Visit: 6/22/2022      To Whom it May Concern:    Justin Meyers is under my professional care  Maria Dolores Andrews was seen in my office on 6/22/2022  Bambi  She has an ongoing respiratory disorder/symptoms that necessitate using nebulizer machine for symptoms control which requires electricity/power supply  If she may not able to use the nebulizer machine her symptoms may significantly deteriorate and require hospitalization  If you have any questions or concerns, please don't hesitate to call           Sincerely,          Angélica Ordaz MD        CC: Justin Cabelloile

## 2022-06-22 NOTE — PROGRESS NOTES
Pulmonary Consultation   Luis Miguel Norris 46 y o  female MRN: [id number]  6/22/2022      Assessment:  COPD/emphysema   · Last FEV1 of 60% on office spirometer in 2018  · Now with ongoing symptoms, mMRC score above 2  · Noted frequent chest congestion/wheezing and dyspnea   · Still able to be independent in ADLs    Plan:   · Restart maintenance therapy, with escalation to triple inhaler  · Ordered Breztri, given samples  · Reviewed the proper inhaler use technique/advised to rinse mouth following each use  Noted an incorrect way using the inhalers before today  · Ordered p r n  albuterol HFA p r n  q 6  · Nebulizer/supplies with DuoNeb p r n  q 6  · Check complete pulmonary function test  · Not interested in pneumococcal vaccination  · 6 minute walk test showed no exertional hypoxemia    RUL GGO   · Noted on chest CT in 2018   · Mild, appears to be nonsignificant   · Given the high risk/30 pack year tobacco abuse history  · Ordered follow-up noncontrast CT chest/also within the window for lung cancer screening  She is agreeable    Active tobacco abuse  · Total of about 30 pack year history   · Cut down to 5 cigarettes per day months ago  · Counseled extensively, she would like to continue cutting down cigarettes on her own not interested in nicotine replacement therapy    Return in about 2 months (around 8/22/2022)  History of Present Illness     New Consult for:  COPD/emphysema      Background  46 y o  female with a h/o CAD, depression, emphysema, reflux, CVA, takotsubo cardiomyopathy, tobacco abuse, thoracic outlet syndrome      Last seen by Pulmonary in 2018 recommended Stiolto Respimat, home oxygen since noted dropped to 83% with exertion  Since last seen, used Stiolto Respimat for sometimes and then stop  So far off for at least 2 weeks  Over the years, noted a slowly progressive dyspnea on exertion such as going up to 1 block    Often time associated with wheezing, chest congestion and minimally productive cough  Improve with rest   Used to be better with Stiolto Respimat  No frequent use of prednisone, ED visits or hospitalization  Cut down smoking to 5 cigarettes per day from 1 pack per day at least 30 pack year history  Still has supplemental oxygen, using it at night not with exertion  Review of Systems  As per hpi, all other systems reviewed and were negative  Studies:  Imaging and other studies: I have personally reviewed pertinent films in PACS  CT chest 05/06/2018-no pulmonary embolism  Bilateral emphysematous changes more at the upper lobes  Bibasilar atelectasis  RML vague GGO suspect possible atelectasis/early pneumonia    Pulmonary function testing:   Office spirometer 07/20/2018-ratio 61%, FEV1 1 89 L/62%, FVC 3 08 L/80%  No BD response  6 minute walk test 06/22/2022-baseline SpO2 96% on room air, heart rate 85  Able to walk 360 m in 6 minutes, lowest SpO2 94%, maximal heart rate 106  Sleep study 04/12/2018-[facility] 0 3  Snoring/with features of upper airway resistance  Severe periodic leg movement  Moderate to severe sleep fragmentation  EKG, Pathology, and Other Studies: I have personally reviewed pertinent reports            Historical Information   Past Medical History:   Diagnosis Date    Coronary artery disease     Cubital tunnel syndrome     Depression     Emphysema of lung (HCC)     GERD (gastroesophageal reflux disease)     History of Clostridium difficile     Ovarian cyst     Stroke ([facility] [address]     Takotsubo cardiomyopathy     Thoracic outlet syndrome     Tobacco abuse     Vitamin D deficiency      Past Surgical History:   Procedure Laterality Date    CARDIAC CATHETERIZATION      CABG    CORONARY ARTERY BYPASS GRAFT N/A 2/12/2016    Procedure: CABG X1; Left  EVH to mid-LAD; ZAHRA;  Surgeon: Mukund Frank DO;  Location: [facility];  Service:    Paulino Jj DEBRIDEMENT TENNIS ELBOW      DILATION AND CURETTAGE OF UTERUS      HYSTERECTOMY      HYSTEROSCOPY      LEFT OOPHORECTOMY      SALPINGECTOMY Right      Family History   Problem Relation Age of Onset    Heart disease Mother     Heart attack Mother     Heart attack Brother 36        s/p stent placement    Diabetes Brother     Heart disease Brother     Cancer Father     Diabetes Sister     Heart disease Sister     Diabetes Sister          Medications/Allergies: Reviewed    Vitals: Blood pressure 103/69, pulse 89, temperature 98 5 °F (36 9 °C), temperature source Tympanic, height 5' 6" (1 676 m), weight 53 1 kg (117 lb), last menstrual period 10/14/2015, SpO2 96 %, not currently breastfeeding  Body mass index is 18 88 kg/m²  Oxygen Therapy  SpO2: 96 %  Oxygen Therapy: None (Room air)      Physical Exam  Body mass index is 18 88 kg/m²     Gen: not in acute distress, thin  Neck/Eyes: supple, no adenopathy, PERRL  Ear: normal appearance, no significant hearing impairment  Nose:  normal nasal mucosa, no drainage  Mouth:  unremarkable/normal appearance of lips, teeth and gums  Oropharynx: mucosa is moist, no focal lesions or erythema  Salivary glands: soft nontender  Chest: normal respiratory efforts, diminished but clear breath sounds bilaterally  CV: RRR, no murmurs appreciated, no edema  Abdomen: soft, non tender  Extremities:  No observed deformity   Skin: unremarkable  Neuro: AAO X3, no focal motor deficit         Labs:  Lab Results   Component Value Date    WBC 13 78 (H) 05/26/2022    HGB 14 4 05/26/2022    HCT 45 4 05/26/2022    MCV 92 05/26/2022     05/26/2022     Lab Results   Component Value Date    GLUCOSE 101 11/20/2017    CALCIUM 9 1 05/26/2022     11/18/2015    K 3 9 05/26/2022    CO2 28 05/26/2022     05/26/2022    BUN 4 (L) 05/26/2022    CREATININE 0 82 05/26/2022     No results found for: IGE  Lab Results   Component Value Date    ALT 24 05/26/2022    AST 25 05/26/2022    ALKPHOS 185 (H) 05/26/2022    BILITOT 0 21 11/18/2015           Portions of the record may have been created with voice recognition software  Occasional wrong word or "sound a like" substitutions may have occurred due to the inherent limitations of voice recognition software  Read the chart carefully and recognize, using context, where substitutions have occurred    [facility]

## 2022-07-06 ENCOUNTER — HOSPITAL ENCOUNTER (EMERGENCY)
Facility: HOSPITAL | Age: 52
Discharge: HOME | End: 2022-07-07
Attending: EMERGENCY MEDICINE | Admitting: EMERGENCY MEDICINE
Payer: COMMERCIAL

## 2022-07-06 ENCOUNTER — APPOINTMENT (EMERGENCY)
Dept: RADIOLOGY | Facility: HOSPITAL | Age: 52
End: 2022-07-06
Payer: COMMERCIAL

## 2022-07-06 DIAGNOSIS — R05.9 COUGH: Primary | ICD-10-CM

## 2022-07-06 DIAGNOSIS — B34.9 VIRAL ILLNESS: ICD-10-CM

## 2022-07-06 LAB
ALBUMIN SERPL BCP-MCNC: 3.6 G/DL (ref 3.5–5)
ALP SERPL-CCNC: 164 U/L (ref 46–116)
ALT SERPL W P-5'-P-CCNC: 22 U/L (ref 12–78)
ANION GAP SERPL CALCULATED.3IONS-SCNC: 9 MMOL/L (ref 4–13)
APTT PPP: 28 SECONDS (ref 23–37)
AST SERPL W P-5'-P-CCNC: 22 U/L (ref 5–45)
BASOPHILS # BLD AUTO: 0.09 THOUSANDS/ΜL (ref 0–0.1)
BASOPHILS NFR BLD AUTO: 1 % (ref 0–1)
BILIRUB SERPL-MCNC: 0.4 MG/DL (ref 0.2–1)
BUN SERPL-MCNC: 4 MG/DL (ref 5–25)
CALCIUM SERPL-MCNC: 8.6 MG/DL (ref 8.3–10.1)
CARDIAC TROPONIN I PNL SERPL HS: 3 NG/L
CHLORIDE SERPL-SCNC: 106 MMOL/L (ref 100–108)
CO2 SERPL-SCNC: 27 MMOL/L (ref 21–32)
CREAT SERPL-MCNC: 0.77 MG/DL (ref 0.6–1.3)
EOSINOPHIL # BLD AUTO: 0.04 THOUSAND/ΜL (ref 0–0.61)
EOSINOPHIL NFR BLD AUTO: 0 % (ref 0–6)
ERYTHROCYTE [DISTWIDTH] IN BLOOD BY AUTOMATED COUNT: 13.8 % (ref 11.6–15.1)
GFR SERPL CREATININE-BSD FRML MDRD: 89 ML/MIN/1.73SQ M
GLUCOSE SERPL-MCNC: 135 MG/DL (ref 65–140)
HCT VFR BLD AUTO: 42.7 % (ref 34.8–46.1)
HGB BLD-MCNC: 13.8 G/DL (ref 11.5–15.4)
IMM GRANULOCYTES # BLD AUTO: 0.14 THOUSAND/UL (ref 0–0.2)
IMM GRANULOCYTES NFR BLD AUTO: 1 % (ref 0–2)
INR PPP: 1.04 (ref 0.84–1.19)
LACTATE SERPL-SCNC: 1.6 MMOL/L (ref 0.5–2)
LYMPHOCYTES # BLD AUTO: 3.9 THOUSANDS/ΜL (ref 0.6–4.47)
LYMPHOCYTES NFR BLD AUTO: 22 % (ref 14–44)
MAGNESIUM SERPL-MCNC: 1.8 MG/DL (ref 1.6–2.6)
MCH RBC QN AUTO: 29.4 PG (ref 26.8–34.3)
MCHC RBC AUTO-ENTMCNC: 32.3 G/DL (ref 31.4–37.4)
MCV RBC AUTO: 91 FL (ref 82–98)
MONOCYTES # BLD AUTO: 0.5 THOUSAND/ΜL (ref 0.17–1.22)
MONOCYTES NFR BLD AUTO: 3 % (ref 4–12)
NEUTROPHILS # BLD AUTO: 13.15 THOUSANDS/ΜL (ref 1.85–7.62)
NEUTS SEG NFR BLD AUTO: 73 % (ref 43–75)
NRBC BLD AUTO-RTO: 0 /100 WBCS
PLATELET # BLD AUTO: 291 THOUSANDS/UL (ref 149–390)
PMV BLD AUTO: 9.6 FL (ref 8.9–12.7)
POTASSIUM SERPL-SCNC: 4.1 MMOL/L (ref 3.5–5.3)
PROT SERPL-MCNC: 6.7 G/DL (ref 6.4–8.2)
PROTHROMBIN TIME: 13 SECONDS (ref 11.6–14.5)
RBC # BLD AUTO: 4.7 MILLION/UL (ref 3.81–5.12)
SODIUM SERPL-SCNC: 142 MMOL/L (ref 136–145)
WBC # BLD AUTO: 17.82 THOUSAND/UL (ref 4.31–10.16)

## 2022-07-06 PROCEDURE — 93005 ELECTROCARDIOGRAM TRACING: CPT

## 2022-07-06 PROCEDURE — 83735 ASSAY OF MAGNESIUM: CPT | Performed by: EMERGENCY MEDICINE

## 2022-07-06 PROCEDURE — 83605 ASSAY OF LACTIC ACID: CPT | Performed by: EMERGENCY MEDICINE

## 2022-07-06 PROCEDURE — 84484 ASSAY OF TROPONIN QUANT: CPT | Performed by: EMERGENCY MEDICINE

## 2022-07-06 PROCEDURE — 80053 COMPREHEN METABOLIC PANEL: CPT | Performed by: EMERGENCY MEDICINE

## 2022-07-06 PROCEDURE — 99284 EMERGENCY DEPT VISIT MOD MDM: CPT

## 2022-07-06 PROCEDURE — 96360 HYDRATION IV INFUSION INIT: CPT

## 2022-07-06 PROCEDURE — 85730 THROMBOPLASTIN TIME PARTIAL: CPT | Performed by: EMERGENCY MEDICINE

## 2022-07-06 PROCEDURE — 85610 PROTHROMBIN TIME: CPT | Performed by: EMERGENCY MEDICINE

## 2022-07-06 PROCEDURE — 87636 SARSCOV2 & INF A&B AMP PRB: CPT | Performed by: EMERGENCY MEDICINE

## 2022-07-06 PROCEDURE — 85025 COMPLETE CBC W/AUTO DIFF WBC: CPT | Performed by: EMERGENCY MEDICINE

## 2022-07-06 PROCEDURE — 36415 COLL VENOUS BLD VENIPUNCTURE: CPT | Performed by: EMERGENCY MEDICINE

## 2022-07-06 PROCEDURE — 71045 X-RAY EXAM CHEST 1 VIEW: CPT

## 2022-07-06 RX ADMIN — SODIUM CHLORIDE 1000 ML: 0.9 INJECTION, SOLUTION INTRAVENOUS at 22:40

## 2022-07-07 ENCOUNTER — DOCUMENTATION (OUTPATIENT)
Dept: PULMONOLOGY | Facility: CLINIC | Age: 52
End: 2022-07-07

## 2022-07-07 VITALS
DIASTOLIC BLOOD PRESSURE: 68 MMHG | SYSTOLIC BLOOD PRESSURE: 121 MMHG | TEMPERATURE: 98 F | OXYGEN SATURATION: 96 % | RESPIRATION RATE: 12 BRPM | HEART RATE: 64 BPM

## 2022-07-07 LAB
ATRIAL RATE: 69 BPM
FLUAV RNA RESP QL NAA+PROBE: NEGATIVE
FLUBV RNA RESP QL NAA+PROBE: NEGATIVE
P AXIS: 81 DEGREES
PR INTERVAL: 172 MS
QRS AXIS: 116 DEGREES
QRSD INTERVAL: 148 MS
QT INTERVAL: 452 MS
QTC INTERVAL: 484 MS
SARS-COV-2 RNA RESP QL NAA+PROBE: NEGATIVE
T WAVE AXIS: 71 DEGREES
VENTRICULAR RATE: 69 BPM

## 2022-07-07 PROCEDURE — 99285 EMERGENCY DEPT VISIT HI MDM: CPT | Performed by: EMERGENCY MEDICINE

## 2022-07-07 PROCEDURE — 93010 ELECTROCARDIOGRAM REPORT: CPT | Performed by: INTERNAL MEDICINE

## 2022-07-07 RX ORDER — AZITHROMYCIN 250 MG/1
TABLET, FILM COATED ORAL
Qty: 6 TABLET | Refills: 0 | Status: SHIPPED | OUTPATIENT
Start: 2022-07-08 | End: 2022-07-11

## 2022-07-07 RX ORDER — LORATADINE 10 MG/1
10 TABLET ORAL DAILY
Qty: 10 TABLET | Refills: 0 | Status: SHIPPED | OUTPATIENT
Start: 2022-07-07 | End: 2022-07-17

## 2022-07-07 NOTE — ED PROVIDER NOTES
Final Diagnosis:  1  Cough    2  Viral illness        Chief Complaint   Patient presents with    Cold Like Symptoms     Patient states cold symptoms, chills etc for a few days     HPI  Patient presents for evaluation of viral like illness  She states that her last 2 or 3 days she has had intermittent chest discomfort which she attributed to acid reflux  Then throughout the day she started to feel generalized weak as well as generalized body aches  She endorses a cough that is productive of whitish type sputum  Denies any fever but endorses chills  Denies any sick contacts  She does have a history of coronary artery disease as well as prior strokes  Patient denies any trauma to her chest   No nausea or vomiting, constipation or diarrhea  No dysuria hematuria  Unless otherwise specified above - No radiations, nausea/vomiting/diaphoresis, inciting/relieving/exacerbating factors  Denies any trauma to chest, recent heavy lifting, and history of prior events  Denies fever, chills, shortness of breath, hemoptysis, cough, exogenous steroid use, history of clots, recent long travel  No family history of early cardiac death  Otherwise patient states they have been at baseline health  Unless otherwise specified:  - No language barrier    - History obtained from patient  - There are no limitations to the history obtained  - Previous charting was reviewed    PMH:   has a past medical history of Coronary artery disease, Cubital tunnel syndrome, Depression, Emphysema of lung ([facility] [address] ), GERD (gastroesophageal reflux disease), History of Clostridium difficile, Ovarian cyst, Stroke ([facility] [address] ), Takotsubo cardiomyopathy, Thoracic outlet syndrome, Tobacco abuse, and Vitamin D deficiency  PSH:   has a past surgical history that includes Left oophorectomy; Salpingectomy (Right); Debridement tennis elbow; Coronary artery bypass graft (N/A, 2/12/2016);  Cardiac catheterization; Dilation and curettage of uterus; Hysteroscopy; and Hysterectomy  ROS:  Review of Systems   -   - 13 point ROS was performed and all are normal unless stated in the history above  - Nursing note reviewed  Vitals reviewed  - Orders placed by myself and/or advanced practitioner / resident  PE:   Vitals:    07/06/22 2206 07/07/22 0000   BP: 136/68 121/68   BP Location: Right arm    Pulse: 70 64   Resp: 18 12   Temp: 98 °F (36 7 °C)    TempSrc: Tympanic    SpO2: 98% 96%     Vitals reviewed by me  Patient appears chronically ill laying in bed  No labored breathing, saturating well on room air  No conversational dyspnea    No tachycardia    No peripheral edema or leg swelling  Unless otherwise specified above:    General: VS reviewed  Appears in NAD    Head: Normocephalic, atraumatic  Eyes: EOM-I  No exudate  ENT: Atraumatic external nose and ears  No malocclusion  No stridor  No drooling  Neck: No JVD  CV: No pallor noted  Lungs:   No tachypnea  No respiratory distress    Abdomen:  Soft, non-tender, non-distended    MSK:   FROM spontaneously  No obvious deformity    Skin: Dry, intact  No obvious rash  Neuro: Awake, alert, GCS15, CN II-XII grossly intact  Speaking in full sentences  Motor grossly intact  Psychiatric/Behavioral: Appropriate mood and affect   Exam: deferred    Physical Exam     Procedures   A:  - Nursing note reviewed                        XR chest 1 view portable   ED Interpretation   No acute cardiopulmonary disease        Orders Placed This Encounter   Procedures    FLU/COVID - if FLU clinically relevant    XR chest 1 view portable    CBC and differential    Protime-INR    APTT    Comprehensive metabolic panel    Magnesium    Lactic acid    HS Troponin 0hr (reflex protocol)    Insert peripheral IV    ECG 12 lead     Labs Reviewed   CBC AND DIFFERENTIAL - Abnormal       Result Value Ref Range Status    WBC 17 82 (*) 4 31 - 10 16 Thousand/uL Final    RBC 4 70  3 81 - 5 12 Million/uL Final    Hemoglobin 13 8  11 5 - 15 4 g/dL Final    Hematocrit 42 7  34 8 - 46 1 % Final    MCV 91  82 - 98 fL Final    MCH 29 4  26 8 - 34 3 pg Final    MCHC 32 3  31 4 - 37 4 g/dL Final    RDW 13 8  11 6 - 15 1 % Final    MPV 9 6  8 9 - 12 7 fL Final    Platelets 441  258 - 390 Thousands/uL Final    nRBC 0  /100 WBCs Final    Neutrophils Relative 73  43 - 75 % Final    Immat GRANS % 1  0 - 2 % Final    Lymphocytes Relative 22  14 - 44 % Final    Monocytes Relative 3 (*) 4 - 12 % Final    Eosinophils Relative 0  0 - 6 % Final    Basophils Relative 1  0 - 1 % Final    Neutrophils Absolute 13 15 (*) 1 85 - 7 62 Thousands/µL Final    Immature Grans Absolute 0 14  0 00 - 0 20 Thousand/uL Final    Lymphocytes Absolute 3 90  0 60 - 4 47 Thousands/µL Final    Monocytes Absolute 0 50  0 17 - 1 22 Thousand/µL Final    Eosinophils Absolute 0 04  0 00 - 0 61 Thousand/µL Final    Basophils Absolute 0 09  0 00 - 0 10 Thousands/µL Final   COMPREHENSIVE METABOLIC PANEL - Abnormal    Sodium 142  136 - 145 mmol/L Final    Potassium 4 1  3 5 - 5 3 mmol/L Final    Chloride 106  100 - 108 mmol/L Final    CO2 27  21 - 32 mmol/L Final    ANION GAP 9  4 - 13 mmol/L Final    BUN 4 (*) 5 - 25 mg/dL Final    Creatinine 0 77  0 60 - 1 30 mg/dL Final    Comment: Standardized to IDMS reference method    Glucose 135  65 - 140 mg/dL Final    Comment: If the patient is fasting, the ADA then defines impaired fasting glucose as > 100 mg/dL and diabetes as > or equal to 123 mg/dL  Specimen collection should occur prior to Sulfasalazine administration due to the potential for falsely depressed results  Specimen collection should occur prior to Sulfapyridine administration due to the potential for falsely elevated results  Calcium 8 6  8 3 - 10 1 mg/dL Final    AST 22  5 - 45 U/L Final    Comment: Specimen collection should occur prior to Sulfasalazine administration due to the potential for falsely depressed results       ALT 22  12 - 78 U/L Final Comment: Specimen collection should occur prior to Sulfasalazine administration due to the potential for falsely depressed results  Alkaline Phosphatase 164 (*) 46 - 116 U/L Final    Total Protein 6 7  6 4 - 8 2 g/dL Final    Albumin 3 6  3 5 - 5 0 g/dL Final    Total Bilirubin 0 40  0 20 - 1 00 mg/dL Final    Comment: Use of this assay is not recommended for patients undergoing treatment with eltrombopag due to the potential for falsely elevated results  eGFR 89  ml/min/1 73sq m Final    Narrative:     National Kidney Disease Foundation guidelines for Chronic Kidney Disease (CKD):     Stage 1 with normal or high GFR (GFR > 90 mL/min/1 73 square meters)    Stage 2 Mild CKD (GFR = 60-89 mL/min/1 73 square meters)    Stage 3A Moderate CKD (GFR = 45-59 mL/min/1 73 square meters)    Stage 3B Moderate CKD (GFR = 30-44 mL/min/1 73 square meters)    Stage 4 Severe CKD (GFR = 15-29 mL/min/1 73 square meters)    Stage 5 End Stage CKD (GFR <15 mL/min/1 73 square meters)  Note: GFR calculation is accurate only with a steady state creatinine   PROTIME-INR - Normal    Protime 13 0  11 6 - 14 5 seconds Final    INR 1 04  0 84 - 1 19 Final   APTT - Normal    PTT 28  23 - 37 seconds Final    Comment: Therapeutic Heparin Range =  60-90 seconds   MAGNESIUM - Normal    Magnesium 1 8  1 6 - 2 6 mg/dL Final   LACTIC ACID, PLASMA - Normal    LACTIC ACID 1 6  0 5 - 2 0 mmol/L Final    Narrative:     Result may be elevated if tourniquet was used during collection  HS TROPONIN I 0HR - Normal    hs TnI 0hr 3  "Refer to ACS Flowchart"- see link ng/L Final    Comment:                                              Initial (time 0) result  If >=50 ng/L, Myocardial injury suggested ;  Type of myocardial injury and treatment strategy  to be determined  If 5-49 ng/L, a delta result at 2 hours and or 4 hours will be needed to further evaluate    If <4 ng/L, and chest pain has been >3 hours since onset, patient may qualify for discharge based on the HEART score in the ED  If <5 ng/L and <3hours since onset of chest pain, a delta result at 2 hours will be needed to further evaluate  HS Troponin 99th Percentile URL of a Health Population=12 ng/L with a 95% Confidence Interval of 8-18 ng/L  Second Troponin (time 2 hours)  If calculated delta >= 20 ng/L,  Myocardial injury suggested ; Type of myocardial injury and treatment strategy to be determined  If 5-49 ng/L and the calculated delta is 5-19 ng/L, consult medical service for evaluation  Continue evaluation for ischemia on ecg and other possible etiology and repeat hs troponin at 4 hours  If delta is <5 ng/L at 2 hours, consider discharge based on risk stratification via the HEART score (if in ED), or DESIRAE risk score in IP/Observation  HS Troponin 99th Percentile URL of a Health Population=12 ng/L with a 95% Confidence Interval of 8-18 ng/L    COVID19 AND INFLUENZA A/B PCR         Final Diagnosis:  1  Cough    2  Viral illness        P:  - patient presents for evaluation of viral like illness  Given patient's multiple medical comorbidities will also evaluate with ACS  -troponin unremarkable, EKG without acute changes, chest x-ray without acute consolidation  -I discussed results with patient  Pending flu and COVID I believe it is reasonable to provide wait and watch approach to his death room I since given that she does have significant comorbidities  Return precautions were discussed      Medications   sodium chloride 0 9 % bolus 1,000 mL (0 mL Intravenous Stopped 7/7/22 0003)     Time reflects when diagnosis was documented in both MDM as applicable and the Disposition within this note     Time User Action Codes Description Comment    7/7/2022 12:20 AM Orlando Stone Add [R05 9] Cough     7/7/2022 12:20 AM Orlando Stone Add [B34 9] Viral illness       ED Disposition     ED Disposition   Discharge    Condition   Stable    Date/Time   Thu Jul 7, 2022 12:20 AM    Comment Lesley Led discharge to home/self care  Follow-up Information     Follow up With Specialties Details Why [address] [facility], [address],  [address] [phone]          Patient's Medications   Discharge Prescriptions    AZITHROMYCIN (ZITHROMAX) 250 MG TABLET    Take 2 tablets today then 1 tablet daily x 4 days       Start Date: 7/8/2022  End Date: 7/11/2022       Order Dose: --       Quantity: 6 tablet    Refills: 0    LORATADINE (CLARITIN) 10 MG TABLET    Take 1 tablet (10 mg total) by mouth daily for 10 days       Start Date: 7/7/2022  End Date: 7/17/2022       Order Dose: 10 mg       Quantity: 10 tablet    Refills: 0     No discharge procedures on file  Prior to Admission Medications   Prescriptions Last Dose Informant Patient Reported? Taking? Aspirin Low Dose 81 MG EC tablet 7/6/2022 at Unknown time  No Yes   Sig: TAKE (1) TABLET BY MOUTH DAILY  Budeson-Glycopyrrol-Formoterol (Breztri Aerosphere) 160-9-4 8 MCG/ACT AERO 7/6/2022 at Unknown time  No Yes   Sig: Inhale 2 puffs every 12 (twelve) hours Rinse mouth after use     albuterol (Ventolin HFA) 90 mcg/act inhaler 7/6/2022 at Unknown time  No Yes   Sig: Inhale 2 puffs every 6 (six) hours as needed for wheezing   atorvastatin (LIPITOR) 80 mg tablet 7/6/2022 at Unknown time  No Yes   Sig: Take 1 tablet (80 mg total) by mouth daily   baclofen 10 mg tablet 7/6/2022 at Unknown time  Yes Yes   buPROPion (Wellbutrin SR) 150 mg 12 hr tablet 7/6/2022 at Unknown time  No Yes   Sig: Take 1 tablet (150 mg total) by mouth 2 (two) times a day   clopidogrel (PLAVIX) 75 mg tablet 7/6/2022 at Unknown time  No Yes   Sig: Take 1 tablet (75 mg total) by mouth daily   doxepin (SINEquan) 10 mg capsule 7/6/2022 at Unknown time Self No Yes   Sig: Take 1 capsule (10 mg total) by mouth daily at bedtime   ergocalciferol (VITAMIN D2) 50,000 units 7/6/2022 at Unknown time  No Yes   Sig: Take 1 capsule (50,000 Units total) by mouth once a week   escitalopram (LEXAPRO) 5 mg tablet 7/6/2022 at Unknown time  No Yes   Sig: Take 1 tablet (5 mg total) by mouth daily   hydrOXYzine HCL (ATARAX) 10 mg tablet   No No   Sig: Take 1 tablet (10 mg total) by mouth 2 (two) times a day as needed for anxiety   Patient not taking: Reported on 5/26/2022   ipratropium-albuterol (DUO-NEB) 0 5-2 5 mg/3 mL nebulizer solution 7/6/2022 at Unknown time  No Yes   Sig: Take 3 mL by nebulization every 6 (six) hours as needed for wheezing or shortness of breath   metoprolol tartrate (LOPRESSOR) 25 mg tablet 7/6/2022 at Unknown time  No Yes   Sig: Take 1 tablet (25 mg total) by mouth every 12 (twelve) hours   nitroglycerin (NITROSTAT) 0 4 mg SL tablet 7/6/2022 at Unknown time Self Yes Yes   Sig: Place 0 4 mg under the tongue every 5 (five) minutes as needed for chest pain   oxyCODONE-acetaminophen (PERCOCET) 5-325 mg per tablet 7/6/2022 at Unknown time  Yes Yes   pantoprazole (PROTONIX) 40 mg tablet 7/6/2022 at Unknown time  No Yes   Sig: Take 1 tablet (40 mg total) by mouth daily      Facility-Administered Medications: None       Portions of the record may have been created with voice recognition software  Occasional wrong word or "sound a like" substitutions may have occurred due to the inherent limitations of voice recognition software  Read the chart carefully and recognize, using context, where substitutions have occurred      Electronically signed by:  MD Jasvir Sy MD  07/07/22 5661

## 2022-07-11 NOTE — PROGRESS NOTES
Attempted to call pt to notify her the Rhianna Griggs was approved from 7/7/2022-7/7/2023  Pt VM has not been set up  Please inform her of my note if she calls

## 2022-07-19 LAB
DME PARACHUTE DELIVERY DATE ACTUAL: NORMAL
DME PARACHUTE DELIVERY DATE REQUESTED: NORMAL
DME PARACHUTE ITEM DESCRIPTION: NORMAL
DME PARACHUTE ORDER STATUS: NORMAL
DME PARACHUTE SUPPLIER NAME: NORMAL
DME PARACHUTE SUPPLIER PHONE: NORMAL

## 2022-09-13 ENCOUNTER — TELEPHONE (OUTPATIENT)
Dept: PULMONOLOGY | Facility: CLINIC | Age: 52
End: 2022-09-13

## 2022-09-13 NOTE — TELEPHONE ENCOUNTER
Patient calling asking for assistance so their electricity does not get shut off      Company: [employer]  Name on account: Juliane Hough   Account number: [de-identified]  Phone number to call: [phone]

## 2022-10-04 ENCOUNTER — VBI (OUTPATIENT)
Dept: ADMINISTRATIVE | Facility: OTHER | Age: 52
End: 2022-10-04

## 2022-12-09 ENCOUNTER — TELEPHONE (OUTPATIENT)
Dept: FAMILY MEDICINE CLINIC | Facility: CLINIC | Age: 52
End: 2022-12-09

## 2022-12-10 ENCOUNTER — VBI (OUTPATIENT)
Dept: ADMINISTRATIVE | Facility: OTHER | Age: 52
End: 2022-12-10

## 2023-06-01 DIAGNOSIS — F32.A DEPRESSION, UNSPECIFIED DEPRESSION TYPE: ICD-10-CM

## 2023-06-01 DIAGNOSIS — K21.9 CHRONIC GERD: ICD-10-CM

## 2023-06-01 DIAGNOSIS — E78.5 DYSLIPIDEMIA: ICD-10-CM

## 2023-06-01 RX ORDER — ESCITALOPRAM OXALATE 5 MG/1
5 TABLET ORAL DAILY
Qty: 30 TABLET | Refills: 0 | Status: SHIPPED | OUTPATIENT
Start: 2023-06-01

## 2023-06-01 RX ORDER — ATORVASTATIN CALCIUM 80 MG/1
80 TABLET, FILM COATED ORAL DAILY
Qty: 30 TABLET | Refills: 0 | Status: SHIPPED | OUTPATIENT
Start: 2023-06-01

## 2023-06-01 RX ORDER — PANTOPRAZOLE SODIUM 40 MG/1
40 TABLET, DELAYED RELEASE ORAL DAILY
Qty: 30 TABLET | Refills: 0 | Status: SHIPPED | OUTPATIENT
Start: 2023-06-01

## 2024-01-19 ENCOUNTER — TELEPHONE (OUTPATIENT)
Dept: PULMONOLOGY | Facility: CLINIC | Age: 54
End: 2024-01-19

## 2024-01-19 NOTE — TELEPHONE ENCOUNTER
Patient scheduled for appt 01/31/2024. Has not been seen since 2022 and Norman is requesting o2 Recert. Must be seen in office. Saved next available appt slot for patient and will continue to try and reach patient via phone.

## 2024-01-19 NOTE — LETTER
[facility] PULMONARY [place] [address] [facility] [address]  Phone#  [phone]  Fax#  [phone]      January 19, 2024      Dear:   Bambi Zacarias         Our office has attempted to contact you several times regarding an appointment. I saved an appointment slot for you on January 31st at 11:40 am. Could you please contact our office at [phone] if that appointment does not work so we can find a time that works better for you?    Thank you.     Sincerely,  Jessie [facility]AU[facility] Pul[facility]

## 2024-01-31 ENCOUNTER — OFFICE VISIT (OUTPATIENT)
Dept: PULMONOLOGY | Facility: CLINIC | Age: 54
End: 2024-01-31
Payer: COMMERCIAL

## 2024-01-31 ENCOUNTER — TELEPHONE (OUTPATIENT)
Age: 54
End: 2024-01-31

## 2024-01-31 VITALS
DIASTOLIC BLOOD PRESSURE: 73 MMHG | SYSTOLIC BLOOD PRESSURE: 111 MMHG | HEIGHT: 66 IN | HEART RATE: 92 BPM | TEMPERATURE: 97.8 F | BODY MASS INDEX: 22.34 KG/M2 | WEIGHT: 139 LBS | OXYGEN SATURATION: 95 %

## 2024-01-31 DIAGNOSIS — J44.89 EMPHYSEMA WITH CHRONIC BRONCHITIS: ICD-10-CM

## 2024-01-31 DIAGNOSIS — J96.11 CHRONIC HYPOXEMIC RESPIRATORY FAILURE (HCC): Primary | ICD-10-CM

## 2024-01-31 DIAGNOSIS — F17.210 SMOKING GREATER THAN 20 PACK YEARS: ICD-10-CM

## 2024-01-31 DIAGNOSIS — J44.9 CHRONIC OBSTRUCTIVE PULMONARY DISEASE, UNSPECIFIED COPD TYPE (HCC): ICD-10-CM

## 2024-01-31 LAB
DME PARACHUTE DELIVERY DATE REQUESTED: NORMAL
DME PARACHUTE ITEM DESCRIPTION: NORMAL
DME PARACHUTE ORDER STATUS: NORMAL
DME PARACHUTE SUPPLIER NAME: NORMAL
DME PARACHUTE SUPPLIER PHONE: NORMAL

## 2024-01-31 PROCEDURE — 99214 OFFICE O/P EST MOD 30 MIN: CPT | Performed by: INTERNAL MEDICINE

## 2024-01-31 PROCEDURE — 94618 PULMONARY STRESS TESTING: CPT | Performed by: INTERNAL MEDICINE

## 2024-01-31 RX ORDER — BUDESONIDE, GLYCOPYRROLATE, AND FORMOTEROL FUMARATE 160; 9; 4.8 UG/1; UG/1; UG/1
2 AEROSOL, METERED RESPIRATORY (INHALATION) EVERY 12 HOURS
Qty: 10.7 G | Refills: 11 | Status: SHIPPED | OUTPATIENT
Start: 2024-01-31

## 2024-01-31 RX ORDER — ALBUTEROL SULFATE 90 UG/1
2 AEROSOL, METERED RESPIRATORY (INHALATION) EVERY 6 HOURS PRN
Qty: 18 G | Refills: 11 | Status: SHIPPED | OUTPATIENT
Start: 2024-01-31

## 2024-01-31 RX ORDER — ALBUTEROL SULFATE 2.5 MG/3ML
2.5 SOLUTION RESPIRATORY (INHALATION) EVERY 6 HOURS PRN
Qty: 180 ML | Refills: 5 | Status: SHIPPED | OUTPATIENT
Start: 2024-01-31

## 2024-01-31 NOTE — PROGRESS NOTES
Progress Note - Pulmonary   Bambi Zacarias 54 y.o. female MRN: [id number]   Encounter: [id number]      Assessment/Plan:  Patient is a 54-year-old female with past medical history seen for COPD, active tobacco abuse, nocturnal hypoxemia who presents for routine follow-up.  The patient reports she has been off of her inhaler therapy as well as nebulizer therapy for an extended period of time.  She notes worsening of her dyspnea specifically on exertion.  It is likely that resumption of her inhaler therapy would be of significant benefit.  Patient also reports that she requires confirmation of her necessity of overnight oxygen.  She did not desaturate on amatory pulse ox but will check overnight oximetry.    Chronic hypoxemic respiratory failure  -  uses supplemental oxygen at night  -Underwent oxygen titration study, does not require oxygen with ambulation  -Check overnight pulse ox    Tobacco Abuse   -Motivated to quit  -Currently smoking 5 to 6 cigarettes/day  -Plans to slowly decrease number of cigarettes  -Has a very strong family history of lung cancer, wishes to continue with lung cancer screening    COPD  -  refilled Breztri, albuterol MDI, albuterol nebulizer solution  -No signs of acute exacerbation    Dyspnea on exertion  -  likely related to being off of inhaler therapy    1. Chronic hypoxemic respiratory failure (HCC)  -     POCT Oxygen Titration    2. Emphysema with chronic bronchitis  -     albuterol (Ventolin HFA) 90 mcg/act inhaler; Inhale 2 puffs every 6 (six) hours as needed for wheezing  -     albuterol (2.5 mg/3 mL) 0.083 % nebulizer solution; Take 3 mL (2.5 mg total) by nebulization every 6 (six) hours as needed for wheezing or shortness of breath  -     Budeson-Glycopyrrol-Formoterol (Breztri Aerosphere) 160-9-4.8 MCG/ACT AERO; Inhale 2 puffs every 12 (twelve) hours Rinse mouth after use.    3. Chronic obstructive pulmonary disease, unspecified COPD type (HCC)    4. Smoking greater than 20 pack  years  -     CT lung screening program; Future; Expected date: 01/31/2024        I discussed with her that she is a candidate for lung cancer CT screening.     The following Shared Decision-Making points were covered:  Benefits of screening were discussed, including the rates of reduction in death from lung cancer and other causes.  Harms of screening were reviewed, including false positive tests, radiation exposure levels, risks of invasive procedures, risks of complications of screening, and risk of overdiagnosis.  I counseled on the importance of adherence to annual lung cancer LDCT screening, impact of co-morbidities, and ability or willingness to undergo diagnosis and treatment.  I counseled on the importance of maintaining abstinence as a former smoker or was counseled on the importance of smoking cessation if a current smoker    Review of Eligibility Criteria: She meets all of the criteria for Lung Cancer Screening.   She is 54 y.o.   She has 20 pack year tobacco history and is a current smoker or has quit within the past 15 years  She presents no signs or symptoms of lung cancer    After discussion, the patient decided to elect lung cancer screening.    Patient may follow up in 3 months or sooner as necessary.     Orders:  Orders Placed This Encounter   Procedures    POCT Oxygen Titration    CT lung screening program     Standing Status:   Future     Standing Expiration Date:   1/31/2025     Scheduling Instructions:      If possible wear clothing without any metal in the chest and abdomen area.  Sweat suit, shorts, sports bra or bra without underwire may eliminate the need to change. Please arrive 15 minutes prior to your appointment time to register. On the day of the test, please bring any prior CT or MRI studies of this area with you that were not performed at a [facility] facility.            This is now being billed through your insurance and if you have a copay, it is required at time of service.   Otherwise, you can go through .              To schedule this appointment, please contact Central Scheduling at [phone].           Order Specific Question:   Is the patient pregnant?     Answer:   No     Order Specific Question:   What is the patient's sedation requirement? If Medication for Claustrophobia is selected, order medication at this point.     Answer:   No Sedation     Order Specific Question:   Does patient have a 20 pack year smoking history? (Equivalent to 1 pack of cigarettes per day for a year) IF NO, PATIENT IS NOT ELIGIBLE FOR THIS PROGRAM.     Answer:   Yes     Order Specific Question:   Has the patient been a current smoker or one who has quit smoking within the last 15 years? IF NO, PATIENT IS NOT ELIGIBLE FOR THIS PROGRAM.     Answer:   Yes     Order Specific Question:   Does the patient show any signs or symptoms of lung cancer?     Answer:   No     Order Specific Question:   Is this the first (baseline) CT or an annual exam?     Answer:   Annual [2]     Order Specific Question:   Release to patient through [employer]     Answer:   Immediate     Order Specific Question:   Reason for Exam (FREE TEXT)     Answer:   screening for lung cancer     Order Specific Question:   Is this a low dose CT or a routine CT?     Answer:   Low Dose CT [1]     Order Specific Question:   Is there documentation of shared decision making?     Answer:   Yes       Subjective:   The patient notes that her breathing is getting worse.  She is smoking about 5-6 cigs/daily.  She ran out of her Stiolto several months ago. Her breathing has gotten a lot worse in that time.  She is out of her nebulizer solution.      She needs all new tubing.      She takes sleeping pills which are prescribed for her.     She failed nicotine patches and chantix.  She notes it is quite hard for her to quit.      She     Inhaler Regimen:  None    Remainder of review of systems negative except as described in HPI.      The  "following portions of the patient's history were reviewed and updated as appropriate: allergies, current medications, past family history, past medical history, past social history, past surgical history and problem list.     Objective:   Vitals: Blood pressure 111/73, pulse 92, temperature 97.8 °F (36.6 °C), temperature source Tympanic, height 5' 6\" (1.676 m), weight 63 kg (139 lb), last menstrual period 10/14/2015, SpO2 95%, not currently breastfeeding., RA, Body mass index is 22.44 kg/m².    Physical Exam  Gen: Pleasant, awake, alert, oriented x 3, no acute distress  HEENT: Mucous membranes moist, no oral lesions, no thrush  NECK: No accessory muscle use, JVP not elevated  Cardiac: RRR, single S1, single S2, no murmurs, no rubs, no gallops  Lungs: slightly diminished breath sounds; no wheezing  Abdomen: normoactive bowel sounds, soft nontender, nondistended, no rebound or rigidity, no guarding, thin  Extremities: no cyanosis, no clubbing, no LE edema  MSK:  Strength equal in all extremities  Derm:  No rashes/lesions noted  Neuro:  Appropriate mood/affect    Labs: I have personally reviewed pertinent lab results.  Lab Results   Component Value Date    WBC 17.82 (H) 07/06/2022    WBC 11.01 (H) 11/18/2015    HGB 13.8 07/06/2022    HGB 13.6 11/18/2015     07/06/2022     (H) 11/18/2015     Lab Results   Component Value Date    CREATININE 1.18 (H) 09/17/2022        Imaging and other studies: I have personally reviewed pertinent reports.    CTA PE 9/17/2022  Radiology findings:  Cardiovascular/Pulmonary Arteries: No central or first segmental pulmonary   arterial filling defects are seen to suggest embolus. Thoracic aorta is normal   caliber and also enhances normally. Heart normal size without pericardial   effusion.  Status post cardiac surgery with coronary bypass.   Lungs/Pleura: Mild hyper aeration with moderate upper lobe predominant   centrilobular emphysema. No suspicious pulmonary nodule or mass, " noting minimal   consolidation right lung base laterally atelectasis.  Mediastinum/Lymph nodes: No suspiciously enlarged mediastinal, hilar, axillary,   or supraclavicular nodes. Imaged thyroid gland grossly normal.  Chest Wall: Within normal limits.   Upper abdomen/Other: The visualized portions of the upper abdomen demonstrate no   significant abnormality.  Bones: No acute osseous abnormality. No displaced rib fractures. Well-healed   midline sternotomy.     Pulmonary Function Testing: I have personally reviewed pertinent reports.   and I have personally reviewed pertinent films in PACS  7/20/2018:  Moderate COPD with no significant change after bronchodilator     Benjamin Hua MD  [facility]   Additional Notes: Spoke with pt regarding BBL in the future. Detail Level: Simple Additional Notes: Advised pt to try OTC liquid nitrogen sticks or apple cider vinegar. Additional Notes: Discussed laser treatment

## 2024-01-31 NOTE — TELEPHONE ENCOUNTER
PA for BREZTRI    Submitted via  [x]CMM-KEY [id number]      Office notes sent, clinical questions answered. Awaiting determination

## 2024-02-01 ENCOUNTER — NURSE TRIAGE (OUTPATIENT)
Age: 54
End: 2024-02-01

## 2024-02-01 NOTE — TELEPHONE ENCOUNTER
Dr. Hua. Sorry for the confusion.I called Kita back to clarify order. She is asking why the 24 hour is ordered as patient is currently on oxygen 24 hours a day. Thank you.

## 2024-02-01 NOTE — TELEPHONE ENCOUNTER
"Kita from [facility] calling. Needs's clarification on overnight  24 hour oximetry test that was ordered on 1/31/24 by Dr. Benjamin Hua. She is also requesting most recent office note. Kita's contact number is [phone].   Answer Assessment - Initial Assessment Questions  1. REASON FOR CALL or QUESTION: \"What is your reason for calling today?\" or \"How can I best help you?\" or \"What question do you have that I can help answer?\"      Kita from [facility] called to confirm order    Protocols used: Information Only Call - No Triage-ADULT-[place]    "

## 2024-02-01 NOTE — TELEPHONE ENCOUNTER
I am not quite sure what you are asking for.  The office notes were completed yesterday.  An order has been placed for an overnight pulse ox.  Please clarify the question.      Thanks  Colin

## 2024-02-01 NOTE — TELEPHONE ENCOUNTER
No worries. The patient stated she only using the oxygen at night.  She had an in office pulse oximetry which showed no need for oxygen during they day.

## 2024-02-02 NOTE — TELEPHONE ENCOUNTER
PA for BREZTRI Approved   Date(s) approved UNTIL 01/31/2025      Patient advised by [] [employer] Message                      [x] Phone call/LM      Pharmacy advised by [x]Fax                                     []Phone call    Approval letter scanned into Media Yes

## 2024-03-13 ENCOUNTER — OFFICE VISIT (OUTPATIENT)
Dept: FAMILY MEDICINE CLINIC | Facility: CLINIC | Age: 54
End: 2024-03-13
Payer: COMMERCIAL

## 2024-03-13 VITALS
BODY MASS INDEX: 21.69 KG/M2 | WEIGHT: 134.4 LBS | OXYGEN SATURATION: 97 % | DIASTOLIC BLOOD PRESSURE: 82 MMHG | TEMPERATURE: 97.2 F | SYSTOLIC BLOOD PRESSURE: 122 MMHG | HEART RATE: 85 BPM

## 2024-03-13 DIAGNOSIS — R73.03 PREDIABETES: ICD-10-CM

## 2024-03-13 DIAGNOSIS — E55.9 VITAMIN D DEFICIENCY: ICD-10-CM

## 2024-03-13 DIAGNOSIS — K21.9 GASTROESOPHAGEAL REFLUX DISEASE, UNSPECIFIED WHETHER ESOPHAGITIS PRESENT: Chronic | ICD-10-CM

## 2024-03-13 DIAGNOSIS — F32.A DEPRESSION, UNSPECIFIED DEPRESSION TYPE: Chronic | ICD-10-CM

## 2024-03-13 DIAGNOSIS — Z95.1 S/P CABG X 1: ICD-10-CM

## 2024-03-13 DIAGNOSIS — Z59.86 FINANCIAL INSECURITY: ICD-10-CM

## 2024-03-13 DIAGNOSIS — Z76.89 ENCOUNTER FOR SUPPORT AND COORDINATION OF TRANSITION OF CARE: Primary | ICD-10-CM

## 2024-03-13 DIAGNOSIS — I10 ESSENTIAL HYPERTENSION, BENIGN: ICD-10-CM

## 2024-03-13 DIAGNOSIS — K21.9 CHRONIC GERD: ICD-10-CM

## 2024-03-13 DIAGNOSIS — E78.5 DYSLIPIDEMIA: ICD-10-CM

## 2024-03-13 PROCEDURE — T1015 CLINIC SERVICE: HCPCS | Performed by: FAMILY MEDICINE

## 2024-03-13 PROCEDURE — 99495 TRANSJ CARE MGMT MOD F2F 14D: CPT

## 2024-03-13 RX ORDER — LIDOCAINE 4 G/100G
PATCH TOPICAL
COMMUNITY
Start: 2024-01-12

## 2024-03-13 RX ORDER — HYDROCODONE BITARTRATE AND ACETAMINOPHEN 5; 325 MG/1; MG/1
1-2 TABLET ORAL EVERY 6 HOURS PRN
COMMUNITY
Start: 2024-03-03

## 2024-03-13 RX ORDER — ONDANSETRON 4 MG/1
4 TABLET, FILM COATED ORAL EVERY 6 HOURS PRN
COMMUNITY
Start: 2024-03-03 | End: 2024-03-13

## 2024-03-13 RX ORDER — ATORVASTATIN CALCIUM 80 MG/1
80 TABLET, FILM COATED ORAL DAILY
Qty: 30 TABLET | Refills: 4 | Status: SHIPPED | OUTPATIENT
Start: 2024-03-13

## 2024-03-13 RX ORDER — NITROGLYCERIN 0.4 MG/1
0.4 TABLET SUBLINGUAL
Qty: 30 TABLET | Refills: 2 | Status: SHIPPED | OUTPATIENT
Start: 2024-03-13

## 2024-03-13 RX ORDER — ASPIRIN 81 MG/1
81 TABLET ORAL DAILY
Qty: 30 TABLET | Refills: 4 | Status: SHIPPED | OUTPATIENT
Start: 2024-03-13

## 2024-03-13 RX ORDER — PANTOPRAZOLE SODIUM 40 MG/1
40 TABLET, DELAYED RELEASE ORAL 2 TIMES DAILY
Qty: 60 TABLET | Refills: 2 | Status: SHIPPED | OUTPATIENT
Start: 2024-03-13

## 2024-03-13 SDOH — ECONOMIC STABILITY - INCOME SECURITY: FINANCIAL INSECURITY: Z59.86

## 2024-03-13 NOTE — PROGRESS NOTES
FAMILY MEDICINE OFFICE VISIT  [facility]      NAME: Bambi Zacarias  AGE: 54 y.o. SEX: female    DATE OF ENCOUNTER: 3/13/2024    Assessment and Plan     1. Encounter for support and coordination of transition of care  Comments:  Reports feeling better after discharge    2. Essential hypertension, benign  Comments:  BP stable  continue current  Orders:  -     aspirin (Aspirin Low Dose) 81 mg EC tablet; Take 1 tablet (81 mg total) by mouth daily  -     metoprolol tartrate (LOPRESSOR) 25 mg tablet; Take 1 tablet (25 mg total) by mouth every 12 (twelve) hours    3. Gastroesophageal reflux disease, unspecified whether esophagitis present  Comments:  On PPI therapy    4. Chronic GERD  Comments:  pantoprazole 40 mg PO twice daily  Orders:  -     pantoprazole (PROTONIX) 40 mg tablet; Take 1 tablet (40 mg total) by mouth 2 (two) times a day    5. CAD S/P CABG x 1 2016  Comments:  No acute symptoms  follwoing up with cardiology  Orders:  -     nitroglycerin (NITROSTAT) 0.4 mg SL tablet; Place 1 tablet (0.4 mg total) under the tongue every 5 (five) minutes as needed for chest pain    6. Dyslipidemia  -     atorvastatin (LIPITOR) 80 mg tablet; Take 1 tablet (80 mg total) by mouth daily    7. Prediabetes  Comments:  Lifestyle modifications  Low carb diet  Follow up in 6 months    8. Vitamin D deficiency  Comments:  Continue Vitamin D supplements    9. Depression, unspecified depression type  Comments:  Previously on Bupropion 150 mg and Escitalopram 5 mg daily  Lost follow up   Currently off of medication  Agreed to discuss during next visit    10. Financial insecurity  -     Ambulatory Referral to Social Work Care Management Program; Future; Expected date: 03/20/2024        Bambi Zacarias is 54 y.o. female presented to the office for TCM follow up visit. No etiology identified explaining splenic infarct during hospital visit. CTA abdomen negative for evidence of vessel occlusion. Echo normal. EGD with  gastritis changes. Biopsy results reviewed and were negative for dysplasia and metaplasia. Patient was discharged on twice daily pantoprazole 40 mg before food and Gastroenterology and general surgery follow up in one week.  Reviewed and updated medications with patient. Refills provided as requested.  Plan to discuss with patient's cardiologist regarding even monitor for rhythm abnormalities. Patient and her daughter agreed to the plan.     Follow up in one week for depression.   Follow up in 2 weeks for annual physical.     Chief Complaint     No chief complaint on file.      History of Present Illness     Patient is 55 yo F with CAD s/p CABG presented to the office with daughter for hospital follow up visit. She is admitted at [facility] from 12/26 to 03/03/2024 with splenic infarction. She went to ED with left upper quadrant abdominal pain, 2 weeks ago and  found to have high fever of 103 F in the ED. On CT abdomen found to have splenic infarction.  CTA abdomen negative for evidence of vessel occlusion. Echo normal. EGD with gastritis changes. Biopsy results reviewed and were negative for dysplasia and metaplasia. Patient was discharged on twice daily pantoprazole 40 mg before food and Gastroenterology and general surgery follow up in one week. Patient haven't seen general surgery yet, she will schedule for the visit. Patient reports her abdominal pain improving gradually. Not using Norco given at the time of discharge. Not using Zofran.  Denies nausea, vomiting, fever, chills, chest pain, SOB, lower extremity edema, change in urination and bowel moment. No h/o rhythm disorders, syncopal episodes, lightheadedness, palpitations.         The following portions of the patient's history were reviewed and updated as appropriate: allergies, current medications, past family history, past medical history, past social history, past surgical history and problem list.    Review of Systems     Review of  Systems   Constitutional:  Positive for activity change. Negative for appetite change, chills, fever and unexpected weight change.   HENT:  Negative for trouble swallowing and voice change.    Eyes:  Negative for pain and visual disturbance.   Respiratory:  Negative for cough, chest tightness, shortness of breath and wheezing.    Cardiovascular:  Negative for chest pain and palpitations.   Gastrointestinal:  Positive for abdominal pain. Negative for nausea and vomiting.   Genitourinary:  Negative for difficulty urinating.   Musculoskeletal:  Negative for gait problem.   Skin:  Negative for rash.   Neurological:  Negative for light-headedness.   Psychiatric/Behavioral:  The patient is not nervous/anxious.    All other systems reviewed and are negative.      Active Problem List     Patient Active Problem List   Diagnosis    Smoker    Non-ST elevation MI (NSTEMI) (HCC)    CAD S/P CABG x 1 2016    GERD (gastroesophageal reflux disease)    Depression    Brain TIA    Leg pain    Left carotid stenosis    Atherosclerotic heart disease of native coronary artery without angina pectoris    Cardiomyopathy, dilated (HCC)    Emphysema with chronic bronchitis    Ischemic cardiomyopathy    Raynaud's syndrome without gangrene    Thoracic outlet syndrome    Vitamin D deficiency    Sleep-related breathing disorder    Insomnia    Restless leg syndrome    Sleep walking    Hypersomnia    Chronic pain disorder    Shoulder pain, left    Burning chest pain    Dyspepsia    Headache syndrome    Closed nondisplaced fracture of greater tuberosity of right humerus    Body mass index (BMI) 19.9 or less, adult    Dyslipidemia    Coronary artery disease involving native heart without angina pectoris    Essential hypertension, benign    Chronic GERD    RBBB    Chronic hypoxemic respiratory failure (HCC)    Prediabetes       Objective     /82 (BP Location: Left arm, Patient Position: Sitting, Cuff Size: Standard)   Pulse 85   Temp (!) 97.2 °F  (36.2 °C)   Wt 61 kg (134 lb 6.4 oz)   LMP 10/14/2015 (LMP Unknown)   SpO2 97%   BMI 21.69 kg/m²     Physical Exam  Vitals and nursing note reviewed. Exam conducted with a chaperone present.   Constitutional:       General: She is not in acute distress.     Appearance: Normal appearance.   HENT:      Head: Normocephalic and atraumatic.      Right Ear: External ear normal.      Left Ear: External ear normal.      Nose: No congestion or rhinorrhea.      Mouth/Throat:      Mouth: Mucous membranes are moist.      Pharynx: Oropharynx is clear.   Eyes:      Extraocular Movements: Extraocular movements intact.      Conjunctiva/sclera: Conjunctivae normal.   Cardiovascular:      Rate and Rhythm: Normal rate and regular rhythm.      Pulses: Normal pulses.      Heart sounds: Normal heart sounds.   Pulmonary:      Effort: Pulmonary effort is normal.      Breath sounds: Normal breath sounds. No wheezing.   Abdominal:      General: Bowel sounds are normal.      Palpations: Abdomen is soft.      Tenderness: There is abdominal tenderness (LUQ tenderness, no skin changes noted). There is no guarding or rebound.   Musculoskeletal:      Cervical back: Neck supple.      Right lower leg: No edema.      Left lower leg: No edema.   Skin:     General: Skin is warm and dry.      Capillary Refill: Capillary refill takes less than 2 seconds.   Neurological:      Mental Status: She is alert and oriented to person, place, and time. Mental status is at baseline.   Psychiatric:         Behavior: Behavior normal.         Pertinent Laboratory/Diagnostic Studies:  CBC:   Lab Results   Component Value Date/Time    WBC 17.82 (H) 07/06/2022 10:35 PM    WBC 11.01 (H) 11/18/2015 01:45 PM    RBC 4.70 07/06/2022 10:35 PM    RBC 4.61 11/18/2015 01:45 PM    HGB 13.8 07/06/2022 10:35 PM    HGB 13.6 11/18/2015 01:45 PM    HCT 42.7 07/06/2022 10:35 PM    HCT 41.3 11/18/2015 01:45 PM    MCV 91 07/06/2022 10:35 PM    MCV 90 11/18/2015 01:45 PM    MCH 29.4  07/06/2022 10:35 PM    MCH 29.5 11/18/2015 01:45 PM    MCHC 32.3 07/06/2022 10:35 PM    MCHC 32.9 11/18/2015 01:45 PM    RDW 13.8 07/06/2022 10:35 PM    RDW 14.6 11/18/2015 01:45 PM    MPV 9.6 07/06/2022 10:35 PM    MPV 10.0 11/18/2015 01:45 PM     07/06/2022 10:35 PM     (H) 11/18/2015 01:45 PM    NRBC 0 07/06/2022 10:35 PM    NEUTOPHILPCT 73 07/06/2022 10:35 PM    NEUTOPHILPCT 64 11/18/2015 01:45 PM    LYMPHOPCT 22 07/06/2022 10:35 PM    LYMPHOPCT 26 11/18/2015 01:45 PM    MONOPCT 3 (L) 07/06/2022 10:35 PM    MONOPCT 8 11/18/2015 01:45 PM    EOSPCT 0 07/06/2022 10:35 PM    EOSPCT 1 11/18/2015 01:45 PM    BASOPCT 1 07/06/2022 10:35 PM    BASOPCT 1 11/18/2015 01:45 PM    NEUTROABS 13.15 (H) 07/06/2022 10:35 PM    NEUTROABS 7.05 11/18/2015 01:45 PM    LYMPHSABS 3.90 07/06/2022 10:35 PM    LYMPHSABS 2.86 11/18/2015 01:45 PM    MONOSABS 0.50 07/06/2022 10:35 PM    MONOSABS 0.88 11/18/2015 01:45 PM    EOSABS 0.04 07/06/2022 10:35 PM    EOSABS 0.11 11/18/2015 01:45 PM     Chemistry Profile:   Lab Results   Component Value Date/Time     11/18/2015 01:45 PM    K 3.4 (L) 02/29/2024 04:01 AM     02/29/2024 04:01 AM    CO2 19 (L) 02/29/2024 04:01 AM    ANIONGAP 6 11/18/2015 01:45 PM    BUN 7 02/29/2024 04:01 AM    CREATININE 0.89 02/29/2024 04:01 AM    GLUC 79 02/29/2024 04:01 AM    GLUF 88 05/26/2022 01:35 PM    GLUCOSE 101 11/20/2017 09:17 PM    GLUCOSE 104 11/18/2015 01:45 PM    CALCIUM 8.2 (L) 02/29/2024 04:01 AM    MG 1.7 02/29/2024 04:01 AM    PHOS 2.4 (L) 08/19/2018 04:29 AM    PHOS 3.2 07/19/2015 04:55 AM    AST 15 02/26/2024 05:39 PM    ALT 3 02/26/2024 05:39 PM    ALKPHOS 125 (H) 02/26/2024 05:39 PM    PROT 7.3 11/18/2015 01:45 PM    BILITOT 0.21 11/18/2015 01:45 PM    EGFR 77 02/29/2024 04:01 AM    EGFR 79 12/29/2020 06:50 PM     Coagulation Studies:   Lab Results   Component Value Date/Time    PROTIME 13.0 07/06/2022 10:35 PM    PROTIME 13.6 07/14/2015 04:50 AM    INR 1.0 02/29/2024 04:01  AM    PTT 28 07/06/2022 10:35 PM    PTT 30 07/13/2015 10:53 AM     Endocrine Studies:   Lab Results   Component Value Date/Time    HGBA1C 5.8 (H) 02/29/2024 04:01 AM    YDE6LUWRYNJD 0.720 05/26/2022 01:35 PM    CSA6EOCEJSMI 0.343 (L) 07/14/2015 04:50 AM    T3FREE 2.4 07/14/2015 09:45 AM    W0LFBSS 1.2 08/28/2013 02:15 PM    FREET4 1.0 07/14/2015 09:45 AM    TRIG 64 05/26/2022 01:35 PM    TRIG 65 07/14/2015 04:50 AM    CHOL 71 07/14/2015 04:50 AM    CHOLESTEROL 112 05/26/2022 01:35 PM    HDL 42 (L) 05/26/2022 01:35 PM    HDL 25 07/14/2015 04:50 AM    LDLCALC 57 05/26/2022 01:35 PM    LDLCALC 33 07/14/2015 04:50 AM    WQAJ26UTEVZR 17.3 (L) 05/26/2022 01:35 PM    LOME14HFMJOE 4.6 (L) 07/16/2015 05:20 AM         (ABNORMAL) EBV AB PROFILE (02/28/2024 2:01 PM EST)  Component Value Ref Range Test Method Analysis Time Performed At Pathologist Signature   EBV VCA IgM Ab 29.8 <36.0 U/mL  02/29/2024 10:57 AM EST [facility]    Comment:  Suggest recollection in 2 to 4 weeks if clinically indicated.  Interpretation  Negative          <36.0  Equivocal     36.0-43.9  Positive          >43.9     EBV VCA IgG Ab >750.0 (H) <18.0 U/mL  02/29/2024 11:07 AM EST [facility]    Comment:  Interpretation  Negative          <18.0  Equivocal     18.0-21.9  Positive          >21.9     EBV EA Ab >150.0 (H) <9.0 U/mL  02/29/2024 11:07 AM EST [facility]    Comment:  Interpretation  Negative          <9.0  Equivocal     9.0-10.9  Positive          >10.9     EBV EBNA Ab 81.7 (H) <18.0 U/mL  02/29/2024 10:49 AM EST [facility]    Comment:  Interpretation  Negative          <18.0  Equivocal     18.0-21.9  Positive          >21.9     Interpretation SEE NOTES   02/29/2024 11:07 AM EST [facility]    Comment: Serological results compatible with past EBV infection.         CTA of the abdomen without and with IV contrast    Comparison: 2/26/2024 and 9/12/2020.    Findings:    Vasculature:  The abdominal aorta is  normal caliber, without evidence of dissection or  intramural hematoma. No evidence of abdominal aortic aneurysm or acute  retroperitoneal hemorrhage. Minimal calcified atherosclerotic plaque is noted in  the distal abdominal aorta and common iliac arteries bilaterally.    Celiac trunk is unremarkable. The hepatic artery is within normal limits. No  main splenic artery is demonstrated. There are small branches feeding the  pancreas, which appeared to arise from the celiac trunk. The superior mesenteric  artery is unremarkable. The visualized portions of inferior mesenteric artery  are also unremarkable.    Single main renal arteries bilaterally are within normal limits. A tiny  accessory left renal artery is noted feeding the lower pole of left kidney.    Abdomen:    Liver: Normal.  Gallbladder/Bile ducts: Normal.  Spleen: Previous splenic infarct is again noted, somewhat decreased in  conspicuity. The splenic vein is unremarkable.    Pancreas: Normal.  Adrenal glands: Normal.  Kidneys: Normal.  Bowel: The visualized portions of the bowel are unremarkable, without evidence  of obstruction or focal bowel wall thickening. No evidence of pneumatosis or  free air.    Mesentery/Lymph nodes: Normal.  Abdominal Wall: Normal.    Bones: No acute osseous abnormality.  Lung Bases: Normal.    IMPRESSION:  Impression:  1. No main splenic artery is demonstrated. There are small branches feeding the  pancreas, which appeared to arise from the celiac trunk. This may reflect a  congenital variant.  2. Previous splenic infarct appears slightly decreased in conspicuity.  3. No other acute abnormality.    CT examination performed with dose lowering protocol in accordance with ALARA     Current Medications     Current Outpatient Medications:     albuterol (2.5 mg/3 mL) 0.083 % nebulizer solution, Take 3 mL (2.5 mg total) by nebulization every 6 (six) hours as needed for wheezing or shortness of breath, Disp: 180 mL, Rfl: 5    albuterol  (Ventolin HFA) 90 mcg/act inhaler, Inhale 2 puffs every 6 (six) hours as needed for wheezing, Disp: 18 g, Rfl: 11    aspirin (Aspirin Low Dose) 81 mg EC tablet, Take 1 tablet (81 mg total) by mouth daily, Disp: 30 tablet, Rfl: 4    atorvastatin (LIPITOR) 80 mg tablet, Take 1 tablet (80 mg total) by mouth daily, Disp: 30 tablet, Rfl: 4    Budeson-Glycopyrrol-Formoterol (Breztri Aerosphere) 160-9-4.8 MCG/ACT AERO, Inhale 2 puffs every 12 (twelve) hours Rinse mouth after use., Disp: 10.7 g, Rfl: 11    doxepin (SINEquan) 10 mg capsule, Take 1 capsule (10 mg total) by mouth daily at bedtime, Disp: 90 capsule, Rfl: 1    ergocalciferol (VITAMIN D2) 50,000 units, Take 1 capsule (50,000 Units total) by mouth once a week, Disp: 13 capsule, Rfl: 1    escitalopram (LEXAPRO) 5 mg tablet, Take 1 tablet (5 mg total) by mouth daily, Disp: 30 tablet, Rfl: 0    GNP Lidocaine Pain Relief 4 % PTCH, , Disp: , Rfl:     HYDROcodone-acetaminophen (NORCO) 5-325 mg per tablet, Take 1-2 tablets by mouth every 6 (six) hours as needed, Disp: , Rfl:     metoprolol tartrate (LOPRESSOR) 25 mg tablet, Take 1 tablet (25 mg total) by mouth every 12 (twelve) hours, Disp: 60 tablet, Rfl: 3    nitroglycerin (NITROSTAT) 0.4 mg SL tablet, Place 1 tablet (0.4 mg total) under the tongue every 5 (five) minutes as needed for chest pain, Disp: 30 tablet, Rfl: 2    oxyCODONE-acetaminophen (PERCOCET) 5-325 mg per tablet, , Disp: , Rfl:     pantoprazole (PROTONIX) 40 mg tablet, Take 1 tablet (40 mg total) by mouth 2 (two) times a day, Disp: 60 tablet, Rfl: 2    buPROPion (Wellbutrin SR) 150 mg 12 hr tablet, Take 1 tablet (150 mg total) by mouth 2 (two) times a day (Patient not taking: Reported on 1/31/2024), Disp: 180 tablet, Rfl: 2    clopidogrel (PLAVIX) 75 mg tablet, Take 1 tablet (75 mg total) by mouth daily (Patient not taking: Reported on 1/31/2024), Disp: 30 tablet, Rfl: 3    ondansetron (ZOFRAN) 4 mg tablet, Take 4 mg by mouth every 6 (six) hours as  needed (Patient not taking: Reported on 3/13/2024), Disp: , Rfl:     Health Maintenance     Health Maintenance   Topic Date Due    Pneumococcal Vaccine: Pediatrics (0 to 5 Years) and At-Risk Patients (6 to 64 Years) (1 of 2 - PCV) Never done    DTaP,Tdap,and Td Vaccines (1 - Tdap) Never done    Breast Cancer Screening: Mammogram  Never done    Colorectal Cancer Screening  07/14/2016    Osteoporosis Screening  Never done    Zoster Vaccine (1 of 2) Never done    Depression Screening  06/16/2022    Annual Physical  05/10/2023    Influenza Vaccine (1) Never done    COVID-19 Vaccine (3 - 2023-24 season) 09/01/2023    BMI: Adult  01/31/2025    HIV Screening  Completed    Hepatitis C Screening  Completed    HIB Vaccine  Aged Out    IPV Vaccine  Aged Out    Hepatitis A Vaccine  Aged Out    Meningococcal ACWY Vaccine  Aged Out    HPV Vaccine  Aged Out     Immunization History   Administered Date(s) Administered    COVID-19 PFIZER VACCINE 0.3 ML IM 05/12/2021, 06/02/2021       Addendum:  Referral placed to social work for financial insecurity.     Tierra Correa MD   Family Medicine  PGY- 3  3/13/2024 1:24 PM

## 2024-03-15 ENCOUNTER — PATIENT OUTREACH (OUTPATIENT)
Dept: FAMILY MEDICINE CLINIC | Facility: CLINIC | Age: 54
End: 2024-03-15

## 2024-03-15 DIAGNOSIS — Z78.9 NEEDS ASSISTANCE WITH COMMUNITY RESOURCES: Primary | ICD-10-CM

## 2024-03-15 NOTE — PROGRESS NOTES
CON BARAHONA received a referral regarding patients need for financial assistance. CON BARAHONA spoke with patient at this time who states she is behind on her [facility] [employer] bill and has received a shut off notice. Date of shut off is listed as April 1st. Patient denies being behind on any other bills at this time.    Patient states that she would like to see if PCP can complete Medical Cert Form to help avoid shut off for at least 30 days. Patient states she called into [facility] and asked for an On Track application but no one has called her back and she did not receive one via mail. CON BARAHONA will place [facility] referral for assistance with the [facility] On Track application process.     CON BARAHONA contacted [facility] to  request a form be faxed to our office for completion. CON BARAHONA was informed that unfortunately patient has exceeded the amount of medical certification forms allowed and they will no longer accept any medical cert forms for pt this year.

## 2024-03-20 ENCOUNTER — OFFICE VISIT (OUTPATIENT)
Dept: FAMILY MEDICINE CLINIC | Facility: CLINIC | Age: 54
End: 2024-03-20
Payer: COMMERCIAL

## 2024-03-20 VITALS
HEART RATE: 83 BPM | WEIGHT: 134 LBS | OXYGEN SATURATION: 98 % | SYSTOLIC BLOOD PRESSURE: 122 MMHG | BODY MASS INDEX: 21.53 KG/M2 | HEIGHT: 66 IN | DIASTOLIC BLOOD PRESSURE: 80 MMHG | RESPIRATION RATE: 18 BRPM | TEMPERATURE: 98.8 F

## 2024-03-20 DIAGNOSIS — Z12.31 ENCOUNTER FOR SCREENING MAMMOGRAM FOR MALIGNANT NEOPLASM OF BREAST: ICD-10-CM

## 2024-03-20 DIAGNOSIS — N95.1 MENOPAUSAL STATE: ICD-10-CM

## 2024-03-20 DIAGNOSIS — F32.5 DEPRESSION, MAJOR, IN REMISSION (HCC): Primary | ICD-10-CM

## 2024-03-20 DIAGNOSIS — D73.5 SPLENIC INFARCT: ICD-10-CM

## 2024-03-20 DIAGNOSIS — Z12.11 COLON CANCER SCREENING: ICD-10-CM

## 2024-03-20 PROCEDURE — 99213 OFFICE O/P EST LOW 20 MIN: CPT

## 2024-03-20 PROCEDURE — T1015 CLINIC SERVICE: HCPCS | Performed by: FAMILY MEDICINE

## 2024-03-20 NOTE — PROGRESS NOTES
FAMILY MEDICINE OFFICE VISIT  [facility]      NAME: Bambi Zacarias  AGE: 54 y.o. SEX: female    DATE OF ENCOUNTER: 3/20/2024    Assessment and Plan     1. Depression, major, in remission (HCC)  Comments:  Not on medicatoi from last 2 years  not with behavioral therapy  PHQ-9 with score of 3 mild to no depression  No SI HI  Monitor off of medication    2. Splenic infarct  Assessment & Plan:  LUQ pain improving.  Encouraged to f/up with general surgery, gastroenterology as recommended at the time of hospital discharge.  Consult placed to cardiology for further evaluation     Orders:  -     Ambulatory Referral to Cardiology; Future; Expected date: 04/03/2024    3. Colon cancer screening  -     Ambulatory Referral to Gastroenterology; Future; Expected date: 04/03/2024    4. Encounter for screening mammogram for malignant neoplasm of breast  -     Mammo screening bilateral w 3d & cad; Future    5. Menopausal state  -     DXA bone density spine hip and pelvis; Future; Expected date: 04/03/2024          Bambi Zacarias is 54 y.o. female with HTN, CAD, status post CABG, TIA, dilated cardiomyopathy presented to the office for follow-up visit.  Patient is currently off of medication from last 2 years.  She never received behavioral therapy.  Initially on depression medication at the time of her CABG in 2016. PHQ-9 with score of 3 with no or minimal depression.  Denies SI HI at this time.  Will monitor off of medication.  Advised to follow-up with gastroenterology for splenic infarction and colonoscopy as part of the colon cancer screening.  Recommend following up with cardiology for extended heart rhythm monitoring to rule out arrhythmia as a cause for splenic infarction.    Follow-up for annual physical.      Chief Complaint     Chief Complaint   Patient presents with    Follow-up     Patient is here for a 1 week f/u- no complaints       History of Present Illness     Bambi Zacarias is 54 y.o. female with HTN,  CAD, status post CABG, TIA, dilated cardiomyopathy presented to the office for follow-up visit.  Patient is currently off of medication from last 2 years.  She never received behavioral therapy.  Initially on depression medication at the time of her CABG in 2016.             The following portions of the patient's history were reviewed and updated as appropriate: allergies, current medications, past family history, past medical history, past social history, past surgical history and problem list.    Review of Systems     Review of Systems   Constitutional:  Negative for activity change, appetite change, chills, fever and unexpected weight change.   HENT:  Negative for trouble swallowing and voice change.    Eyes:  Negative for pain and visual disturbance.   Respiratory:  Negative for cough, chest tightness, shortness of breath and wheezing.    Cardiovascular:  Negative for chest pain and palpitations.   Gastrointestinal:  Positive for abdominal pain (mild abdominal pian at LUQ, improving since discharge). Negative for nausea and vomiting.   Genitourinary:  Negative for difficulty urinating.   Musculoskeletal:  Negative for gait problem.   Skin:  Negative for rash.   Neurological:  Negative for light-headedness.   Psychiatric/Behavioral:  The patient is not nervous/anxious.    All other systems reviewed and are negative.      Active Problem List     Patient Active Problem List   Diagnosis    Smoker    Non-ST elevation MI (NSTEMI) (HCC)    CAD S/P CABG x 1 2016    GERD (gastroesophageal reflux disease)    Depression    Brain TIA    Leg pain    Left carotid stenosis    Atherosclerotic heart disease of native coronary artery without angina pectoris    Cardiomyopathy, dilated (HCC)    Emphysema with chronic bronchitis    Ischemic cardiomyopathy    Raynaud's syndrome without gangrene    Thoracic outlet syndrome    Vitamin D deficiency    Sleep-related breathing disorder    Insomnia    Restless leg syndrome    Sleep  "walking    Hypersomnia    Chronic pain disorder    Shoulder pain, left    Burning chest pain    Dyspepsia    Headache syndrome    Closed nondisplaced fracture of greater tuberosity of right humerus    Body mass index (BMI) 19.9 or less, adult    Dyslipidemia    Coronary artery disease involving native heart without angina pectoris    Essential hypertension, benign    Chronic GERD    RBBB    Chronic hypoxemic respiratory failure (HCC)    Prediabetes    Splenic infarct    Depression, major, in remission (HCC)    Menopausal state       Objective     /80   Pulse 83   Temp 98.8 °F (37.1 °C)   Resp 18   Ht 5' 6\" (1.676 m)   Wt 60.8 kg (134 lb)   LMP 10/14/2015 (LMP Unknown)   SpO2 98%   BMI 21.63 kg/m²     Physical Exam  Vitals and nursing note reviewed. Exam conducted with a chaperone present.   Constitutional:       General: She is not in acute distress.     Appearance: Normal appearance.   HENT:      Head: Normocephalic and atraumatic.      Right Ear: External ear normal.      Left Ear: External ear normal.      Nose: No congestion or rhinorrhea.      Mouth/Throat:      Mouth: Mucous membranes are moist.      Pharynx: Oropharynx is clear.   Eyes:      Extraocular Movements: Extraocular movements intact.      Conjunctiva/sclera: Conjunctivae normal.   Cardiovascular:      Rate and Rhythm: Normal rate and regular rhythm.      Pulses: Normal pulses.      Heart sounds: Normal heart sounds. No murmur heard.  Pulmonary:      Effort: Pulmonary effort is normal.      Breath sounds: Normal breath sounds. No wheezing.   Abdominal:      General: Bowel sounds are normal.      Palpations: Abdomen is soft.      Tenderness: There is no abdominal tenderness.   Musculoskeletal:      Cervical back: Neck supple.      Right lower leg: No edema.      Left lower leg: No edema.   Skin:     General: Skin is warm and dry.      Capillary Refill: Capillary refill takes less than 2 seconds.   Neurological:      General: No focal " deficit present.      Mental Status: She is alert and oriented to person, place, and time.   Psychiatric:         Behavior: Behavior normal.       PHQ-2/9 Depression Screening    Little interest or pleasure in doing things: 1 - several days  Feeling down, depressed, or hopeless: 0 - not at all  Trouble falling or staying asleep, or sleeping too much: 0 - not at all  Feeling tired or having little energy: 1 - several days  Poor appetite or overeatin - not at all  Feeling bad about yourself - or that you are a failure or have let yourself or your family down: 0 - not at all  Trouble concentrating on things, such as reading the newspaper or watching television: 0 - not at all  Moving or speaking so slowly that other people could have noticed. Or the opposite - being so fidgety or restless that you have been moving around a lot more than usual: 1 - several days  Thoughts that you would be better off dead, or of hurting yourself in some way: 0 - not at all  PHQ-9 Score: 3  PHQ-9 Interpretation: No or Minimal depression         Current Medications     Current Outpatient Medications:     albuterol (2.5 mg/3 mL) 0.083 % nebulizer solution, Take 3 mL (2.5 mg total) by nebulization every 6 (six) hours as needed for wheezing or shortness of breath, Disp: 180 mL, Rfl: 5    albuterol (Ventolin HFA) 90 mcg/act inhaler, Inhale 2 puffs every 6 (six) hours as needed for wheezing, Disp: 18 g, Rfl: 11    aspirin (Aspirin Low Dose) 81 mg EC tablet, Take 1 tablet (81 mg total) by mouth daily, Disp: 30 tablet, Rfl: 4    atorvastatin (LIPITOR) 80 mg tablet, Take 1 tablet (80 mg total) by mouth daily, Disp: 30 tablet, Rfl: 4    Budeson-Glycopyrrol-Formoterol (Breztri Aerosphere) 160-9-4.8 MCG/ACT AERO, Inhale 2 puffs every 12 (twelve) hours Rinse mouth after use., Disp: 10.7 g, Rfl: 11    clopidogrel (PLAVIX) 75 mg tablet, Take 1 tablet (75 mg total) by mouth daily (Patient not taking: Reported on 2024), Disp: 30 tablet, Rfl: 3     doxepin (SINEquan) 10 mg capsule, Take 1 capsule (10 mg total) by mouth daily at bedtime, Disp: 90 capsule, Rfl: 1    ergocalciferol (VITAMIN D2) 50,000 units, Take 1 capsule (50,000 Units total) by mouth once a week, Disp: 13 capsule, Rfl: 1    GNP Lidocaine Pain Relief 4 % PTCH, , Disp: , Rfl:     HYDROcodone-acetaminophen (NORCO) 5-325 mg per tablet, Take 1-2 tablets by mouth every 6 (six) hours as needed, Disp: , Rfl:     metoprolol tartrate (LOPRESSOR) 25 mg tablet, Take 1 tablet (25 mg total) by mouth every 12 (twelve) hours, Disp: 60 tablet, Rfl: 3    nitroglycerin (NITROSTAT) 0.4 mg SL tablet, Place 1 tablet (0.4 mg total) under the tongue every 5 (five) minutes as needed for chest pain, Disp: 30 tablet, Rfl: 2    oxyCODONE-acetaminophen (PERCOCET) 5-325 mg per tablet, , Disp: , Rfl:     pantoprazole (PROTONIX) 40 mg tablet, Take 1 tablet (40 mg total) by mouth 2 (two) times a day, Disp: 60 tablet, Rfl: 2    Health Maintenance     Health Maintenance   Topic Date Due    Pneumococcal Vaccine: Pediatrics (0 to 5 Years) and At-Risk Patients (6 to 64 Years) (1 of 2 - PCV) Never done    DTaP,Tdap,and Td Vaccines (1 - Tdap) Never done    Breast Cancer Screening: Mammogram  Never done    Colorectal Cancer Screening  07/14/2016    Osteoporosis Screening  Never done    Zoster Vaccine (1 of 2) Never done    Annual Physical  05/10/2023    Influenza Vaccine (1) Never done    COVID-19 Vaccine (3 - 2023-24 season) 09/01/2023    Depression Screening  03/20/2025    BMI: Adult  03/20/2025    HIV Screening  Completed    Hepatitis C Screening  Completed    HIB Vaccine  Aged Out    IPV Vaccine  Aged Out    Hepatitis A Vaccine  Aged Out    Meningococcal ACWY Vaccine  Aged Out    HPV Vaccine  Aged Out     Immunization History   Administered Date(s) Administered    COVID-19 PFIZER VACCINE 0.3 ML IM 05/12/2021, 06/02/2021           Tierra Correa MD   Family Medicine  PGY- 3  3/20/2024 10:23 AM

## 2024-03-21 PROBLEM — N95.1 MENOPAUSAL STATE: Status: ACTIVE | Noted: 2024-03-21

## 2024-03-21 PROBLEM — F32.5 DEPRESSION, MAJOR, IN REMISSION (HCC): Status: ACTIVE | Noted: 2024-03-21

## 2024-03-21 PROBLEM — D73.5 SPLENIC INFARCT: Status: ACTIVE | Noted: 2024-03-21

## 2024-03-21 NOTE — ASSESSMENT & PLAN NOTE
LUQ pain improving.  Encouraged to f/up with general surgery, gastroenterology as recommended at the time of hospital discharge.  Consult placed to cardiology for further evaluation

## 2024-03-22 ENCOUNTER — PATIENT OUTREACH (OUTPATIENT)
Dept: FAMILY MEDICINE CLINIC | Facility: CLINIC | Age: 54
End: 2024-03-22

## 2024-03-22 NOTE — PROGRESS NOTES
[facility] received a referral from BROOKLYN Fleming to assist patient with applying for [employer].     [facility] attempted to contact Bambi to discuss the referral. No answer. Left message on voicemail with reason for call, this writer's contact information, and a request for a call back to assist.    Will outreach next week if no contact prior.

## 2024-03-29 ENCOUNTER — PATIENT OUTREACH (OUTPATIENT)
Dept: FAMILY MEDICINE CLINIC | Facility: CLINIC | Age: 54
End: 2024-03-29

## 2024-03-29 NOTE — PROGRESS NOTES
[facility] contacted Bambi to discuss applying for [employer].     Bambi is agreeable to services so a 3-way call was completed with [facility] to initiate the application. Bambi provided her demographic and financial information and was informed the application will be processed on or after 4/11/2024.   She disclosed she received a termination notice but that she requested a medical form from her PCP office to hold termination for 30 days while waiting for OnTrack determination. The [employer] rep reviewed her account and confirms a call from the PCP office to request the form was noted, but no form has been received at time of outreach.     Recommended Bambi also contact [facility] to see if she can receive assistance with making a payment to avoid shut off. She states she has the contact information for "[employer] in her area and plans to call today.     [facility] sent a staff message to PCP office requesting completion of L medical form, if applicable.     Will outreach next week for a status update and to assist as needed.

## 2024-04-05 ENCOUNTER — PATIENT OUTREACH (OUTPATIENT)
Dept: FAMILY MEDICINE CLINIC | Facility: CLINIC | Age: 54
End: 2024-04-05

## 2024-04-05 NOTE — PROGRESS NOTES
[facility] contacted Bambi for a status update on her [facility] OnTrack application.     Bambi confirms she submitted her income information so the application could be processed. She also confirms she still has electricity at this time (original termination date was 4/4/24). She plans to wait until 4/11/24 for her determination. She confirms she has contacted [employer] and was denied assistance at this time.   No other needs at this time, Abmbi states she will contact this writer for any needs prior to next outreach.     Will outreach after 4/11/24 for a status update on the OnTrack application and to assist as needed.

## 2024-04-06 DIAGNOSIS — Z59.86 FINANCIAL INSECURITY: Primary | ICD-10-CM

## 2024-04-06 SDOH — ECONOMIC STABILITY - INCOME SECURITY: FINANCIAL INSECURITY: Z59.86

## 2024-04-15 ENCOUNTER — OFFICE VISIT (OUTPATIENT)
Dept: URGENT CARE | Facility: CLINIC | Age: 54
End: 2024-04-15
Payer: COMMERCIAL

## 2024-04-15 ENCOUNTER — PATIENT OUTREACH (OUTPATIENT)
Dept: FAMILY MEDICINE CLINIC | Facility: CLINIC | Age: 54
End: 2024-04-15

## 2024-04-15 VITALS
TEMPERATURE: 98 F | HEART RATE: 82 BPM | RESPIRATION RATE: 16 BRPM | DIASTOLIC BLOOD PRESSURE: 62 MMHG | OXYGEN SATURATION: 98 % | SYSTOLIC BLOOD PRESSURE: 112 MMHG

## 2024-04-15 DIAGNOSIS — R21 RASH OF HAND: Primary | ICD-10-CM

## 2024-04-15 PROCEDURE — 99203 OFFICE O/P NEW LOW 30 MIN: CPT

## 2024-04-15 RX ORDER — CLOTRIMAZOLE AND BETAMETHASONE DIPROPIONATE 10; .64 MG/G; MG/G
CREAM TOPICAL 2 TIMES DAILY
Qty: 15 G | Refills: 1 | Status: SHIPPED | OUTPATIENT
Start: 2024-04-15

## 2024-04-15 NOTE — PROGRESS NOTES
[facility] attempted to contact Bambi for a status update on the [facility] OnTrack application and to assess for further needs.     No answer. Left message on voicemail with reason for call, this writer's contact information, and a request for a call back to discuss and assist if needed.     Will outreach again next week.

## 2024-04-15 NOTE — PROGRESS NOTES
[facility]        NAME: Bambi Zacarias is a 54 y.o. female  : 1970    MRN: [id number]  DATE: April 15, 2024  TIME: 2:47 PM    Assessment and Plan   Rash of hand [R21]  1. Rash of hand  clotrimazole-betamethasone (LOTRISONE) 1-0.05 % cream        Rash to palm of left hand that is circular, pruritic, and there is cracking of the skin.  Slight scaling.  Skin within the rash slightly yellow in color, may be due to the moistness from having a bandage over the area.  Suspicious for contact dermatitis versus tinea of the palm.  Will start on Lotrisone.  Dressing applied.  Advise follow-up with family doctor.  Advised to the ER if any symptoms worsen.    Patient Instructions     Take prescribed medication as instructed.  Tylenol for pain or fever.  clean and dry.  Avoid scratching.  Follow-up with your family doctor if no improvement in symptoms.  Follow up with PCP in 3-5 days.  Proceed to  ER if symptoms worsen.    If tests are performed, our office will contact you with results only if changes need to made to the care plan discussed with you at the visit. [facility]    Chief Complaint     Chief Complaint   Patient presents with    Rash     Started 4 days ago  Diffuse rash on bilateral hands  OTC topical neosporin         History of Present Illness       54-year-old female presents to the clinic with a raised scaly itchy rash on her left palm.  Noticed it 4 days ago.  Tried over-the-counter Neosporin without relief.  Had a bandage over it.  Denies having this rash before.  Denies contacting any new chemicals or cleaning products at home.  Denies any fever, chills, chest pain, shortness of breath.    Rash        Review of Systems   Review of Systems   Constitutional: Negative.    Respiratory: Negative.     Cardiovascular: Negative.    Musculoskeletal: Negative.    Skin:  Positive for rash.   Neurological: Negative.          Current Medications       Current  Outpatient Medications:     albuterol (2.5 mg/3 mL) 0.083 % nebulizer solution, Take 3 mL (2.5 mg total) by nebulization every 6 (six) hours as needed for wheezing or shortness of breath, Disp: 180 mL, Rfl: 5    albuterol (Ventolin HFA) 90 mcg/act inhaler, Inhale 2 puffs every 6 (six) hours as needed for wheezing, Disp: 18 g, Rfl: 11    aspirin (Aspirin Low Dose) 81 mg EC tablet, Take 1 tablet (81 mg total) by mouth daily, Disp: 30 tablet, Rfl: 4    atorvastatin (LIPITOR) 80 mg tablet, Take 1 tablet (80 mg total) by mouth daily, Disp: 30 tablet, Rfl: 4    Budeson-Glycopyrrol-Formoterol (Breztri Aerosphere) 160-9-4.8 MCG/ACT AERO, Inhale 2 puffs every 12 (twelve) hours Rinse mouth after use., Disp: 10.7 g, Rfl: 11    clotrimazole-betamethasone (LOTRISONE) 1-0.05 % cream, Apply topically 2 (two) times a day, Disp: 15 g, Rfl: 1    doxepin (SINEquan) 10 mg capsule, Take 1 capsule (10 mg total) by mouth daily at bedtime, Disp: 90 capsule, Rfl: 1    ergocalciferol (VITAMIN D2) 50,000 units, Take 1 capsule (50,000 Units total) by mouth once a week, Disp: 13 capsule, Rfl: 1    GNP Lidocaine Pain Relief 4 % PTCH, , Disp: , Rfl:     metoprolol tartrate (LOPRESSOR) 25 mg tablet, Take 1 tablet (25 mg total) by mouth every 12 (twelve) hours, Disp: 60 tablet, Rfl: 3    nitroglycerin (NITROSTAT) 0.4 mg SL tablet, Place 1 tablet (0.4 mg total) under the tongue every 5 (five) minutes as needed for chest pain, Disp: 30 tablet, Rfl: 2    oxyCODONE-acetaminophen (PERCOCET) 5-325 mg per tablet, , Disp: , Rfl:     pantoprazole (PROTONIX) 40 mg tablet, Take 1 tablet (40 mg total) by mouth 2 (two) times a day, Disp: 60 tablet, Rfl: 2    clopidogrel (PLAVIX) 75 mg tablet, Take 1 tablet (75 mg total) by mouth daily (Patient not taking: Reported on 4/15/2024), Disp: 30 tablet, Rfl: 3    HYDROcodone-acetaminophen (NORCO) 5-325 mg per tablet, Take 1-2 tablets by mouth every 6 (six) hours as needed (Patient not taking: Reported on 4/15/2024),  Disp: , Rfl:     Current Allergies     Allergies as of 04/15/2024 - Reviewed 04/15/2024   Allergen Reaction Noted    Gabapentin Swelling 03/05/2022    Toradol [ketorolac tromethamine] GI Intolerance 07/28/2017    Tramadol Rash 03/13/2024            The following portions of the patient's history were reviewed and updated as appropriate: allergies, current medications, past family history, past medical history, past social history, past surgical history and problem list.     Past Medical History:   Diagnosis Date    Coronary artery disease     Cubital tunnel syndrome     Depression     Emphysema of lung (HCC)     GERD (gastroesophageal reflux disease)     History of Clostridium difficile     Ovarian cyst     Stroke (HCC)     Takotsubo cardiomyopathy     Thoracic outlet syndrome     Tobacco abuse     Vitamin D deficiency        Past Surgical History:   Procedure Laterality Date    CARDIAC CATHETERIZATION      CABG    CORONARY ARTERY BYPASS GRAFT N/A 2/12/2016    Procedure: CABG X1; Left  EVH to mid-LAD; ZAHRA;  Surgeon: Gary Echavarria DO;  Location: [facility];  Service:     DEBRIDEMENT TENNIS ELBOW      DILATION AND CURETTAGE OF UTERUS      HYSTERECTOMY      HYSTEROSCOPY      LEFT OOPHORECTOMY      SALPINGECTOMY Right        Family History   Problem Relation Age of Onset    Heart disease Mother     Heart attack Mother     Heart attack Brother 40        s/p stent placement    Diabetes Brother     Heart disease Brother     Cancer Father     Diabetes Sister     Heart disease Sister     Diabetes Sister          Medications have been verified.        Objective   /62   Pulse 82   Temp 98 °F (36.7 °C)   Resp 16   LMP 10/14/2015 (LMP Unknown)   SpO2 98%        Physical Exam     Physical Exam  Constitutional:       General: She is not in acute distress.     Appearance: Normal appearance. She is not ill-appearing, toxic-appearing or diaphoretic.   HENT:      Head: Normocephalic and atraumatic.      Nose: No  congestion.      Mouth/Throat:      Mouth: Mucous membranes are moist.      Pharynx: Oropharynx is clear.   Cardiovascular:      Rate and Rhythm: Normal rate and regular rhythm.      Pulses: Normal pulses.      Heart sounds: Normal heart sounds. No murmur heard.     No friction rub. No gallop.   Pulmonary:      Effort: Pulmonary effort is normal. No respiratory distress.      Breath sounds: Normal breath sounds. No stridor. No wheezing, rhonchi or rales.   Chest:      Chest wall: No tenderness.   Skin:     General: Skin is warm and dry.      Capillary Refill: Capillary refill takes less than 2 seconds.      Coloration: Skin is not pale.      Findings: Rash (Rash on the palmar aspect of the left hand -raised, scaly, slightly circular.  Some dry cracked skin.  No signs of infection such as drainage or warmth.  No spreading erythema.) present. No bruising.             Comments: There is some mild dry skin of the right palm as well.  No redness, scaling, drainage, warmth.  No tenderness.   Neurological:      General: No focal deficit present.      Mental Status: She is alert and oriented to person, place, and time.   Psychiatric:         Mood and Affect: Mood normal.

## 2024-04-15 NOTE — PATIENT INSTRUCTIONS
Take prescribed medication as instructed.  Tylenol for pain or fever.  clean and dry.  Avoid scratching.  Follow-up with your family doctor if no improvement in symptoms.  Follow up with PCP in 3-5 days.  Proceed to  ER if symptoms worsen.    If tests are performed, our office will contact you with results only if changes need to made to the care plan discussed with you at the visit. You can review your full results on [facility].  Dermatitis   WHAT YOU NEED TO KNOW:   Dermatitis is skin inflammation. You may have an itchy rash, redness, or swelling. You may also have bumps or blisters that crust over or ooze clear fluid. Dermatitis can be caused by allergens such as dust mites, pet dander, pollen, and certain foods. It can also develop when something touches your skin and irritates it or causes an allergic reaction. Examples include soaps, chemicals, latex, and poison ivy.  DISCHARGE INSTRUCTIONS:   Call your local emergency number (911 in the [place]) or have someone call if:   You have symptoms of anaphylaxis, such as sudden trouble breathing, throat swelling, or feeling dizzy or lightheaded.      Return to the emergency department if:   You develop a fever or have red streaks going up your arm or leg.    Your rash gets more swollen, red, or hot.    Call your doctor or dermatologist if:   Your skin blisters, oozes white or yellow pus, or has a foul-smelling discharge.    Your rash spreads or does not get better, even after treatment.    You have questions or concerns about your condition or care.    Medicines:   Medicines  help decrease itching and inflammation, or treat a bacterial infection. They may be given as a topical cream, shot, or a pill.    Take your medicine as directed.  Contact your healthcare provider if you think your medicine is not helping or if you have side effects. Tell your provider if you are allergic to any medicine. Keep a list of the medicines, vitamins, and herbs you take. Include the  amounts, and when and why you take them. Bring the list or the pill bottles to follow-up visits. Carry your medicine list with you in case of an emergency.    Manage dermatitis:   Apply a cool compress to your rash.  This will help soothe your skin.    Apply lotions or creams to the area.  These help keep your skin moist and decrease itching. Apply the lotion or cream right after a lukewarm bath or shower when your skin is still damp. Use products that do not contain dye or a scent.    Avoid skin irritants.  Examples include makeup, hair products, soaps, and cleansers. Use products that do not contain a scent or dye.    Follow up with your doctor or dermatologist as directed:  Write down your questions so you remember to ask them during your visits.  © Copyright [employer] 2023 Information is for End User's use only and may not be sold, redistributed or otherwise used for commercial purposes.  The above information is an  only. It is not intended as medical advice for individual conditions or treatments. Talk to your doctor, nurse or pharmacist before following any medical regimen to see if it is safe and effective for you.

## 2024-04-17 ENCOUNTER — TELEPHONE (OUTPATIENT)
Age: 54
End: 2024-04-17

## 2024-04-17 NOTE — TELEPHONE ENCOUNTER
Charan from [facility] calls and states that she needs recent office visit notes faxed over to her. Also she is wondering if we can included pts benefits of using breathing equipment in next upcoming appt notes and fax over as well once complete. She is working on authorization and needs this to complete.     Fax:[phone] attn charan

## 2024-04-22 ENCOUNTER — PATIENT OUTREACH (OUTPATIENT)
Dept: FAMILY MEDICINE CLINIC | Facility: CLINIC | Age: 54
End: 2024-04-22

## 2024-04-22 NOTE — PROGRESS NOTES
CON CM received IB from [facility]. Pt was approved for [employer] program and does not have any additional needs at this time. SW CM will be removed from care team and referral will be closed. SW CM available if needed, via new order.    MEDICATIONS  (STANDING):  escitalopram 15 milliGRAM(s) Oral daily    MEDICATIONS  (PRN):  acetaminophen     Tablet .. 650 milliGRAM(s) Oral every 6 hours PRN Mild Pain (1 - 3), Moderate Pain (4 - 6)  diphenhydrAMINE 50 milliGRAM(s) Oral every 6 hours PRN agitation  diphenhydrAMINE Injectable 50 milliGRAM(s) IntraMuscular once PRN agitation  haloperidol     Tablet 5 milliGRAM(s) Oral every 6 hours PRN agitation  haloperidol    Injectable 5 milliGRAM(s) IntraMuscular once PRN agitation  LORazepam     Tablet 2 milliGRAM(s) Oral every 6 hours PRN Agitation  LORazepam   Injectable 2 milliGRAM(s) IntraMuscular once PRN agitation

## 2024-04-22 NOTE — PROGRESS NOTES
CMCHERRY received a call back and voicemail from Bambi.   She confirms she was approved for [facility] and did not have any service interruptions.     She states she has no other needs at this time and thanks the Care Management team for their assistance.     Referral will be closed and no further outreaches will be made.

## 2024-04-25 ENCOUNTER — TELEPHONE (OUTPATIENT)
Dept: PULMONOLOGY | Facility: CLINIC | Age: 54
End: 2024-04-25

## 2024-04-25 NOTE — TELEPHONE ENCOUNTER
Patients ALISON Study has been returned and Lori pineda [facility] delivered copy to office for review by provider.

## 2024-04-25 NOTE — TELEPHONE ENCOUNTER
Norman requesting addendum for patients OV notes from January. Lori states patient is going to lose O2 because the January notes do not state patient is using and benefiting from Home o2.

## 2024-05-21 ENCOUNTER — TELEPHONE (OUTPATIENT)
Age: 54
End: 2024-05-21

## 2024-05-21 NOTE — TELEPHONE ENCOUNTER
Belia from [facility] called in to confirm if the patient is an establish patient with pulm . Belia villarreal for our fax# , provide [facility] fax #

## 2024-05-29 ENCOUNTER — TELEPHONE (OUTPATIENT)
Age: 54
End: 2024-05-29

## 2024-05-29 NOTE — TELEPHONE ENCOUNTER
Brea From [facility] called in and would like the patient overnight testing to be faxed over    Fax# [phone]

## 2024-06-03 ENCOUNTER — TELEPHONE (OUTPATIENT)
Age: 54
End: 2024-06-03

## 2024-06-03 NOTE — TELEPHONE ENCOUNTER
Incoming call by Radha at [facility]:    States that patient's insurance company has not paid for oxygen in over a year. Requesting that the patient have a walk test or if patient on room air qualifies, to place in note with use/benefit from and fax to [phone]

## 2024-06-03 NOTE — TELEPHONE ENCOUNTER
Thea from [facility] calling in regards to the fax, I advised her we sent the fax back the same day. She said sometimes it takes a few days, and she will call back on Wednesday 6/5 for a refax if not received

## 2024-07-15 ENCOUNTER — TELEPHONE (OUTPATIENT)
Dept: GASTROENTEROLOGY | Facility: CLINIC | Age: 54
End: 2024-07-15

## 2024-07-15 NOTE — TELEPHONE ENCOUNTER
Please call [phone] to reschedule follow up appointment with pulmonology. This appointment is needed per Norman

## 2024-07-17 ENCOUNTER — TELEPHONE (OUTPATIENT)
Age: 54
End: 2024-07-17

## 2024-07-17 NOTE — TELEPHONE ENCOUNTER
Radha from [facility] calling requesting office notes from 7/15. Advised patient did not come to appt. Radha will call her for more info.

## 2024-07-26 NOTE — TELEPHONE ENCOUNTER
Attempt to reach patient was unsuccessful. Patient was scheduled 2x and did not come to last 2 scheduled appointments.

## 2024-07-26 NOTE — TELEPHONE ENCOUNTER
Norman calling stating patient's insurance is not paying for her oxygen at this time.  Patient needs to schedule an appointment .  Norman asking if the office can reach out to her, as she is not responding to Norman

## 2024-07-31 ENCOUNTER — TELEPHONE (OUTPATIENT)
Age: 54
End: 2024-07-31

## 2024-07-31 NOTE — TELEPHONE ENCOUNTER
Norman calling requesting patient note from July appointment.  Norman advised patient did not come in for that appointment.  Norman will continue to reach out to patient

## 2024-08-28 ENCOUNTER — CONSULT (OUTPATIENT)
Dept: GASTROENTEROLOGY | Facility: CLINIC | Age: 54
End: 2024-08-28
Payer: COMMERCIAL

## 2024-08-28 VITALS
WEIGHT: 146.2 LBS | SYSTOLIC BLOOD PRESSURE: 128 MMHG | TEMPERATURE: 98.6 F | DIASTOLIC BLOOD PRESSURE: 83 MMHG | BODY MASS INDEX: 23.5 KG/M2 | HEART RATE: 73 BPM | OXYGEN SATURATION: 96 % | HEIGHT: 66 IN

## 2024-08-28 DIAGNOSIS — F17.200 TOBACCO USE DISORDER: ICD-10-CM

## 2024-08-28 DIAGNOSIS — K21.00 GASTROESOPHAGEAL REFLUX DISEASE WITH ESOPHAGITIS WITHOUT HEMORRHAGE: ICD-10-CM

## 2024-08-28 DIAGNOSIS — Z12.11 COLON CANCER SCREENING: Primary | ICD-10-CM

## 2024-08-28 DIAGNOSIS — R19.4 CHANGE IN BOWEL HABIT: ICD-10-CM

## 2024-08-28 DIAGNOSIS — K30 INDIGESTION: ICD-10-CM

## 2024-08-28 DIAGNOSIS — D73.5 SPLENIC INFARCT: ICD-10-CM

## 2024-08-28 PROCEDURE — 99244 OFF/OP CNSLTJ NEW/EST MOD 40: CPT | Performed by: PHYSICIAN ASSISTANT

## 2024-08-28 RX ORDER — OMEPRAZOLE 40 MG/1
40 CAPSULE, DELAYED RELEASE ORAL DAILY
Qty: 30 CAPSULE | Refills: 5 | Status: SHIPPED | OUTPATIENT
Start: 2024-08-28

## 2024-08-28 NOTE — PROGRESS NOTES
[facility] - Outpatient Follow-up Note  Bambi Zacarias 54 y.o. female MRN: [id number]  Encounter: [id number]    ASSESSMENT AND PLAN:      1. Colon cancer screening    Patient is here today originally for discussion of colon cancer screening.  She is agreeable to a colonoscopy, this would be her index testing.  She is dealing with more acute change in bowel habits which is discussed below.    I discussed informed consent with the patient. The risks/benefits/alternatives of the procedure were discussed with the patient. Risks included, but not limited to, infection, bleeding, perforation, injury to organs in the abdomen, missed lesion and incomplete procedure were discussed. Patient was agreeable.    Golytely bowel prep sent to pharmacy for pt.   Pt no longer on Plavix.     - Ambulatory Referral to Gastroenterology  - Colonoscopy; Future  - polyethylene glycol (GOLYTELY) 4000 mL solution; Take 4,000 mL by mouth once for 1 dose  Dispense: 4000 mL; Refill: 0    2. Change in bowel habit    Patient notes she has been dealing with urgent loose stools/diarrhea for the past month or so.  She had something similar during her hospitalization in 02/2024, and she shares that she was tested for C. difficile at that time which was negative.  Differential includes IBS, IBD, chronic infection, celiac disease, SIBO, food sensitivity, microscopic colitis, etc.    We will check additional stool studies for chronic infection, inflammation, and pancreatic insufficiency.  Check blood work for celiac disease now.  Recommend random colon biopsies in the setting of a normal appearing colon.  Okay to trial fiber supplement to help add bulk/form to stool.     - Calprotectin,Fecal; Future  - Stool Enteric Bacterial Panel by PCR; Future  - Giardia antigen; Future  - Ova and parasite examination; Future  - Pancreatic elastase, fecal; Future  - IgA; Future  - Tissue transglutaminase, IgA; Future    3. Gastroesophageal  "reflux disease with esophagitis without hemorrhage  4. Indigestion    Patient with history of poorly controlled reflux and indigestion.  She is complaining of postprandial bloating as well.  She had an EGD completed in 02/2024 which demonstrated reflux esophagitis.  She may have underlying PUD, biliary pathology, dysmotility, SIBO, etc.  Recommend restarting patient on daily PPI now.  Recommend small frequent meals and diet and lifestyle modifications for GERD.  I do not feel that we necessarily need to repeat an EGD now she has recently had 1 within the past 6 months, though I think it is reasonable to at least check noninvasive testing such as an upper GI series and ultrasound to evaluate for abnormalities.    - omeprazole (PriLOSEC) 40 MG capsule; Take 1 capsule (40 mg total) by mouth daily  Dispense: 30 capsule; Refill: 5  - FL UPPER GI UGI; Future    5. Splenic infarct    Noted, unclear etiology.  General surgery was involved in the case during her hospitalization in 02/2024 and they recommended supportive care only.  We will check complete abdominal ultrasound now to evaluate for any significant parenchymal abnormalities.    May need to consider additional investigation in the future.   Otherwise continue to monitor.    - US abdomen complete; Future    6. Tobacco use disorder    Once again encouraged smoking cessation.    We will follow up after colonoscopy is completed.   ______________________________________________________________________    SUBJECTIVE: Patient is a 54 y.o. female who presents today for follow-up regarding colonoscopy consultation. Pmhx sig for CAD, s/p CABG 2016, COPD and hypoxemic respiratory failure on supplemental O2 (PRN), HLD, GERD, hx of CVA, tobacco abuse.     Pt is new to clinic. Is having a lot of abd bloating and pain. Does have heartburn, quite severe at times. Tried in hte past, didn't find it helpful. Some nausea, rare emesis, \"I'm throwing up acid\". Feels early satiety. " "Bloating can be debilitating. Rare solid food dysphagia. Feels swollen in epigastric region. Has been gaining weight. Feels swelling in ankles as well.     Pertaining to bowels, tending to be loose in recent months. Having BM at least 3x daily, typically a bristol 5-6. No straining or constipation. No BRBPR or melena. No nocturnal BM. No family hx of CRC. No new rashes. Before a month ago felt like stool was much more formed, \"normal\", more typically a [place] 5.     Patient was hospitalized earlier this year in 02/2024 earlier this year in 02/2024 for epigastric pain.  She was found to have a splenic infarct of unclear etiology.  She was hospitalized at that time, general surgery was consulted and recommended conservative management.  GI was consulted and she had an EGD completed which commented on esophagitis.  She was started on PPI.    NSAIDs: none  Etoh: none  Tobacco: 1/2 PPD    Cannabis: nightly       03/2024: CT A/P: No main splenic artery is demonstrated. There are small branches feeding the pancreas, which appeared to arise from the celiac trunk. This may reflect a congenital variant. Previous splenic infarct appears slightly decreased in conspicuity   03/2024: RUQ US: fatty liver   02/2024: Hb 12.7, MCV 90, Plt 292, BUN 7, Cr 0.89, AST 19, ALT 5, Albumin 3.6, t bili 0.2, TSH 0.96, INR 1.0    Endoscopic history:   EGD: 03/2024: salmon colored mucosa at GEJ in lower third of esophagus  A. Duodenum, second part, biopsy: Small intestinal mucosa showing no evidence of active inflammation, villous blunting, or intraepithelial lymphocytosis. No dysplasia or carcinoma seen.   B. Stomach, antrum and body, biopsy: Reactive gastropathy, negative for both H. pylori and intestinal metaplasia. No dysplasia or carcinoma seen.  C. GE junction, biopsy: Esophago-gastric mucosa showing reflux esophagitis. No evidence of intestinal metaplasia identified. No dysplasia or carcinoma seen.    D. Esophagus, lower third, biopsy: " Squamous mucosa showing no evidence of eosinophilic esophagitis. No dysplasia or carcinoma seen.  E. Esophagus, upper third, biopsy: Squamous mucosa showing no evidence of eosinophilic esophagitis. No dysplasia or carcinoma see   Colon: none    Review of Systems   Otherwise Per HPI    Historical Information   Past Medical History:   Diagnosis Date    Coronary artery disease     Cubital tunnel syndrome     Depression     Emphysema of lung (HCC)     GERD (gastroesophageal reflux disease)     History of Clostridium difficile     Ovarian cyst     Stroke (HCC)     Takotsubo cardiomyopathy     Thoracic outlet syndrome     Tobacco abuse     Vitamin D deficiency      Past Surgical History:   Procedure Laterality Date    CARDIAC CATHETERIZATION      CABG    CORONARY ARTERY BYPASS GRAFT N/A 2/12/2016    Procedure: CABG X1; Left  EVH to mid-LAD; ZAHRA;  Surgeon: Gary Echavarria DO;  Location: [facility];  Service:     DEBRIDEMENT TENNIS ELBOW      DILATION AND CURETTAGE OF UTERUS      HYSTERECTOMY      HYSTEROSCOPY      LEFT OOPHORECTOMY      SALPINGECTOMY Right      Social History   Social History     Substance and Sexual Activity   Alcohol Use No     Social History     Substance and Sexual Activity   Drug Use Yes    Types: Marijuana    Comment: medical     Social History     Tobacco Use   Smoking Status Every Day    Current packs/day: 0.50    Average packs/day: 0.5 packs/day for 30.0 years (15.0 ttl pk-yrs)    Types: Cigarettes   Smokeless Tobacco Never     Family History   Problem Relation Age of Onset    Heart disease Mother     Heart attack Mother     Heart attack Brother 40        s/p stent placement    Diabetes Brother     Heart disease Brother     Cancer Father     Diabetes Sister     Heart disease Sister     Diabetes Sister      Meds/Allergies       Current Outpatient Medications:     albuterol (2.5 mg/3 mL) 0.083 % nebulizer solution    albuterol (Ventolin HFA) 90 mcg/act inhaler    aspirin (Aspirin Low Dose) 81 mg  "EC tablet    atorvastatin (LIPITOR) 80 mg tablet    Budeson-Glycopyrrol-Formoterol (Breztri Aerosphere) 160-9-4.8 MCG/ACT AERO    clotrimazole-betamethasone (LOTRISONE) 1-0.05 % cream    doxepin (SINEquan) 10 mg capsule    GNP Lidocaine Pain Relief 4 % PTCH    metoprolol tartrate (LOPRESSOR) 25 mg tablet    nitroglycerin (NITROSTAT) 0.4 mg SL tablet    omeprazole (PriLOSEC) 40 MG capsule    oxyCODONE-acetaminophen (PERCOCET) 5-325 mg per tablet    polyethylene glycol (GOLYTELY) 4000 mL solution    Allergies   Allergen Reactions    Gabapentin Swelling    Toradol [Ketorolac Tromethamine] GI Intolerance    Tramadol Rash     Objective     Blood pressure 128/83, pulse 73, temperature 98.6 °F (37 °C), temperature source Temporal, height 5' 6\" (1.676 m), weight 66.3 kg (146 lb 3.2 oz), last menstrual period 10/14/2015, SpO2 96%, not currently breastfeeding. Body mass index is 23.6 kg/m².    Physical Exam  Vitals and nursing note reviewed.   Constitutional:       General: She is not in acute distress.     Appearance: She is well-developed.   HENT:      Head: Normocephalic and atraumatic.      Mouth/Throat:      Comments: Poor dentition   Eyes:      General: No scleral icterus.     Conjunctiva/sclera: Conjunctivae normal.   Cardiovascular:      Rate and Rhythm: Normal rate.      Heart sounds: No murmur heard.  Pulmonary:      Effort: Pulmonary effort is normal. No respiratory distress.      Breath sounds: Normal breath sounds.   Abdominal:      General: Bowel sounds are normal. There is no distension.      Palpations: Abdomen is soft.      Tenderness: There is no abdominal tenderness. There is no guarding or rebound.   Musculoskeletal:         General: No swelling.   Skin:     General: Skin is warm and dry.      Coloration: Skin is not jaundiced.   Neurological:      General: No focal deficit present.      Mental Status: She is alert.   Psychiatric:         Mood and Affect: Mood normal.         Behavior: Behavior normal. "       Lab Results:   No visits with results within 1 Day(s) from this visit.   Latest known visit with results is:   Office Visit on 01/31/2024   Component Date Value    Supplier Name 01/31/2024 [facility]     Supplier Phone Number 01/31/2024 [phone]     Order Status 01/31/2024 Processing     Delivery Request Date 01/31/2024 01/31/2024     Item Description 01/31/2024 Overnight Oximetry Test      Radiology Results:   No results found.    Freda Doshi PA-C    **Please note:  Dictation voice to text software may have been used in the creation of this record.  Occasional wrong word or “sound alike” substitutions may have occurred due to the inherent limitations of voice recognition software.  Read the chart carefully and recognize, using context, where substitutions have occurred.**

## 2024-08-28 NOTE — PATIENT INSTRUCTIONS
I think there is probably some inflammation in your upper GI tract.  Because of this, I want to start you on a strong amount of omeprazole every single morning to help heal the stomach.  We also are going to check an ultrasound and an upper GI series to look at your anatomy.    Small frequent meals throughout the day and avoid triggers for reflux.    You are due for colonoscopy for colon cancer screening.  Because your stool has been loose we are going to check some stool tests and a blood test.    Work on quitting smoking.

## 2024-09-04 NOTE — CASE MANAGEMENT
Initial Clinical Review    Admission: Date/Time/Statement: OBSERVATION 8/19/18 @ 0332    Orders Placed This Encounter   Procedures    Place in Observation (expected length of stay for this patient is less than two midnights)     Standing Status:   Standing     Number of Occurrences:   1     Order Specific Question:   Admitting Physician     Answer:   Gabriela Fernando [[id number]]     Order Specific Question:   Level of Care     Answer:   Med Surg [16]     ED: Date/Time/Mode of Arrival:   ED Arrival Information     Expected Arrival Acuity Means of Arrival Escorted By Service Admission Type    - 8/18/2018 22:52 Emergent [address] Emergency    Arrival Complaint    chest pain        Chief Complaint:   Chief Complaint   Patient presents with    Numbness     left sided numbness started 1 hour ago  pt states that this is the way she felt when she had her prior strokes    Chest Pain     chest tightness in left side of chest started at the same time     History of Illness:    Ken Ball is a 50 y o  female with extensive past medical history including coronary artery disease, takotsubo cardiomyopathy, CABG x1, anxiety, GERD, stroke, Raynaud's disease, depression, chronic pain, thoracic outlet syndrome, and others as listed below who presented to the emergency department for evaluation of chest wall burning 1 hour prior to coming the emergency department, also states she feels shaky all over, and shortness of breath that was approved with oxygen  While in emergency department to lab studies and radiologic studies were obtained, troponin initially noted to be negative  While in emergency department she did receive 1 sublingual nitroglycerin, followed by 1 inch of nitropaste which she stated did decrease the pain little bit    Upon  examine patient noted to appear older than stated age, in no acute distress, conscious and alert and oriented x4, lungs equal and clear bilaterally, abdomen soft nontender, no peripheral edema noted  Patient being admitted for observation  ED Vital Signs:   ED Triage Vitals [08/18/18 2256]   Temperature Pulse Respirations Blood Pressure SpO2   98 2 °F (36 8 °C) 75 20 162/94 99 %      Temp Source Heart Rate Source Patient Position - Orthostatic VS BP Location FiO2 (%)   Temporal Monitor Sitting Right arm --      Pain Score       Worst Possible Pain        Wt Readings from Last 1 Encounters:   08/19/18 64 8 kg (142 lb 13 7 oz)     Vital Signs (abnormal):     08/19/18 0755   97 2 °F (36 2 °C)  60  18  112/60  98 %  Nasal cannula  Lying   08/19/18 0458  --   53  --  --  --  --  --     Abnormal Labs:    K 2 7 - up to 3 8  Cl 113  BUN 4,3  ALT 8  Alk phos 160, 133  Alb 3 3  Trop WNL  Phos 2 4  WBC 15 62, 10 53  ANC 9 28    Diagnostic Test Results:     8/19 CXR - results pending -  No result 8/19 @ 1526  8/19 EKG Sinus w/ Prolonged QTC interval   8/19 EKG NSR improvement in QTC interval w/ no acute ischemic changes     ED Treatment:   Medication Administration from 08/18/2018 2252 to 08/19/2018 0407    Date/Time Order Dose Route Action   08/19/2018 0030 aspirin (ECOTRIN LOW STRENGTH) EC tablet 324 mg 324 mg Oral Given   08/18/2018 2359 nitroglycerin (NITROSTAT) SL tablet 0 4 mg 0 4 mg Sublingual Given   08/18/2018 2357 famotidine (PEPCID) injection 20 mg 20 mg Intravenous Given   08/19/2018 0103 potassium chloride (K-DUR,KLOR-CON) CR tablet 40 mEq 40 mEq Oral Given   08/19/2018 0104 potassium chloride 20 mEq IVPB (premix) 20 mEq Intravenous New Bag   08/19/2018 0104 sodium chloride 0 9 % infusion 125 mL/hr Intravenous New Bag   08/19/2018 0340 nitroglycerin (NITRO-BID) 2 % TD ointment 1 inch 1 inch Topical Given        Past Medical/Surgical History:    Active Ambulatory Problems     Diagnosis Date Noted    Smoker 02/08/2016    Non-ST elevation MI (NSTEMI) ([facility] [address] ) 02/13/2016    CAD S/P CABG x 1 2016 02/13/2016    GERD (gastroesophageal reflux disease) 09/11/2016    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 ([place] [address] ) 09/11/2016    Depression 09/11/2016    Tobacco abuse 09/11/2016    Brain TIA 07/28/2017    Leg pain 07/28/2017    Left carotid stenosis 96/01/6930    Acute systolic CHF (congestive heart failure) ([place] [address] ) 06/18/2015    Atherosclerotic heart disease of native coronary artery without angina pectoris 03/06/2016    Cardiomyopathy, dilated ([place] [address] ) 09/16/2015    Moderate COPD (chronic obstructive pulmonary disease) ([facility]) 01/02/2018    Ischemic cardiomyopathy 07/07/2015    Raynaud's syndrome without gangrene 03/17/2014    Thoracic outlet syndrome 09/13/2017    Vitamin D deficiency 07/28/2015    Sleep-related breathing disorder 04/05/2018    Insomnia 04/05/2018    Restless leg syndrome 04/05/2018    Sleep walking 04/05/2018    Hypersomnia 04/05/2018    Chronic pain disorder 04/05/2018    Enterocolitis 05/06/2018    Shoulder pain, left 06/25/2018     Resolved Ambulatory Problems     Diagnosis Date Noted    Elevated troponin 02/08/2016    Chest pain 02/08/2016    Numbness of face 09/11/2016    Left sided numbness 07/28/2017    CASTORENA (dyspnea on exertion) 07/28/2017    Leukocytosis 09/10/2017    TIA (transient ischemic attack) 09/10/2017     Past Medical History:   Diagnosis Date    Chronic pain     COPD [facility]chronic obstructive pulmonary disease) ([facility])     Coronary artery disease     Cubital tunnel syndrome     Depression     GERD (gastroesophageal reflux disease)     History of Clostridium difficile     Ovarian cyst     Psychiatric disorder     Raynaud's disease     Stroke ([place] [address] )     Takotsubo cardiomyopathy     Thoracic outlet syndrome     Tobacco abuse     Vitamin D deficiency      Admitting Diagnosis: Hypokalemia [E87 6]  Chest pain [R07 9]    Age/Sex: 50 y o  female    Assessment/Plan:   * Burning chest pain   Assessment & Plan     - admit for observation  - chest wall burning reported to be relieved with nitro  - initial troponin negative, less than 0 02, continue monitor q 3 hours  - will check CK  -has had CABG in past  - leave nitropaste on as placed by ER          Cardiomyopathy, dilated ([address] )   Assessment & Plan     - history of Takasubo cardiomyopathy and past          GERD (gastroesophageal reflux disease)   Assessment & Plan     -continue Protonix as ordered     VTE Prophylaxis: Pharmacologic VTE Prophylaxis contraindicated due to Abdominal wall hematoma  / sequential compression device   Code Status:  Full  Anticipated Length of Stay:  Patient will be admitted on an Observation basis with an anticipated length of stay of  > 2 midnights  Justification for Hospital Stay:  Chest wall burning with past medical history of coronary artery disease    Admission Orders:  Scheduled Meds:   Current Facility-Administered Medications:  albuterol 2 puff Inhalation Q6H PRN   aluminum-magnesium hydroxide-simethicone 30 mL Oral Q4H PRN   aspirin 81 mg Oral Daily   atorvastatin 40 mg Oral Daily With Dinner   escitalopram 5 mg Oral Daily   gabapentin 100 mg Oral TID   metoprolol tartrate 12 5 mg Oral Q8H   nicotine 1 patch Transdermal Daily   oxyCODONE 5 mg Oral Q4H PRN   pantoprazole 40 mg Oral Daily   potassium chloride 20 mEq Oral BID     Continuous Infusions:  IV 0 9% NSS at 125 ml/hr     PRN Meds:   albuterol    aluminum-magnesium hydroxide-simethicone x1    oxyCODONE x1    Tele   SCDs  Daily wt  Up w; assist   Diet cardiac step 1    Thank you,  [address]  Utilization Review Department  Phone: [phone]; Fax [phone]  ATTENTION: Please call with any questions or concerns to [phone]  and carefully follow the prompts so that you are directed to the right person  Send all requests for admission clinical reviews, approved or denied determinations and any other requests to fax [phone]   All voicemails are confidential  minimal bilateral middle ear effusion s/p myringotomy and tube placement  auditory brainstem response potentials recorded, to be described in audiologist report T21 ETD s/p BMT ABR  AU minimal middle ear effusion s/p myringotomy and tube placement *walker tubes  auditory brainstem response potentials recorded, to be described in audiologist report T21 ETD s/p BMT ABR  AU minimal middle ear effusion s/p myringotomy and tube placement *walker tubes  auditory brainstem response potentials recorded, to be described in audiologist report WNL

## 2024-09-12 RX ORDER — SODIUM CHLORIDE, SODIUM LACTATE, POTASSIUM CHLORIDE, CALCIUM CHLORIDE 600; 310; 30; 20 MG/100ML; MG/100ML; MG/100ML; MG/100ML
125 INJECTION, SOLUTION INTRAVENOUS CONTINUOUS
Status: CANCELLED | OUTPATIENT
Start: 2024-09-12

## 2024-09-12 RX ORDER — LIDOCAINE HYDROCHLORIDE 10 MG/ML
0.5 INJECTION, SOLUTION EPIDURAL; INFILTRATION; INTRACAUDAL; PERINEURAL ONCE AS NEEDED
Status: CANCELLED | OUTPATIENT
Start: 2024-09-12

## 2024-09-16 ENCOUNTER — HOSPITAL ENCOUNTER (OUTPATIENT)
Dept: ULTRASOUND IMAGING | Facility: HOSPITAL | Age: 54
Discharge: HOME | End: 2024-09-16
Payer: COMMERCIAL

## 2024-09-16 ENCOUNTER — HOSPITAL ENCOUNTER (OUTPATIENT)
Dept: RADIOLOGY | Facility: HOSPITAL | Age: 54
Discharge: HOME | End: 2024-09-16
Payer: COMMERCIAL

## 2024-09-16 DIAGNOSIS — D73.5 SPLENIC INFARCT: ICD-10-CM

## 2024-09-16 DIAGNOSIS — K30 INDIGESTION: ICD-10-CM

## 2024-09-16 PROCEDURE — 76700 US EXAM ABDOM COMPLETE: CPT

## 2024-09-16 PROCEDURE — 74240 X-RAY XM UPR GI TRC 1CNTRST: CPT

## 2024-09-17 ENCOUNTER — TELEPHONE (OUTPATIENT)
Dept: HEMATOLOGY ONCOLOGY | Facility: CLINIC | Age: 54
End: 2024-09-17

## 2024-09-17 NOTE — TELEPHONE ENCOUNTER
----- Message from Freda Doshi PA-C sent at 9/16/2024  7:07 PM EDT -----  Please inform pt that her upper GI test was normal. Her esophagus was normal, no significant reflux was identified. Please let me know if she has any questions.

## 2024-09-24 ENCOUNTER — APPOINTMENT (OUTPATIENT)
Dept: LAB | Facility: HOSPITAL | Age: 54
End: 2024-09-24
Payer: COMMERCIAL

## 2024-09-24 ENCOUNTER — OFFICE VISIT (OUTPATIENT)
Dept: GASTROENTEROLOGY | Facility: CLINIC | Age: 54
End: 2024-09-24
Payer: COMMERCIAL

## 2024-09-24 VITALS
HEIGHT: 66 IN | SYSTOLIC BLOOD PRESSURE: 136 MMHG | WEIGHT: 148 LBS | TEMPERATURE: 97.6 F | HEART RATE: 100 BPM | BODY MASS INDEX: 23.78 KG/M2 | OXYGEN SATURATION: 93 % | DIASTOLIC BLOOD PRESSURE: 71 MMHG

## 2024-09-24 DIAGNOSIS — R19.4 CHANGE IN BOWEL HABIT: ICD-10-CM

## 2024-09-24 DIAGNOSIS — K21.00 GASTROESOPHAGEAL REFLUX DISEASE WITH ESOPHAGITIS WITHOUT HEMORRHAGE: Primary | ICD-10-CM

## 2024-09-24 DIAGNOSIS — D73.5 SPLENIC INFARCT: ICD-10-CM

## 2024-09-24 DIAGNOSIS — R14.0 ABDOMINAL BLOATING: ICD-10-CM

## 2024-09-24 DIAGNOSIS — K30 INDIGESTION: ICD-10-CM

## 2024-09-24 DIAGNOSIS — Z12.11 COLON CANCER SCREENING: ICD-10-CM

## 2024-09-24 LAB — IGA SERPL-MCNC: 302 MG/DL (ref 66–433)

## 2024-09-24 PROCEDURE — 82784 ASSAY IGA/IGD/IGG/IGM EACH: CPT

## 2024-09-24 PROCEDURE — 99214 OFFICE O/P EST MOD 30 MIN: CPT | Performed by: PHYSICIAN ASSISTANT

## 2024-09-24 PROCEDURE — 86364 TISS TRNSGLTMNASE EA IG CLAS: CPT

## 2024-09-24 PROCEDURE — 36415 COLL VENOUS BLD VENIPUNCTURE: CPT

## 2024-09-24 RX ORDER — FAMOTIDINE 40 MG/1
40 TABLET, FILM COATED ORAL
Qty: 30 TABLET | Refills: 5 | Status: SHIPPED | OUTPATIENT
Start: 2024-09-24

## 2024-09-24 NOTE — PROGRESS NOTES
[facility] - Outpatient Follow-up Note  Bambi Zacarias 54 y.o. female MRN: [id number]  Encounter: [id number]    ASSESSMENT AND PLAN:      1. Gastroesophageal reflux disease with esophagitis without hemorrhage  2. Indigestion  3. Abdominal bloating    Pt with hx of reflux type symptoms.   She had EGD at outside facility in 02/2024 which demonstrated reflux esophagitis, bx negative for Yin's esophagus, h pylori negative, celiac negative.   She has had some improvement in UGI symptoms since re-initiating PPI, though still having some breakthrough.   UGI series in 09/2024 was unremarkable.   US with no biliary pathology.   We will add H2RA nightly at this time.   Focus on small frequent meals, diet and lifestyle modifications for GERD.   If symptoms persist despite escalation of therapy, consider repeat EGD in future.     - famotidine (PEPCID) 40 MG tablet; Take 1 tablet (40 mg total) by mouth daily at bedtime  Dispense: 30 tablet; Refill: 5  - Small intestinal bacterial overgrowth    3. Bloating   4. Change in bowel habit    Pt's initial visit outlines urgent loose stool following her hospitalization in 02/2024.   C diff testing at that time was negative.   I did order additional serologic and stool testing at time of last OV to evaluate competing causes of loose stools, including SIBO, IBD, EPI, etc.   Encouraged pt to complete stool testing and SIBO testing now.   Look into low FODMAP diet.   Trial dedicated fiber supplement to see if this adds some bulk/form to stool.     5. Colon cancer screening    Due/overdue for colonoscopy.   She was unable to secure ride for colonoscopy since last OV and had to cancel.   Encouraged pt to schedule colonoscopy now.   She has Golytely prep at home.     I discussed informed consent with the patient. The risks/benefits/alternatives of the procedure were discussed with the patient. Risks included, but not limited to, infection, bleeding, perforation,  injury to organs in the abdomen, missed lesion and incomplete procedure were discussed. Patient was agreeable.    6. Splenic infarct    Noted, unclear etiol.   Gen Surg was involved in the case during her hospitalization in 02/2024.   They recommended supportive care at that time.   No longer having sig upper abd pain as previously endorsed.   Repeat US in 09/2024 with no obvious abnormalities.   May need to consider additional advanced imaging in the future if symptoms recur.     We will follow up with pt after her colonoscopy is completed.   ______________________________________________________________________    SUBJECTIVE: Patient is a 54 y.o. female who presents today for follow-up regarding colonoscopy results. Pmhx sig for CAD, s/p CABG 2016, COPD and hypoxemic respiratory failure on supplemental O2 (PRN), HLD, GERD, hx of CVA, tobacco abuse.     Pt was initially evaluated in 08/2024. At that time, she was complaining of abd bloating, epigastric pain, n/v. She had EGD in 02/2024 which commented on esophagitis. She was restarted on PPI at that time. She was also dealing with urgent/loose stool and she had blood work and stool testing ordered though not completed. Colonoscopy was also ordered though not completed.     09/24/24:     Pt shares that she has had some improvement in her abd pain and burning since starting PPI, though it has not completed abated. Nausea has improved, no emesis. No sig dysphagia or odynophagia. No early satiety. No abnormal weight loss since last OV. Does take oxycodone every other day for pain, notes stable on this regimen for several years.     Pt shares that she is still having some urgent loose stools. Notes abdominal cramping prior to defecation. No BRBPR or melena. No nocturnal BM. No family hx of CRC.     NSAIDs: none  Etoh: none  Tobacco: 1/2 PPD    Cannabis: nightly        03/2024: CT A/P: No main splenic artery is demonstrated. There are small branches feeding the pancreas,  which appeared to arise from the celiac trunk. This may reflect a congenital variant. Previous splenic infarct appears slightly decreased in conspicuity   03/2024: RUQ US: fatty liver   02/2024: Hb 12.7, MCV 90, Plt 292, BUN 7, Cr 0.89, AST 19, ALT 5, Albumin 3.6, t bili 0.2, TSH 0.96, INR 1.0  09/2024: US: wnl  09/2024: UGI: unremarkable      Endoscopic history:   EGD: 03/2024: salmon colored mucosa at GEJ in lower third of esophagus  A. Duodenum, second part, biopsy: Small intestinal mucosa showing no evidence of active inflammation, villous blunting, or intraepithelial lymphocytosis. No dysplasia or carcinoma seen.   B. Stomach, antrum and body, biopsy: Reactive gastropathy, negative for both H. pylori and intestinal metaplasia. No dysplasia or carcinoma seen.  C. GE junction, biopsy: Esophago-gastric mucosa showing reflux esophagitis. No evidence of intestinal metaplasia identified. No dysplasia or carcinoma seen.    D. Esophagus, lower third, biopsy: Squamous mucosa showing no evidence of eosinophilic esophagitis. No dysplasia or carcinoma seen.  E. Esophagus, upper third, biopsy: Squamous mucosa showing no evidence of eosinophilic esophagitis. No dysplasia or carcinoma see   Colon: none    Review of Systems   Otherwise Per HPI    Historical Information   Past Medical History:   Diagnosis Date    Coronary artery disease     Cubital tunnel syndrome     Depression     Emphysema of lung (HCC)     GERD (gastroesophageal reflux disease)     History of Clostridium difficile     Ovarian cyst     Stroke (HCC)     Takotsubo cardiomyopathy     Thoracic outlet syndrome     Tobacco abuse     Vitamin D deficiency      Past Surgical History:   Procedure Laterality Date    CARDIAC CATHETERIZATION      CABG    CORONARY ARTERY BYPASS GRAFT N/A 2/12/2016    Procedure: CABG X1; Left  EVH to mid-LAD; ZAHRA;  Surgeon: Gary Echavarria DO;  Location: [facility];  Service:     DEBRIDEMENT TENNIS ELBOW      DILATION AND CURETTAGE OF  "UTERUS      HYSTERECTOMY      HYSTEROSCOPY      LEFT OOPHORECTOMY      SALPINGECTOMY Right      Social History   Social History     Substance and Sexual Activity   Alcohol Use No     Social History     Substance and Sexual Activity   Drug Use Yes    Types: Marijuana    Comment: medical     Social History     Tobacco Use   Smoking Status Every Day    Current packs/day: 0.50    Average packs/day: 0.5 packs/day for 30.0 years (15.0 ttl pk-yrs)    Types: Cigarettes   Smokeless Tobacco Never     Family History   Problem Relation Age of Onset    Heart disease Mother     Heart attack Mother     Heart attack Brother 40        s/p stent placement    Diabetes Brother     Heart disease Brother     Cancer Father     Diabetes Sister     Heart disease Sister     Diabetes Sister        Meds/Allergies       Current Outpatient Medications:     albuterol (2.5 mg/3 mL) 0.083 % nebulizer solution    albuterol (Ventolin HFA) 90 mcg/act inhaler    aspirin (Aspirin Low Dose) 81 mg EC tablet    atorvastatin (LIPITOR) 80 mg tablet    Budeson-Glycopyrrol-Formoterol (Breztri Aerosphere) 160-9-4.8 MCG/ACT AERO    clotrimazole-betamethasone (LOTRISONE) 1-0.05 % cream    doxepin (SINEquan) 10 mg capsule    GNP Lidocaine Pain Relief 4 % PTCH    metoprolol tartrate (LOPRESSOR) 25 mg tablet    nitroglycerin (NITROSTAT) 0.4 mg SL tablet    omeprazole (PriLOSEC) 40 MG capsule    oxyCODONE-acetaminophen (PERCOCET) 5-325 mg per tablet    polyethylene glycol (GOLYTELY) 4000 mL solution    Allergies   Allergen Reactions    Gabapentin Swelling    Toradol [Ketorolac Tromethamine] GI Intolerance    Tramadol Rash       Objective     Blood pressure 136/71, pulse 100, temperature 97.6 °F (36.4 °C), temperature source Temporal, height 5' 6\" (1.676 m), weight 63.5 kg (140 lb), last menstrual period 10/14/2015, SpO2 93%, not currently breastfeeding. Body mass index is 22.6 kg/m².    Physical Exam  Vitals and nursing note reviewed.   Constitutional:       General: " She is not in acute distress.     Appearance: She is well-developed.   HENT:      Head: Normocephalic and atraumatic.   Eyes:      General: No scleral icterus.     Conjunctiva/sclera: Conjunctivae normal.   Cardiovascular:      Rate and Rhythm: Normal rate.      Heart sounds: No murmur heard.  Pulmonary:      Effort: Pulmonary effort is normal. No respiratory distress.   Abdominal:      General: Bowel sounds are normal. There is no distension.      Palpations: Abdomen is soft.      Tenderness: There is no abdominal tenderness. There is no guarding.   Musculoskeletal:         General: No swelling.   Skin:     General: Skin is warm and dry.      Coloration: Skin is not jaundiced.   Neurological:      General: No focal deficit present.      Mental Status: She is alert. Mental status is at baseline.   Psychiatric:         Mood and Affect: Mood normal.         Behavior: Behavior normal.       Lab Results:   No visits with results within 1 Day(s) from this visit.   Latest known visit with results is:   Office Visit on 01/31/2024   Component Date Value    Supplier Name 01/31/2024 [facility]     Supplier Phone Number 01/31/2024 [phone]     Order Status 01/31/2024 Processing     Delivery Request Date 01/31/2024 01/31/2024     Item Description 01/31/2024 Overnight Oximetry Test        Radiology Results:   US abdomen complete    Result Date: 9/18/2024  Narrative: ABDOMEN ULTRASOUND, COMPLETE INDICATION: D73.5: Infarction of spleen. COMPARISON: CT abdomen pelvis on 5/18/2018 TECHNIQUE: Real-time ultrasound of the abdomen was performed with a curvilinear transducer with both volumetric sweeps and still imaging techniques. FINDINGS: PANCREAS: Visualized portions of the pancreas are within normal limits. AORTA AND IVC: Visualized portions are normal for patient age. LIVER: Size: Within normal range. The liver measures 12.9 cm in the midclavicular line. Contour: Surface contour is smooth. Parenchyma: Echogenicity and  echotexture are within normal limits. No liver mass identified. Limited imaging of the main portal vein shows it to be patent and hepatopetal. BILIARY: No gallbladder findings. No intrahepatic biliary dilatation. CBD measures 3.0 mm. No choledocholithiasis. KIDNEY: Right kidney measures 9.4 x 5.3 x 5.2 cm. Volume 136.3 mL Kidney within normal limits. Left kidney measures 10.0 x 5.2 x 4.1 cm. Volume 113.3 mL Kidney within normal limits. SPLEEN: Measures 5.9 x 6.0 x 2.1 cm. Volume 39.4 mL Within normal limits. ASCITES: None.     Impression: Normal exam. Workstation performed: [id number]     FL UPPER GI UGI    Result Date: 9/16/2024  Narrative: UPPER GI SERIES - DOUBLE CONTRAST INDICATION: K30: Functional dyspepsia. COMPARISON: None TECHNIQUE: The patient was given effervescent granules and barium by mouth and images of the esophagus, stomach, and small bowel were obtained. IMAGES: 39 FLUOROSCOPY TIME: 1 MINUTE FINDINGS: The esophagus is normal in caliber. Esophageal motility is normal and emptying of contrast from the esophagus is prompt. There is no mucosal mass, ulceration or fold thickening identified. The stomach is unremarkable in size. The gastric mucosa is normal. No penetrating ulcers or masses. Contrast empties promptly into the duodenum. The duodenum is normal in caliber. The duodenojejunal junction is in its normal left upper quadrant location. Gastroesophageal reflux was not observed. There is no hiatal hernia.     Impression: Unremarkable upper GI series. Workstation performed: [id number]       Freda Doshi PA-C    **Please note:  Dictation voice to text software may have been used in the creation of this record.  Occasional wrong word or “sound alike” substitutions may have occurred due to the inherent limitations of voice recognition software.  Read the chart carefully and recognize, using context, where substitutions have occurred.**

## 2024-09-24 NOTE — PATIENT INSTRUCTIONS
Stay on omeprazole every morning.   Start famotidine/pepcid 40mg tablet every night.  Small frequent meals.   Blood work now.   Start fiber powder to bulk up your stool.   If still loose, have stool testing and breath test completed (breath test is to look for bacterial imbalance in your gut, can cause bloating and loose stools).     Colonoscopy when you are able.     If your upper GI symptoms persist, we need to consider a repeat upper Endoscopy.

## 2024-09-26 LAB — TTG IGA SER-ACNC: <2 U/ML (ref 0–3)

## 2024-10-18 DIAGNOSIS — I10 ESSENTIAL HYPERTENSION, BENIGN: ICD-10-CM

## 2024-10-18 RX ORDER — ASPIRIN 81 MG/1
81 TABLET, COATED ORAL DAILY
Qty: 30 TABLET | Refills: 0 | Status: SHIPPED | OUTPATIENT
Start: 2024-10-18

## 2024-11-18 ENCOUNTER — TELEPHONE (OUTPATIENT)
Dept: PULMONOLOGY | Facility: CLINIC | Age: 54
End: 2024-11-18

## 2024-11-19 ENCOUNTER — TELEPHONE (OUTPATIENT)
Dept: SURGERY | Facility: CLINIC | Age: 54
End: 2024-11-19

## 2024-11-19 NOTE — TELEPHONE ENCOUNTER
Lm x2 to reschedule 11/21/24 appt with Ashok, she will not be in the office on that day.  MyChart not active.

## 2024-11-20 ENCOUNTER — TELEPHONE (OUTPATIENT)
Age: 54
End: 2024-11-20

## 2024-11-20 NOTE — TELEPHONE ENCOUNTER
Patient calling to r/s appointment per provider.    Patient also asked if someone can call [facility], as she is getting phone calls from [facility] [place] saying they will be coming to  her oxygen equipment

## 2024-11-20 NOTE — TELEPHONE ENCOUNTER
Patient is due for Oxygen Recert they are calling because she overdue for follow up appointment. Unable to reach patient to schedule overdue follow up sooner than February appointment.

## 2024-11-27 ENCOUNTER — TELEPHONE (OUTPATIENT)
Dept: FAMILY MEDICINE CLINIC | Facility: CLINIC | Age: 54
End: 2024-11-27

## 2024-11-27 NOTE — TELEPHONE ENCOUNTER
Radha morris from [facility]:    Requesting faxed office notes if patient attended appointment on 11/21. Informed that it was rescheduled

## 2024-12-10 ENCOUNTER — TELEPHONE (OUTPATIENT)
Age: 54
End: 2024-12-10

## 2024-12-10 NOTE — TELEPHONE ENCOUNTER
Patient called and stated [employer] is stating they will be picking up her O2 and supplies as soon as tomorrow if re certification script is not sent in. Patient states provider changed her original appointment 11/21/24 to now 2/6/2025. Patient requesting script be sent to [facility] ASAP so she doesn't lose O2. Patient requested call back with an update. Please advise.

## 2024-12-10 NOTE — TELEPHONE ENCOUNTER
Patient did not qualify based on 6mw in January. [facility] has been trying to collect their equipment since then. Patient would like to try and requalify for home o2 and is coming in for a 6mw test 12/12.

## 2024-12-11 NOTE — PROGRESS NOTES
Pulmonary Follow Up Note  Bambi Zacarias 54 y.o. female MRN: [id number]  12/12/2024    Assessment/Plan:    Chronic obstructive pulmonary disease (HCC)  -Spirometry in 2018 with moderate obstruction  -No recent exacerbations, not requiring use of albuterol often  -Continues to smoke 1/2 PPD  -Has significant dyspnea on exertion also associated with chest tightness, lightheadedness, and tachycardia as evidenced on walk test today  -Lungs are clear on exam today.  Does not appear in acute exacerbation    Plan:  -Update PFTs to evaluate for any worsening lung function  -Recommend follow-up with cardiology due to tachycardia.  Also advised patient to limit caffeine intake, currently drinking 2 L bottle of soda daily  -Continue Breztri 2 puffs twice daily and albuterol HFA/nebs every 6 hours as needed  -Recommend influenza, pneumococcal, and COVID-vaccine, declined these today  -Follow-up in 3 months or sooner if needed      Chronic hypoxemic respiratory failure (HCC)  -Walk test performed in the office today, did have desaturation from 97% to 90% with ambulation, however did not meet requirements for supplemental oxygen  -Overnight pulse ox study in April 2024 demonstrated mild desaturation and is prescribed 2 L nightly.  Will repeat this to requalify for nightly supplemental oxygen    Smoker  -Smoked approximately 40 years x 1 PPD on average.  Current 1/2 PPD use  -Encourage smoking cessation, offered assistance with NRT/pharmacotherapy, however she declined at this time  -Overdue for CT lung screening. Encouraged patient to schedule. Order already in the system.    Diagnoses and all orders for this visit:    Chronic obstructive pulmonary disease, unspecified COPD type (HCC)  -     Complete PFT with post bronchodilator; Future  -     Budeson-Glycopyrrol-Formoterol (Breztri Aerosphere) 160-9-4.8 MCG/ACT AERO; Inhale 2 puffs every 12 (twelve) hours Rinse mouth after use.  -     albuterol (2.5 mg/3 mL) 0.083 % nebulizer  solution; Take 3 mL (2.5 mg total) by nebulization every 6 (six) hours as needed for wheezing or shortness of breath  -     albuterol (Ventolin HFA) 90 mcg/act inhaler; Inhale 2 puffs every 6 (six) hours as needed for wheezing    Chronic hypoxemic respiratory failure (HCC)  -     POCT Oxygen Titration  -     Pulse oximetry overnight    Smoker    Other orders  -     Overnight Oximetry Test      Return in about 3 months (around 3/12/2025).      History of Present Illness     Chief Complaint:   Chief Complaint   Patient presents with    Follow-up       Patient ID: Bambi is a 54 y.o. y.o. female has a past medical history of Coronary artery disease, Cubital tunnel syndrome, Depression, Emphysema of lung (HCC), GERD (gastroesophageal reflux disease), History of Clostridium difficile, Ovarian cyst, Stroke (HCC), Takotsubo cardiomyopathy, Thoracic outlet syndrome, Tobacco abuse, and Vitamin D deficiency.      12/12/2024  HPI: Bambi aZcarias is a 54 y.o. female with a past medical history of COPD, tobacco abuse, and nocturnal hypoxemia who is here today for a follow-up visit.  Last seen in the office in January 2024.  At that time, she was not on any inhaler therapy and noted worsening of her breathing.  She was restarted on Breztri and albuterol HFA/nebs as needed.  Recommend smoking cessation. She was ordered CT lung cancer screening, but did not complete. She had an overnight pulse ox study performed in April on room air that demonstrated brief desaturation below 88%, which qualified her for nocturnal oxygen.  She returns today requesting requalification for oxygen stating that [facility] is trying to come take away her oxygen.      Patient notes continued dyspnea on exertion, cough, and chest tightness.  No significant wheezing.  She uses albuterol inhaler, but does not help.  She is taking her Breztri twice daily as prescribed.  She is concerned about elevated heart rate today.  She admits to having exertional chest  tightness and lightheadedness at times.  She also tells me she drinks a 2 L bottle of soda daily.  Denies any palpitations, or syncopal episodes.  Previously followed with cardiology last seen in 2022.  Has heartburn symptoms, currently on PPI and Pepcid.  Continues to smoke 1/2 PPD.  Wearing 2 L oxygen nightly sometimes puts it on during the day if needed.      Review of Systems   Constitutional:  Negative for activity change, chills, diaphoresis, fever and unexpected weight change.   HENT:  Negative for congestion, postnasal drip, rhinorrhea, sore throat, trouble swallowing and voice change.    Respiratory:  Positive for cough, chest tightness and shortness of breath. Negative for wheezing.    Cardiovascular:  Negative for chest pain, palpitations and leg swelling.   Allergic/Immunologic: Negative.        Historical Information   Past Medical History:   Diagnosis Date    Coronary artery disease     Cubital tunnel syndrome     Depression     Emphysema of lung (HCC)     GERD (gastroesophageal reflux disease)     History of Clostridium difficile     Ovarian cyst     Stroke (HCC)     Takotsubo cardiomyopathy     Thoracic outlet syndrome     Tobacco abuse     Vitamin D deficiency      Past Surgical History:   Procedure Laterality Date    CARDIAC CATHETERIZATION      CABG    CORONARY ARTERY BYPASS GRAFT N/A 2/12/2016    Procedure: CABG X1; Left  EVH to mid-LAD; ZAHRA;  Surgeon: Gary Echavarria DO;  Location: [facility];  Service:     DEBRIDEMENT TENNIS ELBOW      DILATION AND CURETTAGE OF UTERUS      HYSTERECTOMY      HYSTEROSCOPY      LEFT OOPHORECTOMY      SALPINGECTOMY Right      Family History   Problem Relation Age of Onset    Heart disease Mother     Heart attack Mother     Heart attack Brother 40        s/p stent placement    Diabetes Brother     Heart disease Brother     Cancer Father     Diabetes Sister     Heart disease Sister     Diabetes Sister        Smoking history: She reports that she has been smoking  cigarettes. She has a 15 pack-year smoking history. She has never used smokeless tobacco.    Occupational History:     Immunization History   Administered Date(s) Administered    COVID-19 PFIZER VACCINE 0.3 ML IM 05/12/2021, 06/02/2021       Meds/Allergies     Current Outpatient Medications:     albuterol (2.5 mg/3 mL) 0.083 % nebulizer solution, Take 3 mL (2.5 mg total) by nebulization every 6 (six) hours as needed for wheezing or shortness of breath, Disp: 180 mL, Rfl: 5    albuterol (Ventolin HFA) 90 mcg/act inhaler, Inhale 2 puffs every 6 (six) hours as needed for wheezing, Disp: 18 g, Rfl: 11    aspirin (Aspirin Low Dose) 81 mg EC tablet, Take 1 tablet (81 mg total) by mouth daily, Disp: 30 tablet, Rfl: 0    atorvastatin (LIPITOR) 80 mg tablet, Take 1 tablet (80 mg total) by mouth daily, Disp: 30 tablet, Rfl: 4    Budeson-Glycopyrrol-Formoterol (Breztri Aerosphere) 160-9-4.8 MCG/ACT AERO, Inhale 2 puffs every 12 (twelve) hours Rinse mouth after use., Disp: 10.7 g, Rfl: 11    clotrimazole-betamethasone (LOTRISONE) 1-0.05 % cream, Apply topically 2 (two) times a day, Disp: 15 g, Rfl: 1    doxepin (SINEquan) 10 mg capsule, Take 1 capsule (10 mg total) by mouth daily at bedtime, Disp: 90 capsule, Rfl: 1    famotidine (PEPCID) 40 MG tablet, Take 1 tablet (40 mg total) by mouth daily at bedtime, Disp: 30 tablet, Rfl: 5    GNP Lidocaine Pain Relief 4 % PTCH, , Disp: , Rfl:     metoprolol tartrate (LOPRESSOR) 25 mg tablet, Take 1 tablet (25 mg total) by mouth every 12 (twelve) hours, Disp: 60 tablet, Rfl: 3    nitroglycerin (NITROSTAT) 0.4 mg SL tablet, Place 1 tablet (0.4 mg total) under the tongue every 5 (five) minutes as needed for chest pain, Disp: 30 tablet, Rfl: 2    omeprazole (PriLOSEC) 40 MG capsule, Take 1 capsule (40 mg total) by mouth daily, Disp: 30 capsule, Rfl: 5    oxyCODONE-acetaminophen (PERCOCET) 5-325 mg per tablet, , Disp: , Rfl:     polyethylene glycol (GOLYTELY) 4000 mL solution, Take 4,000 mL  "by mouth once for 1 dose, Disp: 4000 mL, Rfl: 0    Allergies:   Allergies   Allergen Reactions    Gabapentin Swelling    Toradol [Ketorolac Tromethamine] GI Intolerance    Tramadol Rash         Vitals:  Vitals:    12/12/24 1017   BP: 122/95   BP Location: Right arm   Patient Position: Sitting   Cuff Size: Standard   Pulse: (!) 112   Temp: 98.5 °F (36.9 °C)   TempSrc: Temporal   SpO2: 96%   Weight: 67.1 kg (148 lb)   Height: 5' 6\" (1.676 m)   Oxygen Therapy  SpO2: 96 %  .  Wt Readings from Last 3 Encounters:   12/12/24 67.1 kg (148 lb)   09/24/24 67.1 kg (148 lb)   08/28/24 66.3 kg (146 lb 3.2 oz)     Body mass index is 23.89 kg/m².    Physical Exam  Vitals and nursing note reviewed.   Constitutional:       General: She is not in acute distress.     Appearance: Normal appearance. She is well-developed.   Cardiovascular:      Rate and Rhythm: Regular rhythm. Tachycardia present.      Heart sounds: Normal heart sounds, S1 normal and S2 normal. No murmur heard.  Pulmonary:      Effort: Pulmonary effort is normal.      Breath sounds: Normal breath sounds. No decreased breath sounds, wheezing, rhonchi or rales.   Musculoskeletal:         General: No swelling.      Right lower leg: No edema.      Left lower leg: No edema.   Neurological:      Mental Status: She is alert.   Psychiatric:         Mood and Affect: Mood and affect normal.         Behavior: Behavior normal. Behavior is cooperative.           Labs: I have personally reviewed pertinent lab results.  Lab Results   Component Value Date    WBC 17.82 (H) 07/06/2022    HGB 13.8 07/06/2022    HCT 42.7 07/06/2022    MCV 91 07/06/2022     07/06/2022     Lab Results   Component Value Date    GLUCOSE 101 11/20/2017    CALCIUM 8.2 (L) 02/29/2024     11/18/2015    K 3.4 (L) 02/29/2024    CO2 19 (L) 02/29/2024     02/29/2024    BUN 7 02/29/2024    CREATININE 0.89 02/29/2024     No results found for: \"IGE\"  Lab Results   Component Value Date    ALT 5 " 02/29/2024    AST 19 02/29/2024    ALKPHOS 88 02/29/2024    BILITOT 0.21 11/18/2015

## 2024-12-12 ENCOUNTER — OFFICE VISIT (OUTPATIENT)
Dept: PULMONOLOGY | Facility: CLINIC | Age: 54
End: 2024-12-12
Payer: COMMERCIAL

## 2024-12-12 VITALS
WEIGHT: 148 LBS | HEART RATE: 112 BPM | TEMPERATURE: 98.5 F | DIASTOLIC BLOOD PRESSURE: 95 MMHG | BODY MASS INDEX: 23.78 KG/M2 | OXYGEN SATURATION: 96 % | HEIGHT: 66 IN | SYSTOLIC BLOOD PRESSURE: 122 MMHG

## 2024-12-12 DIAGNOSIS — J96.11 CHRONIC HYPOXEMIC RESPIRATORY FAILURE (HCC): ICD-10-CM

## 2024-12-12 DIAGNOSIS — F17.200 SMOKER: ICD-10-CM

## 2024-12-12 DIAGNOSIS — J44.9 CHRONIC OBSTRUCTIVE PULMONARY DISEASE, UNSPECIFIED COPD TYPE (HCC): Primary | ICD-10-CM

## 2024-12-12 PROCEDURE — 94618 PULMONARY STRESS TESTING: CPT

## 2024-12-12 PROCEDURE — 99214 OFFICE O/P EST MOD 30 MIN: CPT

## 2024-12-12 RX ORDER — BUDESONIDE, GLYCOPYRROLATE, AND FORMOTEROL FUMARATE 160; 9; 4.8 UG/1; UG/1; UG/1
2 AEROSOL, METERED RESPIRATORY (INHALATION) EVERY 12 HOURS
Qty: 10.7 G | Refills: 11 | Status: SHIPPED | OUTPATIENT
Start: 2024-12-12

## 2024-12-12 RX ORDER — ALBUTEROL SULFATE 90 UG/1
2 INHALANT RESPIRATORY (INHALATION) EVERY 6 HOURS PRN
Qty: 18 G | Refills: 11 | Status: SHIPPED | OUTPATIENT
Start: 2024-12-12

## 2024-12-12 RX ORDER — ALBUTEROL SULFATE 0.83 MG/ML
2.5 SOLUTION RESPIRATORY (INHALATION) EVERY 6 HOURS PRN
Qty: 180 ML | Refills: 5 | Status: SHIPPED | OUTPATIENT
Start: 2024-12-12

## 2024-12-12 NOTE — ASSESSMENT & PLAN NOTE
-Walk test performed in the office today, did have desaturation from 97% to 90% with ambulation, however did not meet requirements for supplemental oxygen  -Overnight pulse ox study in April 2024 demonstrated mild desaturation and is prescribed 2 L nightly.  Will repeat this to requalify for nightly supplemental oxygen

## 2024-12-12 NOTE — ASSESSMENT & PLAN NOTE
-Smoked approximately 40 years x 1 PPD on average.  Current 1/2 PPD use  -Encourage smoking cessation, offered assistance with NRT/pharmacotherapy, however she declined at this time  -Overdue for CT lung screening. Encouraged patient to schedule. Order already in the system.

## 2024-12-12 NOTE — ASSESSMENT & PLAN NOTE
-Spirometry in 2018 with moderate obstruction  -No recent exacerbations, not requiring use of albuterol often  -Continues to smoke 1/2 PPD  -Has significant dyspnea on exertion also associated with chest tightness, lightheadedness, and tachycardia as evidenced on walk test today  -Lungs are clear on exam today.  Does not appear in acute exacerbation    Plan:  -Update PFTs to evaluate for any worsening lung function  -Recommend follow-up with cardiology due to tachycardia.  Also advised patient to limit caffeine intake, currently drinking 2 L bottle of soda daily  -Continue Breztri 2 puffs twice daily and albuterol HFA/nebs every 6 hours as needed  -Recommend influenza, pneumococcal, and COVID-vaccine, declined these today  -Follow-up in 3 months or sooner if needed

## 2024-12-12 NOTE — PATIENT INSTRUCTIONS
Continue Breztri inhaler 2 puffs twice daily, rinse mouth after  Use albuterol inhaler or nebulizer ever 6 hours as needed for shortness of breath or wheezing  Schedule CT lung cancer screening  Pulmonary function test  Overnight pulse ox study  Cut back on caffeine due to elevated HR and follow-up with Cardiology  Work on smoking cessation

## 2024-12-17 ENCOUNTER — TELEPHONE (OUTPATIENT)
Age: 54
End: 2024-12-17

## 2024-12-17 NOTE — TELEPHONE ENCOUNTER
PA [facility] BRE[facility]I [facility]    via    [x]CMM-KEY: [id number]  []Surescripts-Case ID #   []Availity-Auth ID # NDC #   []Faxed to plan   []Other website   []Phone call Case ID #     [x]PA sent as URGENT    All office notes, labs and other pertaining documents and studies sent. Clinical questions answered. Awaiting determination from insurance company.     Turnaround time for your insurance to make a decision on your Prior Authorization can take 7-21 business days.

## 2024-12-18 NOTE — TELEPHONE ENCOUNTER
PA for BREZTRI NOT REQUIRED     Reason (screenshot if applicable)          Patient advised by          [] MyChart Message  [] Phone call   [x]LMOM  []L/M to call office as no active Communication consent on file  []Unable to leave detailed message as VM not approved on Communication consent       Pharmacy advised by    [x]Fax  []Phone call

## 2024-12-26 ENCOUNTER — TELEPHONE (OUTPATIENT)
Age: 54
End: 2024-12-26

## 2024-12-26 ENCOUNTER — RESULTS FOLLOW-UP (OUTPATIENT)
Dept: PULMONOLOGY | Facility: CLINIC | Age: 54
End: 2024-12-26

## 2024-12-26 DIAGNOSIS — G47.34 NOCTURNAL HYPOXIA: Primary | ICD-10-CM

## 2024-12-26 NOTE — TELEPHONE ENCOUNTER
Radha from [facility] requesting a copy of patient's overnight pule oximetry test be faxed to [phone].

## 2024-12-31 ENCOUNTER — OFFICE VISIT (OUTPATIENT)
Dept: FAMILY MEDICINE CLINIC | Facility: CLINIC | Age: 54
End: 2024-12-31
Payer: COMMERCIAL

## 2024-12-31 VITALS
SYSTOLIC BLOOD PRESSURE: 128 MMHG | HEART RATE: 85 BPM | BODY MASS INDEX: 24.02 KG/M2 | OXYGEN SATURATION: 97 % | WEIGHT: 148.8 LBS | TEMPERATURE: 98.8 F | RESPIRATION RATE: 18 BRPM | DIASTOLIC BLOOD PRESSURE: 90 MMHG

## 2024-12-31 DIAGNOSIS — Z00.00 ANNUAL PHYSICAL EXAM: Primary | ICD-10-CM

## 2024-12-31 DIAGNOSIS — M79.604 LEG PAIN, BILATERAL: ICD-10-CM

## 2024-12-31 DIAGNOSIS — Z13.6 ENCOUNTER FOR SCREENING FOR CARDIOVASCULAR DISORDERS: ICD-10-CM

## 2024-12-31 DIAGNOSIS — M79.605 LEG PAIN, BILATERAL: ICD-10-CM

## 2024-12-31 DIAGNOSIS — R07.89 SENSATION OF CHEST TIGHTNESS: ICD-10-CM

## 2024-12-31 DIAGNOSIS — R79.89 LOW VITAMIN B12 LEVEL: ICD-10-CM

## 2024-12-31 LAB
DME PARACHUTE DELIVERY DATE REQUESTED: NORMAL
DME PARACHUTE ITEM DESCRIPTION: NORMAL
DME PARACHUTE ORDER STATUS: NORMAL
DME PARACHUTE SUPPLIER NAME: NORMAL
DME PARACHUTE SUPPLIER PHONE: NORMAL
SL AMB POCT HEMOGLOBIN AIC: 5.8 (ref ?–6.5)

## 2024-12-31 PROCEDURE — 80061 LIPID PANEL: CPT | Performed by: STUDENT IN AN ORGANIZED HEALTH CARE EDUCATION/TRAINING PROGRAM

## 2024-12-31 PROCEDURE — T1015 CLINIC SERVICE: HCPCS | Performed by: FAMILY MEDICINE

## 2024-12-31 PROCEDURE — 85025 COMPLETE CBC W/AUTO DIFF WBC: CPT | Performed by: STUDENT IN AN ORGANIZED HEALTH CARE EDUCATION/TRAINING PROGRAM

## 2024-12-31 PROCEDURE — 99396 PREV VISIT EST AGE 40-64: CPT | Performed by: STUDENT IN AN ORGANIZED HEALTH CARE EDUCATION/TRAINING PROGRAM

## 2024-12-31 PROCEDURE — 80053 COMPREHEN METABOLIC PANEL: CPT | Performed by: STUDENT IN AN ORGANIZED HEALTH CARE EDUCATION/TRAINING PROGRAM

## 2024-12-31 RX ORDER — METHOCARBAMOL 500 MG/1
500 TABLET, FILM COATED ORAL AS NEEDED
COMMUNITY
Start: 2024-12-10

## 2024-12-31 RX ORDER — MIRTAZAPINE 15 MG/1
15 TABLET, FILM COATED ORAL DAILY
COMMUNITY
Start: 2024-12-10

## 2024-12-31 NOTE — PROGRESS NOTES
Adult Annual Physical  Name: Bambi Zacarias      : 1970      MRN: [id number]  Encounter Provider: Tiera Fleming DO  Encounter Date: 2024   Encounter department: [facility]    Assessment & Plan  Annual physical exam  -Concerns addressed   -Vaccines reviewed   -Prevention reviewed   -RTC in 2 months   Orders:    CBC and differential; Future    Sensation of chest tightness  -EKG done in office with known RBBB and not ST segment elevations (in media tab)   -Echo stress test with dobutamine ordered   -ER precautions reviewed with patient   -Reached out to cardiology via [employer] for scheduling   -RTC in 2 months   Orders:    POCT ECG    Echo stress test, dobutamine; Future    Leg pain, bilateral  -Bilateral arterial duplex ordered   -RTC in 2 months   Orders:    VAS ARTERIAL DUPLEX- LOWER LIMB BILATERAL; Future    Encounter for screening for cardiovascular disorders  -A1c of 5.8% in office today   -Labs ordered as below   Orders:    Comprehensive metabolic panel; Future    Lipid panel; Future    POCT hemoglobin A1c    Low vitamin B12 level  -Low B12 level noted in chart, patient never received supplements   -B12 levels reordered   Orders:    Vitamin B12; Future    Immunizations and preventive care screenings were discussed with patient today. Appropriate education was printed on patient's after visit summary.    Counseling:  Alcohol/drug use: discussed moderation in alcohol intake, the recommendations for healthy alcohol use, and avoidance of illicit drug use.  Dental Health: discussed importance of regular tooth brushing, flossing, and dental visits.  Injury prevention: discussed safety/seat belts, safety helmets, smoke detectors, carbon monoxide detectors, and smoking near bedding or upholstery.  Sexual health: discussed sexually transmitted diseases, partner selection, use of condoms, avoidance of unintended pregnancy, and contraceptive alternatives.  Exercise: the  "importance of regular exercise/physical activity was discussed. Recommend exercise 3-5 times per week for at least 30 minutes.       Tobacco Cessation Counseling: Tobacco cessation counseling was provided. The patient is sincerely urged to quit consumption of tobacco. She is not ready to quit tobacco. Patient refused medication.     Lung Cancer Screening Shared Decision Making: I discussed with her that she is a candidate for lung cancer CT screening.     The following Shared Decision-Making points were covered:  Benefits of screening were discussed, including the rates of reduction in death from lung cancer and other causes.  Harms of screening were reviewed, including false positive tests, radiation exposure levels, risks of invasive procedures, risks of complications of screening, and risk of overdiagnosis.  I counseled on the importance of adherence to annual lung cancer LDCT screening, impact of co-morbidities, and ability or willingness to undergo diagnosis and treatment.  I counseled on the importance of maintaining abstinence as a former smoker or was counseled on the importance of smoking cessation if a current smoker    Review of Eligibility Criteria: She meets all of the criteria for Lung Cancer Screening.   - She is 54 y.o.   - She has 20 pack year tobacco history and is a current smoker or has quit within the past 15 years  - She presents no signs or symptoms of lung cancer    After discussion, the patient decided to elect lung cancer screening.        History of Present Illness     Adult Annual Physical:  Patient presents for annual physical. Bambi Zacarias is a 53 y/o F with PMHx of L carotid stenosis, NSTEMI, Cardiomyopathy, HTN, RBBB, COPD, splenic infarct, depression, current smoker who presents to me for the first time for annual exam. Is complaining of \"chest tightness\" with exertion, especially with hills. This is accompanied by a feeling of \"her body shutting down\" but no dizziness or syncope. " "Her last echo showed a LVEF of 60% with normal wall motion about a year ago. Does have a history of NSTEMI.     C/O bilateral leg pain most pronounced with movement and walking. Feels better with rest. No history of DVT or PE     Also reports occasionally waking up with feeling \"numb\" on the left side of her body which is intermittent and transient. Not experiencing this at the time of this encounter. Patient advised to go to ER if that occurs again. .     Diet and Physical Activity:  - Diet/Nutrition: poor diet. not eating alot due to ongoing abdominal pain workup with GI, drinking only soda  - Exercise: no formal exercise.    General Health:  - Sleep: sleeps well.  - Hearing: normal hearing bilateral ears.  - Vision: no vision problems and wears glasses.  - Dental:. No teeth    /GYN Health:  - Follows with GYN: no.   - Menopause: postmenopausal.   - History of STDs: no  - Contraception:. s/p hysterectomy for non cancer reasons. No history of abnormal paps      Advanced Care Planning:  - Has an advanced directive?: no    - Has a durable medical POA?: no    - ACP document given to patient?: no      Review of Systems   Constitutional:  Negative for fever.   Respiratory:  Positive for cough (chronic), chest tightness (with exertion) and shortness of breath (chronic).    Cardiovascular:  Negative for chest pain and leg swelling.   Musculoskeletal:  Negative for gait problem.   Skin:  Negative for rash.   Neurological:  Negative for dizziness and syncope.   Psychiatric/Behavioral:  Negative for confusion and sleep disturbance.      Preventive Screenings  Colorectal Cancer Screening: Seeing GI, will discuss with them   Breast Cancer Screening: Mammogram ordered in chart by prior provider   Cervical Cancer Screening: N/A due to hysterectomy  Lung Cancer Screening (50-79 y/o): Scheduled for 1/6/24  AAA Screening: Not indicated due to age   STD Screening: Declined   Preconception Counseling: hysterectomy   Cardiovascular " Screening: Lipid panel, CBC, A1c ordered   Kidney health: Cmp ordered   Advance Care Planning: Discussed, not in place at this time   History of Bariatric surgery: No  Polypharmacy? Yes  Vaccination indicated: Flu, shingles, PCV20, Hep A, Hep B     Objective   LMP 10/14/2015 (LMP Unknown)     Physical Exam  Vitals reviewed.   Constitutional:       General: She is not in acute distress.     Appearance: Normal appearance. She is normal weight. She is not ill-appearing or toxic-appearing.   HENT:      Head: Normocephalic and atraumatic.      Nose: Nose normal.   Eyes:      Conjunctiva/sclera: Conjunctivae normal.   Neck:      Vascular: Carotid bruit (Left, previously noted in chart. No bruit on R side) present.   Cardiovascular:      Rate and Rhythm: Normal rate and regular rhythm.      Heart sounds: Normal heart sounds. No murmur heard.     No friction rub. No gallop.   Pulmonary:      Effort: Pulmonary effort is normal. No respiratory distress.      Breath sounds: Normal breath sounds. No wheezing, rhonchi or rales.   Musculoskeletal:      Right lower leg: No edema.      Left lower leg: No edema.   Skin:     General: Skin is warm and dry.      Comments: Slight erythema on legs bilaterally, no swelling  No varicose veins noted   Legs warm and well perfused bilaterally    Neurological:      General: No focal deficit present.      Mental Status: She is alert and oriented to person, place, and time.      Gait: Gait normal.   Psychiatric:         Mood and Affect: Mood normal.         Behavior: Behavior normal.         Thought Content: Thought content normal.       Tiera Fleming DO   PGY-2 Rural  Residency   [facility]

## 2024-12-31 NOTE — PATIENT INSTRUCTIONS
"Patient Education     Routine physical for adults   The Basics   Written by the doctors and editors at [facility]   What is a physical? -- A physical is a routine visit, or \"check-up,\" with your doctor. You might also hear it called a \"wellness visit\" or \"preventive visit.\"  During each visit, the doctor will:   Ask about your physical and mental health   Ask about your habits, behaviors, and lifestyle   Do an exam   Give you vaccines if needed   Talk to you about any medicines you take   Give advice about your health   Answer your questions  Getting regular check-ups is an important part of taking care of your health. It can help your doctor find and treat any problems you have. But it's also important for preventing health problems.  A routine physical is different from a \"sick visit.\" A sick visit is when you see a doctor because of a health concern or problem. Since physicals are scheduled ahead of time, you can think about what you want to ask the doctor.  How often should I get a physical? -- It depends on your age and health. In general, for people age 21 years and older:   If you are younger than 50 years, you might be able to get a physical every 3 years.   If you are 50 years or older, your doctor might recommend a physical every year.  If you have an ongoing health condition, like diabetes or high blood pressure, your doctor will probably want to see you more often.  What happens during a physical? -- In general, each visit will include:   Physical exam - The doctor or nurse will check your height, weight, heart rate, and blood pressure. They will also look at your eyes and ears. They will ask about how you are feeling and whether you have any symptoms that bother you.   Medicines - It's a good idea to bring a list of all the medicines you take to each doctor visit. Your doctor will talk to you about your medicines and answer any questions. Tell them if you are having any side effects that bother you. You " "should also tell them if you are having trouble paying for any of your medicines.   Habits and behaviors - This includes:   Your diet   Your exercise habits   Whether you smoke, drink alcohol, or use drugs   Whether you are sexually active   Whether you feel safe at home  Your doctor will talk to you about things you can do to improve your health and lower your risk of health problems. They will also offer help and support. For example, if you want to quit smoking, they can give you advice and might prescribe medicines. If you want to improve your diet or get more physical activity, they can help you with this, too.   Lab tests, if needed - The tests you get will depend on your age and situation. For example, your doctor might want to check your:   Cholesterol   Blood sugar   Iron level   Vaccines - The recommended vaccines will depend on your age, health, and what vaccines you already had. Vaccines are very important because they can prevent certain serious or deadly infections.   Discussion of screening - \"Screening\" means checking for diseases or other health problems before they cause symptoms. Your doctor can recommend screening based on your age, risk, and preferences. This might include tests to check for:   Cancer, such as breast, prostate, cervical, ovarian, colorectal, prostate, lung, or skin cancer   Sexually transmitted infections, such as chlamydia and gonorrhea   Mental health conditions like depression and anxiety  Your doctor will talk to you about the different types of screening tests. They can help you decide which screenings to have. They can also explain what the results might mean.   Answering questions - The physical is a good time to ask the doctor or nurse questions about your health. If needed, they can refer you to other doctors or specialists, too.  Adults older than 65 years often need other care, too. As you get older, your doctor will talk to you about:   How to prevent falling at " home   Hearing or vision tests   Memory testing   How to take your medicines safely   Making sure that you have the help and support you need at home  All topics are updated as new evidence becomes available and our peer review process is complete.  This topic retrieved from [employer] on: May 02, 2024.  Topic 594543 Version 1.0  Release: 32.4.3 - C32.122  © 2024 UpToDate, Inc. and/or its affiliates. All rights reserved.  Consumer Information Use and Disclaimer   Disclaimer: This generalized information is a limited summary of diagnosis, treatment, and/or medication information. It is not meant to be comprehensive and should be used as a tool to help the user understand and/or assess potential diagnostic and treatment options. It does NOT include all information about conditions, treatments, medications, side effects, or risks that may apply to a specific patient. It is not intended to be medical advice or a substitute for the medical advice, diagnosis, or treatment of a health care provider based on the health care provider's examination and assessment of a patient's specific and unique circumstances. Patients must speak with a health care provider for complete information about their health, medical questions, and treatment options, including any risks or benefits regarding use of medications. This information does not endorse any treatments or medications as safe, effective, or approved for treating a specific patient. UpToDate, Inc. and its affiliates disclaim any warranty or liability relating to this information or the use thereof.The use of this information is governed by the Terms of Use, available at https://www.wolters[employer]Espressouwer.com/en/know/clinical-effectiveness-terms. 2024© UpToDate, Inc. and its affiliates and/or licensors. All rights reserved.  Copyright   © 2024 UpToDate, Inc. and/or its affiliates. All rights reserved.

## 2025-01-02 LAB
ALBUMIN SERPL BCG-MCNC: 4.2 G/DL (ref 3.5–5)
ALP SERPL-CCNC: 125 U/L (ref 34–104)
ALT SERPL W P-5'-P-CCNC: 9 U/L (ref 7–52)
ANION GAP SERPL CALCULATED.3IONS-SCNC: 7 MMOL/L (ref 4–13)
AST SERPL W P-5'-P-CCNC: 17 U/L (ref 13–39)
BASOPHILS # BLD AUTO: 0.14 THOUSANDS/ΜL (ref 0–0.1)
BASOPHILS NFR BLD AUTO: 1 % (ref 0–1)
BILIRUB SERPL-MCNC: 0.34 MG/DL (ref 0.2–1)
BUN SERPL-MCNC: 6 MG/DL (ref 5–25)
CALCIUM SERPL-MCNC: 9.2 MG/DL (ref 8.4–10.2)
CHLORIDE SERPL-SCNC: 107 MMOL/L (ref 96–108)
CHOLEST SERPL-MCNC: 198 MG/DL (ref ?–200)
CO2 SERPL-SCNC: 26 MMOL/L (ref 21–32)
CREAT SERPL-MCNC: 0.84 MG/DL (ref 0.6–1.3)
EOSINOPHIL # BLD AUTO: 0.07 THOUSAND/ΜL (ref 0–0.61)
EOSINOPHIL NFR BLD AUTO: 1 % (ref 0–6)
ERYTHROCYTE [DISTWIDTH] IN BLOOD BY AUTOMATED COUNT: 13.6 % (ref 11.6–15.1)
GFR SERPL CREATININE-BSD FRML MDRD: 79 ML/MIN/1.73SQ M
GLUCOSE P FAST SERPL-MCNC: 87 MG/DL (ref 65–99)
HCT VFR BLD AUTO: 45 % (ref 34.8–46.1)
HDLC SERPL-MCNC: 36 MG/DL
HGB BLD-MCNC: 13.9 G/DL (ref 11.5–15.4)
IMM GRANULOCYTES # BLD AUTO: 0.05 THOUSAND/UL (ref 0–0.2)
IMM GRANULOCYTES NFR BLD AUTO: 0 % (ref 0–2)
LDLC SERPL CALC-MCNC: 133 MG/DL (ref 0–100)
LYMPHOCYTES # BLD AUTO: 2.61 THOUSANDS/ΜL (ref 0.6–4.47)
LYMPHOCYTES NFR BLD AUTO: 23 % (ref 14–44)
MCH RBC QN AUTO: 29 PG (ref 26.8–34.3)
MCHC RBC AUTO-ENTMCNC: 30.9 G/DL (ref 31.4–37.4)
MCV RBC AUTO: 94 FL (ref 82–98)
MONOCYTES # BLD AUTO: 0.93 THOUSAND/ΜL (ref 0.17–1.22)
MONOCYTES NFR BLD AUTO: 8 % (ref 4–12)
NEUTROPHILS # BLD AUTO: 7.44 THOUSANDS/ΜL (ref 1.85–7.62)
NEUTS SEG NFR BLD AUTO: 67 % (ref 43–75)
NONHDLC SERPL-MCNC: 162 MG/DL
NRBC BLD AUTO-RTO: 0 /100 WBCS
PLATELET # BLD AUTO: 463 THOUSANDS/UL (ref 149–390)
PMV BLD AUTO: 9.9 FL (ref 8.9–12.7)
POTASSIUM SERPL-SCNC: 4.1 MMOL/L (ref 3.5–5.3)
PROT SERPL-MCNC: 7.4 G/DL (ref 6.4–8.4)
RBC # BLD AUTO: 4.8 MILLION/UL (ref 3.81–5.12)
SODIUM SERPL-SCNC: 140 MMOL/L (ref 135–147)
TRIGL SERPL-MCNC: 143 MG/DL (ref ?–150)
WBC # BLD AUTO: 11.24 THOUSAND/UL (ref 4.31–10.16)

## 2025-01-03 ENCOUNTER — TELEPHONE (OUTPATIENT)
Dept: FAMILY MEDICINE CLINIC | Facility: CLINIC | Age: 55
End: 2025-01-03

## 2025-01-03 NOTE — TELEPHONE ENCOUNTER
JEANINE Dukes [facility] stating:    This message is for Crescencio. I just wanted to let you know I did receive your information that Bambi Zacarias's blood work was being sent today. Unfortunately one of the tests, the vitamin B12 will have to be recollected due to the delay in testing. That test unfortunately must be resulted on day of collection within 24 hours of collection. So I have cancelled the vitamin B12 and I'm waiting for the rest of the lab work to be sent to our lab for completion. But I am notifying you via phone that the vitamin B12 unfortunately will need to be recollected. Thank You.    Please do let me know if there's anything that I can do to help  you in follow up.      Star

## 2025-01-06 ENCOUNTER — HOSPITAL ENCOUNTER (OUTPATIENT)
Dept: PULMONOLOGY | Facility: HOSPITAL | Age: 55
Discharge: HOME | End: 2025-01-06

## 2025-01-06 RX ORDER — ALBUTEROL SULFATE 0.83 MG/ML
2.5 SOLUTION RESPIRATORY (INHALATION) ONCE AS NEEDED
Status: DISCONTINUED | OUTPATIENT
Start: 2025-01-06 | End: 2025-01-10 | Stop reason: HOSPADM

## 2025-01-08 LAB
DME PARACHUTE DELIVERY DATE ACTUAL: NORMAL
DME PARACHUTE DELIVERY DATE ACTUAL: NORMAL
DME PARACHUTE DELIVERY DATE REQUESTED: NORMAL
DME PARACHUTE DELIVERY DATE REQUESTED: NORMAL
DME PARACHUTE ITEM DESCRIPTION: NORMAL
DME PARACHUTE ORDER STATUS: NORMAL
DME PARACHUTE ORDER STATUS: NORMAL
DME PARACHUTE SUPPLIER NAME: NORMAL
DME PARACHUTE SUPPLIER NAME: NORMAL
DME PARACHUTE SUPPLIER PHONE: NORMAL
DME PARACHUTE SUPPLIER PHONE: NORMAL

## 2025-01-14 ENCOUNTER — HOSPITAL ENCOUNTER (OUTPATIENT)
Dept: PULMONOLOGY | Facility: HOSPITAL | Age: 55
Discharge: HOME | End: 2025-01-14
Payer: COMMERCIAL

## 2025-01-14 ENCOUNTER — HOSPITAL ENCOUNTER (OUTPATIENT)
Dept: CT IMAGING | Facility: HOSPITAL | Age: 55
Discharge: HOME | End: 2025-01-14
Attending: INTERNAL MEDICINE
Payer: COMMERCIAL

## 2025-01-14 DIAGNOSIS — J44.9 CHRONIC OBSTRUCTIVE PULMONARY DISEASE, UNSPECIFIED COPD TYPE (HCC): ICD-10-CM

## 2025-01-14 DIAGNOSIS — F17.210 SMOKING GREATER THAN 20 PACK YEARS: ICD-10-CM

## 2025-01-14 PROCEDURE — 94729 DIFFUSING CAPACITY: CPT | Performed by: INTERNAL MEDICINE

## 2025-01-14 PROCEDURE — 94726 PLETHYSMOGRAPHY LUNG VOLUMES: CPT

## 2025-01-14 PROCEDURE — 94060 EVALUATION OF WHEEZING: CPT | Performed by: INTERNAL MEDICINE

## 2025-01-14 PROCEDURE — 94726 PLETHYSMOGRAPHY LUNG VOLUMES: CPT | Performed by: INTERNAL MEDICINE

## 2025-01-14 PROCEDURE — 94729 DIFFUSING CAPACITY: CPT

## 2025-01-14 PROCEDURE — 94760 N-INVAS EAR/PLS OXIMETRY 1: CPT

## 2025-01-14 PROCEDURE — 94060 EVALUATION OF WHEEZING: CPT

## 2025-01-14 RX ORDER — ALBUTEROL SULFATE 0.83 MG/ML
2.5 SOLUTION RESPIRATORY (INHALATION) ONCE AS NEEDED
Status: COMPLETED | OUTPATIENT
Start: 2025-01-14 | End: 2025-01-14

## 2025-01-14 RX ADMIN — ALBUTEROL SULFATE 2.5 MG: 2.5 SOLUTION RESPIRATORY (INHALATION) at 13:38

## 2025-01-17 ENCOUNTER — RESULTS FOLLOW-UP (OUTPATIENT)
Dept: PULMONOLOGY | Facility: CLINIC | Age: 55
End: 2025-01-17

## 2025-01-21 ENCOUNTER — RESULTS FOLLOW-UP (OUTPATIENT)
Dept: PULMONOLOGY | Facility: CLINIC | Age: 55
End: 2025-01-21

## 2025-02-25 ENCOUNTER — OFFICE VISIT (OUTPATIENT)
Dept: FAMILY MEDICINE CLINIC | Facility: CLINIC | Age: 55
End: 2025-02-25
Payer: COMMERCIAL

## 2025-02-25 VITALS
TEMPERATURE: 100.2 F | SYSTOLIC BLOOD PRESSURE: 126 MMHG | WEIGHT: 150.2 LBS | OXYGEN SATURATION: 95 % | HEART RATE: 106 BPM | DIASTOLIC BLOOD PRESSURE: 82 MMHG | RESPIRATION RATE: 18 BRPM | HEIGHT: 66 IN | BODY MASS INDEX: 24.14 KG/M2

## 2025-02-25 DIAGNOSIS — J44.89 EMPHYSEMA WITH CHRONIC BRONCHITIS (HCC): ICD-10-CM

## 2025-02-25 DIAGNOSIS — I10 ESSENTIAL HYPERTENSION, BENIGN: ICD-10-CM

## 2025-02-25 DIAGNOSIS — E78.5 DYSLIPIDEMIA: ICD-10-CM

## 2025-02-25 DIAGNOSIS — Z72.0 TOBACCO USE: ICD-10-CM

## 2025-02-25 DIAGNOSIS — Z95.1 S/P CABG X 1: ICD-10-CM

## 2025-02-25 DIAGNOSIS — J44.1 COPD EXACERBATION (HCC): Primary | ICD-10-CM

## 2025-02-25 DIAGNOSIS — I42.0 CARDIOMYOPATHY, DILATED (HCC): ICD-10-CM

## 2025-02-25 PROCEDURE — T1015 CLINIC SERVICE: HCPCS | Performed by: FAMILY MEDICINE

## 2025-02-25 PROCEDURE — 99213 OFFICE O/P EST LOW 20 MIN: CPT

## 2025-02-25 RX ORDER — NITROGLYCERIN 0.4 MG/1
0.4 TABLET SUBLINGUAL
Qty: 30 TABLET | Refills: 2 | Status: SHIPPED | OUTPATIENT
Start: 2025-02-25

## 2025-02-25 RX ORDER — METOPROLOL TARTRATE 25 MG/1
25 TABLET, FILM COATED ORAL EVERY 12 HOURS SCHEDULED
Qty: 60 TABLET | Refills: 3 | Status: SHIPPED | OUTPATIENT
Start: 2025-02-25

## 2025-02-25 RX ORDER — ATORVASTATIN CALCIUM 80 MG/1
80 TABLET, FILM COATED ORAL DAILY
Qty: 30 TABLET | Refills: 4 | Status: SHIPPED | OUTPATIENT
Start: 2025-02-25

## 2025-02-25 RX ORDER — PREDNISONE 20 MG/1
40 TABLET ORAL DAILY
Qty: 10 TABLET | Refills: 0 | Status: SHIPPED | OUTPATIENT
Start: 2025-02-25

## 2025-02-25 RX ORDER — ASPIRIN 81 MG/1
81 TABLET ORAL DAILY
Qty: 30 TABLET | Refills: 0 | Status: SHIPPED | OUTPATIENT
Start: 2025-02-25

## 2025-02-25 NOTE — PROGRESS NOTES
"Name: Bambi Zacarias      : 1970      MRN: [id number]  Encounter Provider: Yves Mendoza MD  Encounter Date: 2025   Encounter department: [facility]  :  Patient with no wheezing on exam and seems stable while at rest, saturations are good on room air, though increased sputum production and a history of COPD is concerning of a COPD exacerbation that is mild and could worsen given her recent likely viral illness.  Will give prednisone burst and reevaluate in 1 week or sooner as needed.  Recommended that patient obtain her stress echo to assess her heart that was ordered by Dr. Fleming in December.  Assessment & Plan  COPD exacerbation (HCC)    Orders:    predniSONE 20 mg tablet; Take 2 tablets (40 mg total) by mouth daily    Emphysema with chronic bronchitis (HCC)         Cardiomyopathy, dilated (HCC)         Essential hypertension, benign    Orders:    aspirin (Aspirin Low Dose) 81 mg EC tablet; Take 1 tablet (81 mg total) by mouth daily    metoprolol tartrate (LOPRESSOR) 25 mg tablet; Take 1 tablet (25 mg total) by mouth every 12 (twelve) hours    Dyslipidemia    Orders:    atorvastatin (LIPITOR) 80 mg tablet; Take 1 tablet (80 mg total) by mouth daily    CAD S/P CABG x 1     Orders:    nitroglycerin (NITROSTAT) 0.4 mg SL tablet; Place 1 tablet (0.4 mg total) under the tongue every 5 (five) minutes as needed for chest pain    Tobacco use                History of Present Illness   HPI  CC: recheck abdominal issues  States she has occasional cough with acid reflux, has tried tums, prilosec which does not help. Pepcid helps significantly. Drinking water works to reduce symptoms. For x3-4 days ago started having worsening symptoms because she started to get \"a cold\" which was accompanied by subjective fever/sweats, chills, fatigue, worsening GI sxs, SOB with exertion which albuterol inhaler relieves, not using it much more often than normal. Currently does not feel hot.  " "No decreased appetite nausea vomiting abdominal pain, getting good fluids. Non-progressive, not improving. Not tested for flu/covid, no recent sick contacts.  Patient has no trouble breathing at rest, does have shortness of breath on exertion, noting increased sputum production without change in the quality of the sputum.  She is not using her inhalers more often.  Has never been hospitalized and does not frequently use the emergency department for COPD which has been diagnosed via PFTs. Last COPD exacerbation per patient was >1 year ago.  Smokes 0.5ppd, though has not smoked much during this period of illness. Tried nicotine gum, does not want lozenges, tried patches decades ago, was afraid of chantix side effects and did not take it.    Review of Systems   Constitutional:  Negative for chills and fever.   HENT:  Negative for ear pain and sore throat.    Eyes:  Negative for pain and visual disturbance.   Respiratory:  Positive for cough, shortness of breath and wheezing.    Cardiovascular:  Negative for chest pain, palpitations and leg swelling.   Gastrointestinal:  Negative for abdominal pain, diarrhea, nausea and vomiting.   Genitourinary:  Negative for dysuria and hematuria.   Musculoskeletal:  Negative for arthralgias and back pain.   Skin:  Negative for color change and rash.   Neurological:  Negative for dizziness, syncope and light-headedness.   Psychiatric/Behavioral:  Negative for confusion.    All other systems reviewed and are negative.      Objective   /82 (BP Location: Left arm, Patient Position: Sitting, Cuff Size: Large)   Pulse (!) 106   Temp 100.2 °F (37.9 °C) (Tympanic)   Resp 18   Ht 5' 6\" (1.676 m)   Wt 68.1 kg (150 lb 3.2 oz)   LMP 10/14/2015 (LMP Unknown)   SpO2 95%   BMI 24.24 kg/m²      Physical Exam  Vitals and nursing note reviewed.   Constitutional:       General: She is not in acute distress.     Appearance: She is well-developed.   HENT:      Head: Normocephalic and " atraumatic.      Right Ear: Tympanic membrane, ear canal and external ear normal. There is no impacted cerumen.      Left Ear: Tympanic membrane, ear canal and external ear normal. There is no impacted cerumen.      Nose: Nose normal. No congestion or rhinorrhea.      Mouth/Throat:      Mouth: Mucous membranes are moist.      Pharynx: Oropharynx is clear. No oropharyngeal exudate or posterior oropharyngeal erythema.   Eyes:      General: No scleral icterus.        Right eye: No discharge.         Left eye: No discharge.      Conjunctiva/sclera: Conjunctivae normal.   Cardiovascular:      Rate and Rhythm: Normal rate and regular rhythm.      Pulses: Normal pulses.      Heart sounds: Normal heart sounds. No murmur heard.     No friction rub. No gallop.      Comments: No tachycardia on my exam  Pulmonary:      Effort: Pulmonary effort is normal. No respiratory distress.      Breath sounds: Normal breath sounds. No stridor. No wheezing, rhonchi or rales.      Comments: Saturating well on room air  Abdominal:      General: Bowel sounds are normal. There is no distension.      Palpations: Abdomen is soft. There is no mass.      Tenderness: There is no abdominal tenderness. There is no guarding or rebound.   Musculoskeletal:         General: No swelling.      Cervical back: Neck supple.   Skin:     General: Skin is warm and dry.      Coloration: Skin is not jaundiced or pale.   Neurological:      Mental Status: She is alert.      Comments: No facial droop, slurred speech or gross focal deficits noted

## 2025-02-25 NOTE — ASSESSMENT & PLAN NOTE
Orders:    aspirin (Aspirin Low Dose) 81 mg EC tablet; Take 1 tablet (81 mg total) by mouth daily    metoprolol tartrate (LOPRESSOR) 25 mg tablet; Take 1 tablet (25 mg total) by mouth every 12 (twelve) hours

## 2025-05-27 ENCOUNTER — TELEPHONE (OUTPATIENT)
Age: 55
End: 2025-05-27

## 2025-05-29 NOTE — TELEPHONE ENCOUNTER
No orders in media and no way to push the parachute order to the provider for e-signature. Patient also hasn't been seen since December 2024 and will need an appointment. Will try to call to schedule patient.

## 2025-05-30 DIAGNOSIS — K30 INDIGESTION: ICD-10-CM

## 2025-05-30 RX ORDER — OMEPRAZOLE 40 MG/1
40 CAPSULE, DELAYED RELEASE ORAL DAILY
Qty: 30 CAPSULE | Refills: 5 | Status: SHIPPED | OUTPATIENT
Start: 2025-05-30

## 2025-08-20 ENCOUNTER — TELEPHONE (OUTPATIENT)
Dept: PULMONOLOGY | Facility: CLINIC | Age: 55
End: 2025-08-20